# Patient Record
Sex: FEMALE | Race: WHITE | NOT HISPANIC OR LATINO | Employment: FULL TIME | URBAN - METROPOLITAN AREA
[De-identification: names, ages, dates, MRNs, and addresses within clinical notes are randomized per-mention and may not be internally consistent; named-entity substitution may affect disease eponyms.]

---

## 2017-04-20 ENCOUNTER — MYC MEDICAL ADVICE (OUTPATIENT)
Dept: FAMILY MEDICINE | Facility: CLINIC | Age: 42
End: 2017-04-20

## 2017-04-20 DIAGNOSIS — G47.19 DAYTIME HYPERSOMNOLENCE: ICD-10-CM

## 2017-04-20 DIAGNOSIS — R53.83 FATIGUE, UNSPECIFIED TYPE: Primary | ICD-10-CM

## 2017-04-24 DIAGNOSIS — G47.19 DAYTIME HYPERSOMNOLENCE: ICD-10-CM

## 2017-04-24 DIAGNOSIS — R53.83 FATIGUE, UNSPECIFIED TYPE: ICD-10-CM

## 2017-04-24 LAB
ERYTHROCYTE [DISTWIDTH] IN BLOOD BY AUTOMATED COUNT: 12.3 % (ref 10–15)
HCT VFR BLD AUTO: 37.1 % (ref 35–47)
HGB BLD-MCNC: 13.1 G/DL (ref 11.7–15.7)
MCH RBC QN AUTO: 32.8 PG (ref 26.5–33)
MCHC RBC AUTO-ENTMCNC: 35.3 G/DL (ref 31.5–36.5)
MCV RBC AUTO: 93 FL (ref 78–100)
PLATELET # BLD AUTO: 265 10E9/L (ref 150–450)
RBC # BLD AUTO: 3.99 10E12/L (ref 3.8–5.2)
TSH SERPL DL<=0.005 MIU/L-ACNC: 2.13 MU/L (ref 0.4–4)
WBC # BLD AUTO: 8.5 10E9/L (ref 4–11)

## 2017-04-24 PROCEDURE — 36415 COLL VENOUS BLD VENIPUNCTURE: CPT | Performed by: FAMILY MEDICINE

## 2017-04-24 PROCEDURE — 84443 ASSAY THYROID STIM HORMONE: CPT | Performed by: FAMILY MEDICINE

## 2017-04-24 PROCEDURE — 85027 COMPLETE CBC AUTOMATED: CPT | Performed by: FAMILY MEDICINE

## 2017-04-24 PROCEDURE — 82306 VITAMIN D 25 HYDROXY: CPT | Performed by: FAMILY MEDICINE

## 2017-04-25 LAB — DEPRECATED CALCIDIOL+CALCIFEROL SERPL-MC: 21 UG/L (ref 20–75)

## 2017-04-26 ENCOUNTER — MYC MEDICAL ADVICE (OUTPATIENT)
Dept: FAMILY MEDICINE | Facility: CLINIC | Age: 42
End: 2017-04-26

## 2017-04-27 NOTE — TELEPHONE ENCOUNTER
Dr. Maher -  Patient has an appointment on Friday. Please review results below. Thank you.  Adele Cannon RN    Component      Latest Ref Rng & Units 4/24/2017   WBC      4.0 - 11.0 10e9/L 8.5   RBC Count      3.8 - 5.2 10e12/L 3.99   Hemoglobin      11.7 - 15.7 g/dL 13.1   Hematocrit      35.0 - 47.0 % 37.1   MCV      78 - 100 fl 93   MCH      26.5 - 33.0 pg 32.8   MCHC      31.5 - 36.5 g/dL 35.3   RDW      10.0 - 15.0 % 12.3   Platelet Count      150 - 450 10e9/L 265   TSH      0.40 - 4.00 mU/L 2.13   Vitamin D Deficiency screening      20 - 75 ug/L 21

## 2017-04-28 ENCOUNTER — OFFICE VISIT (OUTPATIENT)
Dept: FAMILY MEDICINE | Facility: CLINIC | Age: 42
End: 2017-04-28
Payer: COMMERCIAL

## 2017-04-28 VITALS
DIASTOLIC BLOOD PRESSURE: 75 MMHG | HEIGHT: 66 IN | WEIGHT: 187 LBS | SYSTOLIC BLOOD PRESSURE: 118 MMHG | HEART RATE: 61 BPM | BODY MASS INDEX: 30.05 KG/M2 | TEMPERATURE: 98 F

## 2017-04-28 DIAGNOSIS — R53.82 CHRONIC FATIGUE: Primary | ICD-10-CM

## 2017-04-28 PROCEDURE — 99213 OFFICE O/P EST LOW 20 MIN: CPT | Performed by: FAMILY MEDICINE

## 2017-04-28 ASSESSMENT — ANXIETY QUESTIONNAIRES
5. BEING SO RESTLESS THAT IT IS HARD TO SIT STILL: NOT AT ALL
3. WORRYING TOO MUCH ABOUT DIFFERENT THINGS: NOT AT ALL
7. FEELING AFRAID AS IF SOMETHING AWFUL MIGHT HAPPEN: NOT AT ALL
2. NOT BEING ABLE TO STOP OR CONTROL WORRYING: NOT AT ALL
GAD7 TOTAL SCORE: 0
6. BECOMING EASILY ANNOYED OR IRRITABLE: NOT AT ALL
1. FEELING NERVOUS, ANXIOUS, OR ON EDGE: NOT AT ALL

## 2017-04-28 ASSESSMENT — PATIENT HEALTH QUESTIONNAIRE - PHQ9: 5. POOR APPETITE OR OVEREATING: NOT AT ALL

## 2017-04-28 NOTE — MR AVS SNAPSHOT
After Visit Summary   4/28/2017    Caitlin Oh    MRN: [id number]           Patient Information     Date Of Birth          1975        Visit Information        Provider Department      4/28/2017 7:30 AM Sujatha Maher MD [facility]        Today's Diagnoses     Chronic fatigue    -  1      Care Instructions    Try some flonase or nasonex mist spray for the next 2-4 weeks to see if this helps at all  If not make the sleep  appointment           Follow-ups after your visit        Additional Services     SLEEP EVALUATION & MANAGEMENT REFERRAL - ADULT       Please be aware that coverage of these services is subject to the terms and limitations of your health insurance plan.  Call member services at your health plan with any benefit or coverage questions.      Please bring the following to your appointment:    >>   List of current medications   >>   This referral request   >>   Any documents/labs given to you for this referral    [facility]  Ph [phone] (Age 2 and up)                  Future tests that were ordered for you today     Open Future Orders        Priority Expected Expires Ordered    SLEEP EVALUATION & MANAGEMENT REFERRAL - ADULT Routine  4/28/2018 4/28/2017            Who to contact     Normal or non-critical lab and imaging results will be communicated to you by MyChart, letter or phone within 4 business days after the clinic has received the results. If you do not hear from us within 7 days, please contact the clinic through MyChart or phone. If you have a critical or abnormal lab result, we will notify you by phone as soon as possible.  Submit refill requests through AdStackt or call your pharmacy and they will forward the refill request to us. Please allow 3 business days for your refill to be completed.          If you need to speak with a  for additional information , please call: [phone]             Additional  "Information About Your Visit        MyChart Information     [employer] gives you secure access to your electronic health record. If you see a primary care provider, you can also send messages to your care team and make appointments. If you have questions, please call your primary care clinic.  If you do not have a primary care provider, please call [phone] and they will assist you.        Care EveryWhere ID     This is your Care EveryWhere ID. This could be used by other organizations to access your [facility] medical records  [id number]        Your Vitals Were     Pulse Temperature Height BMI (Body Mass Index)          61 98  F (36.7  C) (Tympanic) 5' 6\" (1.676 m) 30.18 kg/m2         Blood Pressure from Last 3 Encounters:   04/28/17 118/75   09/27/16 128/73   05/13/16 119/76    Weight from Last 3 Encounters:   04/28/17 187 lb (84.8 kg)   09/27/16 191 lb (86.6 kg)   05/13/16 187 lb (84.8 kg)               Primary Care Provider Office Phone # Fax #    Sujatha Maher [phone]       [facility] [address] [facility] [address]        Thank you!     Thank you for choosing [facility]  for your care. Our goal is always to provide you with excellent care. Hearing back from our patients is one way we can continue to improve our services. Please take a few minutes to complete the written survey that you may receive in the mail after your visit with us. Thank you!             Your Updated Medication List - Protect others around you: Learn how to safely use, store and throw away your medicines at www.disposemymeds.org.          This list is accurate as of: 4/28/17  8:12 AM.  Always use your most recent med list.                   Brand Name Dispense Instructions for use    ADVIL PO      Take by mouth every 6 hours as needed       fluticasone 50 MCG/ACT spray    FLONASE    16 g    Spray 1-2 sprays into both nostrils daily       naproxen 500 MG tablet    NAPROSYN    60 tablet    " Take 1 tablet (500 mg) by mouth 2 times daily as needed for moderate pain       rizatriptan 5 MG ODT tab    MAXALT-MLT    18 tablet    Take 2 tablets (10 mg) by mouth at onset of headache for migraine May repeat dose in 2 hours.  Do not exceed 30 mg in 24 hours       tiZANidine 4 MG tablet    ZANAFLEX    90 tablet    Take 0.5 tablets (2 mg) by mouth At Bedtime May increase to 1 tablet as tolerated       topiramate 50 MG tablet    TOPAMAX    180 tablet    Take 1 tablet (50 mg) by mouth 2 times daily       TYLENOL PO      Take 500 mg by mouth every 6 hours as needed

## 2017-04-28 NOTE — NURSING NOTE
"No chief complaint on file.      Initial /75 (BP Location: Right arm, Patient Position: Chair, Cuff Size: Adult Regular)  Pulse 61  Temp 98  F (36.7  C) (Tympanic)  Ht 5' 6\" (1.676 m)  Wt 187 lb (84.8 kg)  BMI 30.18 kg/m2 Estimated body mass index is 30.18 kg/(m^2) as calculated from the following:    Height as of this encounter: 5' 6\" (1.676 m).    Weight as of this encounter: 187 lb (84.8 kg).  Medication Reconciliation: complete   Laurita Matute LPN    "

## 2017-04-28 NOTE — PATIENT INSTRUCTIONS
Try some flonase or nasonex mist spray for the next 2-4 weeks to see if this helps at all  If not make the sleep  appointment

## 2017-04-28 NOTE — PROGRESS NOTES
SUBJECTIVE:                                                    Caitlin Oh is a 42 year old female who presents to clinic today for the following health issues:      Follow up on recent blood work   Feels tired and she feels like she could just close her eyes and sleep   Feels rested in the morning   No headache in the morning   As the day goes on she just feels wiped   Not mentally fatigued just feels like she wants to close her eyes but she is not really sleepy  She is a    Not falling asleep driving. She is very active during the day  She has always had an issue with feeling a bit tired during the day  At night she sleeps with a pillow over her head.   She does wake one time to take the dog out. Remembers her dreams most nights.     No change in weight     Problem list and histories reviewed & adjusted, as indicated.  Additional history: as documented    Patient Active Problem List   Diagnosis     Esophageal reflux     CARDIOVASCULAR SCREENING; LDL GOAL LESS THAN 160     Kidney stones     Tension headache, chronic     Menstrual migraine     Menorrhagia     Cervical high risk HPV (human papillomavirus) test positive     Past Surgical History:   Procedure Laterality Date     BUNIONECTOMY RT/LT Bilateral 11/2009     CYSTOSCOPY, RETROGRADES, INSERT STENT URETER(S), COMBINED  9/19/2013    Procedure: COMBINED CYSTOSCOPY, RETROGRADES, INSERT STENT URETER(S);  Right Ureteral Stent Placement;  Surgeon: JUN Villar MD;  Location: [facility] OR     DILATION AND CURETTAGE, HYSTEROSCOPY, ABLATE ENDOMETRIUM THERMACHOICE, COMBINED N/A 4/15/2015    Procedure: COMBINED DILATION AND CURETTAGE, HYSTEROSCOPY, ABLATE ENDOMETRIUM THERMACHOICE;  Surgeon: Munira Goddard MD;  Location: [facility] OR     EXTRACORPOREAL SHOCK WAVE LITHOTRIPSY (ESWL)  9/18/2013    Procedure: EXTRACORPOREAL SHOCK WAVE LITHOTRIPSY (ESWL);  Right Extracorporeal Shock Wave Lithotripsy;  Surgeon: JUN Villar MD;  Location: [facility] OR     [facility]  SURGERY      for plantar fasciitis     HC TOOTH EXTRACTION W/FORCEP      wisdom teeth     LAPAROSCOPY DIAGNOSTIC (GYN)  2007    infertility     LASER HOLMIUM LITHOTRIPSY URETER(S), INSERT STENT, COMBINED  9/27/2013    Procedure: COMBINED CYSTOSCOPY, URETEROSCOPY, LASER HOLMIUM LITHOTRIPSY URETER(S), INSERT STENT;  Right Ureteroscopic Stone Extraction and Possible Stent Placement;  Surgeon: JUN Villar MD;  Location: [facility] OR     TONSILLECTOMY  1983       Social History   Substance Use Topics     Smoking status: Never Smoker     Smokeless tobacco: Never Used     Alcohol use No     Family History   Problem Relation Age of Onset     HEART DISEASE Maternal Grandmother      Breast Cancer Maternal Grandmother      dx'd age 70     Arthritis Mother      Hypertension Mother      CANCER Mother      skin cancer     Prostate Cancer Father            Reviewed and updated as needed this visit by clinical staff  Tobacco  Allergies  Med Hx  Surg Hx  Fam Hx  Soc Hx      Reviewed and updated as needed this visit by Provider         ROS:  C: NEGATIVE for fever, chills, change in weight  I: NEGATIVE for worrisome rashes, moles or lesions  E: NEGATIVE for vision changes or irritation  E/M: NEGATIVE for ear, mouth and throat problems  R: NEGATIVE for significant cough or SOB  B: NEGATIVE for masses, tenderness or discharge  CV: NEGATIVE for chest pain, palpitations or peripheral edema  GI: NEGATIVE for nausea, abdominal pain, heartburn, or change in bowel habits  : NEGATIVE for frequency, dysuria, or hematuria  M: NEGATIVE for significant arthralgias or myalgia  N: NEGATIVE for weakness, dizziness or paresthesias  E: NEGATIVE for temperature intolerance, skin/hair changes  ENDOCRINE: NEGATIVE for cold intolerance, constipation, hair loss, hot flashes, palpitations, paresthesias, polydipsia, polyphagia, polyuria, weight gain and weight loss  H: NEGATIVE for bleeding problems  P: NEGATIVE for changes in mood or affect    OBJECTIVE:  "                                                   /75 (BP Location: Right arm, Patient Position: Chair, Cuff Size: Adult Regular)  Pulse 61  Temp 98  F (36.7  C) (Tympanic)  Ht 5' 6\" (1.676 m)  Wt 187 lb (84.8 kg)  BMI 30.18 kg/m2  Body mass index is 30.18 kg/(m^2).  GENERAL: healthy, alert and no distress    Diagnostic Test Results:  Results for orders placed or performed in visit on 04/24/17   **TSH with free T4 reflex FUTURE anytime   Result Value Ref Range    TSH 2.13 0.40 - 4.00 mU/L   **Vitamin D Deficiency FUTURE anytime   Result Value Ref Range    Vitamin D Deficiency screening 21 20 - 75 ug/L   **CBC with platelets FUTURE 2mo   Result Value Ref Range    WBC 8.5 4.0 - 11.0 10e9/L    RBC Count 3.99 3.8 - 5.2 10e12/L    Hemoglobin 13.1 11.7 - 15.7 g/dL    Hematocrit 37.1 35.0 - 47.0 %    MCV 93 78 - 100 fl    MCH 32.8 26.5 - 33.0 pg    MCHC 35.3 31.5 - 36.5 g/dL    RDW 12.3 10.0 - 15.0 %    Platelet Count 265 150 - 450 10e9/L        ASSESSMENT/PLAN:                                                        BMI:   Estimated body mass index is 30.18 kg/(m^2) as calculated from the following:    Height as of this encounter: 5' 6\" (1.676 m).    Weight as of this encounter: 187 lb (84.8 kg).   Weight management plan: Discussed healthy diet and exercise guidelines and patient will follow up in 12 months in clinic to re-evaluate.      1. Chronic fatigue  Patient Instructions   Try some flonase or nasonex mist spray for the next 2-4 weeks to see if this helps at all  If not make the sleep  appointment         - SLEEP EVALUATION & MANAGEMENT REFERRAL - ADULT; Future    Work on weight loss  Regular exercise    Sujatha Maher MD  [facility]    "

## 2017-04-29 ASSESSMENT — ANXIETY QUESTIONNAIRES: GAD7 TOTAL SCORE: 0

## 2017-04-29 ASSESSMENT — PATIENT HEALTH QUESTIONNAIRE - PHQ9: SUM OF ALL RESPONSES TO PHQ QUESTIONS 1-9: 0

## 2017-07-12 ENCOUNTER — RECORDS - HEALTHEAST (OUTPATIENT)
Dept: LAB | Facility: HOSPITAL | Age: 42
End: 2017-07-12

## 2017-07-13 LAB — ANA SER QL: 0.2 U

## 2017-07-25 ENCOUNTER — HOSPITAL ENCOUNTER (OUTPATIENT)
Dept: PHYSICAL THERAPY | Facility: CLINIC | Age: 42
Setting detail: THERAPIES SERIES
End: 2017-07-25
Attending: FAMILY MEDICINE
Payer: COMMERCIAL

## 2017-07-25 PROCEDURE — 97140 MANUAL THERAPY 1/> REGIONS: CPT | Mod: GP | Performed by: PHYSICAL THERAPIST

## 2017-07-25 PROCEDURE — 40000718 ZZHC STATISTIC PT DEPARTMENT ORTHO VISIT: Performed by: PHYSICAL THERAPIST

## 2017-07-25 PROCEDURE — 97161 PT EVAL LOW COMPLEX 20 MIN: CPT | Mod: GP | Performed by: PHYSICAL THERAPIST

## 2017-07-26 NOTE — PROGRESS NOTES
07/25/17 1300   General Information   Type of Visit Initial OP Ortho PT Evaluation   Start of Care Date 07/25/17   Referring Physician Nika   Patient/Family Goals Statement decrease pain   Orders Evaluate and Treat   Date of Order 07/11/17   Insurance Type [employer]   Medical Diagnosis thoracic pain   Body Part(s)   Body Part(s) Cervical Spine   Presentation and Etiology   Pertinent history of current problem (include personal factors and/or comorbidities that impact the POC) back pain from a year ago came back.  not sure why.  it was better for the past year.  now constant.  similar to last time.  it helped in PT last time   Impairments A. Pain;J. Burning   Functional Limitations perform activities of daily living;perform desired leisure / sports activities   Symptom Location L thoracic spine   How/Where did it occur From insidious onset   Onset date of current episode/exacerbation 07/28/16   Chronicity Recurrent   Pain rating (0-10 point scale) Best (/10);Worst (/10)   Best (/10) 2   Worst (/10) 5   Pain quality A. Sharp;B. Dull;C. Aching;D. Burning;E. Shooting   Frequency of pain/symptoms A. Constant   Pain/symptoms are: The same all the time   Pain/symptoms eased by G. Heat   Progression of symptoms since onset: Improved   Fall Risk Screen   Per patient - Fall 2 or more times in past year? No   Per patient - Fall with injury in past year? No   Cervical Spine   Posture forward head, rounded   Thoracic Extension ROM stiff CTJ   Thoracic Right Rotation 90   Thoracic Left Rotation 80   Shoulder AROM Screen full   Segmental Mobility-Thoracic FRS LT6 L T10, L 9th rib   Palpation stiff UT, Levator gallo   Cervical Right Rotation ROM 55   Cervical Left Rotation ROM 55   Upper Trapezius Flexibility mod bilat   Levator Scapula Flexibility mod bilat   Cervical Flexibility Comments -   Vertebral Artery Test -   Alar Ligament Test -   Planned Therapy Interventions   Planned Therapy Interventions manual  therapy;neuromuscular re-education;ROM;strengthening;stretching   Clinical Impression   Criteria for Skilled Therapeutic Interventions Met yes, treatment indicated   PT Diagnosis mechanical T pain   Influenced by the following impairments stiffness, tone   Clinical Presentation Stable/Uncomplicated   Clinical Presentation Rationale pain, stiffness, tone   Clinical Decision Making (Complexity) Low complexity   Therapy Frequency 1 time/week   Predicted Duration of Therapy Intervention (days/wks) 6wk   Risk & Benefits of therapy have been explained Yes   Patient, Family & other staff in agreement with plan of care Yes   Education Assessment   Preferred Learning Style Demonstration   Barriers to Learning No barriers   Ortho Goal 1   Goal Identifier 1   Goal Description pt will be able to drive without pain in 4wks for work duties   Target Date 08/26/17   Ortho Goal 2   Goal Identifier 2   Goal Description pt will be able to sleep without waking from pain in 4wk   Target Date 08/26/17   Ortho Goal 3   Goal Identifier 3   Goal Description pt will be able to sleep on L side without pain   Target Date 09/06/17   Total Evaluation Time   Total Evaluation Time 30

## 2017-08-02 ENCOUNTER — HOSPITAL ENCOUNTER (OUTPATIENT)
Dept: PHYSICAL THERAPY | Facility: CLINIC | Age: 42
Setting detail: THERAPIES SERIES
End: 2017-08-02
Attending: FAMILY MEDICINE
Payer: COMMERCIAL

## 2017-08-02 PROCEDURE — 40000718 ZZHC STATISTIC PT DEPARTMENT ORTHO VISIT: Performed by: PHYSICAL THERAPIST

## 2017-08-02 PROCEDURE — 97140 MANUAL THERAPY 1/> REGIONS: CPT | Mod: GP | Performed by: PHYSICAL THERAPIST

## 2017-08-05 ENCOUNTER — E-VISIT (OUTPATIENT)
Dept: FAMILY MEDICINE | Facility: CLINIC | Age: 42
End: 2017-08-05
Payer: COMMERCIAL

## 2017-08-05 DIAGNOSIS — G89.29 CHRONIC MIDLINE LOW BACK PAIN, WITH SCIATICA PRESENCE UNSPECIFIED: Primary | ICD-10-CM

## 2017-08-05 DIAGNOSIS — M54.5 CHRONIC MIDLINE LOW BACK PAIN, WITH SCIATICA PRESENCE UNSPECIFIED: Primary | ICD-10-CM

## 2017-08-05 PROCEDURE — 99207 ZZC NO BILLABLE SERVICE THIS VISIT: CPT | Performed by: FAMILY MEDICINE

## 2017-08-17 ENCOUNTER — HOSPITAL ENCOUNTER (OUTPATIENT)
Dept: PHYSICAL THERAPY | Facility: CLINIC | Age: 42
Setting detail: THERAPIES SERIES
End: 2017-08-17
Attending: FAMILY MEDICINE
Payer: COMMERCIAL

## 2017-08-17 PROCEDURE — 40000718 ZZHC STATISTIC PT DEPARTMENT ORTHO VISIT: Performed by: PHYSICAL THERAPIST

## 2017-08-17 PROCEDURE — 97140 MANUAL THERAPY 1/> REGIONS: CPT | Mod: GP | Performed by: PHYSICAL THERAPIST

## 2017-08-22 ENCOUNTER — OFFICE VISIT (OUTPATIENT)
Dept: ORTHOPEDICS | Facility: CLINIC | Age: 42
End: 2017-08-22
Payer: COMMERCIAL

## 2017-08-22 VITALS
BODY MASS INDEX: 30.05 KG/M2 | HEIGHT: 66 IN | SYSTOLIC BLOOD PRESSURE: 119 MMHG | WEIGHT: 187 LBS | DIASTOLIC BLOOD PRESSURE: 78 MMHG

## 2017-08-22 DIAGNOSIS — M41.9 SCOLIOSIS OF THORACIC SPINE, UNSPECIFIED SCOLIOSIS TYPE: ICD-10-CM

## 2017-08-22 DIAGNOSIS — M54.6 PAIN IN THORACIC SPINE: Primary | ICD-10-CM

## 2017-08-22 PROCEDURE — 99213 OFFICE O/P EST LOW 20 MIN: CPT | Performed by: FAMILY MEDICINE

## 2017-08-22 NOTE — NURSING NOTE
"Chief Complaint   Patient presents with     Back Pain     mid-lower thoracic back pain > 1 month       Initial /78  Ht 5' 6\" (1.676 m)  Wt 187 lb (84.8 kg)  BMI 30.18 kg/m2 Estimated body mass index is 30.18 kg/(m^2) as calculated from the following:    Height as of this encounter: 5' 6\" (1.676 m).    Weight as of this encounter: 187 lb (84.8 kg).  Medication Reconciliation: complete     Enmanuel Horne, ATC  "

## 2017-08-22 NOTE — MR AVS SNAPSHOT
"              After Visit Summary   8/22/2017    Caitlin Oh    MRN: [id number]           Patient Information     Date Of Birth          1975        Visit Information        Provider Department      8/22/2017 8:00 AM Bart Carvalho,  [facility]        Today's Diagnoses     Pain in thoracic spine    -  1    Scoliosis of thoracic spine, unspecified scoliosis type           Follow-ups after your visit        Who to contact     If you have questions or need follow up information about today's clinic visit or your schedule please contact [facility] directly at [phone].  Normal or non-critical lab and imaging results will be communicated to you by Digitilitihart, letter or phone within 4 business days after the clinic has received the results. If you do not hear from us within 7 days, please contact the clinic through Innovaspiret or phone. If you have a critical or abnormal lab result, we will notify you by phone as soon as possible.  Submit refill requests through [employer] or call your pharmacy and they will forward the refill request to us. Please allow 3 business days for your refill to be completed.          Additional Information About Your Visit        MyChart Information     [employer] gives you secure access to your electronic health record. If you see a primary care provider, you can also send messages to your care team and make appointments. If you have questions, please call your primary care clinic.  If you do not have a primary care provider, please call [phone] and they will assist you.        Care EveryWhere ID     This is your Care EveryWhere ID. This could be used by other organizations to access your [facility] medical records  [id number]        Your Vitals Were     Height BMI (Body Mass Index)                5' 6\" (1.676 m) 30.18 kg/m2           Blood Pressure from Last 3 Encounters:   08/24/17 126/75   08/22/17 119/78 "   04/28/17 118/75    Weight from Last 3 Encounters:   08/24/17 190 lb 3.2 oz (86.3 kg)   08/22/17 187 lb (84.8 kg)   04/28/17 187 lb (84.8 kg)              Today, you had the following     No orders found for display         Today's Medication Changes          These changes are accurate as of: 8/22/17 11:59 PM.  If you have any questions, ask your nurse or doctor.               Stop taking these medicines if you haven't already. Please contact your care team if you have questions.     tiZANidine 4 MG tablet   Commonly known as:  ZANAFLEX   Stopped by:  Bart Carvalho,                     Primary Care Provider Office Phone # Fax #    Sujatha Maher [phone] [address]  [place] [address]        Equal Access to Services     [facility] HospitalGISELLE : Hadii ra oquendo Soyu, waaxda luqadaha, qaybta kaalmada charleen, miesha keith . So [facility] [phone].    ATENCIÓN: Si habla español, tiene a galdamez disposición servicios gratuitos de asistencia lingüística. [facility] [phone].    We comply with applicable federal civil rights laws and [place] laws. We do not discriminate on the basis of race, color, national origin, age, disability sex, sexual orientation or gender identity.            Thank you!     Thank you for choosing [facility]  for your care. Our goal is always to provide you with excellent care. Hearing back from our patients is one way we can continue to improve our services. Please take a few minutes to complete the written survey that you may receive in the mail after your visit with us. Thank you!             Your Updated Medication List - Protect others around you: Learn how to safely use, store and throw away your medicines at www.disposemymeds.org.          This list is accurate as of: 8/22/17 11:59 PM.  Always use your most recent med list.                   Brand Name Dispense Instructions for use  Diagnosis    ADVIL PO      Take by mouth every 6 hours as needed        fluticasone 50 MCG/ACT spray    FLONASE    16 g    Spray 1-2 sprays into both nostrils daily    Post-nasal drainage, Chronic rhinitis       rizatriptan 5 MG ODT tab    MAXALT-MLT    18 tablet    Take 2 tablets (10 mg) by mouth at onset of headache for migraine May repeat dose in 2 hours.  Do not exceed 30 mg in 24 hours    Headache(784.0)       TYLENOL PO      Take 500 mg by mouth every 6 hours as needed

## 2017-08-22 NOTE — Clinical Note
Tate Solares,   Saw you got an MR on her and discussed briefly.  I will give her a call as well to review the results.  Bart Carvalho DO, CAQ Primary Care Sports Medicine [facility]

## 2017-08-22 NOTE — PROGRESS NOTES
Caitlin Oh  :  1975  DOS: 2017  MRN: [id number]    Sports Medicine Clinic Visit    PCP: Sujatha Maher    Caitlin Oh is a 42 year old female who is seen in consultation at the request of Joshua Huston DPT presenting with left sided thoracic back pain.    Injury: Gradual onset of left lower thoracic back pain > 4 weeks, mostly with sleeping.  Pain located over left lower thoracic spine, TL junction, nonradiating.  Denies any radiating symptoms at this time.  Additional Features:  Positive: muscle spasm, intermittent burning pain.  Symptoms are better with Rest and lying on right sided.  Symptoms are worse with: lying supine, lying on left, repetitive lifting.  Other evaluation and/or treatments so far consists of: Ibuprofen, Rest and physical therapy (3 visits).  Recent imaging completed: X-rays completed 2016.  Prior History of related problems: H/o similar pain ~ 1 year ago that was treated by Dr Piedra, improved following physical therapy.    Social History: works as [employer] Officer     Review of Systems  Musculoskeletal: as above  Remainder of review of systems is negative including constitutional, CV, pulmonary, GI, Skin and Neurologic except as noted in HPI or medical history.    Past Medical History:   Diagnosis Date     Bilateral bunions      Cervical high risk HPV (human papillomavirus) test positive 2015     Female infertility 7/10/2008    Undergoing ivf .      Personal history of gestational diabetes 2012    diet controlled     Plantar fasciitis     s/p surgery     Past Surgical History:   Procedure Laterality Date     BUNIONECTOMY RT/LT Bilateral 2009     CYSTOSCOPY, RETROGRADES, INSERT STENT URETER(S), COMBINED  2013    Procedure: COMBINED CYSTOSCOPY, RETROGRADES, INSERT STENT URETER(S);  Right Ureteral Stent Placement;  Surgeon: JUN Villar MD;  Location: [facility]     DILATION AND CURETTAGE, HYSTEROSCOPY, ABLATE ENDOMETRIUM THERMACHOICE, COMBINED  "N/A 4/15/2015    Procedure: COMBINED DILATION AND CURETTAGE, HYSTEROSCOPY, ABLATE ENDOMETRIUM THERMACHOICE;  Surgeon: Munira Goddard MD;  Location: [facility]     EXTRACORPOREAL SHOCK WAVE LITHOTRIPSY (ESWL)  9/18/2013    Procedure: EXTRACORPOREAL SHOCK WAVE LITHOTRIPSY (ESWL);  Right Extracorporeal Shock Wave Lithotripsy;  Surgeon: JUN Villar MD;  Location: [facility] OR     FOOT SURGERY      for plantar fasciitis     HC TOOTH EXTRACTION W/FORCEP      wisdom teeth     LAPAROSCOPY DIAGNOSTIC (GYN)  2007    infertility     LASER HOLMIUM LITHOTRIPSY URETER(S), INSERT STENT, COMBINED  9/27/2013    Procedure: COMBINED CYSTOSCOPY, URETEROSCOPY, LASER HOLMIUM LITHOTRIPSY URETER(S), INSERT STENT;  Right Ureteroscopic Stone Extraction and Possible Stent Placement;  Surgeon: JUN Villar MD;  Location: [facility]     TONSILLECTOMY  1983       Objective  /78  Ht 5' 6\" (1.676 m)  Wt 187 lb (84.8 kg)  BMI 30.18 kg/m2    General: healthy, alert and in no distress    HEENT: no scleral icterus or conjunctival erythema   Skin: no suspicious lesions or rash. No jaundice.   CV: regular rhythm by palpation, 2+ distal pulses, no pedal edema    Resp: normal respiratory effort without conversational dyspnea   Psych: normal mood and affect    Gait: nonantalgic, appropriate coordination and balance   Neuro: normal light touch sensory exam of the extremities. Motor strength as noted below     Thoracic and Low back exam:    Inspection:       no visible deformity in the low back       normal skin       normal vascular       normal lymphatic    ROM:       full flexion       full extension       Mildly limited in L>R sidebending and R rotation, minimal discomfort    Tender:       paraspinal muscles on L near costovertebral joint and rib angles of T7-9    Non Tender:       remainder of lumbar spine and thoracic spine       No midline TTP    Strength:        No UE weakness or pain       hip flexion 5/5       knee extension 5/5       ankle " dorsiflexion 5/5       ankle plantarflexion 5/5       dorsiflexion of the great toe 5/5    Reflexes:       patellar (L3, L4) symmetric normal       achilles tendons (S1) symmetric normal    Sensation:      grossly intact throughout lower extremities    Skin:       well perfused       capillary refill brisk    Special tests:       straight leg raise left neg        straight leg raise right neg       neg (-) TATI        slump test neg       Radiology:  XR THORACIC SPINE 2 VW, XR RIBS & CHEST LT G/E 3VW 5/13/2016  10:07 AM      HISTORY: Pleurodynia      IMPRESSION:  1. No evidence of displaced left rib fracture, pleural effusion, or  pneumothorax.  2. The lungs appear clear and the chest within normal limits.  3. The thoracic spine demonstrates mild scoliosis apex to the right at  T6 and to the left at T9. No paravertebral soft tissue swelling. No  apparent compression fracture.    Assessment:  1. Pain in thoracic spine    2. Scoliosis of thoracic spine, unspecified scoliosis type        Plan:  Discussed the assessment with the patient.  Follow up: prn  Prior XR images independently visualized and reviewed with patient today in clinic  Advised continuing PT and being faithful with HEP  Discussed various risk factors for pain exacerbation, including vests/belts/jackets for work as [employer] officer  Discussed role of posture and posture exercises if working at a desk  Discussed no sign of radicular issue, so doubt MR would change mgmt, but given length of time and known scoliosis, MR to define DDD not unreasonable and can be considered any time  No clear role for bracing with current sx and mild scoliosis  We discussed modified progressive pain-free activity as tolerated  Home handouts provided and supportive care reviewed  All questions were answered today  Contact us with additional questions or concerns  Signs and sx of concern reviewed      Bart Carvalho DO, TOBIAS  Primary Care Sports Medicine  [facility]             Disclaimer: This note consists of symbols derived from keyboarding, dictation and/or voice recognition software. As a result, there may be errors in the script that have gone undetected. Please consider this when interpreting information found in this chart.

## 2017-08-24 ENCOUNTER — OFFICE VISIT (OUTPATIENT)
Dept: FAMILY MEDICINE | Facility: CLINIC | Age: 42
End: 2017-08-24
Payer: COMMERCIAL

## 2017-08-24 ENCOUNTER — HOSPITAL ENCOUNTER (OUTPATIENT)
Dept: PHYSICAL THERAPY | Facility: CLINIC | Age: 42
Setting detail: THERAPIES SERIES
End: 2017-08-24
Attending: FAMILY MEDICINE
Payer: COMMERCIAL

## 2017-08-24 VITALS
DIASTOLIC BLOOD PRESSURE: 75 MMHG | WEIGHT: 190.2 LBS | TEMPERATURE: 96.6 F | HEIGHT: 66 IN | SYSTOLIC BLOOD PRESSURE: 126 MMHG | HEART RATE: 63 BPM | BODY MASS INDEX: 30.57 KG/M2

## 2017-08-24 DIAGNOSIS — G43.829 MENSTRUAL MIGRAINE WITHOUT STATUS MIGRAINOSUS, NOT INTRACTABLE: ICD-10-CM

## 2017-08-24 DIAGNOSIS — Z00.00 ROUTINE GENERAL MEDICAL EXAMINATION AT A HEALTH CARE FACILITY: Primary | ICD-10-CM

## 2017-08-24 DIAGNOSIS — M54.14 THORACIC RADICULOPATHY: ICD-10-CM

## 2017-08-24 DIAGNOSIS — Z13.6 CARDIOVASCULAR SCREENING; LDL GOAL LESS THAN 160: ICD-10-CM

## 2017-08-24 LAB
ANION GAP SERPL CALCULATED.3IONS-SCNC: 6 MMOL/L (ref 3–14)
BUN SERPL-MCNC: 14 MG/DL (ref 7–30)
CALCIUM SERPL-MCNC: 8.7 MG/DL (ref 8.5–10.1)
CHLORIDE SERPL-SCNC: 109 MMOL/L (ref 94–109)
CHOLEST SERPL-MCNC: 167 MG/DL
CO2 SERPL-SCNC: 23 MMOL/L (ref 20–32)
CREAT SERPL-MCNC: 0.8 MG/DL (ref 0.52–1.04)
GFR SERPL CREATININE-BSD FRML MDRD: 79 ML/MIN/1.7M2
GLUCOSE SERPL-MCNC: 85 MG/DL (ref 70–99)
HDLC SERPL-MCNC: 53 MG/DL
LDLC SERPL CALC-MCNC: 96 MG/DL
NONHDLC SERPL-MCNC: 114 MG/DL
POTASSIUM SERPL-SCNC: 3.9 MMOL/L (ref 3.4–5.3)
SODIUM SERPL-SCNC: 138 MMOL/L (ref 133–144)
TRIGL SERPL-MCNC: 88 MG/DL

## 2017-08-24 PROCEDURE — 40000718 ZZHC STATISTIC PT DEPARTMENT ORTHO VISIT: Performed by: PHYSICAL THERAPIST

## 2017-08-24 PROCEDURE — 99396 PREV VISIT EST AGE 40-64: CPT | Performed by: FAMILY MEDICINE

## 2017-08-24 PROCEDURE — 80061 LIPID PANEL: CPT | Performed by: FAMILY MEDICINE

## 2017-08-24 PROCEDURE — 99212 OFFICE O/P EST SF 10 MIN: CPT | Mod: 25 | Performed by: FAMILY MEDICINE

## 2017-08-24 PROCEDURE — 97110 THERAPEUTIC EXERCISES: CPT | Mod: GP | Performed by: PHYSICAL THERAPIST

## 2017-08-24 PROCEDURE — 36415 COLL VENOUS BLD VENIPUNCTURE: CPT | Performed by: FAMILY MEDICINE

## 2017-08-24 PROCEDURE — 97140 MANUAL THERAPY 1/> REGIONS: CPT | Mod: GP | Performed by: PHYSICAL THERAPIST

## 2017-08-24 PROCEDURE — 80048 BASIC METABOLIC PNL TOTAL CA: CPT | Performed by: FAMILY MEDICINE

## 2017-08-24 RX ORDER — TOPIRAMATE 50 MG/1
50 TABLET, FILM COATED ORAL 2 TIMES DAILY
Qty: 180 TABLET | Refills: 3 | Status: SHIPPED | OUTPATIENT
Start: 2017-08-24 | End: 2018-08-24

## 2017-08-24 NOTE — MR AVS SNAPSHOT
After Visit Summary   8/24/2017    Caitlin Oh    MRN: [id number]           Patient Information     Date Of Birth          1975        Visit Information        Provider Department      8/24/2017 8:00 AM Sujatha Maher MD [facility]        Today's Diagnoses     Routine general medical examination at a health care facility    -  1    Menstrual migraine without status migrainosus, not intractable        CARDIOVASCULAR SCREENING; LDL GOAL LESS THAN 160        Thoracic radiculopathy          Care Instructions      Preventive Health Recommendations  Female Ages 40 to 49    Yearly exam:     See your health care provider every year in order to  1. Review health changes.   2. Discuss preventive care.    3. Review your medicines if your doctor prescribed any.      Get a Pap test every three years (unless you have an abnormal result and your provider advises testing more often).      If you get Pap tests with HPV test, you only need to test every 5 years, unless you have an abnormal result. You do not need a Pap test if your uterus was removed (hysterectomy) and you have not had cancer.      You should be tested each year for STDs (sexually transmitted diseases), if you're at risk.       Ask your doctor if you should have a mammogram.      Have a colonoscopy (test for colon cancer) if someone in your family has had colon cancer or polyps before age 50.       Have a cholesterol test every 5 years.       Have a diabetes test (fasting glucose) after age 45. If you are at risk for diabetes, you should have this test every 3 years.    Shots: Get a flu shot each year. Get a tetanus shot every 10 years.     Nutrition:     Eat at least 5 servings of fruits and vegetables each day.    Eat whole-grain bread, whole-wheat pasta and brown rice instead of white grains and rice.    Talk to your provider about Calcium and Vitamin D.     Lifestyle    Exercise at least 150 minutes a week (an average of 30  minutes a day, 5 days a week). This will help you control your weight and prevent disease.    Limit alcohol to one drink per day.    No smoking.     Wear sunscreen to prevent skin cancer.    See your dentist every six months for an exam and cleaning.          Follow-ups after your visit        Follow-up notes from your care team     Return if symptoms worsen or fail to improve.      Your next 10 appointments already scheduled     Sep 14, 2017  4:00 PM CDT   New Visit with Dain Vargas DPM   [facility] ([facility])    [address]  [address]  [place] [address]   [phone]              Who to contact     Normal or non-critical lab and imaging results will be communicated to you by [employer], letter or phone within 4 business days after the clinic has received the results. If you do not hear from us within 7 days, please contact the clinic through [employer] or phone. If you have a critical or abnormal lab result, we will notify you by phone as soon as possible.  Submit refill requests through [employer] or call your pharmacy and they will forward the refill request to us. Please allow 3 business days for your refill to be completed.          If you need to speak with a  for additional information , please call: [phone]             Additional Information About Your Visit        [employer] Information     [employer] gives you secure access to your electronic health record. If you see a primary care provider, you can also send messages to your care team and make appointments. If you have questions, please call your primary care clinic.  If you do not have a primary care provider, please call [phone] and they will assist you.        Care EveryWhere ID     This is your Care EveryWhere ID. This could be used by other organizations to access your [facility] medical records  [id number]        Your Vitals Were     Pulse Temperature Height BMI  "(Body Mass Index)          63 96.6  F (35.9  C) (Tympanic) 5' 6\" (1.676 m) 30.7 kg/m2         Blood Pressure from Last 3 Encounters:   08/24/17 126/75   08/22/17 119/78   04/28/17 118/75    Weight from Last 3 Encounters:   08/24/17 190 lb 3.2 oz (86.3 kg)   08/22/17 187 lb (84.8 kg)   04/28/17 187 lb (84.8 kg)              We Performed the Following     Basic metabolic panel     Lipid panel reflex to direct LDL          Where to get your medicines      These medications were sent to [place] [address] [place] [facility] [place] [address] [address] [facility] [address]     Phone:  [phone]     topiramate 50 MG tablet          Primary Care Provider Office Phone # Fax #    Sujatha Maher [phone] [address] [address] [address]        Equal Access to Services     TRACY BALDWIN AH: Hadii aad ku hadasho Soomaali, waaxda luqadaha, qaybta kaalmada adeegyada, waxay idiin haysamn adry keith . So [facility] [phone].    ATENCIÓN: Si habla español, tiene a galdamez disposición servicios gratuitos de asistencia lingüística. Llame al [phone].    We comply with applicable federal civil rights laws and [place] laws. We do not discriminate on the basis of race, color, national origin, age, disability sex, sexual orientation or gender identity.            Thank you!     Thank you for choosing [facility]  for your care. Our goal is always to provide you with excellent care. Hearing back from our patients is one way we can continue to improve our services. Please take a few minutes to complete the written survey that you may receive in the mail after your visit with us. Thank you!             Your Updated Medication List - Protect others around you: Learn how to safely use, store and throw away your medicines at www.disposemymeds.org.          This list is accurate as of: 8/24/17 11:59 PM.  Always use your most recent med list.                   Brand Name Dispense " Instructions for use Diagnosis    ADVIL PO      Take by mouth every 6 hours as needed        fluticasone 50 MCG/ACT spray    FLONASE    16 g    Spray 1-2 sprays into both nostrils daily    Post-nasal drainage, Chronic rhinitis       rizatriptan 5 MG ODT tab    MAXALT-MLT    18 tablet    Take 2 tablets (10 mg) by mouth at onset of headache for migraine May repeat dose in 2 hours.  Do not exceed 30 mg in 24 hours    Headache(784.0)       topiramate 50 MG tablet    TOPAMAX    180 tablet    Take 1 tablet (50 mg) by mouth 2 times daily    Menstrual migraine without status migrainosus, not intractable       TYLENOL PO      Take 500 mg by mouth every 6 hours as needed

## 2017-08-24 NOTE — NURSING NOTE
"Chief Complaint   Patient presents with     Physical       Initial /75  Pulse 63  Temp 96.6  F (35.9  C) (Tympanic)  Ht 5' 6\" (1.676 m)  Wt 190 lb 3.2 oz (86.3 kg)  BMI 30.7 kg/m2 Estimated body mass index is 30.7 kg/(m^2) as calculated from the following:    Height as of this encounter: 5' 6\" (1.676 m).    Weight as of this encounter: 190 lb 3.2 oz (86.3 kg).  Medication Reconciliation: complete   Ria Sarmiento CMA    "

## 2017-08-24 NOTE — PROGRESS NOTES
SUBJECTIVE:   CC: Caitlin Oh is an 42 year old woman who presents for preventive health visit.     Healthy Habits:    Do you get at least three servings of calcium containing foods daily (dairy, green leafy vegetables, etc.)? yes    Amount of exercise or daily activities, outside of work: 2-3 day(s) per week    Problems taking medications regularly not applicable    Medication side effects: No    Have you had an eye exam in the past two years? yes    Do you see a dentist twice per year? yes    Do you have sleep apnea, excessive snoring or daytime drowsiness?no    Discuss Naproxen vs advil     Today's PHQ-2 Score:   PHQ-2 ( 1999 [employer]) 8/24/2017 4/28/2017   Q1: Little interest or pleasure in doing things 0 0   Q2: Feeling down, depressed or hopeless 0 0   PHQ-2 Score 0 0   Q1: Little interest or pleasure in doing things - -   Q2: Feeling down, depressed or hopeless - -   PHQ-2 Score - -         Abuse: Current or Past(Physical, Sexual or Emotional)- No  Do you feel safe in your environment - Yes  Social History   Substance Use Topics     Smoking status: Never Smoker     Smokeless tobacco: Never Used     Alcohol use No     The patient does not drink >3 drinks per day nor >7 drinks per week.    Reviewed orders with patient.  Reviewed health maintenance and updated orders accordingly - Yes  BP Readings from Last 3 Encounters:   08/24/17 126/75   08/22/17 119/78   04/28/17 118/75    Wt Readings from Last 3 Encounters:   08/24/17 190 lb 3.2 oz (86.3 kg)   08/22/17 187 lb (84.8 kg)   04/28/17 187 lb (84.8 kg)                      Patient under age 50, mutual decision reflected in health maintenance.        Pertinent mammograms are reviewed under the imaging tab.Lab Results   Component Value Date    PAP NIL 09/27/2016    PAP NIL 10/28/2015    PAP NIL 07/11/2012     History of abnormal Pap smear: NO - age 30-65 PAP every 5 years with negative HPV co-testing recommended    Reviewed and updated as needed this visit by  "clinical staff         Reviewed and updated as needed this visit by Provider          She hascontinued to have the back pain she is conceerned that there is something more going on as she cont to have the pain in the upper abdomen and the wrapping feeling   Denies extremity weakness no numbness     ROS:  C: NEGATIVE for fever, chills, change in weight  I: NEGATIVE for worrisome rashes, moles or lesions  E: NEGATIVE for vision changes or irritation  ENT: NEGATIVE for ear, mouth and throat problems  R: NEGATIVE for significant cough or SOB  B: NEGATIVE for masses, tenderness or discharge  CV: NEGATIVE for chest pain, palpitations or peripheral edema  GI: NEGATIVE for nausea, abdominal pain, heartburn, or change in bowel habits  : NEGATIVE for unusual urinary or vaginal symptoms. Periods are regular.  MUSCULOSKELETAL:POSITIVE  for back pain , radicular pain thoracic and migraines   N: NEGATIVE for weakness, dizziness or paresthesias  P: NEGATIVE for changes in mood or affect    OBJECTIVE:   /75  Pulse 63  Temp 96.6  F (35.9  C) (Tympanic)  Ht 5' 6\" (1.676 m)  Wt 190 lb 3.2 oz (86.3 kg)  BMI 30.7 kg/m2  EXAM:  GENERAL: healthy, alert and no distress  HENT: ear canals and TM's normal, nose and mouth without ulcers or lesions  NECK: no adenopathy, no asymmetry, masses, or scars and thyroid normal to palpation  RESP: lungs clear to auscultation - no rales, rhonchi or wheezes  BREAST: normal without masses, tenderness or nipple discharge and no palpable axillary masses or adenopathy  CV: regular rate and rhythm, normal S1 S2, no S3 or S4, no murmur, click or rub, no peripheral edema and peripheral pulses strong  ABDOMEN: soft, nontender, no hepatosplenomegaly, no masses and bowel sounds normal  MS: no gross musculoskeletal defects noted, no edema  SKIN: no suspicious lesions or rashes  NEURO: Normal strength and tone, mentation intact and speech normal  Comprehensive back pain exam:  Tenderness of parathoracic " "muscles and facet joints lower mid thoracic , Range of motion not limited by pain and Lower extremity strength functional and equal on both sides  PSYCH: mentation appears normal, affect normal/bright    ASSESSMENT/PLAN:       ICD-10-CM    1. Routine general medical examination at a health care facility Z00.00    2. Menstrual migraine without status migrainosus, not intractable G43.829 topiramate (TOPAMAX) 50 MG tablet     Basic metabolic panel   3. CARDIOVASCULAR SCREENING; LDL GOAL LESS THAN 160 Z13.6 Lipid panel reflex to direct LDL   4. Thoracic radiculopathy M54.14 MR Thoracic Spine w/o Contrast   will get MRI she does have radiculopathy sx   Consider alternate physical therapy     COUNSELING:   Reviewed preventive health counseling, as reflected in patient instructions         reports that she has never smoked. She has never used smokeless tobacco.    Estimated body mass index is 30.18 kg/(m^2) as calculated from the following:    Height as of 8/22/17: 5' 6\" (1.676 m).    Weight as of 8/22/17: 187 lb (84.8 kg).   Weight management plan: Discussed healthy diet and exercise guidelines and patient will follow up in 6 months in clinic to re-evaluate.    Counseling Resources:  ATP IV Guidelines  Pooled Cohorts Equation Calculator  Breast Cancer Risk Calculator  FRAX Risk Assessment  ICSI Preventive Guidelines  Dietary Guidelines for Americans, 2010  USDA's MyPlate  ASA Prophylaxis  Lung CA Screening    Sujatha Maher MD  [facility]  "

## 2017-08-25 ENCOUNTER — HOSPITAL ENCOUNTER (OUTPATIENT)
Dept: MRI IMAGING | Facility: CLINIC | Age: 42
Discharge: HOME | End: 2017-08-25
Attending: FAMILY MEDICINE | Admitting: FAMILY MEDICINE
Payer: COMMERCIAL

## 2017-08-25 DIAGNOSIS — M54.14 THORACIC RADICULOPATHY: ICD-10-CM

## 2017-08-25 PROCEDURE — 72146 MRI CHEST SPINE W/O DYE: CPT

## 2017-08-28 NOTE — PROGRESS NOTES
Caitlin,  Your lab results were normal/stable. Please feel free to my chart or call the office with questions. Sujatha Maher M.D.

## 2017-08-29 ENCOUNTER — MYC MEDICAL ADVICE (OUTPATIENT)
Dept: FAMILY MEDICINE | Facility: CLINIC | Age: 42
End: 2017-08-29

## 2017-09-06 ENCOUNTER — TRANSFERRED RECORDS (OUTPATIENT)
Dept: HEALTH INFORMATION MANAGEMENT | Facility: CLINIC | Age: 42
End: 2017-09-06

## 2017-09-06 ENCOUNTER — MEDICAL CORRESPONDENCE (OUTPATIENT)
Dept: HEALTH INFORMATION MANAGEMENT | Facility: CLINIC | Age: 42
End: 2017-09-06

## 2017-09-06 ENCOUNTER — TELEPHONE (OUTPATIENT)
Dept: FAMILY MEDICINE | Facility: CLINIC | Age: 42
End: 2017-09-06

## 2017-09-06 NOTE — TELEPHONE ENCOUNTER
Reason for Call: Request for an order or referral:    Order or referral being requested: Referral for JEFF Childs Fax #[phone]    Date needed: as soon as possible -  She has appointment this afternoon (9/6/17).  She needs referral for back and neck pain.      Has the patient been seen by the PCP for this problem? YES    Additional comments: none    Phone number Patient can be reached at:  Other phone number:  Page @ [phone]    Best Time:  Any time    Can we leave a detailed message on this number?  YES    Call taken on 9/6/2017 at 1:14 PM by Reny Miranda

## 2017-09-07 ENCOUNTER — TRANSFERRED RECORDS (OUTPATIENT)
Dept: HEALTH INFORMATION MANAGEMENT | Facility: CLINIC | Age: 42
End: 2017-09-07

## 2017-09-18 ENCOUNTER — MYC MEDICAL ADVICE (OUTPATIENT)
Dept: FAMILY MEDICINE | Facility: CLINIC | Age: 42
End: 2017-09-18

## 2017-09-19 ENCOUNTER — E-VISIT (OUTPATIENT)
Dept: FAMILY MEDICINE | Facility: CLINIC | Age: 42
End: 2017-09-19
Payer: COMMERCIAL

## 2017-09-19 ENCOUNTER — MYC MEDICAL ADVICE (OUTPATIENT)
Dept: FAMILY MEDICINE | Facility: CLINIC | Age: 42
End: 2017-09-19

## 2017-09-19 DIAGNOSIS — M79.10 MYALGIA: ICD-10-CM

## 2017-09-19 DIAGNOSIS — M25.50 PAIN IN JOINT, MULTIPLE SITES: Primary | ICD-10-CM

## 2017-09-19 DIAGNOSIS — Z82.61 FAMILY HISTORY OF RHEUMATOID ARTHRITIS: ICD-10-CM

## 2017-09-19 PROCEDURE — 99444 ZZC PHYSICIAN ONLINE EVALUATION & MANAGEMENT SERVICE: CPT | Performed by: FAMILY MEDICINE

## 2017-09-21 DIAGNOSIS — M25.50 PAIN IN JOINT, MULTIPLE SITES: ICD-10-CM

## 2017-09-21 DIAGNOSIS — Z82.61 FAMILY HISTORY OF RHEUMATOID ARTHRITIS: ICD-10-CM

## 2017-09-21 DIAGNOSIS — M79.10 MYALGIA: ICD-10-CM

## 2017-09-21 LAB — CRP SERPL-MCNC: <2.9 MG/L (ref 0–8)

## 2017-09-21 PROCEDURE — 86038 ANTINUCLEAR ANTIBODIES: CPT | Performed by: FAMILY MEDICINE

## 2017-09-21 PROCEDURE — 86618 LYME DISEASE ANTIBODY: CPT | Performed by: FAMILY MEDICINE

## 2017-09-21 PROCEDURE — 86039 ANTINUCLEAR ANTIBODIES (ANA): CPT | Performed by: FAMILY MEDICINE

## 2017-09-21 PROCEDURE — 86200 CCP ANTIBODY: CPT | Performed by: FAMILY MEDICINE

## 2017-09-21 PROCEDURE — 86431 RHEUMATOID FACTOR QUANT: CPT | Performed by: FAMILY MEDICINE

## 2017-09-21 PROCEDURE — 86140 C-REACTIVE PROTEIN: CPT | Performed by: FAMILY MEDICINE

## 2017-09-21 PROCEDURE — 36415 COLL VENOUS BLD VENIPUNCTURE: CPT | Performed by: FAMILY MEDICINE

## 2017-09-22 LAB
B BURGDOR IGG+IGM SER QL: 0.08 (ref 0–0.89)
CCP AB SER IA-ACNC: 1 U/ML
RHEUMATOID FACT SER NEPH-ACNC: <20 IU/ML (ref 0–20)

## 2017-09-25 LAB
ANA PAT SER IF-IMP: ABNORMAL
ANA PAT SER IF-IMP: ABNORMAL
ANA SER QL IF: POSITIVE
ANA TITR SER IF: ABNORMAL {TITER}
ANA TITR SER IF: ABNORMAL {TITER}

## 2017-09-26 ENCOUNTER — MYC MEDICAL ADVICE (OUTPATIENT)
Dept: FAMILY MEDICINE | Facility: CLINIC | Age: 42
End: 2017-09-26

## 2017-09-28 ENCOUNTER — OFFICE VISIT (OUTPATIENT)
Dept: PODIATRY | Facility: CLINIC | Age: 42
End: 2017-09-28
Payer: COMMERCIAL

## 2017-09-28 VITALS — HEIGHT: 66 IN | WEIGHT: 190 LBS | BODY MASS INDEX: 30.53 KG/M2

## 2017-09-28 DIAGNOSIS — M72.2 PLANTAR FASCIITIS, LEFT: Primary | ICD-10-CM

## 2017-09-28 PROCEDURE — 99203 OFFICE O/P NEW LOW 30 MIN: CPT | Performed by: PODIATRIST

## 2017-09-28 NOTE — MR AVS SNAPSHOT
After Visit Summary   9/28/2017    Caitlin Oh    MRN: [id number]           Patient Information     Date Of Birth          1975        Visit Information        Provider Department      9/28/2017 4:20 PM Dain Vargas DPM [facility]        Today's Diagnoses     Plantar fasciitis, left    -  1      Care Instructions    Initial musculoskeletal treatment recommendation:    1.  Stretch the calf muscles as instructed once an hour.  2.  Ice the injured area in the evening; 20 min on/off.  3.  Take antiinflammatory medication as directed.  4.  Massage the soft tissues around the injured area in the morning to loosen the tissue  5.  Wear supportive foot wear and/or arch supports (rigid not cushion).      If no improvement in symptoms within four to six weeks, return to clinic for reevaluation.            Follow-ups after your visit        Additional Services     ORTHOTICS REFERRAL       Please be aware that coverage of these services is subject to the terms and limitations of your health insurance plan.  Call member services at your health plan with any benefit or coverage questions.      Please bring the following to your appointment:    >>   Any x-rays, CTs or MRIs which have been performed.  Contact the facility where they were done to arrange for  prior to your scheduled appointment.  Any new CT, MRI or other procedures ordered by your specialist must be performed at a [facility] facility or coordinated by your clinic's referral office.    >>   List of current medications   >>   This referral request   >>   Any documents/labs given to you for this referral    ==This Referral PRINTS in the [facility] (ORTHOTICS & PROSTHETICS) Central scheduling office ==     The [facility] Central Scheduling staff will contact patient to arrange appointments. Central Scheduling Phone #:  [place]  [phone]     Orthotics: Foot Orthotics          "         Follow-up notes from your care team     Return in about 4 weeks (around 10/26/2017).      Your next 10 appointments already scheduled     Oct 31, 2017  1:00 PM CDT   New Visit with Clint Nye MD   [facility] ([facility])    [address] [place] [address] [id number] [phone]              Who to contact     If you have questions or need follow up information about today's clinic visit or your schedule please contact [facility] directly at [phone].  Normal or non-critical lab and imaging results will be communicated to you by Mieplehart, letter or phone within 4 business days after the clinic has received the results. If you do not hear from us within 7 days, please contact the clinic through [employer] or phone. If you have a critical or abnormal lab result, we will notify you by phone as soon as possible.  Submit refill requests through muzu tv or call your pharmacy and they will forward the refill request to us. Please allow 3 business days for your refill to be completed.          Additional Information About Your Visit        MyChart Information     muzu tv gives you secure access to your electronic health record. If you see a primary care provider, you can also send messages to your care team and make appointments. If you have questions, please call your primary care clinic.  If you do not have a primary care provider, please call [phone] and they will assist you.        Care EveryWhere ID     This is your Care EveryWhere ID. This could be used by other organizations to access your [facility] medical records  [id number]        Your Vitals Were     Height BMI (Body Mass Index)                1.676 m (5' 6\") 30.67 kg/m2           Blood Pressure from Last 3 Encounters:   08/24/17 126/75   08/22/17 119/78   04/28/17 118/75    Weight from Last 3 Encounters:   09/28/17 86.2 kg (190 lb)   08/24/17 86.3 kg (190 lb " 3.2 oz)   08/22/17 84.8 kg (187 lb)              We Performed the Following     ORTHOTICS REFERRAL        Primary Care Provider Office Phone # Fax #    Sujatha Maher [phone] [address] [place] [address]        Equal Access to Services     TRACY WILKESGISELLE : Hadii aad ku hadasho Soomaali, waaxda luqadaha, qaybta kaalmada adeegyada, waxay idiin hayaan aderocael myers lanoamn ah. So [facility] [phone].    ATENCIÓN: Si habla español, tiene a galdamez disposición servicios gratuitos de asistencia lingüística. Ll[facility] [phone].    We comply with applicable federal civil rights laws and [place] laws. We do not discriminate on the basis of race, color, national origin, age, disability sex, sexual orientation or gender identity.            Thank you!     Thank you for choosing [facility]  for your care. Our goal is always to provide you with excellent care. Hearing back from our patients is one way we can continue to improve our services. Please take a few minutes to complete the written survey that you may receive in the mail after your visit with us. Thank you!             Your Updated Medication List - Protect others around you: Learn how to safely use, store and throw away your medicines at www.disposemymeds.org.          This list is accurate as of: 9/28/17  4:38 PM.  Always use your most recent med list.                   Brand Name Dispense Instructions for use Diagnosis    ADVIL PO      Take by mouth every 6 hours as needed        fluticasone 50 MCG/ACT spray    FLONASE    16 g    Spray 1-2 sprays into both nostrils daily    Post-nasal drainage, Chronic rhinitis       rizatriptan 5 MG ODT tab    MAXALT-MLT    18 tablet    Take 2 tablets (10 mg) by mouth at onset of headache for migraine May repeat dose in 2 hours.  Do not exceed 30 mg in 24 hours    Headache(784.0)       topiramate 50 MG tablet    TOPAMAX    180 tablet    Take 1 tablet (50 mg) by mouth 2  times daily    Menstrual migraine without status migrainosus, not intractable       TYLENOL PO      Take 500 mg by mouth every 6 hours as needed

## 2017-09-28 NOTE — LETTER
9/28/2017         RE: Caitlin Oh  [address] [place] [address]        Dear Colleague,    Thank you for referring your patient, Caitlin Oh, to the [facility]. Please see a copy of my visit note below.    PATIENT HISTORY:  Caitlin Oh is a 42 year old female who presents to clinic for a painful left foot .  The patient describes the pain as sharp stabbing.  The patient relates the pain level is moderate.  The patient relates pain is located on the bottom of her left heel.  The patient relates the pain has been present for the past several weeks.  The patient relates pain with ambulation.  The patient has tried different shoes with little relief.      REVIEW OF SYSTEMS:  Constitutional, HEENT, cardiovascular, pulmonary, GI, , musculoskeletal, neuro, skin, endocrine and psych systems are negative, except as otherwise noted.     PAST MEDICAL HISTORY:   Past Medical History:   Diagnosis Date     Bilateral bunions      Cervical high risk HPV (human papillomavirus) test positive 11/4/2015     Female infertility 7/10/2008    Undergoing ivf 2008.      Personal history of gestational diabetes 7/11/2012    diet controlled     Plantar fasciitis     s/p surgery        PAST SURGICAL HISTORY:   Past Surgical History:   Procedure Laterality Date     BUNIONECTOMY RT/LT Bilateral 11/2009     CYSTOSCOPY, RETROGRADES, INSERT STENT URETER(S), COMBINED  9/19/2013    Procedure: COMBINED CYSTOSCOPY, RETROGRADES, INSERT STENT URETER(S);  Right Ureteral Stent Placement;  Surgeon: JUN Villar MD;  Location: [facility]     DILATION AND CURETTAGE, HYSTEROSCOPY, ABLATE ENDOMETRIUM THERMACHOICE, COMBINED N/A 4/15/2015    Procedure: COMBINED DILATION AND CURETTAGE, HYSTEROSCOPY, ABLATE ENDOMETRIUM THERMACHOICE;  Surgeon: Munira Goddard MD;  Location: [facility] OR     EXTRACORPOREAL SHOCK WAVE LITHOTRIPSY (ESWL)  9/18/2013    Procedure: EXTRACORPOREAL SHOCK WAVE LITHOTRIPSY (ESWL);  Right  Extracorporeal Shock Wave Lithotripsy;  Surgeon: JUN Villar MD;  Location: [facility] OR     FOOT SURGERY      for plantar fasciitis     HC TOOTH EXTRACTION W/FORCEP      wisdom teeth     LAPAROSCOPY DIAGNOSTIC (GYN)  2007    infertility     LASER HOLMIUM LITHOTRIPSY URETER(S), INSERT STENT, COMBINED  9/27/2013    Procedure: COMBINED CYSTOSCOPY, URETEROSCOPY, LASER HOLMIUM LITHOTRIPSY URETER(S), INSERT STENT;  Right Ureteroscopic Stone Extraction and Possible Stent Placement;  Surgeon: JUN Villar MD;  Location: [facility] OR     TONSILLECTOMY  1983        MEDICATIONS:   Current Outpatient Prescriptions:      topiramate (TOPAMAX) 50 MG tablet, Take 1 tablet (50 mg) by mouth 2 times daily, Disp: 180 tablet, Rfl: 3     fluticasone (FLONASE) 50 MCG/ACT nasal spray, Spray 1-2 sprays into both nostrils daily, Disp: 16 g, Rfl: 11     rizatriptan (MAXALT-MLT) 5 MG disintegrating tablet, Take 2 tablets (10 mg) by mouth at onset of headache for migraine May repeat dose in 2 hours.  Do not exceed 30 mg in 24 hours, Disp: 18 tablet, Rfl: 1     Ibuprofen (ADVIL PO), Take by mouth every 6 hours as needed, Disp: , Rfl:      Acetaminophen (TYLENOL PO), Take 500 mg by mouth every 6 hours as needed, Disp: , Rfl:      ALLERGIES:    Allergies   Allergen Reactions     Penicillins Rash        SOCIAL HISTORY:   Social History     Social History     Marital status:      Spouse name: Robi Oh     Number of children: 1     Years of education: 18     Occupational History      [employer]     Social History Main Topics     Smoking status: Never Smoker     Smokeless tobacco: Never Used     Alcohol use No     Drug use: No     Sexual activity: Yes     Partners: Male     Birth control/ protection: Male Surgical      Comment: infertility, vasectomy     Other Topics Concern     Parent/Sibling W/ Cabg, Mi Or Angioplasty Before 65f 55m? No     Social History Narrative    Caffeine intake/servings daily - 1    Calcium  "intake/servings daily - 3    Exercise 6 times weekly - describe cardio and weightlifting    Sunscreen used - Yes    Seatbelts used - Yes    Guns stored in the home - Yes    Self Breast Exam - Yes    Pap test up to date -  Yes 1/04    Eye exam up to date -  No    Dental exam up to date -  Yes    DEXA scan up to date -  Not Applicable    Flex Sig/Colonoscopy up to date -  Not Applicable    Mammography up to date -  Not Applicable    Immunizations reviewed and up to date - Yes    Abuse: Current or Past (Physical, Sexual or Emotional) - No    Do you feel safe in your environment - Yes    Do you cope well with stress - Yes    Do you suffer from insomnia - No    Last updated by: Joselyn Massey  1/28/2005                                FAMILY HISTORY:   Family History   Problem Relation Age of Onset     HEART DISEASE Maternal Grandmother      Breast Cancer Maternal Grandmother      dx'd age 70     Arthritis Mother      Hypertension Mother      CANCER Mother      skin cancer     Prostate Cancer Father         EXAM:Vitals: Ht 1.676 m (5' 6\")  Wt 86.2 kg (190 lb)  BMI 30.67 kg/m2  BMI= Body mass index is 30.67 kg/(m^2).    Weight management plan: Patient was referred to their PCP to discuss a diet and exercise plan.    General appearance: Patient is alert and fully cooperative with history & exam.  No sign of distress is noted during the visit.     Psychiatric: Affect is pleasant & appropriate.  Patient appears motivated to improve health.     Respiratory: Breathing is regular & unlabored while sitting.     HEENT: Hearing is intact to spoken word.  Speech is clear.  No gross evidence of visual impairment that would impact ambulation.     Dermatologic: Skin is intact to both lower extremities without significant lesions, rash or abrasion.  No paronychia or evidence of soft tissue infection is noted.     Vascular: DP & PT pulses are intact & regular bilaterally.  No significant edema or varicosities noted.  CFT and skin " temperature is normal to both lower extremities.     Neurologic: Lower extremity sensation is intact to light touch.  No evidence of weakness or contracture in the lower extremities.  No evidence of neuropathy.     Musculoskeletal: Patient is ambulatory without assistive device or brace.  No gross ankle deformity noted.  No foot or ankle joint effusion is noted.  No pain on palpation of the plantar aspect of the left heel at the insertion point of the plantar fascia. No surrounding erythema noted. Once a tight gastroc complex on the left lower extremity.         ASSESSMENT / PLAN:     ICD-10-CM    1. Plantar fasciitis, left M72.2 ORTHOTICS REFERRAL       I have explained to Caitlin  about the conditions.  We discussed the nature of the condition as well as the treatment plan and expected length of recovery.  At this time, the patient was instructed on icing, stretching, tissue massage and support.  The patient was referred to [facility] for custom orthotics that will aid in offloading the tension forces to the soft tissues and prevent further inflammation.   The patient will return in four weeks for reevaluation if the symptoms do not resolve.        Disclaimer: This note consists of symbols derived from keyboarding, dictation and/or voice recognition software. As a result, there may be errors in the script that have gone undetected. Please consider this when interpreting information found in this chart.       TREMAINE [email or web address]., F.A.C.F.A.S.        Again, thank you for allowing me to participate in the care of your patient.        Sincerely,        Dain Vargas DPM

## 2017-09-28 NOTE — PROGRESS NOTES
PATIENT HISTORY:  Caitlin Oh is a 42 year old female who presents to clinic for a painful left foot .  The patient describes the pain as sharp stabbing.  The patient relates the pain level is moderate.  The patient relates pain is located on the bottom of her left heel.  The patient relates the pain has been present for the past several weeks.  The patient relates pain with ambulation.  The patient has tried different shoes with little relief.      REVIEW OF SYSTEMS:  Constitutional, HEENT, cardiovascular, pulmonary, GI, , musculoskeletal, neuro, skin, endocrine and psych systems are negative, except as otherwise noted.     PAST MEDICAL HISTORY:   Past Medical History:   Diagnosis Date     Bilateral bunions      Cervical high risk HPV (human papillomavirus) test positive 11/4/2015     Female infertility 7/10/2008    Undergoing ivf 2008.      Personal history of gestational diabetes 7/11/2012    diet controlled     Plantar fasciitis     s/p surgery        PAST SURGICAL HISTORY:   Past Surgical History:   Procedure Laterality Date     BUNIONECTOMY RT/LT Bilateral 11/2009     CYSTOSCOPY, RETROGRADES, INSERT STENT URETER(S), COMBINED  9/19/2013    Procedure: COMBINED CYSTOSCOPY, RETROGRADES, INSERT STENT URETER(S);  Right Ureteral Stent Placement;  Surgeon: JUN Villar MD;  Location: [facility]     DILATION AND CURETTAGE, HYSTEROSCOPY, ABLATE ENDOMETRIUM THERMACHOICE, COMBINED N/A 4/15/2015    Procedure: COMBINED DILATION AND CURETTAGE, HYSTEROSCOPY, ABLATE ENDOMETRIUM THERMACHOICE;  Surgeon: Munira Goddard MD;  Location: [facility]     EXTRACORPOREAL SHOCK WAVE LITHOTRIPSY (ESWL)  9/18/2013    Procedure: EXTRACORPOREAL SHOCK WAVE LITHOTRIPSY (ESWL);  Right Extracorporeal Shock Wave Lithotripsy;  Surgeon: JUN Villar MD;  Location: [facility] OR     FOOT SURGERY      for plantar fasciitis     HC TOOTH EXTRACTION W/FORCEP      wisdom teeth     LAPAROSCOPY DIAGNOSTIC (GYN)  2007    infertility     LASER HOLMIUM  LITHOTRIPSY URETER(S), INSERT STENT, COMBINED  9/27/2013    Procedure: COMBINED CYSTOSCOPY, URETEROSCOPY, LASER HOLMIUM LITHOTRIPSY URETER(S), INSERT STENT;  Right Ureteroscopic Stone Extraction and Possible Stent Placement;  Surgeon: JUN Villar MD;  Location: [facility]     TONSILLECTOMY  1983        MEDICATIONS:   Current Outpatient Prescriptions:      topiramate (TOPAMAX) 50 MG tablet, Take 1 tablet (50 mg) by mouth 2 times daily, Disp: 180 tablet, Rfl: 3     fluticasone (FLONASE) 50 MCG/ACT nasal spray, Spray 1-2 sprays into both nostrils daily, Disp: 16 g, Rfl: 11     rizatriptan (MAXALT-MLT) 5 MG disintegrating tablet, Take 2 tablets (10 mg) by mouth at onset of headache for migraine May repeat dose in 2 hours.  Do not exceed 30 mg in 24 hours, Disp: 18 tablet, Rfl: 1     Ibuprofen (ADVIL PO), Take by mouth every 6 hours as needed, Disp: , Rfl:      Acetaminophen (TYLENOL PO), Take 500 mg by mouth every 6 hours as needed, Disp: , Rfl:      ALLERGIES:    Allergies   Allergen Reactions     Penicillins Rash        SOCIAL HISTORY:   Social History     Social History     Marital status:      Spouse name: Robi Oh     Number of children: 1     Years of education: 18     Occupational History      [employer]     Social History Main Topics     Smoking status: Never Smoker     Smokeless tobacco: Never Used     Alcohol use No     Drug use: No     Sexual activity: Yes     Partners: Male     Birth control/ protection: Male Surgical      Comment: infertility, vasectomy     Other Topics Concern     Parent/Sibling W/ Cabg, Mi Or Angioplasty Before 65f 55m? No     Social History Narrative    Caffeine intake/servings daily - 1    Calcium intake/servings daily - 3    Exercise 6 times weekly - describe cardio and weightlifting    Sunscreen used - Yes    Seatbelts used - Yes    Guns stored in the home - Yes    Self Breast Exam - Yes    Pap test up to date -  Yes 1/04    Eye exam up to date -   "No    Dental exam up to date -  Yes    DEXA scan up to date -  Not Applicable    Flex Sig/Colonoscopy up to date -  Not Applicable    Mammography up to date -  Not Applicable    Immunizations reviewed and up to date - Yes    Abuse: Current or Past (Physical, Sexual or Emotional) - No    Do you feel safe in your environment - Yes    Do you cope well with stress - Yes    Do you suffer from insomnia - No    Last updated by: Joselyn Massey  1/28/2005                                FAMILY HISTORY:   Family History   Problem Relation Age of Onset     HEART DISEASE Maternal Grandmother      Breast Cancer Maternal Grandmother      dx'd age 70     Arthritis Mother      Hypertension Mother      CANCER Mother      skin cancer     Prostate Cancer Father         EXAM:Vitals: Ht 1.676 m (5' 6\")  Wt 86.2 kg (190 lb)  BMI 30.67 kg/m2  BMI= Body mass index is 30.67 kg/(m^2).    Weight management plan: Patient was referred to their PCP to discuss a diet and exercise plan.    General appearance: Patient is alert and fully cooperative with history & exam.  No sign of distress is noted during the visit.     Psychiatric: Affect is pleasant & appropriate.  Patient appears motivated to improve health.     Respiratory: Breathing is regular & unlabored while sitting.     HEENT: Hearing is intact to spoken word.  Speech is clear.  No gross evidence of visual impairment that would impact ambulation.     Dermatologic: Skin is intact to both lower extremities without significant lesions, rash or abrasion.  No paronychia or evidence of soft tissue infection is noted.     Vascular: DP & PT pulses are intact & regular bilaterally.  No significant edema or varicosities noted.  CFT and skin temperature is normal to both lower extremities.     Neurologic: Lower extremity sensation is intact to light touch.  No evidence of weakness or contracture in the lower extremities.  No evidence of neuropathy.     Musculoskeletal: Patient is ambulatory without " assistive device or brace.  No gross ankle deformity noted.  No foot or ankle joint effusion is noted.  No pain on palpation of the plantar aspect of the left heel at the insertion point of the plantar fascia. No surrounding erythema noted. Once a tight gastroc complex on the left lower extremity.         ASSESSMENT / PLAN:     ICD-10-CM    1. Plantar fasciitis, left M72.2 ORTHOTICS REFERRAL       I have explained to Caitlin  about the conditions.  We discussed the nature of the condition as well as the treatment plan and expected length of recovery.  At this time, the patient was instructed on icing, stretching, tissue massage and support.  The patient was referred to [facility] for custom orthotics that will aid in offloading the tension forces to the soft tissues and prevent further inflammation.   The patient will return in four weeks for reevaluation if the symptoms do not resolve.        Disclaimer: This note consists of symbols derived from keyboarding, dictation and/or voice recognition software. As a result, there may be errors in the script that have gone undetected. Please consider this when interpreting information found in this chart.       FELTON Vargas D.P.M., FMELIDA.F.A.S.

## 2017-10-03 ENCOUNTER — OFFICE VISIT (OUTPATIENT)
Dept: FAMILY MEDICINE | Facility: CLINIC | Age: 42
End: 2017-10-03
Payer: COMMERCIAL

## 2017-10-03 ENCOUNTER — RADIANT APPOINTMENT (OUTPATIENT)
Dept: GENERAL RADIOLOGY | Facility: CLINIC | Age: 42
End: 2017-10-03
Attending: FAMILY MEDICINE
Payer: COMMERCIAL

## 2017-10-03 VITALS
SYSTOLIC BLOOD PRESSURE: 127 MMHG | TEMPERATURE: 98.6 F | DIASTOLIC BLOOD PRESSURE: 80 MMHG | WEIGHT: 186 LBS | HEART RATE: 61 BPM | HEIGHT: 66 IN | BODY MASS INDEX: 29.89 KG/M2

## 2017-10-03 DIAGNOSIS — M25.50 ARTHRALGIA OF MULTIPLE SITES, BILATERAL: Primary | ICD-10-CM

## 2017-10-03 DIAGNOSIS — M25.50 ARTHRALGIA OF MULTIPLE SITES, BILATERAL: ICD-10-CM

## 2017-10-03 DIAGNOSIS — H15.003 SCLERITIS OF BOTH EYES: ICD-10-CM

## 2017-10-03 DIAGNOSIS — Z23 NEED FOR PROPHYLACTIC VACCINATION AND INOCULATION AGAINST INFLUENZA: ICD-10-CM

## 2017-10-03 PROCEDURE — 36415 COLL VENOUS BLD VENIPUNCTURE: CPT | Performed by: FAMILY MEDICINE

## 2017-10-03 PROCEDURE — 90471 IMMUNIZATION ADMIN: CPT | Performed by: FAMILY MEDICINE

## 2017-10-03 PROCEDURE — 84550 ASSAY OF BLOOD/URIC ACID: CPT | Performed by: FAMILY MEDICINE

## 2017-10-03 PROCEDURE — 90686 IIV4 VACC NO PRSV 0.5 ML IM: CPT | Performed by: FAMILY MEDICINE

## 2017-10-03 PROCEDURE — 86480 TB TEST CELL IMMUN MEASURE: CPT | Performed by: FAMILY MEDICINE

## 2017-10-03 PROCEDURE — 99213 OFFICE O/P EST LOW 20 MIN: CPT | Mod: 25 | Performed by: FAMILY MEDICINE

## 2017-10-03 PROCEDURE — 99000 SPECIMEN HANDLING OFFICE-LAB: CPT | Performed by: FAMILY MEDICINE

## 2017-10-03 PROCEDURE — 85549 MURAMIDASE: CPT | Mod: 90 | Performed by: FAMILY MEDICINE

## 2017-10-03 PROCEDURE — 86255 FLUORESCENT ANTIBODY SCREEN: CPT | Mod: 90 | Performed by: FAMILY MEDICINE

## 2017-10-03 PROCEDURE — 71020 XR CHEST 2 VW: CPT

## 2017-10-03 PROCEDURE — 82164 ANGIOTENSIN I ENZYME TEST: CPT | Mod: 90 | Performed by: FAMILY MEDICINE

## 2017-10-03 NOTE — PROGRESS NOTES
SUBJECTIVE:                                                    Caitlin Oh is a 42 year old female who presents to clinic today for the following health issues:    Patient appointment note:  Talk more about the pain in my back and rib cage.   Physical therapy is not working. It is uncomfortable   to take deep breaths and lay on my stomach and back.      Problem list and histories reviewed & adjusted, as indicated.  Additional history: the jaya came back positive. 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 I had not been aware that she had this and she did not realize before this that there was an association with auto immune disease.   Her mother has had RA for many years  She has been to physical therapy and she isnot improving  At this point with her wide spread pain she may have an auto immune disease       Patient Active Problem List   Diagnosis     Esophageal reflux     CARDIOVASCULAR SCREENING; LDL GOAL LESS THAN 160     Kidney stones     Tension headache, chronic     Menstrual migraine     Menorrhagia     Cervical high risk HPV (human papillomavirus) test positive     Past Surgical History:   Procedure Laterality Date     BUNIONECTOMY RT/LT Bilateral 11/2009     CYSTOSCOPY, RETROGRADES, INSERT STENT URETER(S), COMBINED  9/19/2013    Procedure: COMBINED CYSTOSCOPY, RETROGRADES, INSERT STENT URETER(S);  Right Ureteral Stent Placement;  Surgeon: JUN Villar MD;  Location: [facility]     DILATION AND CURETTAGE, HYSTEROSCOPY, ABLATE ENDOMETRIUM THERMACHOICE, COMBINED N/A 4/15/2015    Procedure: COMBINED DILATION AND CURETTAGE, HYSTEROSCOPY, ABLATE ENDOMETRIUM THERMACHOICE;  Surgeon: Munira Goddard MD;  Location: [facility]     EXTRACORPOREAL SHOCK WAVE LITHOTRIPSY (ESWL)  9/18/2013    Procedure: EXTRACORPOREAL SHOCK WAVE LITHOTRIPSY (ESWL);  Right Extracorporeal Shock Wave Lithotripsy;  Surgeon:  "JUN Villar MD;  Location: [facility]     FOOT SURGERY      for plantar fasciitis     HC TOOTH EXTRACTION W/FORCEP      wisdom teeth     LAPAROSCOPY DIAGNOSTIC (GYN)  2007    infertility     LASER HOLMIUM LITHOTRIPSY URETER(S), INSERT STENT, COMBINED  9/27/2013    Procedure: COMBINED CYSTOSCOPY, URETEROSCOPY, LASER HOLMIUM LITHOTRIPSY URETER(S), INSERT STENT;  Right Ureteroscopic Stone Extraction and Possible Stent Placement;  Surgeon: JUN Villar MD;  Location: [facility]     TONSILLECTOMY  1983       Social History   Substance Use Topics     Smoking status: Never Smoker     Smokeless tobacco: Never Used     Alcohol use No     Family History   Problem Relation Age of Onset     HEART DISEASE Maternal Grandmother      Breast Cancer Maternal Grandmother      dx'd age 70     Arthritis Mother      Hypertension Mother      CANCER Mother      skin cancer     Other Cancer Mother      Lymphoma     Prostate Cancer Father              ROS:  Constitutional, HEENT, cardiovascular, pulmonary, gi and gu systems are negative, except as otherwise noted.      OBJECTIVE:                                                    /80 (BP Location: Right arm, Patient Position: Chair, Cuff Size: Adult Regular)  Pulse 61  Temp 98.6  F (37  C) (Tympanic)  Ht 5' 6\" (1.676 m)  Wt 186 lb (84.4 kg)  BMI 30.02 kg/m2 Body mass index is 30.02 kg/(m^2).   GENERAL APPEARANCE: healthy, alert and no distress       ASSESSMENT/PLAN:                                                    1. Arthralgia of multiple sites, bilateral    - Uric acid  - Antineutrophil cytoplasmic Nubia IgG  - Angiotensin converting enzyme  - LYSOZYME, SERUM  - M Tuberculosis by Quantiferon  - XR Chest 2 Views; Future    2. Scleritis of both eyes    - Uric acid  - Antineutrophil cytoplasmic Nubia IgG  - Angiotensin converting enzyme  - LYSOZYME, SERUM  - M Tuberculosis by Quantiferon  - XR Chest 2 Views; Future    3. Need for prophylactic vaccination and inoculation against " influenza    - FLU VAC, SPLIT VIRUS IM > 3 YO (QUADRIVALENT) [36802]  - Vaccine Administration, Initial [74114]  Referral to rheumatology has already been make she will also get the additional labs     reports that she has never smoked. She has never used smokeless tobacco.        Weight management plan: Discussed healthy diet and exercise guidelines and patient will follow up in 12 months in clinic to re-evaluate.  Sujatha Maher M.D.  [facility]

## 2017-10-03 NOTE — PROGRESS NOTES
Injectable Influenza Immunization Documentation    1.  Is the person to be vaccinated sick today?   No    2. Does the person to be vaccinated have an allergy to a component   of the vaccine?   No    3. Has the person to be vaccinated ever had a serious reaction   to influenza vaccine in the past?   No    4. Has the person to be vaccinated ever had Guillain-Barré syndrome?   No    Form completed by Charito Garner CMA

## 2017-10-03 NOTE — MR AVS SNAPSHOT
After Visit Summary   10/3/2017    Caitlin Oh    MRN: [id number]           Patient Information     Date Of Birth          1975        Visit Information        Provider Department      10/3/2017 2:00 PM Sujatha Maher MD [facility]        Today's Diagnoses     Arthralgia of multiple sites, bilateral    -  1    Scleritis of both eyes        Need for prophylactic vaccination and inoculation against influenza           Follow-ups after your visit        Follow-up notes from your care team     Return with the speciaoist.      Your next 10 appointments already scheduled     Oct 25, 2017  8:00 AM CDT   New Visit with Clint Nye MD   [facility] ([facility])    [address] [facility] [address] [phone]              Who to contact     Normal or non-critical lab and imaging results will be communicated to you by MyChart, letter or phone within 4 business days after the clinic has received the results. If you do not hear from us within 7 days, please contact the clinic through MyChart or phone. If you have a critical or abnormal lab result, we will notify you by phone as soon as possible.  Submit refill requests through wali or call your pharmacy and they will forward the refill request to us. Please allow 3 business days for your refill to be completed.          If you need to speak with a  for additional information , please call: [phone]             Additional Information About Your Visit        [employer] Information     wali gives you secure access to your electronic health record. If you see a primary care provider, you can also send messages to your care team and make appointments. If you have questions, please call your primary care clinic.  If you do not have a primary care provider, please call [phone] and they will assist you.        Care EveryWhere ID     This is your Care EveryWhere ID. This could be  "used by other organizations to access your [facility] medical records  [id number]        Your Vitals Were     Pulse Temperature Height BMI (Body Mass Index)          61 98.6  F (37  C) (Tympanic) 5' 6\" (1.676 m) 30.02 kg/m2         Blood Pressure from Last 3 Encounters:   10/03/17 127/80   08/24/17 126/75   08/22/17 119/78    Weight from Last 3 Encounters:   10/03/17 186 lb (84.4 kg)   09/28/17 190 lb (86.2 kg)   08/24/17 190 lb 3.2 oz (86.3 kg)              We Performed the Following     Angiotensin converting enzyme     Antineutrophil cytoplasmic Nubia IgG     FLU VAC, SPLIT VIRUS IM > 3 YO (QUADRIVALENT) [04501]     LYSOZYME, SERUM     M Tuberculosis by Quantiferon     Uric acid     Vaccine Administration, Initial [68294]        Primary Care Provider Office Phone # Fax #    Sujatha Maher [phone] [address] [facility] [address]        Equal Access to Services     TRACY BALDWIN : Hadii aad ku hadasho Soomaali, waaxda luqadaha, qaybta kaalmada adeegyada, waxay idiin haysamn adry keith . So [facility] [phone].    ATENCIÓN: Si habla español, tiene a galdamez disposición servicios gratuitos de asistencia lingüística. Llame al [phone].    We comply with applicable federal civil rights laws and [place] laws. We do not discriminate on the basis of race, color, national origin, age, disability, sex, sexual orientation, or gender identity.            Thank you!     Thank you for choosing [facility]  for your care. Our goal is always to provide you with excellent care. Hearing back from our patients is one way we can continue to improve our services. Please take a few minutes to complete the written survey that you may receive in the mail after your visit with us. Thank you!             Your Updated Medication List - Protect others around you: Learn how to safely use, store and throw away your medicines at www.disposemymeds.org.          This list is accurate as of: " 10/3/17 11:59 PM.  Always use your most recent med list.                   Brand Name Dispense Instructions for use Diagnosis    ADVIL PO      Take by mouth every 6 hours as needed        fluticasone 50 MCG/ACT spray    FLONASE    16 g    Spray 1-2 sprays into both nostrils daily    Post-nasal drainage, Chronic rhinitis       rizatriptan 5 MG ODT tab    MAXALT-MLT    18 tablet    Take 2 tablets (10 mg) by mouth at onset of headache for migraine May repeat dose in 2 hours.  Do not exceed 30 mg in 24 hours    Headache(784.0)       topiramate 50 MG tablet    TOPAMAX    180 tablet    Take 1 tablet (50 mg) by mouth 2 times daily    Menstrual migraine without status migrainosus, not intractable       TYLENOL PO      Take 500 mg by mouth every 6 hours as needed

## 2017-10-04 LAB
ACE SERPL-CCNC: 23 U/L (ref 9–67)
URATE SERPL-MCNC: 3.4 MG/DL (ref 2.6–6)

## 2017-10-05 LAB
ANCA IGG TITR SER IF: NORMAL {TITER}
M TB TUBERC IFN-G BLD QL: NEGATIVE
M TB TUBERC IFN-G/MITOGEN IGNF BLD: 0 IU/ML

## 2017-10-06 LAB — LYSOZYME SERPL-MCNC: 1.28 UG/ML (ref 0–2.75)

## 2017-10-25 ENCOUNTER — OFFICE VISIT (OUTPATIENT)
Dept: RHEUMATOLOGY | Facility: CLINIC | Age: 42
End: 2017-10-25
Payer: COMMERCIAL

## 2017-10-25 ENCOUNTER — RESULTS ONLY (OUTPATIENT)
Dept: OTHER | Facility: CLINIC | Age: 42
End: 2017-10-25

## 2017-10-25 ENCOUNTER — RADIANT APPOINTMENT (OUTPATIENT)
Dept: GENERAL RADIOLOGY | Facility: CLINIC | Age: 42
End: 2017-10-25
Attending: INTERNAL MEDICINE
Payer: COMMERCIAL

## 2017-10-25 VITALS
HEIGHT: 66 IN | TEMPERATURE: 98.5 F | SYSTOLIC BLOOD PRESSURE: 143 MMHG | RESPIRATION RATE: 16 BRPM | WEIGHT: 191 LBS | BODY MASS INDEX: 30.7 KG/M2 | HEART RATE: 78 BPM | DIASTOLIC BLOOD PRESSURE: 86 MMHG

## 2017-10-25 DIAGNOSIS — M79.641 PAIN IN BOTH HANDS: ICD-10-CM

## 2017-10-25 DIAGNOSIS — M79.642 PAIN IN BOTH HANDS: ICD-10-CM

## 2017-10-25 DIAGNOSIS — R76.8 ANA POSITIVE: Primary | ICD-10-CM

## 2017-10-25 DIAGNOSIS — H15.002 SCLERITIS OF LEFT EYE: ICD-10-CM

## 2017-10-25 DIAGNOSIS — M54.50 CHRONIC MIDLINE LOW BACK PAIN WITHOUT SCIATICA: ICD-10-CM

## 2017-10-25 DIAGNOSIS — G89.29 CHRONIC MIDLINE LOW BACK PAIN WITHOUT SCIATICA: ICD-10-CM

## 2017-10-25 LAB
ALBUMIN SERPL-MCNC: 3.8 G/DL (ref 3.4–5)
ALBUMIN UR-MCNC: NEGATIVE MG/DL
ALP SERPL-CCNC: 78 U/L (ref 40–150)
ALT SERPL W P-5'-P-CCNC: 28 U/L (ref 0–50)
AMORPH CRY #/AREA URNS HPF: ABNORMAL /HPF
ANION GAP SERPL CALCULATED.3IONS-SCNC: 8 MMOL/L (ref 3–14)
APPEARANCE UR: ABNORMAL
AST SERPL W P-5'-P-CCNC: 13 U/L (ref 0–45)
BASOPHILS # BLD AUTO: 0 10E9/L (ref 0–0.2)
BASOPHILS NFR BLD AUTO: 0.2 %
BILIRUB SERPL-MCNC: 0.2 MG/DL (ref 0.2–1.3)
BILIRUB UR QL STRIP: NEGATIVE
BUN SERPL-MCNC: 12 MG/DL (ref 7–30)
CALCIUM SERPL-MCNC: 8.8 MG/DL (ref 8.5–10.1)
CHLORIDE SERPL-SCNC: 112 MMOL/L (ref 94–109)
CK SERPL-CCNC: 100 U/L (ref 30–225)
CO2 SERPL-SCNC: 21 MMOL/L (ref 20–32)
COLOR UR AUTO: YELLOW
CREAT SERPL-MCNC: 0.79 MG/DL (ref 0.52–1.04)
CREAT UR-MCNC: 47 MG/DL
CRP SERPL-MCNC: 5.7 MG/L (ref 0–8)
DIFFERENTIAL METHOD BLD: NORMAL
EOSINOPHIL # BLD AUTO: 0.1 10E9/L (ref 0–0.7)
EOSINOPHIL NFR BLD AUTO: 0.9 %
ERYTHROCYTE [DISTWIDTH] IN BLOOD BY AUTOMATED COUNT: 13 % (ref 10–15)
ERYTHROCYTE [SEDIMENTATION RATE] IN BLOOD BY WESTERGREN METHOD: 10 MM/H (ref 0–20)
GFR SERPL CREATININE-BSD FRML MDRD: 80 ML/MIN/1.7M2
GLUCOSE SERPL-MCNC: 92 MG/DL (ref 70–99)
GLUCOSE UR STRIP-MCNC: NEGATIVE MG/DL
HCT VFR BLD AUTO: 38.2 % (ref 35–47)
HGB BLD-MCNC: 13 G/DL (ref 11.7–15.7)
HGB UR QL STRIP: NEGATIVE
KETONES UR STRIP-MCNC: NEGATIVE MG/DL
LEUKOCYTE ESTERASE UR QL STRIP: NEGATIVE
LYMPHOCYTES # BLD AUTO: 1.8 10E9/L (ref 0.8–5.3)
LYMPHOCYTES NFR BLD AUTO: 21.8 %
MCH RBC QN AUTO: 31.8 PG (ref 26.5–33)
MCHC RBC AUTO-ENTMCNC: 34 G/DL (ref 31.5–36.5)
MCV RBC AUTO: 93 FL (ref 78–100)
MONOCYTES # BLD AUTO: 0.7 10E9/L (ref 0–1.3)
MONOCYTES NFR BLD AUTO: 8.5 %
NEUTROPHILS # BLD AUTO: 5.8 10E9/L (ref 1.6–8.3)
NEUTROPHILS NFR BLD AUTO: 68.6 %
NITRATE UR QL: NEGATIVE
NON-SQ EPI CELLS #/AREA URNS LPF: ABNORMAL /LPF
PH UR STRIP: 7 PH (ref 5–7)
PLATELET # BLD AUTO: 270 10E9/L (ref 150–450)
POTASSIUM SERPL-SCNC: 3.8 MMOL/L (ref 3.4–5.3)
PROT SERPL-MCNC: 7 G/DL (ref 6.8–8.8)
PROT UR-MCNC: 0.07 G/L
PROT/CREAT 24H UR: 0.16 G/G CR (ref 0–0.2)
RBC # BLD AUTO: 4.09 10E12/L (ref 3.8–5.2)
RBC #/AREA URNS AUTO: ABNORMAL /HPF
SODIUM SERPL-SCNC: 141 MMOL/L (ref 133–144)
SOURCE: ABNORMAL
SP GR UR STRIP: <=1.005 (ref 1–1.03)
UROBILINOGEN UR STRIP-ACNC: 0.2 EU/DL (ref 0.2–1)
WBC # BLD AUTO: 8.4 10E9/L (ref 4–11)
WBC #/AREA URNS AUTO: ABNORMAL /HPF

## 2017-10-25 PROCEDURE — 99204 OFFICE O/P NEW MOD 45 MIN: CPT | Performed by: INTERNAL MEDICINE

## 2017-10-25 PROCEDURE — 81001 URINALYSIS AUTO W/SCOPE: CPT | Performed by: INTERNAL MEDICINE

## 2017-10-25 PROCEDURE — 86160 COMPLEMENT ANTIGEN: CPT | Performed by: INTERNAL MEDICINE

## 2017-10-25 PROCEDURE — 73130 X-RAY EXAM OF HAND: CPT | Mod: LT

## 2017-10-25 PROCEDURE — 00000167 ZZHCL STATISTIC INR NC: Performed by: INTERNAL MEDICINE

## 2017-10-25 PROCEDURE — 00000401 ZZHCL STATISTIC THROMBIN TIME NC: Performed by: INTERNAL MEDICINE

## 2017-10-25 PROCEDURE — 86225 DNA ANTIBODY NATIVE: CPT | Performed by: INTERNAL MEDICINE

## 2017-10-25 PROCEDURE — 36415 COLL VENOUS BLD VENIPUNCTURE: CPT | Performed by: INTERNAL MEDICINE

## 2017-10-25 PROCEDURE — 80053 COMPREHEN METABOLIC PANEL: CPT | Performed by: INTERNAL MEDICINE

## 2017-10-25 PROCEDURE — 86147 CARDIOLIPIN ANTIBODY EA IG: CPT | Performed by: INTERNAL MEDICINE

## 2017-10-25 PROCEDURE — 86780 TREPONEMA PALLIDUM: CPT | Performed by: INTERNAL MEDICINE

## 2017-10-25 PROCEDURE — 86235 NUCLEAR ANTIGEN ANTIBODY: CPT | Performed by: INTERNAL MEDICINE

## 2017-10-25 PROCEDURE — 85025 COMPLETE CBC W/AUTO DIFF WBC: CPT | Performed by: INTERNAL MEDICINE

## 2017-10-25 PROCEDURE — 86140 C-REACTIVE PROTEIN: CPT | Performed by: INTERNAL MEDICINE

## 2017-10-25 PROCEDURE — 85730 THROMBOPLASTIN TIME PARTIAL: CPT | Performed by: INTERNAL MEDICINE

## 2017-10-25 PROCEDURE — 86146 BETA-2 GLYCOPROTEIN ANTIBODY: CPT | Performed by: INTERNAL MEDICINE

## 2017-10-25 PROCEDURE — 81374 HLA I TYPING 1 ANTIGEN LR: CPT | Performed by: INTERNAL MEDICINE

## 2017-10-25 PROCEDURE — 85652 RBC SED RATE AUTOMATED: CPT | Performed by: INTERNAL MEDICINE

## 2017-10-25 PROCEDURE — 85613 RUSSELL VIPER VENOM DILUTED: CPT | Performed by: INTERNAL MEDICINE

## 2017-10-25 PROCEDURE — 82550 ASSAY OF CK (CPK): CPT | Performed by: INTERNAL MEDICINE

## 2017-10-25 PROCEDURE — 84156 ASSAY OF PROTEIN URINE: CPT | Performed by: INTERNAL MEDICINE

## 2017-10-25 NOTE — PROGRESS NOTES
Rheumatology Clinic Visit      Caitlin Oh MRN# [id number]   YOB: 1975 Age: 42 year old      Date of visit: 10/25/17   PCP: Dr. Sujatha Maher  Ophthalmology: Braden Christy at [facility]    Chief Complaint   Patient presents with:  Consult: Labs      Assessment and Plan     1. Left eye scleritis: Response to topical drops given by her eye doctor. Workup has included a negative CCP, negative rheumatoid factor, positive antinuclear antibody at 1:160 nucleolar, negative ANCA, normal CBC, normal BMP, negative Lyme antibody screen, and negative tuberculosis testing. Given the positive antinuclear antibody, additional labs will be ordered as noted below.  No other symptoms to suggest an ZACHARY-associated rheumatologic disease at this time. Another consideration is for possible spondyloarthropathy given the lower back pain; see #2. Check x-rays of bilateral hands given the bilateral thumb IP joint pain. Continue to follow with ophthalmology  - Labs: CBC, CMP, ESR, CRP, ZACHARY by immunofluorescence, BONIFACIO, dsDNA, C3, C4, APS labs, CK, UA, Uprotein:creatinine, HLA-B27, RPR  - X-rays: Bilateral hands    2. Lower back pain: chronic issue.  Also has hx of bilateral plantar fasciitis that required surgery of the right foot. In the setting of an inflammatory eye disease, there is concern for spondyloarthropathy.  She has had a previous abdominal CT without evidence of sacroiliitis seen on review today. Therefore, check MRI and HLA-B27. Has been to PT in the past without benefit.  Depending on the imaging results, she may benefit from a spine specialist evaluation.   - MRI without contrast of the L-spine and SI joints  - Lab: HLA-B27    3. Shooting pains in the bilateral arms unclear etiology. Possibly related to other issues that are being worked up. Concern for other neuropathy such as ulnar neuropathy or carpal tunnel syndrome. Depending on the findings from the above issues, may consider a nerve  conduction study / neurology referral.    4. Left lower thoracic back pain: She reports having an MRI for this in the past that was nonrevealing. Unclear etiology. Advised her to follow up with her PCP for this issue.    Ms. Oh verbalized agreement with and understanding of the rational for the diagnosis and treatment plan.  All questions were answered to best of my ability and the patient's satisfaction. Ms. Oh was advised to contact the clinic with any questions that may arise after the clinic visit.      Thank you for involving me in the care of the patient    Return to clinic: 1-2 weeks      HPI   Caitlin Oh is a 42 year old female with a past medical history significant for GERD, nephrolithiasis, tension headaches, and a pain who presents for evaluation of a positive ZACHARY    Today, Ms. Oh reports that she was diagnosed with left eye scleritis 2 years ago and has had 4 recurrences since then. Each episode resolves with steroid eyedrops. [facility] is seen at [facility]. She has a history of kidney stones twice but none recently. History of headaches since high school but have not changed in quality or severity. History of GERD that has not recurred. History of bilateral plantar fasciitis that required surgery on the right foot but she is still symptomatically. Bilateral bunionectomies in the past. Left lower thoracic back pain without clear etiology and she says that an MRI did not give more information for this. Bilateral thumb IP joint pain. Bilateral elbow pain that occurs randomly with shooting pains down her forearms. The main reason for coming in today is to figure out if the left eye scleritis is related to an autoimmune disease, as she was told that it may be sensitive as been reoccurring so many times. No morning stiffness. Her mother has rheumatoid arthritis.      Denies fevers, chills, nausea, vomiting, constipation, diarrhea. No abdominal pain. No chest pain/pressure,  palpitations, or shortness of breath. No LE swelling. No neck pain. No oral or nasal sores.  No rash. No sicca symptoms.  No history of DVT, pulmonary embolism, or miscarriage.   No history of serositis.  No history of Raynaud's Phenomenon.  No seizure history.  No known renal disorder.      Tobacco: None  EtOH: None  Drugs: None  Occupation: ; works in [employer]   GEN: No fevers, chills, night sweats, or weight change  SKIN: No itching, rashes, sores  HEENT: No epistaxis. No oral or nasal ulcers. See history of present illness  CV: No chest pain, pressure, palpitations, or dyspnea on exertion.  PULM: No SOB, wheeze, cough.  GI: No nausea, vomiting, constipation, diarrhea. No blood in stool. No abdominal pain.  : No blood in urine.  MSK: See HPI.  NEURO: No numbness, tingling, or weakness.  EXT: No LE swelling  PSYCH: Negative    Active Problem List     Patient Active Problem List   Diagnosis     Esophageal reflux     CARDIOVASCULAR SCREENING; LDL GOAL LESS THAN 160     Kidney stones     Tension headache, chronic     Menstrual migraine     Menorrhagia     Cervical high risk HPV (human papillomavirus) test positive     Past Medical History     Past Medical History:   Diagnosis Date     Bilateral bunions      Cervical high risk HPV (human papillomavirus) test positive 11/4/2015     Female infertility 7/10/2008    Undergoing ivf 2008.      Personal history of gestational diabetes 7/11/2012    diet controlled     Plantar fasciitis     s/p surgery     Past Surgical History     Past Surgical History:   Procedure Laterality Date     BUNIONECTOMY RT/LT Bilateral 11/2009     CYSTOSCOPY, RETROGRADES, INSERT STENT URETER(S), COMBINED  9/19/2013    Procedure: COMBINED CYSTOSCOPY, RETROGRADES, INSERT STENT URETER(S);  Right Ureteral Stent Placement;  Surgeon: JUN Villar MD;  Location: [facility]     DILATION AND CURETTAGE, HYSTEROSCOPY, ABLATE ENDOMETRIUM THERMACHOICE, COMBINED N/A 4/15/2015    Procedure:  COMBINED DILATION AND CURETTAGE, HYSTEROSCOPY, ABLATE ENDOMETRIUM THERMACHOICE;  Surgeon: Munira Goddard MD;  Location: [facility]     EXTRACORPOREAL SHOCK WAVE LITHOTRIPSY (ESWL)  9/18/2013    Procedure: EXTRACORPOREAL SHOCK WAVE LITHOTRIPSY (ESWL);  Right Extracorporeal Shock Wave Lithotripsy;  Surgeon: JUN Villar MD;  Location: [facility]     FOOT SURGERY      for plantar fasciitis     HC TOOTH EXTRACTION W/FORCEP      wisdom teeth     LAPAROSCOPY DIAGNOSTIC (GYN)  2007    infertility     LASER HOLMIUM LITHOTRIPSY URETER(S), INSERT STENT, COMBINED  9/27/2013    Procedure: COMBINED CYSTOSCOPY, URETEROSCOPY, LASER HOLMIUM LITHOTRIPSY URETER(S), INSERT STENT;  Right Ureteroscopic Stone Extraction and Possible Stent Placement;  Surgeon: JUN Villar MD;  Location: [facility]     TONSILLECTOMY  1983     Allergy     Allergies   Allergen Reactions     Penicillins Rash     Current Medication List     Current Outpatient Prescriptions   Medication Sig     topiramate (TOPAMAX) 50 MG tablet Take 1 tablet (50 mg) by mouth 2 times daily     fluticasone (FLONASE) 50 MCG/ACT nasal spray Spray 1-2 sprays into both nostrils daily (Patient not taking: Reported on 10/25/2017)     rizatriptan (MAXALT-MLT) 5 MG disintegrating tablet Take 2 tablets (10 mg) by mouth at onset of headache for migraine May repeat dose in 2 hours.  Do not exceed 30 mg in 24 hours     Ibuprofen (ADVIL PO) Take by mouth every 6 hours as needed     Acetaminophen (TYLENOL PO) Take 500 mg by mouth every 6 hours as needed     No current facility-administered medications for this visit.        Social History   See HPI    Family History     Family History   Problem Relation Age of Onset     HEART DISEASE Maternal Grandmother      Breast Cancer Maternal Grandmother      dx'd age 70     Arthritis Mother      Hypertension Mother      CANCER Mother      skin cancer     Other Cancer Mother      Lymphoma     Prostate Cancer Father      Mother: Rheumatoid  "arthritis    Physical Exam     Temp Readings from Last 3 Encounters:   10/25/17 98.5  F (36.9  C)   10/03/17 98.6  F (37  C) (Tympanic)   08/24/17 96.6  F (35.9  C) (Tympanic)     BP Readings from Last 5 Encounters:   10/25/17 143/86   10/03/17 127/80   08/24/17 126/75   08/22/17 119/78   04/28/17 118/75     Pulse Readings from Last 1 Encounters:   10/25/17 78     Resp Readings from Last 1 Encounters:   10/25/17 16     Estimated body mass index is 30.83 kg/(m^2) as calculated from the following:    Height as of this encounter: 1.676 m (5' 6\").    Weight as of this encounter: 86.6 kg (191 lb).    GEN: NAD  HEENT: MMM. No oral lesions. EOMI. Anicteric, noninjected sclera  CV: S1, S2. RRR. No m/r/g.  PULM: CTA bilaterally. No w/c.  ABD: +BS.  MSK: Bilateral first IP joints with bony hypertrophy. MCPs, PIPs, DIPs, wrists, elbows, shoulders, knees, ankles, and MTPs without swelling or tenderness to palpation. Hips nontender to direct palpation. Spine nontender to direct palpation.      NEURO: UE and LE strengths 5/5 and equal bilaterally.   SKIN: No rash. No nail pitting.  EXT: No LE edema  PSYCH: Alert. Appropriate.    Labs / Imaging (select studies)     RF/CCP  Recent Labs   Lab Test  09/21/17   1628   CCPIGG  1   RHF  <20     ZACHARY  Recent Labs   Lab Test  09/21/17   1628   ROOPA  Positive*   ANAP1  NUCLEOLAR   ANAT1  1:160     ANCA  Recent Labs   Lab Test  10/03/17   1516   ANCA  <1:20     CBC  Recent Labs   Lab Test  04/24/17   1637  10/28/15   0859  04/08/15   1227   WBC  8.5  7.5   --    RBC  3.99  4.07   --    HGB  13.1  12.8  13.2   HCT  37.1  37.5   --    MCV  93  92   --    RDW  12.3  12.3   --    PLT  265  249   --    MCH  32.8  31.4   --    MCHC  35.3  34.1   --      CMP  Recent Labs   Lab Test  08/24/17   0901  09/27/16   1010  10/28/15   0859  08/26/14   0956  12/27/13   1010   03/16/13   0919  12/14/12   1115   NA  138  139  140  141  139   --   140  141   POTASSIUM  3.9  3.8  4.3  4.5  4.2   --   4.1  4.4 "   CHLORIDE  109  109  111*  112*  105   --   108  104   CO2  23  25  24  24  25   --   23  26   ANIONGAP  6  5  5  5*  9   --   10  11   GLC  85  89  95  93  104*   --   96  87   BUN  14  12  13  12  14   --   14  12   CR  0.80  0.78  0.76  0.79  0.93   --   0.91  0.75   GFRESTIMATED  79  81  84  81  68   --   70  87   GFRESTBLACK  >90  >90  African American GFR Calc    >90   GFR Calc    >90   GFR Calc    82   --   84  >90   FEDE  8.7  8.5  8.5  8.5  8.9   < >  8.4*  9.7   BILITOTAL   --    --   0.4   --    --    --    --    --    ALBUMIN   --    --   3.8   --   4.0   --   4.0  4.3   PROTTOTAL   --    --   6.8   --    --    --    --    --    ALKPHOS   --    --   84   --    --    --    --    --    AST   --    --   13   --    --    --    --    --    ALT   --    --   24   --    --    --    --    --     < > = values in this interval not displayed.     GGT  No results for input(s): GGT in the last 12319 hours.  HgA1c  Recent Labs   Lab Test  07/11/12   1008   A1C  5.5     Uric Acid  Recent Labs   Lab Test  10/03/17   1516  12/27/13   1010   URIC  3.4  4.3     Calcium/VitaminD  Recent Labs   Lab Test  08/24/17   0901  04/24/17   1637  09/27/16   1010  10/28/15   0859   12/28/10   0915   FEDE  8.7   --   8.5  8.5   < >   --    D3VIT   --    --    --    --    --   36   VITDT   --   21   --    --    --    --     < > = values in this interval not displayed.     ESR/CRP  Recent Labs   Lab Test  09/21/17   1628   CRP  <2.9     TSH/T4  Recent Labs   Lab Test  04/24/17   1637  03/31/15   0822   TSH  2.13  1.20     Lyme ab screening  Recent Labs   Lab Test  09/21/17   1628   LYMEGM  0.08     Tuberculosis Screening  Recent Labs   Lab Test  10/03/17   1516   TBRSLT  Negative   TBAGN  0.00     UA  Recent Labs   Lab Test  08/21/13   1205   COLOR  Yellow   APPEARANCE  Clear   URINEGLC  Negative   URINEBILI  Negative   SG  1.020   URINEPH  7.0   PROTEIN  Negative   UROBILINOGEN  0.2   NITRITE  Negative    UBLD  Negative   LEUKEST  Negative     Immunization History     Immunization History   Administered Date(s) Administered     HepB 01/23/2003     Influenza (IIV3) 10/28/2003, 10/14/2008, 09/28/2009, 10/17/2012     Influenza Intranasal Vaccine 4 valent 10/18/2013     Influenza Vaccine IM 3yrs+ 4 Valent IIV4 10/01/2015, 10/03/2017     TD (ADULT, 7+) 01/01/2009          Chart documentation done in part with Dragon Voice recognition Software. Although reviewed after completion, some word and grammatical error may remain.    Clint Nye MD

## 2017-10-25 NOTE — MR AVS SNAPSHOT
After Visit Summary   10/25/2017    Caitlin Oh    MRN: [id number]           Patient Information     Date Of Birth          1975        Visit Information        Provider Department      10/25/2017 8:00 AM Clint Nye MD [facility]        Today's Diagnoses     ZACHARY positive    -  1    Scleritis of left eye        Chronic midline low back pain without sciatica        Pain in both hands           Follow-ups after your visit        Follow-up notes from your care team     Return in about 2 weeks (around 11/8/2017).      Your next 10 appointments already scheduled     Nov 06, 2017  4:30 PM CST   MR LUMBAR SPINE W/O CONTRAST with [address] MRI ([facility])    [address] [facility] [id number] [phone]           Take your medicines as usual, unless your doctor tells you not to. Bring a list of your current medicines to your exam (including vitamins, minerals and over-the-counter drugs). Also bring the results of similar scans you may have had.  Please remove any body piercings and hair extensions before you arrive.  Follow your doctor s orders. If you do not, we may have to postpone your exam.  You will not have contrast for this exam. You do not need to do anything special to prepare.  The MRI machine uses a strong magnet. Please wear clothes without metal (snaps, zippers). A sweatsuit works well, or we may give you a hospital gown.   **IMPORTANT** THE INSTRUCTIONS BELOW ARE ONLY FOR THOSE PATIENTS WHO HAVE BEEN TOLD THEY WILL RECEIVE SEDATION OR GENERAL ANESTHESIA DURING THEIR MRI PROCEDURE:  IF YOU WILL RECEIVE SEDATION (take medicine to help you relax during your exam):   You must get the medicine from your doctor before you arrive. Bring the medicine to the exam. Do not take it at home.   Arrive one hour early. Bring someone who can take you home after the test. Your medicine will make you sleepy. After the exam, you may not drive,  take a bus or take a taxi by yourself.   No eating 8 hours before your exam. You may have clear liquids up until 4 hours before your exam. (Clear liquids include water, clear tea, black coffee and fruit juice without pulp.)  IF YOU WILL RECEIVE ANESTHESIA (be asleep for your exam):   Arrive 1 1/2 hours early. Bring someone who can take you home after the test. You may not drive, take a bus or take a taxi by yourself.   No eating 8 hours before your exam. You may have clear liquids up until 4 hours before your exam. (Clear liquids include water, clear tea, black coffee and fruit juice without pulp.)   You will spend four to five hours in the recovery room.  Please call the Imaging Department at your exam site with any questions.            Nov 06, 2017  5:00 PM CST   MR PELVIS W/O CONTRAST with [facility]   [facility] ([facility])    [address] [facility] [address]   [phone]           Take your medicines as usual, unless your doctor tells you not to. Bring a list of your current medicines to your exam (including vitamins, minerals and over-the-counter drugs). Also bring the results of similar scans you may have had.  Please remove any body piercings and hair extensions before you arrive.  Follow your doctor s orders. If you do not, we may have to postpone your exam.  You will not have contrast for this exam. You do not need to do anything special to prepare.  The MRI machine uses a strong magnet. Please wear clothes without metal (snaps, zippers). A sweatsuit works well, or we may give you a hospital gown.   **IMPORTANT** THE INSTRUCTIONS BELOW ARE ONLY FOR THOSE PATIENTS WHO HAVE BEEN TOLD THEY WILL RECEIVE SEDATION OR GENERAL ANESTHESIA DURING THEIR MRI PROCEDURE:  IF YOU WILL RECEIVE SEDATION (take medicine to help you relax during your exam):   You must get the medicine from your doctor before you arrive. Bring the medicine to the exam. Do not take it at home.   Arrive one  hour early. Bring someone who can take you home after the test. Your medicine will make you sleepy. After the exam, you may not drive, take a bus or take a taxi by yourself.   No eating 8 hours before your exam. You may have clear liquids up until 4 hours before your exam. (Clear liquids include water, clear tea, black coffee and fruit juice without pulp.)  IF YOU WILL RECEIVE ANESTHESIA (be asleep for your exam):   Arrive 1 1/2 hours early. Bring someone who can take you home after the test. You may not drive, take a bus or take a taxi by yourself.   No eating 8 hours before your exam. You may have clear liquids up until 4 hours before your exam. (Clear liquids include water, clear tea, black coffee and fruit juice without pulp.)   You will spend four to five hours in the recovery room.  Please call the Imaging Department at your exam site with any questions.            Nov 13, 2017  3:40 PM CST   Return Visit with Clint Nye MD   [facility]    [address] [facility] [address] [phone]              Future tests that were ordered for you today     Open Future Orders        Priority Expected Expires Ordered    MR Lumbar Spine w/o Contrast Routine  10/26/2018 10/25/2017    MR Pelvis w/o Contrast Routine  10/25/2018 10/25/2017            Who to contact     If you have questions or need follow up information about today's clinic visit or your schedule please contact [facility] directly at [phone].  Normal or non-critical lab and imaging results will be communicated to you by MyChart, letter or phone within 4 business days after the clinic has received the results. If you do not hear from us within 7 days, please contact the clinic through [employer] or phone. If you have a critical or abnormal lab result, we will notify you by phone as soon as possible.  Submit refill requests through [employer] or call your pharmacy and they will forward the  "refill request to us. Please allow 3 business days for your refill to be completed.          Additional Information About Your Visit        Applimation[employer] Information     [employer] gives you secure access to your electronic health record. If you see a primary care provider, you can also send messages to your care team and make appointments. If you have questions, please call your primary care clinic.  If you do not have a primary care provider, please call [phone] and they will assist you.        Care EveryWhere ID     This is your Care EveryWhere ID. This could be used by other organizations to access your [place] medical records  [id number]        Your Vitals Were     Pulse Temperature Respirations Height BMI (Body Mass Index)       78 98.5  F (36.9  C) 16 1.676 m (5' 6\") 30.83 kg/m2        Blood Pressure from Last 3 Encounters:   10/25/17 143/86   10/03/17 127/80   08/24/17 126/75    Weight from Last 3 Encounters:   10/25/17 86.6 kg (191 lb)   10/03/17 84.4 kg (186 lb)   09/28/17 86.2 kg (190 lb)              We Performed the Following     Anti Treponema     Beta 2 Glycoprotein 1 Antibody IgG     Beta 2 Glycoprotein 1 Antibody IgM     Cardiolipin Nubia IgG and IgM     CBC with platelets differential     CK total     Complement C3     Complement C4     Comprehensive metabolic panel     Creatinine urine calculation only     CRP inflammation     DNA double stranded antibodies     BONIFACIO antibody panel     Erythrocyte sedimentation rate auto     HLA-B27 Typing     Lupus Anticoagulant Panel     Protein  random urine with Creat Ratio     UA with Microscopic reflex to Culture        Primary Care Provider Office Phone # Fax #    Sujatha Maher [phone] [address] [place] [address]        Equal Access to Services     [facility]: Hadii ra oquendo Soyu, waaxda luqadaha, qaybta kamiesha quezada. So [facility] [phone].    ATENCIÓN: Si habla " español, tiene a galdamez disposición servicios gratuitos de asistencia lingüística. Marina max [phone].    We comply with applicable federal civil rights laws and [place] laws. We do not discriminate on the basis of race, color, national origin, age, disability, sex, sexual orientation, or gender identity.            Thank you!     Thank you for choosing [facility]  for your care. Our goal is always to provide you with excellent care. Hearing back from our patients is one way we can continue to improve our services. Please take a few minutes to complete the written survey that you may receive in the mail after your visit with us. Thank you!             Your Updated Medication List - Protect others around you: Learn how to safely use, store and throw away your medicines at www.disposemymeds.org.          This list is accurate as of: 10/25/17  4:57 PM.  Always use your most recent med list.                   Brand Name Dispense Instructions for use Diagnosis    ADVIL PO      Take by mouth every 6 hours as needed        fluticasone 50 MCG/ACT spray    FLONASE    16 g    Spray 1-2 sprays into both nostrils daily    Post-nasal drainage, Chronic rhinitis       rizatriptan 5 MG ODT tab    MAXALT-MLT    18 tablet    Take 2 tablets (10 mg) by mouth at onset of headache for migraine May repeat dose in 2 hours.  Do not exceed 30 mg in 24 hours    Headache(784.0)       topiramate 50 MG tablet    TOPAMAX    180 tablet    Take 1 tablet (50 mg) by mouth 2 times daily    Menstrual migraine without status migrainosus, not intractable       TYLENOL PO      Take 500 mg by mouth every 6 hours as needed

## 2017-10-25 NOTE — NURSING NOTE
"Chief Complaint   Patient presents with     Consult     Labs       Initial /86  Pulse 78  Temp 98.5  F (36.9  C)  Resp 16  Ht 5' 6\" (1.676 m)  Wt 191 lb (86.6 kg)  BMI 30.83 kg/m2 Estimated body mass index is 30.83 kg/(m^2) as calculated from the following:    Height as of this encounter: 5' 6\" (1.676 m).    Weight as of this encounter: 191 lb (86.6 kg).  BP completed using cuff size: regular         RAPID3 (0-30) Cumulative Score  6.3          RAPID3 Weighted Score (divide #4 by 3 and that is the weighted score)  2.1       "

## 2017-10-26 LAB
B2 GLYCOPROT1 IGG SERPL IA-ACNC: <0.6 U/ML
B2 GLYCOPROT1 IGM SERPL IA-ACNC: 3.4 U/ML
CARDIOLIPIN ANTIBODY IGG: <1.6 GPL-U/ML (ref 0–19.9)
CARDIOLIPIN ANTIBODY IGM: 3.9 MPL-U/ML (ref 0–19.9)
DSDNA AB SER-ACNC: <1 IU/ML
ENA RNP IGG SER IA-ACNC: <0.2 AI (ref 0–0.9)
ENA SCL70 IGG SER IA-ACNC: <0.2 AI (ref 0–0.9)
ENA SM IGG SER-ACNC: <0.2 AI (ref 0–0.9)
ENA SS-A IGG SER IA-ACNC: <0.2 AI (ref 0–0.9)
ENA SS-B IGG SER IA-ACNC: <0.2 AI (ref 0–0.9)
HLA-B27 QL NAA+PROBE: NORMAL
LA PPP-IMP: NEGATIVE
T PALLIDUM IGG+IGM SER QL: NEGATIVE

## 2017-10-26 NOTE — PROGRESS NOTES
""[employer] message sent:  \"Ms. Oh,    Hand x-rays showed very mild degenerative changes in the left thumb.  Lab results are pending.    Sincerely,  Clint Nye MD  10/26/2017 5:24 AM\""

## 2017-10-27 LAB
C3 SERPL-MCNC: 109 MG/DL (ref 76–169)
C4 SERPL-MCNC: 21 MG/DL (ref 15–50)

## 2017-10-30 LAB
B LOCUS: NORMAL
B27TEST METHOD: NORMAL

## 2017-11-01 NOTE — PROGRESS NOTES
"[employer] message sent:  \"Ms. Oh,    Labs did not reveal an etiology for the scleritis.  I will review them with you at your follow up appointment.    Sincerely,  Clint Nye MD  11/1/2017 5:33 AM\""

## 2017-11-06 ENCOUNTER — HOSPITAL ENCOUNTER (OUTPATIENT)
Dept: MRI IMAGING | Facility: CLINIC | Age: 42
Discharge: HOME | End: 2017-11-06
Attending: INTERNAL MEDICINE | Admitting: INTERNAL MEDICINE
Payer: COMMERCIAL

## 2017-11-06 ENCOUNTER — HOSPITAL ENCOUNTER (OUTPATIENT)
Dept: MRI IMAGING | Facility: CLINIC | Age: 42
End: 2017-11-06
Attending: INTERNAL MEDICINE
Payer: COMMERCIAL

## 2017-11-06 DIAGNOSIS — H15.002 SCLERITIS OF LEFT EYE: ICD-10-CM

## 2017-11-06 DIAGNOSIS — M54.50 CHRONIC MIDLINE LOW BACK PAIN WITHOUT SCIATICA: ICD-10-CM

## 2017-11-06 DIAGNOSIS — G89.29 CHRONIC MIDLINE LOW BACK PAIN WITHOUT SCIATICA: ICD-10-CM

## 2017-11-06 PROCEDURE — 72195 MRI PELVIS W/O DYE: CPT

## 2017-11-06 PROCEDURE — 72148 MRI LUMBAR SPINE W/O DYE: CPT

## 2017-11-08 NOTE — PROGRESS NOTES
"[employer] message sent:  \"Ms. Oh,    MRIs showed degenerative changes without evidence for inflammatory arthritis. We will also review this at your follow up appointment.    Sincerely,  Clint Nye MD  11/8/2017 5:38 AM\""

## 2017-11-13 ENCOUNTER — TELEPHONE (OUTPATIENT)
Dept: PALLIATIVE MEDICINE | Facility: CLINIC | Age: 42
End: 2017-11-13

## 2017-11-13 ENCOUNTER — OFFICE VISIT (OUTPATIENT)
Dept: RHEUMATOLOGY | Facility: CLINIC | Age: 42
End: 2017-11-13
Payer: COMMERCIAL

## 2017-11-13 VITALS
BODY MASS INDEX: 30.5 KG/M2 | HEIGHT: 66 IN | DIASTOLIC BLOOD PRESSURE: 83 MMHG | HEART RATE: 76 BPM | SYSTOLIC BLOOD PRESSURE: 132 MMHG | OXYGEN SATURATION: 98 % | WEIGHT: 189.8 LBS

## 2017-11-13 DIAGNOSIS — R52 PAIN: Primary | ICD-10-CM

## 2017-11-13 DIAGNOSIS — R76.8 ANA POSITIVE: ICD-10-CM

## 2017-11-13 PROCEDURE — 99213 OFFICE O/P EST LOW 20 MIN: CPT | Performed by: INTERNAL MEDICINE

## 2017-11-13 NOTE — NURSING NOTE
"Chief Complaint   Patient presents with     RECHECK     follow up labs       Initial /83 (BP Location: Left arm, Patient Position: Chair, Cuff Size: Adult Large)  Pulse 76  Ht 1.676 m (5' 6\")  Wt 86.1 kg (189 lb 12.8 oz)  SpO2 98%  BMI 30.63 kg/m2 Estimated body mass index is 30.63 kg/(m^2) as calculated from the following:    Height as of this encounter: 1.676 m (5' 6\").    Weight as of this encounter: 86.1 kg (189 lb 12.8 oz).  BP completed using cuff size: large         RAPID3 (0-30) Cumulative Score            RAPID3 Weighted Score (divide #4 by 3 and that is the weighted score)           "

## 2017-11-13 NOTE — MR AVS SNAPSHOT
After Visit Summary   11/13/2017    Caitlin Oh    MRN: [id number]           Patient Information     Date Of Birth          1975        Visit Information        Provider Department      11/13/2017 3:40 PM Clint Nye MD [facility]        Today's Diagnoses     Pain    -  1       Follow-ups after your visit        Additional Services     PAIN MANAGEMENT REFERRAL       Your provider has referred you to: [facility]: [facility] -    Reason for Referral: Comprehensive Evaluation and Management    Please complete the following questions:    What is your diagnosis for the patient's pain? Lower and thoracic back pain; also pain under the anterior lower left ribs    Do you have any specific questions for the pain specialist? Yes: assistance with diagnosis and treatment for the cause of her pain.    Are there any red flags that may impact the assessment or management of the patient? None    For any questions, contact the [facility] at [phone].     **ANY DIAGNOSTIC TESTS THAT ARE NOT IN EPIC SHOULD BE SENT TO THE PAIN CENTER**    REGARDING OPIOID MEDICATIONS:  We will always address appropriateness of opioid pain medications, but we generally will not automatically take on a prescribing role. When we do take on prescribing of opioids for chronic pain, it is in collaboration with the referring physician for an intermediate period of time (months), with an expectation that the primary physician or provider will assume the prescribing role if medications are effective at stable doses with demonstrated compliance.  Therefore, please do not assume that your prescribing responsibilities end on the day of pain clinic consultation.  Is this agreeable to you? NO: If chronic opioids are going to be started, please coordinate this with the patient's primary care provider.       Please be aware that coverage of these services is subject to the terms and  "limitations of your health insurance plan.  Call member services at your health plan with any benefit or coverage questions.      Please bring the following with you to your appointment:    (1) Any X-Rays, CTs or MRIs which have been performed.  Contact the facility where they were done to arrange for  prior to your scheduled appointment.    (2) List of current medications   (3) This referral request   (4) Any documents/labs given to you for this referral                  Who to contact     If you have questions or need follow up information about today's clinic visit or your schedule please contact [facility] directly at [phone].  Normal or non-critical lab and imaging results will be communicated to you by [employer], letter or phone within 4 business days after the clinic has received the results. If you do not hear from us within 7 days, please contact the clinic through [employer] or phone. If you have a critical or abnormal lab result, we will notify you by phone as soon as possible.  Submit refill requests through [employer] or call your pharmacy and they will forward the refill request to us. Please allow 3 business days for your refill to be completed.          Additional Information About Your Visit        [employer] Information     [employer] gives you secure access to your electronic health record. If you see a primary care provider, you can also send messages to your care team and make appointments. If you have questions, please call your primary care clinic.  If you do not have a primary care provider, please call [phone] and they will assist you.        Care EveryWhere ID     This is your Care EveryWhere ID. This could be used by other organizations to access your [place] medical records  [id number]        Your Vitals Were     Pulse Height Pulse Oximetry BMI (Body Mass Index)          76 1.676 m (5' 6\") 98% 30.63 kg/m2         Blood Pressure from Last 3 Encounters:   11/13/17 132/83 "   10/25/17 143/86   10/03/17 127/80    Weight from Last 3 Encounters:   11/13/17 86.1 kg (189 lb 12.8 oz)   10/25/17 86.6 kg (191 lb)   10/03/17 84.4 kg (186 lb)              We Performed the Following     PAIN MANAGEMENT REFERRAL        Primary Care Provider Office Phone # Fax #    Sujatha Maher [phone] [address] [facility] [address]        Equal Access to Services     [facility] : Hadii aad ku hadasho Soomaali, waaxda luqadaha, qaybta kaalmada adeegyada, waxay idiin hayaan adeeg kharash laannetta . So [facility] [phone].    ATENCIÓN: Si habla español, tiene a galdamez disposición servicios gratuitos de asistencia lingüística. [facility] [phone].    We comply with applicable federal civil rights laws and [place] laws. We do not discriminate on the basis of race, color, national origin, age, disability, sex, sexual orientation, or gender identity.            Thank you!     Thank you for choosing [facility]  for your care. Our goal is always to provide you with excellent care. Hearing back from our patients is one way we can continue to improve our services. Please take a few minutes to complete the written survey that you may receive in the mail after your visit with us. Thank you!             Your Updated Medication List - Protect others around you: Learn how to safely use, store and throw away your medicines at www.disposemymeds.org.          This list is accurate as of: 11/13/17  4:28 PM.  Always use your most recent med list.                   Brand Name Dispense Instructions for use Diagnosis    ADVIL PO      Take by mouth every 6 hours as needed        fluticasone 50 MCG/ACT spray    FLONASE    16 g    Spray 1-2 sprays into both nostrils daily    Post-nasal drainage, Chronic rhinitis       rizatriptan 5 MG ODT tab    MAXALT-MLT    18 tablet    Take 2 tablets (10 mg) by mouth at onset of headache for migraine May repeat dose in 2 hours.  Do not exceed 30 mg in 24  hours    Headache(784.0)       topiramate 50 MG tablet    TOPAMAX    180 tablet    Take 1 tablet (50 mg) by mouth 2 times daily    Menstrual migraine without status migrainosus, not intractable       TYLENOL PO      Take 500 mg by mouth every 6 hours as needed

## 2017-11-13 NOTE — LETTER
November 13, 2017    Caitlin Oh  [address] [place] [address]    Dear Caitlin,    Welcome to the [facility].  We are located on the 2nd floor ([address]) of the [facility], located at [address] [place] [address] [place] [address].    Your appointment at the [facility] has been scheduled on Thursday, December 14TH at 9:00AM with Yahir Scott MD.    At your first visit, you will meet your team of caregivers who will help you to develop pain management strategies that will last a lifetime. You will meet with our support staff to review your insurance information, and collect your co-payment if required by your insurance company. You will also meet with a medical pain specialist and care coordinator who will assess your pain and develop a plan of care for your successful pain rehabilitation. You should expect to spend 1-2 hours at your first visit with us. Usually, patients work with us for a period of 6-12 months, and eventually return to their primary doctor once their pain management has stabilized.      To help us make your visit go as smoothly as possible, please bring the following items with you on your visit:     Completed Pain Questionnaire enclosed in this packet.  If you do not bring the completed questionnaire, we may have to reschedule your appointment.  List of any medicines that you are currently taking or have been prescribed  Important NON-[place] medical information such as medical records or tests results (X-rays, or laboratory tests)  Your health insurance card  Financial resources to cover your co-payment or balance due at the time of service (cash, personal check, Visa, and MasterCard are acceptable methods of payment)     Due to the demand for new patient evaluations, you must notify the scheduling department 48 hours in advance if you are not able to keep this appointment. Failure to do so could affect your ability to reschedule with our  clinic. Please be aware that we will not prescribe any medications at your first visit.     Please call [phone] with any questions regarding your appointment. We look forward to meeting you and working to address your health care needs.     Sincerely,        [facility]

## 2017-11-13 NOTE — PROGRESS NOTES
Rheumatology Clinic Visit      Caitlin Oh MRN# [id number]   YOB: 1975 Age: 42 year old      Date of visit: 11/13/17   PCP: Dr. Sujatha Maher  Ophthalmology: Braden Christy at [facility]    Chief Complaint   Patient presents with:  RECHECK: follow up labs      Assessment and Plan     1. Left eye scleritis / Positive ZACHARY: Good response to topical drops given by her eye doctor. Workup for an associated rheumatologic etiology was negative except for the ZACHARY at 1:160 nucleolar.  Additional ZACHARY-specific labs were negative/normal and she does not have other symptoms suggestive of an ZACHARY-associated rheumatologic disorder.  She may return in the future if systemic immunosuppression management is needed (typically her ophthalmologist will request this).    2. Back pain: chronic issue.  Also has hx of bilateral plantar fasciitis that required surgery of the right foot. In the setting of an inflammatory eye disease, there was concern for spondyloarthropath but additional workup with MRIs did not reveal an axial inflammatory arthritis and HLA-B27 was negative.  Has been to PT and sports medicine without improvement of her back pain.  At this point I think the next best step would be an evaluation with one of the pain specialists to help with diagnosis and possible treatment options.   - Pain management referral    3. Shooting pains in the bilateral arms Unclear etiology. Not always present.  There is concern for neuropathy but given her overall pain that is occurring I think she would be be evaluated by a pain specialist before considering a NCS.      Ms. Oh verbalized agreement with and understanding of the rational for the diagnosis and treatment plan.  All questions were answered to best of my ability and the patient's satisfaction. Ms. Oh was advised to contact the clinic with any questions that may arise after the clinic visit.      Thank you for involving me in the care of the  patient    Return to clinic: 1-2 weeks      HPI   Caitlin Oh is a 42 year old female with a past medical history significant for GERD, nephrolithiasis, tension headaches, and a pain who presents for f/u of positive ZACHARY.    On 10/25/2017, Ms. 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 Each episode resolves with steroid eyedrops. She is seen at [facility]. She has a history of kidney stones twice but none recently. History of headaches since high school but have not changed in quality or severity. History of GERD that has not recurred. History of bilateral plantar fasciitis that required surgery on the right foot but she is still symptomatic. Bilateral bunionectomies in the past. Left lower thoracic back pain without clear etiology and she says that an MRI did not give more information for this. Bilateral thumb IP joint pain. Bilateral elbow pain that occurs randomly with shooting pains down her forearms. The main reason for coming in today is to figure out if the left eye scleritis is related to an autoimmune disease, as she was told that it may be sensitive as been reoccurring so many times. No morning stiffness. Her mother has rheumatoid arthritis.      Today, she reports no change in her symptoms.  Scleritis not an issue today. Left thoracic back pain is the most bothersome for her, but only occurs at night when laying down.  Bilateral elbow pain occurs randomly with shooting pains down her forearms; not always present.  No morning stiffness.     Denies fevers, chills, nausea, vomiting, constipation, diarrhea. No abdominal pain. No chest pain/pressure, palpitations, or shortness of breath. No LE swelling. No neck pain. No oral or nasal sores.  No rash. No sicca symptoms.  No history of DVT, pulmonary embolism, or miscarriage.   No history of serositis.  No history of Raynaud's Phenomenon.  No seizure history.  No known renal  disorder.      Tobacco: None  EtOH: None  Drugs: None  Occupation: ; [place] [facility] [employer]   GEN: No fevers, chills, night sweats, or weight change  SKIN: No itching, rashes, sores  HEENT: No epistaxis. No oral or nasal ulcers. See history of present illness  CV: No chest pain, pressure, palpitations, or dyspnea on exertion.  PULM: No SOB, wheeze, cough.  GI: No nausea, vomiting, constipation, diarrhea. No blood in stool. No abdominal pain.  : No blood in urine.  MSK: See HPI.  NEURO: No numbness, tingling, or weakness.  EXT: No LE swelling  PSYCH: Negative    Active Problem List     Patient Active Problem List   Diagnosis     Esophageal reflux     CARDIOVASCULAR SCREENING; LDL GOAL LESS THAN 160     Kidney stones     Tension headache, chronic     Menstrual migraine     Menorrhagia     Cervical high risk HPV (human papillomavirus) test positive     Past Medical History     Past Medical History:   Diagnosis Date     Bilateral bunions      Cervical high risk HPV (human papillomavirus) test positive 11/4/2015     Female infertility 7/10/2008    Undergoing ivf 2008.      Personal history of gestational diabetes 7/11/2012    diet controlled     Plantar fasciitis     s/p surgery     Past Surgical History     Past Surgical History:   Procedure Laterality Date     BUNIONECTOMY RT/LT Bilateral 11/2009     CYSTOSCOPY, RETROGRADES, INSERT STENT URETER(S), COMBINED  9/19/2013    Procedure: COMBINED CYSTOSCOPY, RETROGRADES, INSERT STENT URETER(S);  Right Ureteral Stent Placement;  Surgeon: JUN Villar MD;  Location: [facility] OR     DILATION AND CURETTAGE, HYSTEROSCOPY, ABLATE ENDOMETRIUM THERMACHOICE, COMBINED N/A 4/15/2015    Procedure: COMBINED DILATION AND CURETTAGE, HYSTEROSCOPY, ABLATE ENDOMETRIUM THERMACHOICE;  Surgeon: Munira Goddard MD;  Location: [facility]     EXTRACORPOREAL SHOCK WAVE LITHOTRIPSY (ESWL)  9/18/2013    Procedure: EXTRACORPOREAL SHOCK WAVE LITHOTRIPSY (ESWL);  Right Extracorporeal Shock  Wave Lithotripsy;  Surgeon: JUN Villar MD;  Location: [facility] OR     FOOT SURGERY      for plantar fasciitis     HC TOOTH EXTRACTION W/FORCEP      wisdom teeth     LAPAROSCOPY DIAGNOSTIC (GYN)  2007    infertility     LASER HOLMIUM LITHOTRIPSY URETER(S), INSERT STENT, COMBINED  9/27/2013    Procedure: COMBINED CYSTOSCOPY, URETEROSCOPY, LASER HOLMIUM LITHOTRIPSY URETER(S), INSERT STENT;  Right Ureteroscopic Stone Extraction and Possible Stent Placement;  Surgeon: JUN Villar MD;  Location: [facility] OR     TONSILLECTOMY  1983     Allergy     Allergies   Allergen Reactions     Penicillins Rash     Current Medication List     Current Outpatient Prescriptions   Medication Sig     topiramate (TOPAMAX) 50 MG tablet Take 1 tablet (50 mg) by mouth 2 times daily     rizatriptan (MAXALT-MLT) 5 MG disintegrating tablet Take 2 tablets (10 mg) by mouth at onset of headache for migraine May repeat dose in 2 hours.  Do not exceed 30 mg in 24 hours     Ibuprofen (ADVIL PO) Take by mouth every 6 hours as needed     Acetaminophen (TYLENOL PO) Take 500 mg by mouth every 6 hours as needed     fluticasone (FLONASE) 50 MCG/ACT nasal spray Spray 1-2 sprays into both nostrils daily (Patient not taking: Reported on 10/25/2017)     No current facility-administered medications for this visit.        Social History   See HPI    Family History     Family History   Problem Relation Age of Onset     HEART DISEASE Maternal Grandmother      Breast Cancer Maternal Grandmother      dx'd age 70     Arthritis Mother      Hypertension Mother      CANCER Mother      skin cancer     Other Cancer Mother      Lymphoma     Prostate Cancer Father      Mother: Rheumatoid arthritis    Physical Exam     Temp Readings from Last 3 Encounters:   10/25/17 98.5  F (36.9  C)   10/03/17 98.6  F (37  C) (Tympanic)   08/24/17 96.6  F (35.9  C) (Tympanic)     BP Readings from Last 5 Encounters:   11/13/17 132/83   10/25/17 143/86   10/03/17 127/80   08/24/17 126/75  "  08/22/17 119/78     Pulse Readings from Last 1 Encounters:   11/13/17 76     Resp Readings from Last 1 Encounters:   10/25/17 16     Estimated body mass index is 30.63 kg/(m^2) as calculated from the following:    Height as of this encounter: 1.676 m (5' 6\").    Weight as of this encounter: 86.1 kg (189 lb 12.8 oz).    GEN: NAD  HEENT: MMM. No oral lesions. EOMI. Anicteric, noninjected sclera  CV: S1, S2. RRR. No m/r/g.  PULM: CTA bilaterally. No w/c.  ABD: +BS.  MSK: Bilateral first IP joints with bony hypertrophy. MCPs, PIPs, DIPs, wrists, elbows, shoulders, knees, ankles, and MTPs without swelling or tenderness to palpation. Hips nontender to direct palpation. Spine nontender to direct palpation.   Point tenderness left of her thoracic spine but only if she is lying down, not if she is sitting up.    NEURO: UE and LE strengths 5/5 and equal bilaterally.   SKIN: No rash. No nail pitting.  EXT: No LE edema  PSYCH: Alert. Appropriate.    Labs / Imaging (select studies)   RF/CCP  Recent Labs   Lab Test  09/21/17   1628   CCPIGG  1   RHF  <20     ZACHARY  Recent Labs   Lab Test  09/21/17   1628   ROOPA  Positive*   ANAP1  NUCLEOLAR   ANAT1  1:160     RNP/Sm/SSA/SSB  Recent Labs   Lab Test  10/25/17   0857   RNPIGG  <0.2   SMIGG  <0.2   SSAIGG  <0.2   SSBIGG  <0.2   SCLIGG  <0.2   TREPAB  Negative     dsDNA  Recent Labs   Lab Test  10/25/17   0857   DNA  <1     C3/C4  Recent Labs   Lab Test  10/25/17   0857   G7DURQN  109   N6TMIWG  21     Antiphospholipid Antibodies  Recent Labs   Lab Test  10/25/17   0857   B2GPG  <0.6   B2GPM  3.4   CARDG  <1.6   CARDM  3.9   LUPINT  Negative     ANCA  Recent Labs   Lab Test  10/03/17   1516   ANCA  <1:20     HLA-B27  Recent Labs   Lab Test  10/25/17   0857   [id number]  SSOP   B1  B27 Neg     CBC  Recent Labs   Lab Test  10/25/17   0857  04/24/17   1637  10/28/15   0859   WBC  8.4  8.5  7.5   RBC  4.09  3.99  4.07   HGB  13.0  13.1  12.8   HCT  38.2  37.1  37.5   MCV  93  93  92   RDW "  13.0  12.3  12.3   PLT  270  265  249   MCH  31.8  32.8  31.4   MCHC  34.0  35.3  34.1   NEUTROPHIL  68.6   --    --    LYMPH  21.8   --    --    MONOCYTE  8.5   --    --    EOSINOPHIL  0.9   --    --    BASOPHIL  0.2   --    --    ANEU  5.8   --    --    ALYM  1.8   --    --    MIRNA  0.7   --    --    AEOS  0.1   --    --    ABAS  0.0   --    --      CMP  Recent Labs   Lab Test  10/25/17   0857  08/24/17   0901  09/27/16   1010  10/28/15   0859  08/26/14   0956  12/27/13   1010   03/16/13   0919   NA  141  138  139  140  141  139   --   140   POTASSIUM  3.8  3.9  3.8  4.3  4.5  4.2   --   4.1   CHLORIDE  112*  109  109  111*  112*  105   --   108   CO2  21  23  25  24  24  25   --   23   ANIONGAP  8  6  5  5  5*  9   --   10   GLC  92  85  89  95  93  104*   --   96   BUN  12  14  12  13  12  14   --   14   CR  0.79  0.80  0.78  0.76  0.79  0.93   --   0.91   GFRESTIMATED  80  79  81  84  81  68   --   70   GFRESTBLACK  >90  >90  >90  African American GFR Calc    >90   GFR Calc    >90   GFR Calc    82   --   84   FEDE  8.8  8.7  8.5  8.5  8.5  8.9   < >  8.4*   BILITOTAL  0.2   --    --   0.4   --    --    --    --    ALBUMIN  3.8   --    --   3.8   --   4.0   --   4.0   PROTTOTAL  7.0   --    --   6.8   --    --    --    --    ALKPHOS  78   --    --   84   --    --    --    --    AST  13   --    --   13   --    --    --    --    ALT  28   --    --   24   --    --    --    --     < > = values in this interval not displayed.     HgA1c  Recent Labs   Lab Test  07/11/12   1008   A1C  5.5     Uric Acid  Recent Labs   Lab Test  10/03/17   1516  12/27/13   1010   URIC  3.4  4.3     Calcium/VitaminD  Recent Labs   Lab Test  10/25/17   0857  08/24/17   0901  04/24/17   1637  09/27/16   1010   12/28/10   0915   FEDE  8.8  8.7   --   8.5   < >   --    D3VIT   --    --    --    --    --   36   VITDT   --    --   21   --    --    --     < > = values in this interval not displayed.      ESR/CRP  Recent Labs   Lab Test  10/25/17   0857  09/21/17   1628   SED  10   --    CRP  5.7  <2.9     CK/Aldolase  Recent Labs   Lab Test  10/25/17   0857   CKT  100     TSH/T4  Recent Labs   Lab Test  04/24/17   1637  03/31/15   0822   TSH  2.13  1.20     Lyme ab screening  Recent Labs   Lab Test  09/21/17   1628   LYMEGM  0.08     Tuberculosis Screening  Recent Labs   Lab Test  10/03/17   1516   TBRSLT  Negative   TBAGN  0.00     UA  Recent Labs   Lab Test  10/25/17   0855  08/21/13   1205   COLOR  Yellow  Yellow   APPEARANCE  Slightly Cloudy  Clear   URINEGLC  Negative  Negative   URINEBILI  Negative  Negative   SG  <=1.005  1.020   URINEPH  7.0  7.0   PROTEIN  Negative  Negative   UROBILINOGEN  0.2  0.2   NITRITE  Negative  Negative   UBLD  Negative  Negative   LEUKEST  Negative  Negative   WBCU  O - 2   --    RBCU  O - 2   --    SQUAMOUSEPI  Few   --      Urine Microscopic  Recent Labs   Lab Test  10/25/17   0855   WBCU  O - 2   RBCU  O - 2   SQUAMOUSEPI  Few     Urine Protein  Recent Labs   Lab Test  10/25/17   0855   UTP  0.07   UTPG  0.16   UCRR  47     Recent Results (from the past 744 hour(s))   XR Hand Bilateral G/E 3 Views    Narrative    XR HAND BILATERAL G/E 3 VW 10/25/2017 9:30 AM    COMPARISON: None.    HISTORY: Bilateral hand pain.    FINDINGS:  Right hand: No fractures are seen. Joint spaces are preserved and in  normal alignment. No erosive changes identified.    Left hand: Very mild first interphalangeal degenerative change. No  fractures are seen. No erosive changes identified.      Impression    IMPRESSION: Very mild left first interphalangeal osteoarthritis. No  acute bone abnormality or erosive changes identified in either hand.    MARTY RODRIGUEZ MD   MR Pelvis w/o Contrast    Narrative    MR PELVIS WITHOUT CONTRAST November 6, 2017 5:09 PM    HISTORY: Lower back pain; concern for sacroiliitis.    TECHNIQUE: Coronal T1 and inversion recovery, sagittal T1, and  transverse proton density  and fat suppressed T2 weighted images.    FINDINGS:   Osseous and Cartilaginous Structures: Marrow signal within the pelvis  and proximal femurs appears within normal limits. No MR evidence of  sacroiliitis or periarticular erosion. No fracture or destructive bone  lesion. No femoral head osteonecrosis. No significant hip  osteoarthritis or apparent chondromalacia.       Acetabular Labrum: No juxta-acetabular cyst. No obvious labral tear is  appreciated, allowing for the large FOV technique. If indicated  clinically, MR arthrography would be considered the study of choice in  this regard.    Common Hamstring Tendon: Intact.    Gluteal Tendon Greater Trochanteric Attachments: The gluteus medius  and minimus tendons appear within normal limits and symmetrical.    Trochanteric and Iliopsoas Bursae: No fluid collection in the  trochanteric and iliopsoas bursae.    Joint space: No joint effusion.       Impression    IMPRESSION: No sacroiliac joint periarticular erosion or evidence of  sacroiliitis. Osseous and soft tissue signal about the hips appears  within normal limits.    JES JAMES MD   MR Lumbar Spine w/o Contrast    Narrative    MR LUMBAR SPINE WITHOUT CONTRAST  11/6/2017 5:09 PM    HISTORY:  Lower back pain. History of scleritis; concern for  inflammatory arthritis versus other.    TECHNIQUE: Sagittal T1 and T2, sagittal IR, and transverse proton  density and T2-weighted pulse sequences.    FINDINGS: Five lumbar vertebrae are assumed. Mild loss of disc signal  is noted, greatest at L4-L5 where there is minimal loss of disc  height. Apophyseal joints appear essentially within normal limits.  Marrow signal is within normal limits. Vertebral body heights and  sagittal alignment are normal. The conus medullaris is unremarkable in  appearance on the sagittal images.     L1-L2, L2-L3, L3-L4: No disc bulge or herniation. No central or  foraminal stenosis.    L4-L5: Small central disc protrusion contacting but not  indenting the  thecal sac. No central stenosis or evidence of direct neural  impingement. The L4 neural foramina are widely patent.    L5-S1: Minimal if any annular bulge. No central or foraminal stenosis.      Impression    IMPRESSION:  1. Mild or early degenerative disc disease, greatest at L4-L5.  2. L4-L5 small central disc protrusion without stenosis or apparent  neural impingement.  3. Apophyseal joints appear essentially within normal limits. No  periarticular reactive marrow edema.    JES JAMES MD     Immunization History     Immunization History   Administered Date(s) Administered     HepB 01/23/2003     Influenza (IIV3) 10/28/2003, 10/14/2008, 09/28/2009, 10/17/2012     Influenza Intranasal Vaccine 4 valent 10/18/2013     Influenza Vaccine IM 3yrs+ 4 Valent IIV4 10/01/2015, 10/03/2017     TD (ADULT, 7+) 01/01/2009          Chart documentation done in part with Dragon Voice recognition Software. Although reviewed after completion, some word and grammatical error may remain.    Clint Nye MD

## 2017-11-14 DIAGNOSIS — G89.29 CHRONIC LEFT-SIDED THORACIC BACK PAIN: ICD-10-CM

## 2017-11-14 DIAGNOSIS — M54.6 CHRONIC LEFT-SIDED THORACIC BACK PAIN: ICD-10-CM

## 2017-11-15 ENCOUNTER — E-VISIT (OUTPATIENT)
Dept: FAMILY MEDICINE | Facility: CLINIC | Age: 42
End: 2017-11-15
Payer: COMMERCIAL

## 2017-11-15 DIAGNOSIS — G89.29 CHRONIC BILATERAL THORACIC BACK PAIN: Primary | ICD-10-CM

## 2017-11-15 DIAGNOSIS — M54.6 CHRONIC BILATERAL THORACIC BACK PAIN: Primary | ICD-10-CM

## 2017-11-15 PROCEDURE — 99444 ZZC PHYSICIAN ONLINE EVALUATION & MANAGEMENT SERVICE: CPT | Performed by: FAMILY MEDICINE

## 2017-11-15 NOTE — TELEPHONE ENCOUNTER
TIZANIDINE HCL 4 MG TABLET        Last Written Prescription Date:  Not stated  Last Fill Quantity: na,   # refills: na  Future Office visit:       Routing refill request to provider for review/approval because:  Drug not on the [facility], [facility] or [facility] refill protocol or controlled substance

## 2017-12-13 ENCOUNTER — MYC MEDICAL ADVICE (OUTPATIENT)
Dept: PODIATRY | Facility: CLINIC | Age: 42
End: 2017-12-13

## 2017-12-14 ENCOUNTER — TELEPHONE (OUTPATIENT)
Dept: PALLIATIVE MEDICINE | Facility: CLINIC | Age: 42
End: 2017-12-14

## 2017-12-14 ENCOUNTER — OFFICE VISIT (OUTPATIENT)
Dept: PALLIATIVE MEDICINE | Facility: CLINIC | Age: 42
End: 2017-12-14
Payer: COMMERCIAL

## 2017-12-14 VITALS
DIASTOLIC BLOOD PRESSURE: 84 MMHG | HEART RATE: 66 BPM | SYSTOLIC BLOOD PRESSURE: 134 MMHG | WEIGHT: 189 LBS | BODY MASS INDEX: 30.51 KG/M2

## 2017-12-14 DIAGNOSIS — M79.18 MYOFASCIAL MUSCLE PAIN: Primary | ICD-10-CM

## 2017-12-14 PROCEDURE — 99205 OFFICE O/P NEW HI 60 MIN: CPT | Performed by: PAIN MEDICINE

## 2017-12-14 RX ORDER — LIDOCAINE/PRILOCAINE 2.5 %-2.5%
CREAM (GRAM) TOPICAL PRN
Qty: 30 G | Refills: 1 | Status: SHIPPED | OUTPATIENT
Start: 2017-12-14 | End: 2018-12-04

## 2017-12-14 RX ORDER — DICLOFENAC SODIUM 75 MG/1
75 TABLET, DELAYED RELEASE ORAL 2 TIMES DAILY PRN
Qty: 60 TABLET | Refills: 1 | Status: SHIPPED | OUTPATIENT
Start: 2017-12-14 | End: 2018-08-16

## 2017-12-14 ASSESSMENT — PATIENT HEALTH QUESTIONNAIRE - PHQ9: SUM OF ALL RESPONSES TO PHQ QUESTIONS 1-9: 0

## 2017-12-14 ASSESSMENT — PAIN SCALES - GENERAL: PAINLEVEL: NO PAIN (0)

## 2017-12-14 NOTE — TELEPHONE ENCOUNTER
Order came from Dr. Scott for:    Interventional Evaluation:    Trigger Point: need US for procedure      Does pt need an evaluation or can she go ahead and schedule TPI?  Please advise and route back to schedulers.       Maria Antonia RUDOLPH    [facility]

## 2017-12-14 NOTE — PATIENT INSTRUCTIONS
- Further procedures recommended:    - Trigger point injection-will need Ultrasound for the procedure    - May consider intercostal nerve blocks in the future  - Medication Management:    - Start Diclofenac 75mg twice a day as needed. Do not take any other NSAIDS will taking Diclofenac   - Start EMLA cream- if not covered will order 4% Lidocaine   - Diagnostic Studies: none  - Urine toxicology screen today: none   - Follow up: For trigger point injection or as needed      ----------------------------------------------------------------  Nurse Triage line:  [phone]   Call this number with any questions or concerns. You may leave a detailed message anytime. Calls are typically returned Monday through Friday between 8 AM and 4:30 PM. We usually get back to you within 2 business days depending on the issue/request.       Medication refills:    For non-narcotic medications, call your pharmacy directly to request a refill. The pharmacy will contact the [facility] for authorization. Please allow 3-4 days for these refills to be processed.     For narcotic refills, call the nurse triage line or send a [employer] message. Please contact us 7-10 days before your refill is due. The message MUST include the name of the specific medication(s) requested and how you would like to receive the prescription(s). The options are as follows:    Pain Clinic staff can mail the prescription to your pharmacy. Please tell us the name of the pharmacy.    You may pick the prescription up at the Pain Clinic (tell us the location) or during a clinic visit with your pain provider    Pain Clinic staff can deliver the prescription to the [facility] pharmacy in the clinic building. Please tell us the location.      Scheduling number: [phone].  Call this number to schedule or change appointments.    We believe regular attendance is key to your success in our program.    Any time you are unable to keep your appointment we ask that you  call us at least 24 hours in advance to let us know. This will allow us to offer the appointment time to another patient.

## 2017-12-14 NOTE — TELEPHONE ENCOUNTER
Please schedule the TPI with me when ultrasound is available as soon as possible.  No further evaluation is needed.   Thanks

## 2017-12-14 NOTE — PROGRESS NOTES
[facility] Consultation    Date of visit: 12/14/2017    Reason for consultation:    Primary Care Provider is Sujatha Maher  Pain medications are being prescribed by n/a    Please see the [facility] health questionnaire which the patient completed and reviewed with me in detail.    Chief Complaint:    Chief Complaint   Patient presents with     Pain       Pain history:  Caitlin Oh is a 42 year old female who first started having problems with pain approx 1.5 yrs ago. Of note pt  at that time had a kidney stone. patient had negative workup for kidney stone with her current pain. Patient reports her current pain is different from her prior pain,  more cephalad approximately T6-T10 area.  Her current pain started approx 6month ago. No inciting event. The pain is intermittent. Only occurs when sleeping. The pain starts in the upper back and radiates to front. The pain is a deep ach, but occasional sharp shooting.  She denies any radiation into her abdomen or groin.  Occasionally the pt feels like her rib flips out. She takes advil prn for HAs and pain. She takes zanaflex with some relief at night. Pain is worse with sleeping.The pain is worse with deep breaths and deep palpation.   The pain is better when she is not putting pressure over that area. she denies any respiratory/pneumonia like symptoms. Neg chest xray. Denies burning. She denies any allodynia. She denies any pain when putting on cloths. She denies any rash or hx of shingles(Hx of chicken pox as a kid)      Overall the pain limits her ability to sleep, which is her main concern.      Hx of HAs as well    She has tried physical therapy without relief.  She has tried massages without relief.      Any bowel or bladder incontinence: no    Current treatments include:  zanaflex   As needed Motrin    Previous medication treatments included:  none    Other treatments have included:  \No benefit\  PT: No  benefit  Chiropractic: no benefit     Injections: none    Past Medical History:  Past Medical History:   Diagnosis Date     Bilateral bunions      Cervical high risk HPV (human papillomavirus) test positive 11/4/2015     Female infertility 7/10/2008    Undergoing ivf 2008.      Personal history of gestational diabetes 7/11/2012    diet controlled     Plantar fasciitis     s/p surgery     Past Surgical History:  Past Surgical History:   Procedure Laterality Date     BUNIONECTOMY RT/LT Bilateral 11/2009     CYSTOSCOPY, RETROGRADES, INSERT STENT URETER(S), COMBINED  9/19/2013    Procedure: COMBINED CYSTOSCOPY, RETROGRADES, INSERT STENT URETER(S);  Right Ureteral Stent Placement;  Surgeon: JUN Villar MD;  Location: [facility]     DILATION AND CURETTAGE, HYSTEROSCOPY, ABLATE ENDOMETRIUM THERMACHOICE, COMBINED N/A 4/15/2015    Procedure: COMBINED DILATION AND CURETTAGE, HYSTEROSCOPY, ABLATE ENDOMETRIUM THERMACHOICE;  Surgeon: Munira Goddard MD;  Location: [facility]     EXTRACORPOREAL SHOCK WAVE LITHOTRIPSY (ESWL)  9/18/2013    Procedure: EXTRACORPOREAL SHOCK WAVE LITHOTRIPSY (ESWL);  Right Extracorporeal Shock Wave Lithotripsy;  Surgeon: JUN Villar MD;  Location: [facility] OR     FOOT SURGERY      for plantar fasciitis     HC TOOTH EXTRACTION W/FORCEP      wisdom teeth     LAPAROSCOPY DIAGNOSTIC (GYN)  2007    infertility     LASER HOLMIUM LITHOTRIPSY URETER(S), INSERT STENT, COMBINED  9/27/2013    Procedure: COMBINED CYSTOSCOPY, URETEROSCOPY, LASER HOLMIUM LITHOTRIPSY URETER(S), INSERT STENT;  Right Ureteroscopic Stone Extraction and Possible Stent Placement;  Surgeon: JUN Villar MD;  Location: [facility] OR     TONSILLECTOMY  1983     Medications:  Current Outpatient Prescriptions   Medication Sig Dispense Refill     lidocaine-prilocaine (EMLA) cream Apply topically as needed for moderate pain 30 g 1     diclofenac (VOLTAREN) 75 MG EC tablet Take 1 tablet (75 mg) by mouth 2 times daily as needed for moderate pain 60 tablet 1      tiZANidine (ZANAFLEX) 4 MG tablet Take 1.5 tablets (6 mg) by mouth 3 times daily as needed for muscle spasms 135 tablet 11     topiramate (TOPAMAX) 50 MG tablet Take 1 tablet (50 mg) by mouth 2 times daily 180 tablet 3     fluticasone (FLONASE) 50 MCG/ACT nasal spray Spray 1-2 sprays into both nostrils daily 16 g 11     rizatriptan (MAXALT-MLT) 5 MG disintegrating tablet Take 2 tablets (10 mg) by mouth at onset of headache for migraine May repeat dose in 2 hours.  Do not exceed 30 mg in 24 hours 18 tablet 1     Ibuprofen (ADVIL PO) Take by mouth every 6 hours as needed       Acetaminophen (TYLENOL PO) Take 500 mg by mouth every 6 hours as needed       Allergies:     Allergies   Allergen Reactions     Penicillins Rash     Social History:  Home situation: family  Occupation/Schooling:   Tobacco use: no  Alcohol use: no  Drug use: no  History of chemical dependency treatment: no    Family history:  Family History   Problem Relation Age of Onset     HEART DISEASE Maternal Grandmother      Breast Cancer Maternal Grandmother      dx'd age 70     Arthritis Mother      Hypertension Mother      CANCER Mother      skin cancer     Other Cancer Mother      Lymphoma     Prostate Cancer Father      Family history of headaches: yes    Review of Systems:  Skin: negative  Eyes: negative  Ears/Nose/Throat: negative  Respiratory: No shortness of breath, dyspnea on exertion, cough, or hemoptysis  Cardiovascular: negative  Gastrointestinal: negative  Genitourinary: negative  Musculoskeletal: negative  Neurologic: negative  Psychiatric: negative  Hematologic/Lymphatic/Immunologic: negative  Endocrine: negative    Physical Exam:  Vitals:    12/14/17 0856   BP: 134/84   Pulse: 66   Weight: 85.7 kg (189 lb)     Exam:  Constitutional: healthy, alert and no distress  Head: normocephalic. Atraumatic.   Eyes: no redness or jaundice noted   ENT: oropharnx normal.  MMM.  Neck supple.    Cardiovascular: RRR no m/g/r    Respiratory: clear   Gastrointestinal: soft, non-tender, normoactive bowel sounds   Skin: no suspicious lesions or rashes  Psychiatric: mentation appears normal and affect normal/bright    Musculoskeletal exam:  Gait/Station/Posture: wnl  Cervical spine: ROM       Thoracic spine:    ++++ Tenderness to deep palpation over the left T6-T10 area which reproduces some of her pain.  The pain is mostly in the intercostal space limited tenderness over the ribs themselves    Lumbar spine:     ROM: wnl   Myofascial tenderness:  neg     Neurologic exam:  CN:  Cranial nerves 2-12 are normal  Motor:  5/5 UE and LE strength  Reflexes:     Biceps:     +2   Brachioradialis   +2            Sensory:  (upper and lower extremities):   Light touch: normal    Allodynia: absent    Dysethesia: absent    Hyperalgesia: absent     Diagnostic tests:  MRI       IMPRESSION:  1. Mild or early degenerative disc disease, greatest at L4-L5.  2. L4-L5 small central disc protrusion without stenosis or apparent  neural impingement.  3. Apophyseal joints appear essentially within normal limits. No  periarticular reactive marrow edema.         Assessment/Plan:  Caitlin Oh is a 42 year old female who presents with the complaints of Left rib pain   Caitlin was seen today for pain.    Diagnoses and all orders for this visit:    Myofascial muscle pain  -     lidocaine-prilocaine (EMLA) cream; Apply topically as needed for moderate pain  -     diclofenac (VOLTAREN) 75 MG EC tablet; Take 1 tablet (75 mg) by mouth 2 times daily as needed for moderate pain  - Further procedures recommended:    - Trigger point injection-will need Ultrasound   - May consider intercostal nerve blocks in the future  - Medication Management:    - Start Diclofenac 75mg twice a day as needed. Do not take any other NSAIDS will taking Diclofenac   -Continue Zanaflex as prescribed.  May consider taking 2 mg during the day if pain worsens.  - Diagnostic Studies: none  - Urine toxicology  screen today: none   - Follow up: For trigger point or as needed            Total time spent was 60 minutes, and more than 50% of face to face time was spent counseling and/or coordination of care regarding principles of multidisciplinary care and medication management  Left rib pain     Mark Scott MD  [facility] [place]

## 2017-12-14 NOTE — NURSING NOTE
"Chief Complaint   Patient presents with     Pain       Initial /84  Pulse 66  Wt 85.7 kg (189 lb)  BMI 30.51 kg/m2 Estimated body mass index is 30.51 kg/(m^2) as calculated from the following:    Height as of 11/13/17: 1.676 m (5' 6\").    Weight as of this encounter: 85.7 kg (189 lb).  Medication Reconciliation: complete     Antonina Alberts MA      "

## 2017-12-14 NOTE — MR AVS SNAPSHOT
After Visit Summary   12/14/2017    Caitlin Oh    MRN: [id number]           Patient Information     Date Of Birth          1975        Visit Information        Provider Department      12/14/2017 9:00 AM Mark Scott MD [facility]        Today's Diagnoses     Myofascial muscle pain    -  1      Care Instructions    - Further procedures recommended:    - Trigger point injection-will need Ultrasound for the procedure    - May consider intercostal nerve blocks in the future  - Medication Management:    - Start Diclofenac 75mg twice a day as needed. Do not take any other NSAIDS will taking Diclofenac   - Start EMLA cream- if not covered will order 4% Lidocaine   - Diagnostic Studies: none  - Urine toxicology screen today: none   - Follow up: For trigger point injection or as needed      ----------------------------------------------------------------  Nurse Triage line:  [phone]   Call this number with any questions or concerns. You may leave a detailed message anytime. Calls are typically returned Monday through Friday between 8 AM and 4:30 PM. We usually get back to you within 2 business days depending on the issue/request.       Medication refills:    For non-narcotic medications, call your pharmacy directly to request a refill. The pharmacy will contact the [facility] Management Center for authorization. Please allow 3-4 days for these refills to be processed.     For narcotic refills, call the nurse triage line or send a [employer] message. Please contact us 7-10 days before your refill is due. The message MUST include the name of the specific medication(s) requested and how you would like to receive the prescription(s). The options are as follows:    Pain Clinic staff can mail the prescription to your pharmacy. Please tell us the name of the pharmacy.    You may pick the prescription up at the Pain Clinic (tell us the location) or during a clinic visit with your pain  provider    Pain Clinic staff can deliver the prescription to the [facility] pharmacy in the clinic building. Please tell us the location.      Scheduling number: [phone].  Call this number to schedule or change appointments.    We believe regular attendance is key to your success in our program.    Any time you are unable to keep your appointment we ask that you call us at least 24 hours in advance to let us know. This will allow us to offer the appointment time to another patient.               Follow-ups after your visit        Additional Services     PAIN INJECTION EVAL/TREAT/FOLLOW UP                 Who to contact     If you have questions or need follow up information about today's clinic visit or your schedule please contact [facility] directly at [phone].  Normal or non-critical lab and imaging results will be communicated to you by MyChart, letter or phone within 4 business days after the clinic has received the results. If you do not hear from us within 7 days, please contact the clinic through [employer] or phone. If you have a critical or abnormal lab result, we will notify you by phone as soon as possible.  Submit refill requests through [employer] or call your pharmacy and they will forward the refill request to us. Please allow 3 business days for your refill to be completed.          Additional Information About Your Visit        [employer] Information     [employer] gives you secure access to your electronic health record. If you see a primary care provider, you can also send messages to your care team and make appointments. If you have questions, please call your primary care clinic.  If you do not have a primary care provider, please call [phone] and they will assist you.        Care EveryWhere ID     This is your Care EveryWhere ID. This could be used by other organizations to access your [facility] medical records  [id number]        Your Vitals Were     Pulse BMI (Body Mass Index)                 66 30.51 kg/m2           Blood Pressure from Last 3 Encounters:   12/14/17 134/84   11/13/17 132/83   10/25/17 143/86    Weight from Last 3 Encounters:   12/14/17 85.7 kg (189 lb)   11/13/17 86.1 kg (189 lb 12.8 oz)   10/25/17 86.6 kg (191 lb)              We Performed the Following     PAIN INJECTION EVAL/TREAT/FOLLOW UP          Today's Medication Changes          These changes are accurate as of: 12/14/17  9:44 AM.  If you have any questions, ask your nurse or doctor.               Start taking these medicines.        Dose/Directions    diclofenac 75 MG EC tablet   Commonly known as:  VOLTAREN   Used for:  Myofascial muscle pain        Dose:  75 mg   Take 1 tablet (75 mg) by mouth 2 times daily as needed for moderate pain   Quantity:  60 tablet   Refills:  1       lidocaine-prilocaine cream   Commonly known as:  EMLA   Used for:  Myofascial muscle pain        Apply topically as needed for moderate pain   Quantity:  30 g   Refills:  1            Where to get your medicines      These medications were sent to [place] [address] [place] [id number]     Phone:  [phone]     diclofenac 75 MG EC tablet    lidocaine-prilocaine cream                Primary Care Provider Office Phone # Fax #    Sujatha Maher [phone] [address] [facility] [address]        Equal Access to Services     [facility] AH: Hadii ra caldera hadkavyao Soyu, waaxda luqadaha, qaybta kaalmada aderocaelyada, miesha garcia. So [facility] [phone].    ATENCIÓN: Si habla español, tiene a galdamez disposición servicios gratuitos de asistencia lingüística. Llame al [phone].    We comply with applicable federal civil rights laws and [place] laws. We do not discriminate on the basis of race, color, national origin, age, disability, sex, sexual orientation, or gender identity.            Thank you!     Thank you for choosing  [facility]  for your care. Our goal is always to provide you with excellent care. Hearing back from our patients is one way we can continue to improve our services. Please take a few minutes to complete the written survey that you may receive in the mail after your visit with us. Thank you!             Your Updated Medication List - Protect others around you: Learn how to safely use, store and throw away your medicines at www.disposemymeds.org.          This list is accurate as of: 12/14/17  9:44 AM.  Always use your most recent med list.                   Brand Name Dispense Instructions for use Diagnosis    ADVIL PO      Take by mouth every 6 hours as needed        diclofenac 75 MG EC tablet    VOLTAREN    60 tablet    Take 1 tablet (75 mg) by mouth 2 times daily as needed for moderate pain    Myofascial muscle pain       fluticasone 50 MCG/ACT spray    FLONASE    16 g    Spray 1-2 sprays into both nostrils daily    Post-nasal drainage, Chronic rhinitis       lidocaine-prilocaine cream    EMLA    30 g    Apply topically as needed for moderate pain    Myofascial muscle pain       rizatriptan 5 MG ODT tab    MAXALT-MLT    18 tablet    Take 2 tablets (10 mg) by mouth at onset of headache for migraine May repeat dose in 2 hours.  Do not exceed 30 mg in 24 hours    Headache(784.0)       tiZANidine 4 MG tablet    ZANAFLEX    135 tablet    Take 1.5 tablets (6 mg) by mouth 3 times daily as needed for muscle spasms    Muscle spasm of back       topiramate 50 MG tablet    TOPAMAX    180 tablet    Take 1 tablet (50 mg) by mouth 2 times daily    Menstrual migraine without status migrainosus, not intractable       TYLENOL PO      Take 500 mg by mouth every 6 hours as needed

## 2017-12-19 NOTE — TELEPHONE ENCOUNTER
There is a grid for when the [place] is free for the [place] location.    Okay to schedule this procedure with Dr. Scott on the next available Wednesday.  Please put in notes that this is with [place].    Sara Do RN-BSN  [facility]

## 2017-12-27 ENCOUNTER — OFFICE VISIT (OUTPATIENT)
Dept: PALLIATIVE MEDICINE | Facility: CLINIC | Age: 42
End: 2017-12-27
Payer: COMMERCIAL

## 2017-12-27 VITALS — HEART RATE: 79 BPM | DIASTOLIC BLOOD PRESSURE: 96 MMHG | SYSTOLIC BLOOD PRESSURE: 152 MMHG

## 2017-12-27 DIAGNOSIS — M79.18 MYOFASCIAL MUSCLE PAIN: Primary | ICD-10-CM

## 2017-12-27 PROCEDURE — 20552 NJX 1/MLT TRIGGER POINT 1/2: CPT | Performed by: PAIN MEDICINE

## 2017-12-27 ASSESSMENT — PAIN SCALES - GENERAL: PAINLEVEL: NO PAIN (0)

## 2017-12-27 NOTE — PATIENT INSTRUCTIONS
[facility]   Post Procedure Instructions    Today you had:  trigger point injections        Medications used:  lidocaine   ropivicaine  depomedrol          Go to the emergency room if you develop any shortness of breath    Monitor the injection sites for signs and symptoms of infection-fever, chills, redness, swelling, warmth, or drainage to areas.    You may have soreness at injection sites for up to 24 hours.    You may apply ice to the painful areas to help minimize the discomfort of the needle pokes.    Do not apply heat to sites for at least 12 hours.    You may use anti-inflammatory medications or Tylenol for pain control if necessary  Nurse line: [phone]  After hours provider line: [phone]  Appointment line: [phone]

## 2017-12-27 NOTE — LETTER
[facility]  [address]  [place] [address]  Phone: [phone]  Fax: [phone]    December 27, 2017        Caitlin hO  [address]  [place] [address]    To whom it may concern:    RE: Caitlin Oh    Patient was seen and treated today at our clinic today for myofascial pain.  She had a trigger point injection today under ultrasound. Patient should perform any crunches until further evaluation.      Please contact me for questions or concerns.      Sincerely,        Mark Scott MD

## 2017-12-27 NOTE — MR AVS SNAPSHOT
After Visit Summary   12/27/2017    Caitlin Oh    MRN: [id number]           Patient Information     Date Of Birth          1975        Visit Information        Provider Department      12/27/2017 9:30 AM Mark Scott MD [facility]        Today's Diagnoses     Myofascial muscle pain    -  1      Care Instructions    [facility]   Post Procedure Instructions    Today you had:  trigger point injections        Medications used:  lidocaine   ropivicaine  depomedrol          Go to the emergency room if you develop any shortness of breath    Monitor the injection sites for signs and symptoms of infection-fever, chills, redness, swelling, warmth, or drainage to areas.    You may have soreness at injection sites for up to 24 hours.    You may apply ice to the painful areas to help minimize the discomfort of the needle pokes.    Do not apply heat to sites for at least 12 hours.    You may use anti-inflammatory medications or Tylenol for pain control if necessary  Nurse line: [phone]  After hours provider line: [phone]  Appointment line: [phone]              Follow-ups after your visit        Your next 10 appointments already scheduled     Dec 28, 2017  4:40 PM CST   Return Visit with Dain Vargas DPM   [facility]    [address] [address] [phone]              Who to contact     If you have questions or need follow up information about today's clinic visit or your schedule please contact [facility] directly at [phone].  Normal or non-critical lab and imaging results will be communicated to you by MyChart, letter or phone within 4 business days after the clinic has received the results. If you do not hear from us within 7 days, please contact the clinic through MyChart or phone. If you have a critical or abnormal lab result,  we will notify you by phone as soon as possible.  Submit refill requests through [employer] or call your pharmacy and they will forward the refill request to us. Please allow 3 business days for your refill to be completed.          Additional Information About Your Visit        [employer] Information     [employer] gives you secure access to your electronic health record. If you see a primary care provider, you can also send messages to your care team and make appointments. If you have questions, please call your primary care clinic.  If you do not have a primary care provider, please call [phone] and they will assist you.        Care EveryWhere ID     This is your Care EveryWhere ID. This could be used by other organizations to access your [facility] medical records  [id number]        Your Vitals Were     Pulse                   79            Blood Pressure from Last 3 Encounters:   12/27/17 (!) 152/96   12/14/17 134/84   11/13/17 132/83    Weight from Last 3 Encounters:   12/14/17 85.7 kg (189 lb)   11/13/17 86.1 kg (189 lb 12.8 oz)   10/25/17 86.6 kg (191 lb)              Today, you had the following     No orders found for display       Primary Care Provider Office Phone # Fax #    Sujatha Maher [phone] [address] [facility] [address]        Equal Access to Services     [facility] AH: Hadii aad ku hadasho Soomaali, waaxda luqadaha, qaybta kaalmada adeegyada, waxay idiin hayaan adry keith . So [facility] [phone].    ATENCIÓN: Si habla español, tiene a galdamez disposición servicios gratuitos de asistencia lingüística. Llame al [phone].    We comply with applicable federal civil rights laws and [place] laws. We do not discriminate on the basis of race, color, national origin, age, disability, sex, sexual orientation, or gender identity.            Thank you!     Thank you for choosing [facility]  for your care. Our goal is always to provide you with  excellent care. Hearing back from our patients is one way we can continue to improve our services. Please take a few minutes to complete the written survey that you may receive in the mail after your visit with us. Thank you!             Your Updated Medication List - Protect others around you: Learn how to safely use, store and throw away your medicines at www.disposemymeds.org.          This list is accurate as of: 12/27/17 10:39 AM.  Always use your most recent med list.                   Brand Name Dispense Instructions for use Diagnosis    ADVIL PO      Take by mouth every 6 hours as needed        diclofenac 75 MG EC tablet    VOLTAREN    60 tablet    Take 1 tablet (75 mg) by mouth 2 times daily as needed for moderate pain    Myofascial muscle pain       fluticasone 50 MCG/ACT spray    FLONASE    16 g    Spray 1-2 sprays into both nostrils daily    Post-nasal drainage, Chronic rhinitis       lidocaine-prilocaine cream    EMLA    30 g    Apply topically as needed for moderate pain    Myofascial muscle pain       rizatriptan 5 MG ODT tab    MAXALT-MLT    18 tablet    Take 2 tablets (10 mg) by mouth at onset of headache for migraine May repeat dose in 2 hours.  Do not exceed 30 mg in 24 hours    Headache(784.0)       tiZANidine 4 MG tablet    ZANAFLEX    135 tablet    Take 1.5 tablets (6 mg) by mouth 3 times daily as needed for muscle spasms    Muscle spasm of back       topiramate 50 MG tablet    TOPAMAX    180 tablet    Take 1 tablet (50 mg) by mouth 2 times daily    Menstrual migraine without status migrainosus, not intractable       TYLENOL PO      Take 500 mg by mouth every 6 hours as needed

## 2017-12-27 NOTE — PROGRESS NOTES
Caitlin was seen today for pain.    Diagnoses and all orders for this visit:    Myofascial muscle pain      Trigger points were identified by patient, and marked when appropriate.  The area was prepped with Chloroprep.    Using clean technique, injections were completed using a 25G, 3.5 inch needle.  After negative aspiration, injection was completed.  A total of 3 locations were injected.  When possible, tissue was retracted from the chest wall to avoid lung injury.    Muscle groups injected:  Intercostal external/internal. Latissimus dorsi    Injection solution contained:  5ml of 1% lidocaine, 5ml of ropivacaine 0.2%, and 40mg of Depomedrol.    Breath sounds were normal. wnl acutely and 15min after procedure.         Mark Scott MD  [facility]

## 2017-12-27 NOTE — LETTER
[facility]  [address]  [place] [address]  Phone: [phone]  Fax: [phone]    December 27, 2017        RE:  Caitlin Oh         [address]         [place] [address]      To whom it may concern:    RE: Caitlin Oh    Patient was seen and treated today at our clinic for myofascial pain.  She had trigger point injections under ultrasound.  Patient should not perform any crunches until further evaluation.    Please contact me for questions or concerns.      Sincerely,        Mark Scott MD

## 2017-12-28 ENCOUNTER — MYC MEDICAL ADVICE (OUTPATIENT)
Dept: PALLIATIVE MEDICINE | Facility: CLINIC | Age: 42
End: 2017-12-28

## 2017-12-28 ENCOUNTER — OFFICE VISIT (OUTPATIENT)
Dept: PODIATRY | Facility: CLINIC | Age: 42
End: 2017-12-28
Payer: COMMERCIAL

## 2017-12-28 VITALS — WEIGHT: 189 LBS | BODY MASS INDEX: 30.37 KG/M2 | HEART RATE: 80 BPM | HEIGHT: 66 IN

## 2017-12-28 DIAGNOSIS — M72.2 PLANTAR FASCIITIS, LEFT: Primary | ICD-10-CM

## 2017-12-28 PROCEDURE — 99212 OFFICE O/P EST SF 10 MIN: CPT | Performed by: PODIATRIST

## 2017-12-28 RX ORDER — METHYLPREDNISOLONE 4 MG
4 TABLET, DOSE PACK ORAL SEE ADMIN INSTRUCTIONS
Qty: 21 TABLET | Refills: 0 | Status: SHIPPED | OUTPATIENT
Start: 2017-12-28 | End: 2018-02-14

## 2017-12-28 NOTE — MR AVS SNAPSHOT
After Visit Summary   12/28/2017    Caitlin Oh    MRN: [id number]           Patient Information     Date Of Birth          1975        Visit Information        Provider Department      12/28/2017 4:40 PM Dain Vargas DPM [facility]        Today's Diagnoses     Plantar fasciitis, left    -  1      Care Instructions    After the Injection     After the injection, strenuous and repetitive activity should be minimized for approximately 48 hours.   Ice should be applied to the injected area at least for the next 48 hours.   Apply ice to the injected area at least 3 - 4 times a day for 20 minutes each time for the next 48 hours. This can reduce the painful  flare  reaction that can follow an injection the next day. This reaction can cause the area that was injected to hurt more the next day just from the injection. This will resolve within a day if it does occur.     Use over-the-counter pain medications such as Tylenol to help with the pain if necessary.     After 48 hours, icing the area may be continued if you find it beneficial.     The lidocaine or marcaine (commonly called Novocain) is an anesthetic agent that is injected with the steroid will typically relieve your pain for a few hours following the injection. If the  Novocain  and steroid are injected near a nerve, you may experience local numbness or weakness from the nerve block until it wears off. After this wears off your pain may return until the steroid takes effect.   The steroid may be effective immediately after the injection. Do not be concerned if the injection is not effective in relieving your symptoms immediately. In some cases, it may take up to two weeks for the steroid to work.   If you are diabetic, the corticosteroid may cause your blood sugar to become elevated for several days following the injection. This response usually lasts about 2-4 days before it returns to your normal  "level.   You should report any adverse reaction to you doctor. Call if there are any questions.             Follow-ups after your visit        Follow-up notes from your care team     Return in about 4 weeks (around 1/25/2018), or if symptoms worsen or fail to improve.      Who to contact     If you have questions or need follow up information about today's clinic visit or your schedule please contact [facility] directly at [phone].  Normal or non-critical lab and imaging results will be communicated to you by Reflexhart, letter or phone within 4 business days after the clinic has received the results. If you do not hear from us within 7 days, please contact the clinic through Healintt or phone. If you have a critical or abnormal lab result, we will notify you by phone as soon as possible.  Submit refill requests through Soicos or call your pharmacy and they will forward the refill request to us. Please allow 3 business days for your refill to be completed.          Additional Information About Your Visit        Soicos Information     Soicos gives you secure access to your electronic health record. If you see a primary care provider, you can also send messages to your care team and make appointments. If you have questions, please call your primary care clinic.  If you do not have a primary care provider, please call [phone] and they will assist you.        Care EveryWhere ID     This is your Care EveryWhere ID. This could be used by other organizations to access your [place] medical records  [id number]        Your Vitals Were     Pulse Height BMI (Body Mass Index)             80 5' 6\" (1.676 m) 30.51 kg/m2          Blood Pressure from Last 3 Encounters:   12/27/17 (!) 152/96   12/14/17 134/84   11/13/17 132/83    Weight from Last 3 Encounters:   12/28/17 189 lb (85.7 kg)   12/14/17 189 lb (85.7 kg)   11/13/17 189 lb 12.8 oz (86.1 kg)              Today, you had the " following     No orders found for display         Today's Medication Changes          These changes are accurate as of: 12/28/17 11:59 PM.  If you have any questions, ask your nurse or doctor.               Start taking these medicines.        Dose/Directions    methylPREDNISolone 4 MG tablet   Commonly known as:  MEDROL DOSEPAK   Used for:  Plantar fasciitis, left   Started by:  Dain Vargas DPM        Dose:  4 mg   Take 1 tablet (4 mg) by mouth See Admin Instructions   Quantity:  21 tablet   Refills:  0            Where to get your medicines      These medications were sent to [place] [address] [facility] [place] [address] [address] [address]     Phone:  [phone]     methylPREDNISolone 4 MG tablet                Primary Care Provider Office Phone # Fax #    Sujatha Maher [phone] [address] [facility] [address]        Equal Access to Services     TRACY BALDWIN AH: Hadii ra caldera hadasho Soomaali, waaxda luqadaha, qaybta kaalmada adeegyada, miesha keith . So [facility] [phone].    ATENCIÓN: Si habla español, tiene a galdamez disposición servicios gratuitos de asistencia lingüística. Marina al [phone].    We comply with applicable federal civil rights laws and [place] laws. We do not discriminate on the basis of race, color, national origin, age, disability, sex, sexual orientation, or gender identity.            Thank you!     Thank you for choosing [facility]  for your care. Our goal is always to provide you with excellent care. Hearing back from our patients is one way we can continue to improve our services. Please take a few minutes to complete the written survey that you may receive in the mail after your visit with us. Thank you!             Your Updated Medication List - Protect others around you: Learn how to safely use, store and throw away your medicines at  www.disposemymeds.org.          This list is accurate as of: 12/28/17 11:59 PM.  Always use your most recent med list.                   Brand Name Dispense Instructions for use Diagnosis    ADVIL PO      Take by mouth every 6 hours as needed        diclofenac 75 MG EC tablet    VOLTAREN    60 tablet    Take 1 tablet (75 mg) by mouth 2 times daily as needed for moderate pain    Myofascial muscle pain       fluticasone 50 MCG/ACT spray    FLONASE    16 g    Spray 1-2 sprays into both nostrils daily    Post-nasal drainage, Chronic rhinitis       lidocaine-prilocaine cream    EMLA    30 g    Apply topically as needed for moderate pain    Myofascial muscle pain       methylPREDNISolone 4 MG tablet    MEDROL DOSEPAK    21 tablet    Take 1 tablet (4 mg) by mouth See Admin Instructions    Plantar fasciitis, left       rizatriptan 5 MG ODT tab    MAXALT-MLT    18 tablet    Take 2 tablets (10 mg) by mouth at onset of headache for migraine May repeat dose in 2 hours.  Do not exceed 30 mg in 24 hours    Headache(784.0)       tiZANidine 4 MG tablet    ZANAFLEX    135 tablet    Take 1.5 tablets (6 mg) by mouth 3 times daily as needed for muscle spasms    Muscle spasm of back       topiramate 50 MG tablet    TOPAMAX    180 tablet    Take 1 tablet (50 mg) by mouth 2 times daily    Menstrual migraine without status migrainosus, not intractable       TYLENOL PO      Take 500 mg by mouth every 6 hours as needed

## 2017-12-28 NOTE — LETTER
12/28/2017         RE: Caitlin Oh  [address] [place] [address]        Dear Colleague,    Thank you for referring your patient, Caitlin Oh, to the [facility]. Please see a copy of my visit note below.    Caitlin returns to the office for reevaluation of the left foot.  The patient relates following the instructions given at the last visit with noted more pain.  The patient relates overall less  improvement in pain and function of the left foot.  The patient relates no other problems.    PAST MEDICAL HISTORY:   Past Medical History:   Diagnosis Date     Bilateral bunions      Cervical high risk HPV (human papillomavirus) test positive 11/4/2015     Female infertility 7/10/2008    Undergoing ivf 2008.      Personal history of gestational diabetes 7/11/2012    diet controlled     Plantar fasciitis     s/p surgery       BMI= Body mass index is 30.51 kg/(m^2).    Weight management plan: Patient was referred to their PCP to discuss a diet and exercise plan.    Physical Exam:    General: The patient appears to have a pleasant mental affect.    Lower extremity physical exam:  Neurovascular status is intact with palpable pedal pulses and intact epicritic sensations.  Muscular exam is within normal limits to major muscle groups.  Integument is intact.      One notes decreased edema.  One notes pain on palpation under the left heel at the insertion point of the plantar fascia.  No surrounding erythema noted.       Assessment:      ICD-10-CM    1. Plantar fasciitis, left M72.2 methylPREDNISolone (MEDROL DOSEPAK) 4 MG tablet       Plan:  I have explained to Caitlin about the conditions.  At this time, the patient was prescribed a Medrol Dosepak to help reduce the amount of inflammation.      Disclaimer: This note consists of symbols derived from keyboarding, dictation and/or voice recognition software. As a result, there may be errors in the script that have gone undetected. Please  consider this when interpreting information found in this chart.       FELTON Vargas D.P.M., F.GRISEL.C.F.A.S.      Again, thank you for allowing me to participate in the care of your patient.        Sincerely,        Dain Vargas DPM

## 2017-12-28 NOTE — PROGRESS NOTES
Caitlin returns to the office for reevaluation of the left foot.  The patient relates following the instructions given at the last visit with noted more pain.  The patient relates overall less  improvement in pain and function of the left foot.  The patient relates no other problems.    PAST MEDICAL HISTORY:   Past Medical History:   Diagnosis Date     Bilateral bunions      Cervical high risk HPV (human papillomavirus) test positive 11/4/2015     Female infertility 7/10/2008    Undergoing ivf 2008.      Personal history of gestational diabetes 7/11/2012    diet controlled     Plantar fasciitis     s/p surgery       BMI= Body mass index is 30.51 kg/(m^2).    Weight management plan: Patient was referred to their PCP to discuss a diet and exercise plan.    Physical Exam:    General: The patient appears to have a pleasant mental affect.    Lower extremity physical exam:  Neurovascular status is intact with palpable pedal pulses and intact epicritic sensations.  Muscular exam is within normal limits to major muscle groups.  Integument is intact.      One notes decreased edema.  One notes pain on palpation under the left heel at the insertion point of the plantar fascia.  No surrounding erythema noted.       Assessment:      ICD-10-CM    1. Plantar fasciitis, left M72.2 methylPREDNISolone (MEDROL DOSEPAK) 4 MG tablet       Plan:  I have explained to Caitlin about the conditions.  At this time, the patient was prescribed a Medrol Dosepak to help reduce the amount of inflammation.      Disclaimer: This note consists of symbols derived from keyboarding, dictation and/or voice recognition software. As a result, there may be errors in the script that have gone undetected. Please consider this when interpreting information found in this chart.       FELTON Vargas D.P.M., FMELIDA.F.ASandraS.

## 2017-12-28 NOTE — NURSING NOTE
"Chief Complaint   Patient presents with     RECHECK     Planter fasciitis left foot       Initial Pulse 80  Ht 5' 6\" (1.676 m)  Wt 189 lb (85.7 kg)  BMI 30.51 kg/m2 Estimated body mass index is 30.51 kg/(m^2) as calculated from the following:    Height as of this encounter: 5' 6\" (1.676 m).    Weight as of this encounter: 189 lb (85.7 kg).  Medication Reconciliation: complete     Kianna Linn MA        "

## 2017-12-28 NOTE — TELEPHONE ENCOUNTER
Per patient Ramyhart message:  From: Caitlin Oh      Created: 12/28/2017 10:22 AM      Good morning. Sorry I missed you call. I was curious if it is normal to have pain, swelling and tenderness in the area that you worked yesterday. Let me know if that is normal. I have been icing it, but it is pretty sore.     Thanks, graeme        Routed to the nursing pool to triage.    Sara Do, RN-BSN  [facility]

## 2017-12-29 NOTE — TELEPHONE ENCOUNTER
My chart below sent to patient. Will leave encounter open for patient response/chart review by nursing.       Ambrose Tucker,     I reviewed your chart.  I looks like you had trigger point injections done.  How are you feeling now? Some patients do experience soreness for about 24 hours. I copied the discharge information below. Are you having any of the signs of infection per below?       Go to the emergency room if you develop any shortness of breath    Monitor the injection sites for signs and symptoms of infection-fever, chills, redness, swelling, warmth, or drainage to areas.    You may have soreness at injection sites for up to 24 hours.    You may apply ice to the painful areas to help minimize the discomfort of the needle pokes.    Do not apply heat to sites for at least 12 hours.    You may use anti-inflammatory medications or Tylenol for pain control if necessary  Nurse line: [phone]    Marina Santacruz  BSN-RN Care Coordinator  [facility]

## 2018-01-03 NOTE — TELEPHONE ENCOUNTER
Per patient myChart message:  From: Caitlin Oh      Created: 12/29/2017 2:30 PM      Hello, it is better today. A little sore still but much better. Hopefully I will be able to tell if the injections worked soon.         Routed to Dr. Scott to review.    Sara Do, RN-BSN  [facility]

## 2018-01-04 NOTE — PATIENT INSTRUCTIONS
After the Injection     After the injection, strenuous and repetitive activity should be minimized for approximately 48 hours.   Ice should be applied to the injected area at least for the next 48 hours.   Apply ice to the injected area at least 3 - 4 times a day for 20 minutes each time for the next 48 hours. This can reduce the painful  flare  reaction that can follow an injection the next day. This reaction can cause the area that was injected to hurt more the next day just from the injection. This will resolve within a day if it does occur.     Use over-the-counter pain medications such as Tylenol to help with the pain if necessary.     After 48 hours, icing the area may be continued if you find it beneficial.     The lidocaine or marcaine (commonly called Novocain) is an anesthetic agent that is injected with the steroid will typically relieve your pain for a few hours following the injection. If the  Novocain  and steroid are injected near a nerve, you may experience local numbness or weakness from the nerve block until it wears off. After this wears off your pain may return until the steroid takes effect.   The steroid may be effective immediately after the injection. Do not be concerned if the injection is not effective in relieving your symptoms immediately. In some cases, it may take up to two weeks for the steroid to work.   If you are diabetic, the corticosteroid may cause your blood sugar to become elevated for several days following the injection. This response usually lasts about 2-4 days before it returns to your normal level.   You should report any adverse reaction to you doctor. Call if there are any questions.

## 2018-02-14 ENCOUNTER — OFFICE VISIT (OUTPATIENT)
Dept: FAMILY MEDICINE | Facility: CLINIC | Age: 43
End: 2018-02-14
Payer: COMMERCIAL

## 2018-02-14 ENCOUNTER — RADIANT APPOINTMENT (OUTPATIENT)
Dept: GENERAL RADIOLOGY | Facility: CLINIC | Age: 43
End: 2018-02-14
Attending: NURSE PRACTITIONER
Payer: COMMERCIAL

## 2018-02-14 VITALS
TEMPERATURE: 98.7 F | HEIGHT: 66 IN | WEIGHT: 190.7 LBS | SYSTOLIC BLOOD PRESSURE: 124 MMHG | RESPIRATION RATE: 16 BRPM | HEART RATE: 66 BPM | DIASTOLIC BLOOD PRESSURE: 70 MMHG | OXYGEN SATURATION: 99 % | BODY MASS INDEX: 30.65 KG/M2

## 2018-02-14 DIAGNOSIS — M79.675 PAIN OF TOE OF LEFT FOOT: ICD-10-CM

## 2018-02-14 DIAGNOSIS — S92.502A CLOSED FRACTURE OF PHALANX OF LEFT FIFTH TOE, INITIAL ENCOUNTER: ICD-10-CM

## 2018-02-14 DIAGNOSIS — M79.675 PAIN OF TOE OF LEFT FOOT: Primary | ICD-10-CM

## 2018-02-14 PROCEDURE — 99213 OFFICE O/P EST LOW 20 MIN: CPT | Performed by: NURSE PRACTITIONER

## 2018-02-14 PROCEDURE — 73660 X-RAY EXAM OF TOE(S): CPT | Mod: LT

## 2018-02-14 NOTE — MR AVS SNAPSHOT
After Visit Summary   2/14/2018    Caitlin Oh    MRN: [id number]           Patient Information     Date Of Birth          1975        Visit Information        Provider Department      2/14/2018 8:20 AM Kailey Cedeno APRN [facility]        Today's Diagnoses     Pain of toe of left foot    -  1    Closed fracture of phalanx of left fifth toe, initial encounter          Care Instructions      Closed Toe Fracture  Your toe is broken (fractured). This causes local pain, swelling, and sometimes bruising. This injury usually takes about 4 to 6 weeks to heal, but can sometimes take longer. Toe injuries are often treated by taping the injured toe to the next one (des taping). This protects the injured toe and holds it in position.     If the toenail has been severely injured, it may fall off in 1 to 2 weeks. It takes up to 12 months for a new toenail to grow back.  Home care  Follow these guidelines when caring for yourself at home:    You may be given a cast shoe to wear to keep your toe from moving. If not, you can use a sandal or any shoe that doesn t put pressure on the injured toe until the swelling and pain go away. If using a sandal, be careful not to strike your foot against anything. Another injury could make the fracture worse. If you were given crutches, don t put full weight on the injured foot until you can do so without pain, or as directed by your healthcare provider.    Keep your foot elevated to reduce pain and swelling. When sleeping, put a pillow under the injured leg. When sitting, support the injured leg so it is above your waist. This is very important during the first 2 days (48 hours).    Put an ice pack on the injured area. Do this for 20 minutes every 1 to 2 hours the first day for pain relief. You can make an ice pack by wrapping a plastic bag of ice cubes in a thin towel. As the ice melts, be careful that any cloth or paper tape doesn t get wet.  Continue using the ice pack 3 to 4 times a day for the next 2 days. Then use the ice pack as needed to ease pain and swelling.    If buddy tape was used and it becomes wet or dirty, change it. You may replace it with paper, plastic, or cloth tape. Cloth tape and paper tapes must be kept dry.    You may use acetaminophen or ibuprofen to control pain, unless another pain medicine was prescribed. If you have chronic liver or kidney disease, talk with your healthcare provider before using these medicines. Also talk with your provider if you ve had a stomach ulcer or gastrointestinal bleeding.    You may return to sports or physical education activities after 4 weeks when you can run without pain, or as directed by your healthcare provider.  Follow-up care  Follow up with your healthcare provider in 1 week, or as advised. This is to make sure the bone is healing the way it should.  X-rays may be taken. You will be told of any new findings that may affect your care.  When to seek medical advice  Call your healthcare provider right away if any of these occur:    Pain or swelling gets worse    The cast/splint cracks    The cast and padding get wet and stays wet more than 24 hours    Bad odor from the cast/splint or wound fluid stains the cast    Tightness or pressure under the cast/splint gets worse    Toe becomes cold, blue, numb, or tingly    You can t move the toe    Signs of infection: fever, redness, warmth, swelling, or drainage from the wound or cast    Fever of 100.4 F (38 C) or higher, or as directed by your healthcare provider  Date Last Reviewed: 2/1/2017 2000-2017 [employer]. [address] [place], [place] [id number]. All rights reserved. This information is not intended as a substitute for professional medical care. Always follow your healthcare professional's instructions.                Follow-ups after your visit        Additional Services     ORTHOPEDICS ADULT REFERRAL       Your provider  has referred you to: [facility]: [facility] [phone]   [email or web address]    Please be aware that coverage of these services is subject to the terms and limitations of your health insurance plan.  Call member services at your health plan with any benefit or coverage questions.      Please bring the following to your appointment:    >>   Any x-rays, CTs or MRIs which have been performed.  Contact the facility where they were done to arrange for  prior to your scheduled appointment.    >>   List of current medications   >>   This referral request   >>   Any documents/labs given to you for this referral                  Who to contact     Normal or non-critical lab and imaging results will be communicated to you by [employer], letter or phone within 4 business days after the clinic has received the results. If you do not hear from us within 7 days, please contact the clinic through [employer] or phone. If you have a critical or abnormal lab result, we will notify you by phone as soon as possible.  Submit refill requests through [employer] or call your pharmacy and they will forward the refill request to us. Please allow 3 business days for your refill to be completed.          If you need to speak with a  for additional information , please call: [phone]             Additional Information About Your Visit        [employer] Information     [employer] gives you secure access to your electronic health record. If you see a primary care provider, you can also send messages to your care team and make appointments. If you have questions, please call your primary care clinic.  If you do not have a primary care provider, please call [phone] and they will assist you.        Care EveryWhere ID     This is your Care EveryWhere ID. This could be used by other organizations to access your  "[place] medical records  [id number]        Your Vitals Were     Pulse Temperature Respirations Height Pulse Oximetry BMI (Body Mass Index)    66 98.7  F (37.1  C) (Tympanic) 16 5' 6\" (1.676 m) 99% 30.78 kg/m2       Blood Pressure from Last 3 Encounters:   02/14/18 124/70   12/27/17 (!) 152/96   12/14/17 134/84    Weight from Last 3 Encounters:   02/14/18 190 lb 11.2 oz (86.5 kg)   12/28/17 189 lb (85.7 kg)   12/14/17 189 lb (85.7 kg)              We Performed the Following     ORTHOPEDICS ADULT REFERRAL        Primary Care Provider Office Phone # Fax #    Sujatha Maher [phone] [address] [facility] [address]        Equal Access to Services     [facility]: Hadii aad werner hadasho Soomaali, waaxda luqadaha, qaybta kaalmada adeegyada, waxay katiein hayglen keith . So [facility] [phone].    ATENCIÓN: Si habla español, tiene a galdamez disposición servicios gratuitos de asistencia lingüística. Llame al [phone].    We comply with applicable federal civil rights laws and [place] laws. We do not discriminate on the basis of race, color, national origin, age, disability, sex, sexual orientation, or gender identity.            Thank you!     Thank you for choosing [facility]  for your care. Our goal is always to provide you with excellent care. Hearing back from our patients is one way we can continue to improve our services. Please take a few minutes to complete the written survey that you may receive in the mail after your visit with us. Thank you!             Your Updated Medication List - Protect others around you: Learn how to safely use, store and throw away your medicines at www.disposemymeds.org.          This list is accurate as of 2/14/18  8:51 AM.  Always use your most recent med list.                   Brand Name Dispense Instructions for use Diagnosis    ADVIL PO      Take by mouth every 6 hours as needed        diclofenac 75 MG EC tablet    VOLTAREN "    60 tablet    Take 1 tablet (75 mg) by mouth 2 times daily as needed for moderate pain    Myofascial muscle pain       fluticasone 50 MCG/ACT spray    FLONASE    16 g    Spray 1-2 sprays into both nostrils daily    Post-nasal drainage, Chronic rhinitis       lidocaine-prilocaine cream    EMLA    30 g    Apply topically as needed for moderate pain    Myofascial muscle pain       rizatriptan 5 MG ODT tab    MAXALT-MLT    18 tablet    Take 2 tablets (10 mg) by mouth at onset of headache for migraine May repeat dose in 2 hours.  Do not exceed 30 mg in 24 hours    Headache(784.0)       tiZANidine 4 MG tablet    ZANAFLEX    135 tablet    Take 1.5 tablets (6 mg) by mouth 3 times daily as needed for muscle spasms    Muscle spasm of back       topiramate 50 MG tablet    TOPAMAX    180 tablet    Take 1 tablet (50 mg) by mouth 2 times daily    Menstrual migraine without status migrainosus, not intractable       TYLENOL PO      Take 500 mg by mouth every 6 hours as needed

## 2018-02-14 NOTE — PATIENT INSTRUCTIONS
Closed Toe Fracture  Your toe is broken (fractured). This causes local pain, swelling, and sometimes bruising. This injury usually takes about 4 to 6 weeks to heal, but can sometimes take longer. Toe injuries are often treated by taping the injured toe to the next one (buddy taping). This protects the injured toe and holds it in position.     If the toenail has been severely injured, it may fall off in 1 to 2 weeks. It takes up to 12 months for a new toenail to grow back.  Home care  Follow these guidelines when caring for yourself at home:    You may be given a cast shoe to wear to keep your toe from moving. If not, you can use a sandal or any shoe that doesn t put pressure on the injured toe until the swelling and pain go away. If using a sandal, be careful not to strike your foot against anything. Another injury could make the fracture worse. If you were given crutches, don t put full weight on the injured foot until you can do so without pain, or as directed by your healthcare provider.    Keep your foot elevated to reduce pain and swelling. When sleeping, put a pillow under the injured leg. When sitting, support the injured leg so it is above your waist. This is very important during the first 2 days (48 hours).    Put an ice pack on the injured area. Do this for 20 minutes every 1 to 2 hours the first day for pain relief. You can make an ice pack by wrapping a plastic bag of ice cubes in a thin towel. As the ice melts, be careful that any cloth or paper tape doesn t get wet. Continue using the ice pack 3 to 4 times a day for the next 2 days. Then use the ice pack as needed to ease pain and swelling.    If buddy tape was used and it becomes wet or dirty, change it. You may replace it with paper, plastic, or cloth tape. Cloth tape and paper tapes must be kept dry.    You may use acetaminophen or ibuprofen to control pain, unless another pain medicine was prescribed. If you have chronic liver or kidney disease,  talk with your healthcare provider before using these medicines. Also talk with your provider if you ve had a stomach ulcer or gastrointestinal bleeding.    You may return to sports or physical education activities after 4 weeks when you can run without pain, or as directed by your healthcare provider.  Follow-up care  Follow up with your healthcare provider in 1 week, or as advised. This is to make sure the bone is healing the way it should.  X-rays may be taken. You will be told of any new findings that may affect your care.  When to seek medical advice  Call your healthcare provider right away if any of these occur:    Pain or swelling gets worse    The cast/splint cracks    The cast and padding get wet and stays wet more than 24 hours    Bad odor from the cast/splint or wound fluid stains the cast    Tightness or pressure under the cast/splint gets worse    Toe becomes cold, blue, numb, or tingly    You can t move the toe    Signs of infection: fever, redness, warmth, swelling, or drainage from the wound or cast    Fever of 100.4 F (38 C) or higher, or as directed by your healthcare provider  Date Last Reviewed: 2/1/2017 2000-2017 [employer]. [address] [place] [address]. All rights reserved. This information is not intended as a substitute for professional medical care. Always follow your healthcare professional's instructions.

## 2018-02-14 NOTE — LETTER
[facility]  [id number] [place] [id number]  Phone: [phone]    02/14/18    Caitlin DUVALL Adriano  [address] [facility] [id number]      To whom it may concern:     Caitlin was treated today in clinic. She has been instructed to light duty/desk work until cleared by Ortho to return to full duty.    Sincerely,      BLADIMIR Hayes CNP

## 2018-02-14 NOTE — NURSING NOTE
"Chief Complaint   Patient presents with     Toe Injury       Initial /70 (BP Location: Right arm, Patient Position: Sitting, Cuff Size: Adult Large)  Pulse 66  Temp 98.7  F (37.1  C) (Tympanic)  Resp 16  Ht 5' 6\" (1.676 m)  Wt 190 lb 11.2 oz (86.5 kg)  SpO2 99%  BMI 30.78 kg/m2 Estimated body mass index is 30.78 kg/(m^2) as calculated from the following:    Height as of this encounter: 5' 6\" (1.676 m).    Weight as of this encounter: 190 lb 11.2 oz (86.5 kg).  Medication Reconciliation: complete  "

## 2018-02-14 NOTE — PROGRESS NOTES
SUBJECTIVE:   Caitlin Oh is a 43 year old female who presents to clinic today for the following health issues:      Foot Pain    Onset: last night    Description:   Location: left small toe  Character: Sharp and shooting pain    Intensity: moderate at rest, severe when on feet    Progression of Symptoms: same    Accompanying Signs & Symptoms:  Other symptoms: swelling and bruising, unable to bear weight, throbs after being on it    History:   Previous similar pain: no       Precipitating factors:   Trauma or overuse: YES- stubbed foot on door    Alleviating factors:  Improved by: nothing    Therapies Tried and outcome: ibuprofen, ice      Problem list and histories reviewed & adjusted, as indicated.  Additional history: as documented    Patient Active Problem List   Diagnosis     Esophageal reflux     CARDIOVASCULAR SCREENING; LDL GOAL LESS THAN 160     Kidney stones     Tension headache, chronic     Menstrual migraine     Menorrhagia     Cervical high risk HPV (human papillomavirus) test positive     Past Surgical History:   Procedure Laterality Date     BUNIONECTOMY RT/LT Bilateral 11/2009     CYSTOSCOPY, RETROGRADES, INSERT STENT URETER(S), COMBINED  9/19/2013    Procedure: COMBINED CYSTOSCOPY, RETROGRADES, INSERT STENT URETER(S);  Right Ureteral Stent Placement;  Surgeon: JUN Villar MD;  Location: [facility] OR     DILATION AND CURETTAGE, HYSTEROSCOPY, ABLATE ENDOMETRIUM THERMACHOICE, COMBINED N/A 4/15/2015    Procedure: COMBINED DILATION AND CURETTAGE, HYSTEROSCOPY, ABLATE ENDOMETRIUM THERMACHOICE;  Surgeon: Munira Goddard MD;  Location: [facility] OR     EXTRACORPOREAL SHOCK WAVE LITHOTRIPSY (ESWL)  9/18/2013    Procedure: EXTRACORPOREAL SHOCK WAVE LITHOTRIPSY (ESWL);  Right Extracorporeal Shock Wave Lithotripsy;  Surgeon: JUN Villar MD;  Location: [facility] OR     FOOT SURGERY      for plantar fasciitis     HC TOOTH EXTRACTION W/FORCEP      wisdom teeth     LAPAROSCOPY DIAGNOSTIC (GYN)  2007    infertility      "LASER HOLMIUM LITHOTRIPSY URETER(S), INSERT STENT, COMBINED  9/27/2013    Procedure: COMBINED CYSTOSCOPY, URETEROSCOPY, LASER HOLMIUM LITHOTRIPSY URETER(S), INSERT STENT;  Right Ureteroscopic Stone Extraction and Possible Stent Placement;  Surgeon: JUN Villar MD;  Location: [facility] OR     TONSILLECTOMY  1983       Social History   Substance Use Topics     Smoking status: Never Smoker     Smokeless tobacco: Never Used     Alcohol use No     Family History   Problem Relation Age of Onset     HEART DISEASE Maternal Grandmother      Breast Cancer Maternal Grandmother      dx'd age 70     Arthritis Mother      Hypertension Mother      CANCER Mother      skin cancer     Other Cancer Mother      Lymphoma     Prostate Cancer Father            Reviewed and updated as needed this visit by clinical staff       Reviewed and updated as needed this visit by Provider         ROS:  Constitutional, HEENT, cardiovascular, pulmonary, gi and gu systems are negative, except as otherwise noted.    OBJECTIVE:     /70 (BP Location: Right arm, Patient Position: Sitting, Cuff Size: Adult Large)  Pulse 66  Temp 98.7  F (37.1  C) (Tympanic)  Resp 16  Ht 5' 6\" (1.676 m)  Wt 190 lb 11.2 oz (86.5 kg)  SpO2 99%  BMI 30.78 kg/m2  Body mass index is 30.78 kg/(m^2).  GENERAL: healthy, alert and no distress  MS: LLE exam shows 5th toe with limited ROM, edema, and tenderness to palpation at MTP joint.   SKIN: ecchymoses - left 5th toe and left lateral foot.  NEURO: Normal strength and tone, mentation intact and speech normal    Diagnostic Test Results:  Xray -   HISTORY: Stubbed toe.    COMPARISON: None.    FINDINGS: Oblique nondisplaced fracture through the proximal phalanx  of the fifth toe with associated soft tissue swelling. Other  visualized osseous structures are intact.             Impression             IMPRESSION: Fifth toe proximal phalanx fracture.    MICHAEL MARINELLI MD          ASSESSMENT/PLAN:       ICD-10-CM    1. Pain of toe " of left foot M79.675 XR Toe Left G/E 2 Views   2. Closed fracture of phalanx of left fifth toe, initial encounter S92.502A ORTHOPEDICS ADULT REFERRAL       CONSULTATION/REFERRAL to ORTHO in 1 week. Wear post op shoe. May des tape for comfort. Motrin or Aleve PRN pain. Light duty at work () until cleared by ortho for full active duty.    Patient Instructions     Closed Toe Fracture  Your toe is broken (fractured). This causes local pain, swelling, and sometimes bruising. This injury usually takes about 4 to 6 weeks to heal, but can sometimes take longer. Toe injuries are often treated by taping the injured toe to the next one (des taping). This protects the injured toe and holds it in position.     If the toenail has been severely injured, it may fall off in 1 to 2 weeks. It takes up to 12 months for a new toenail to grow back.  Home care  Follow these guidelines when caring for yourself at home:    You may be given a cast shoe to wear to keep your toe from moving. If not, you can use a sandal or any shoe that doesn t put pressure on the injured toe until the swelling and pain go away. If using a sandal, be careful not to strike your foot against anything. Another injury could make the fracture worse. If you were given crutches, don t put full weight on the injured foot until you can do so without pain, or as directed by your healthcare provider.    Keep your foot elevated to reduce pain and swelling. When sleeping, put a pillow under the injured leg. When sitting, support the injured leg so it is above your waist. This is very important during the first 2 days (48 hours).    Put an ice pack on the injured area. Do this for 20 minutes every 1 to 2 hours the first day for pain relief. You can make an ice pack by wrapping a plastic bag of ice cubes in a thin towel. As the ice melts, be careful that any cloth or paper tape doesn t get wet. Continue using the ice pack 3 to 4 times a day for the next 2  days. Then use the ice pack as needed to ease pain and swelling.    If buddy tape was used and it becomes wet or dirty, change it. You may replace it with paper, plastic, or cloth tape. Cloth tape and paper tapes must be kept dry.    You may use acetaminophen or ibuprofen to control pain, unless another pain medicine was prescribed. If you have chronic liver or kidney disease, talk with your healthcare provider before using these medicines. Also talk with your provider if you ve had a stomach ulcer or gastrointestinal bleeding.    You may return to sports or physical education activities after 4 weeks when you can run without pain, or as directed by your healthcare provider.  Follow-up care  Follow up with your healthcare provider in 1 week, or as advised. This is to make sure the bone is healing the way it should.  X-rays may be taken. You will be told of any new findings that may affect your care.  When to seek medical advice  Call your healthcare provider right away if any of these occur:    Pain or swelling gets worse    The cast/splint cracks    The cast and padding get wet and stays wet more than 24 hours    Bad odor from the cast/splint or wound fluid stains the cast    Tightness or pressure under the cast/splint gets worse    Toe becomes cold, blue, numb, or tingly    You can t move the toe    Signs of infection: fever, redness, warmth, swelling, or drainage from the wound or cast    Fever of 100.4 F (38 C) or higher, or as directed by your healthcare provider  Date Last Reviewed: 2/1/2017 2000-2017 [email or web address]. [address] [place] [address], [place] [address]. All rights reserved. This information is not intended as a substitute for professional medical care. Always follow your healthcare professional's instructions.            BLADIMIR Hayes [facility]

## 2018-02-16 ENCOUNTER — TELEPHONE (OUTPATIENT)
Dept: FAMILY MEDICINE | Facility: CLINIC | Age: 43
End: 2018-02-16

## 2018-02-16 DIAGNOSIS — Z20.828 EXPOSURE TO THE FLU: Primary | ICD-10-CM

## 2018-02-16 RX ORDER — OSELTAMIVIR PHOSPHATE 75 MG/1
75 CAPSULE ORAL DAILY
Qty: 10 CAPSULE | Refills: 0 | Status: SHIPPED | OUTPATIENT
Start: 2018-02-16 | End: 2018-08-14

## 2018-02-16 NOTE — TELEPHONE ENCOUNTER
According to her chart, she is healthy and without chronic illness. There is no real indication for her to take the preventative dose since she is young and without chronic illness or immunosuppression. It can reduce the chance of her getting the flu. She could still get the flu however. Her susceptibility returns once she is done with the course of tamiflu.     I will order it for her but she may choose not to take it.    MARJORIE Alexander MD

## 2018-02-16 NOTE — TELEPHONE ENCOUNTER
Reason for Call:  Other prescription    Detailed comments: Caitlin's daughter was diagnosed today with influenza A.  She is a patient at [facility].  She was told to call her PCP and ask about Tamiflu.  She does not have any sx so not sure if she needs it or not. Please call and assess.  Thank you..Reny Miranda    Phone Number Patient can be reached at: Home number on file [phone] (home)    Best Time: any time    Can we leave a detailed message on this number? YES    Call taken on 2/16/2018 at 2:37 PM by Reny Miranda

## 2018-02-20 ENCOUNTER — OFFICE VISIT (OUTPATIENT)
Dept: ORTHOPEDICS | Facility: CLINIC | Age: 43
End: 2018-02-20
Payer: COMMERCIAL

## 2018-02-20 DIAGNOSIS — S92.515D CLOSED NONDISPLACED FRACTURE OF PROXIMAL PHALANX OF LESSER TOE OF LEFT FOOT WITH ROUTINE HEALING, SUBSEQUENT ENCOUNTER: Primary | ICD-10-CM

## 2018-02-20 PROCEDURE — 99214 OFFICE O/P EST MOD 30 MIN: CPT | Performed by: FAMILY MEDICINE

## 2018-02-20 NOTE — PROGRESS NOTES
Caitlin Oh  :  1975  DOS: 2018  MRN: [id number]    Sports Medicine Clinic Visit    PCP: Sujatha Maher    Caitlin Oh is a 43 year old female who is seen in consultation at the request of  Kailey Cedeno PA-C presenting with left little toe fracture.    Injury: Walking, stubbed left foot hard on door frame ~ 1 week ago.  Pain located over left little toe, nonradiating.  Additional Features:  Positive: swelling and bruising.  Symptoms are better with Rest.  Symptoms are worse with: walking, WB outside of shoe.  Other evaluation and/or treatments so far consists of: Tylenol, Rest and PCP consult, post-op shoe.  Prior imaging: X-rays completed 18.  Prior History of related problems: none    Social History: works as     Review of Systems  Musculoskeletal: as above  Remainder of review of systems is negative including constitutional, CV, pulmonary, GI, Skin and Neurologic except as noted in HPI or medical history.    Past Medical History:   Diagnosis Date     Bilateral bunions      Cervical high risk HPV (human papillomavirus) test positive 2015     Female infertility 7/10/2008    Undergoing ivf .      Personal history of gestational diabetes 2012    diet controlled     Plantar fasciitis     s/p surgery     Past Surgical History:   Procedure Laterality Date     BUNIONECTOMY RT/LT Bilateral 2009     CYSTOSCOPY, RETROGRADES, INSERT STENT URETER(S), COMBINED  2013    Procedure: COMBINED CYSTOSCOPY, RETROGRADES, INSERT STENT URETER(S);  Right Ureteral Stent Placement;  Surgeon: JUN Villar MD;  Location: [facility]     DILATION AND CURETTAGE, HYSTEROSCOPY, ABLATE ENDOMETRIUM THERMACHOICE, COMBINED N/A 4/15/2015    Procedure: COMBINED DILATION AND CURETTAGE, HYSTEROSCOPY, ABLATE ENDOMETRIUM THERMACHOICE;  Surgeon: Munira Goddard MD;  Location: [facility]     EXTRACORPOREAL SHOCK WAVE LITHOTRIPSY (ESWL)  2013    Procedure: EXTRACORPOREAL SHOCK WAVE  "LITHOTRIPSY (ESWL);  Right Extracorporeal Shock Wave Lithotripsy;  Surgeon: JUN Villar MD;  Location: [facility]     FOOT SURGERY      for plantar fasciitis     HC TOOTH EXTRACTION W/FORCEP      wisdom teeth     LAPAROSCOPY DIAGNOSTIC (GYN)  2007    infertility     LASER HOLMIUM LITHOTRIPSY URETER(S), INSERT STENT, COMBINED  9/27/2013    Procedure: COMBINED CYSTOSCOPY, URETEROSCOPY, LASER HOLMIUM LITHOTRIPSY URETER(S), INSERT STENT;  Right Ureteroscopic Stone Extraction and Possible Stent Placement;  Surgeon: JUN Villar MD;  Location: [facility]     TONSILLECTOMY  1983     Objective  BP (P) 151/86  Ht (P) 5' 6\" (1.676 m)  Wt (P) 191 lb (86.6 kg)  BMI (P) 30.83 kg/m2    General: healthy, alert and in no distress    HEENT: no scleral icterus or conjunctival erythema   Skin: no suspicious lesions or rash. No jaundice.   CV: regular rhythm by palpation, 2+ distal pulses, no pedal edema    Resp: normal respiratory effort without conversational dyspnea   Psych: normal mood and affect    Gait: mildly antalgic, appropriate coordination and balance   Neuro: normal light touch sensory exam of the extremities. Motor strength as noted below     Left Ankle/Foot Exam:    Inspection:       ecchymosis noted lateral foot       edema noted lateral foot and 5th MTP    Foot inspection:       no deformity noted    ROM:        full ROM with dorsiflexion, plantarflexion, inversion and eversion    Tender:       Most focally over proximal phalanx of L 5th toe, more mildly in surrounding soft tissues    Non-Tender:       remainder of foot and ankle       lateral malleolus       lateral ankle ligaments       deltoid ligament       posterior edge of lateral malleolus       dorsal tibiotalar joint       tarsal navicular       medial malleolus       distal tibiofibular joint       proximal 1st and 2nd intermetatarsal ligament       tibialis posterior tendon, posterior to medial malleolus       peroneal tendon sheath    Skin:       well " perfused       capillary refill less than 2 seconds    Gait:       antalgic gait    Proprioception:        deferred      Radiology:  Recent Results (from the past 744 hour(s))   XR Toe Left G/E 2 Views    Narrative    XR TOE LT G/E 2 VW 2/14/2018 8:44 AM    HISTORY: Stubbed toe.    COMPARISON: None.    FINDINGS: Oblique nondisplaced fracture through the proximal phalanx  of the fifth toe with associated soft tissue swelling. Other  visualized osseous structures are intact.      Impression    IMPRESSION: Fifth toe proximal phalanx fracture.    MICHAEL MARINELLI MD       Assessment:  1. Closed nondisplaced fracture of proximal phalanx of lesser toe of left foot with routine healing, subsequent encounter        Plan:  Discussed the assessment with the patient.  Follow up: 3 weeks prn, sooner prn  XR images independently visualized and reviewed with patient today in clinic  Likely 4-6 weeks of healing will be required to return to full duty, but will base clinically  Forego repeat imaging for now, can obtain in future if sx worsening or additional trauma  Note provided for work  Stiff soled shoe vs post op shoe vs dynaflex insert vs short walking boot options reviewed  RICE reviewed  Home handouts provided and supportive care reviewed  All questions were answered today  Contact us with additional questions or concerns  Signs and sx of concern reviewed    Thanks very much for sending this nice lady to us, I will keep you updated with her progress      Bart Carvalho DO, CAQ  Primary Care Sports Medicine  [facility]

## 2018-02-20 NOTE — MR AVS SNAPSHOT
After Visit Summary   2/20/2018    Caitlin Oh    MRN: [id number]           Patient Information     Date Of Birth          1975        Visit Information        Provider Department      2/20/2018 11:00 AM Bart Carvalho DO [facility]        Today's Diagnoses     Closed nondisplaced fracture of proximal phalanx of lesser toe of left foot with routine healing, subsequent encounter    -  1       Follow-ups after your visit        Who to contact     If you have questions or need follow up information about today's clinic visit or your schedule please contact [facility] directly at [phone].  Normal or non-critical lab and imaging results will be communicated to you by MyChart, letter or phone within 4 business days after the clinic has received the results. If you do not hear from us within 7 days, please contact the clinic through [email or web address] or phone. If you have a critical or abnormal lab result, we will notify you by phone as soon as possible.  Submit refill requests through Acceptd or call your pharmacy and they will forward the refill request to us. Please allow 3 business days for your refill to be completed.          Additional Information About Your Visit        MyChart Information     Acceptd gives you secure access to your electronic health record. If you see a primary care provider, you can also send messages to your care team and make appointments. If you have questions, please call your primary care clinic.  If you do not have a primary care provider, please call [phone] and they will assist you.        Care EveryWhere ID     This is your Care EveryWhere ID. This could be used by other organizations to access your [place] medical records  [id number]         Blood Pressure from Last 3 Encounters:   02/20/18 (P) 151/86   02/14/18 124/70   12/27/17 (!) 152/96    Weight from Last 3 Encounters:   02/20/18 (P)  191 lb (86.6 kg)   02/14/18 190 lb 11.2 oz (86.5 kg)   12/28/17 189 lb (85.7 kg)              Today, you had the following     No orders found for display       Primary Care Provider Office Phone # Fax #    Sujatha Maher [phone] [address] [facility] [address]        Equal Access to Services     TRACY BALDWIN : Hadii aad ku hadasho Soomaali, waaxda luqadaha, qaybta kaalmada adeegyada, waxay idiin hayaan adeeg kharash la'aan ah. So [facility] [phone].    ATENCIÓN: Si lesly harris, tiene a galdamez disposición servicios gratuitos de asistencia lingüística. Llame al [phone].    We comply with applicable federal civil rights laws and [place] laws. We do not discriminate on the basis of race, color, national origin, age, disability, sex, sexual orientation, or gender identity.            Thank you!     Thank you for choosing [facility]  for your care. Our goal is always to provide you with excellent care. Hearing back from our patients is one way we can continue to improve our services. Please take a few minutes to complete the written survey that you may receive in the mail after your visit with us. Thank you!             Your Updated Medication List - Protect others around you: Learn how to safely use, store and throw away your medicines at www.disposemymeds.org.          This list is accurate as of 2/20/18 12:21 PM.  Always use your most recent med list.                   Brand Name Dispense Instructions for use Diagnosis    ADVIL PO      Take by mouth every 6 hours as needed        diclofenac 75 MG EC tablet    VOLTAREN    60 tablet    Take 1 tablet (75 mg) by mouth 2 times daily as needed for moderate pain    Myofascial muscle pain       fluticasone 50 MCG/ACT spray    FLONASE    16 g    Spray 1-2 sprays into both nostrils daily    Post-nasal drainage, Chronic rhinitis       lidocaine-prilocaine cream    EMLA    30 g    Apply topically as needed for  moderate pain    Myofascial muscle pain       oseltamivir 75 MG capsule    TAMIFLU    10 capsule    Take 1 capsule (75 mg) by mouth daily    Exposure to the flu       rizatriptan 5 MG ODT tab    MAXALT-MLT    18 tablet    Take 2 tablets (10 mg) by mouth at onset of headache for migraine May repeat dose in 2 hours.  Do not exceed 30 mg in 24 hours    Headache(784.0)       tiZANidine 4 MG tablet    ZANAFLEX    135 tablet    Take 1.5 tablets (6 mg) by mouth 3 times daily as needed for muscle spasms    Muscle spasm of back       topiramate 50 MG tablet    TOPAMAX    180 tablet    Take 1 tablet (50 mg) by mouth 2 times daily    Menstrual migraine without status migrainosus, not intractable       TYLENOL PO      Take 500 mg by mouth every 6 hours as needed

## 2018-02-20 NOTE — LETTER
2018         RE: Caitlin Oh  [address] [place] [address]        Dear Colleague,    Thank you for referring your patient, Caitlin Oh, to the [facility]. Please see a copy of my visit note below.    Caitlin Oh  :  1975  DOS: 2018  MRN: [id number]    Sports Medicine Clinic Visit    PCP: Sujatha Maher    Caitlin Oh is a 43 year old female who is seen in consultation at the request of  Kailey Cedeno PA-C presenting with left little toe fracture.    Injury: Walking, stubbed left foot hard on door frame ~ 1 week ago.  Pain located over left little toe, nonradiating.  Additional Features:  Positive: swelling and bruising.  Symptoms are better with Rest.  Symptoms are worse with: walking, WB outside of shoe.  Other evaluation and/or treatments so far consists of: Tylenol, Rest and PCP consult, post-op shoe.  Prior imaging: X-rays completed 18.  Prior History of related problems: none    Social History: works as     Review of Systems  Musculoskeletal: as above  Remainder of review of systems is negative including constitutional, CV, pulmonary, GI, Skin and Neurologic except as noted in HPI or medical history.    Past Medical History:   Diagnosis Date     Bilateral bunions      Cervical high risk HPV (human papillomavirus) test positive 2015     Female infertility 7/10/2008    Undergoing ivf .      Personal history of gestational diabetes 2012    diet controlled     Plantar fasciitis     s/p surgery     Past Surgical History:   Procedure Laterality Date     BUNIONECTOMY RT/LT Bilateral 2009     CYSTOSCOPY, RETROGRADES, INSERT STENT URETER(S), COMBINED  2013    Procedure: COMBINED CYSTOSCOPY, RETROGRADES, INSERT STENT URETER(S);  Right Ureteral Stent Placement;  Surgeon: JUN Villar MD;  Location: [facility]     DILATION AND CURETTAGE, HYSTEROSCOPY, ABLATE ENDOMETRIUM THERMACHOICE, COMBINED N/A  "4/15/2015    Procedure: COMBINED DILATION AND CURETTAGE, HYSTEROSCOPY, ABLATE ENDOMETRIUM THERMACHOICE;  Surgeon: Munira Goddard MD;  Location: [facility]     EXTRACORPOREAL SHOCK WAVE LITHOTRIPSY (ESWL)  9/18/2013    Procedure: EXTRACORPOREAL SHOCK WAVE LITHOTRIPSY (ESWL);  Right Extracorporeal Shock Wave Lithotripsy;  Surgeon: JUN Villar MD;  Location: [facility]     FOOT SURGERY      for plantar fasciitis     HC TOOTH EXTRACTION W/FORCEP      wisdom teeth     LAPAROSCOPY DIAGNOSTIC (GYN)  2007    infertility     LASER HOLMIUM LITHOTRIPSY URETER(S), INSERT STENT, COMBINED  9/27/2013    Procedure: COMBINED CYSTOSCOPY, URETEROSCOPY, LASER HOLMIUM LITHOTRIPSY URETER(S), INSERT STENT;  Right Ureteroscopic Stone Extraction and Possible Stent Placement;  Surgeon: JUN Villar MD;  Location: [facility]     TONSILLECTOMY  1983     Objective  BP (P) 151/86  Ht (P) 5' 6\" (1.676 m)  Wt (P) 191 lb (86.6 kg)  BMI (P) 30.83 kg/m2    General: healthy, alert and in no distress    HEENT: no scleral icterus or conjunctival erythema   Skin: no suspicious lesions or rash. No jaundice.   CV: regular rhythm by palpation, 2+ distal pulses, no pedal edema    Resp: normal respiratory effort without conversational dyspnea   Psych: normal mood and affect    Gait: mildly antalgic, appropriate coordination and balance   Neuro: normal light touch sensory exam of the extremities. Motor strength as noted below     Left Ankle/Foot Exam:    Inspection:       ecchymosis noted lateral foot       edema noted lateral foot and 5th MTP    Foot inspection:       no deformity noted    ROM:        full ROM with dorsiflexion, plantarflexion, inversion and eversion    Tender:       Most focally over proximal phalanx of L 5th toe, more mildly in surrounding soft tissues    Non-Tender:       remainder of foot and ankle       lateral malleolus       lateral ankle ligaments       deltoid ligament       posterior edge of lateral malleolus       dorsal tibiotalar " joint       tarsal navicular       medial malleolus       distal tibiofibular joint       proximal 1st and 2nd intermetatarsal ligament       tibialis posterior tendon, posterior to medial malleolus       peroneal tendon sheath    Skin:       well perfused       capillary refill less than 2 seconds    Gait:       antalgic gait    Proprioception:        deferred      Radiology:  Recent Results (from the past 744 hour(s))   XR Toe Left G/E 2 Views    Narrative    XR TOE LT G/E 2 VW 2/14/2018 8:44 AM    HISTORY: Stubbed toe.    COMPARISON: None.    FINDINGS: Oblique nondisplaced fracture through the proximal phalanx  of the fifth toe with associated soft tissue swelling. Other  visualized osseous structures are intact.      Impression    IMPRESSION: Fifth toe proximal phalanx fracture.    MICHAEL MARINELLI MD       Assessment:  1. Closed nondisplaced fracture of proximal phalanx of lesser toe of left foot with routine healing, subsequent encounter        Plan:  Discussed the assessment with the patient.  Follow up: 3 weeks prn, sooner prn  XR images independently visualized and reviewed with patient today in clinic  Likely 4-6 weeks of healing will be required to return to full duty, but will base clinically  Forego repeat imaging for now, can obtain in future if sx worsening or additional trauma  Note provided for work  Stiff soled shoe vs post op shoe vs dynaflex insert vs short walking boot options reviewed  RICE reviewed  Home handouts provided and supportive care reviewed  All questions were answered today  Contact us with additional questions or concerns  Signs and sx of concern reviewed    Thanks very much for sending this nice lady to us, I will keep you updated with her progress      Bart Carvalho DO, CAQ  Primary Care Sports Medicine  [facility]               Again, thank you for allowing me to participate in the care of your patient.        Sincerely,        Bart Carvalho DO

## 2018-02-20 NOTE — LETTER
February 20, 2018        Caitlin was seen in my office today.  She should remain on light duty restrictions until 3/20/2018.  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  Prolonged walking, standing, running or climbing is not realistic.  I anticipate restrictions for 4-6 weeks total, but will updated recommendations based on her progress.  She can plan to return to work without restriction on 3/20/2018, and updates will be provided as needed.        Bart Wooten DO, CAQ  Primary Care Sports Medicine  [facility]

## 2018-02-27 ENCOUNTER — MYC MEDICAL ADVICE (OUTPATIENT)
Dept: PALLIATIVE MEDICINE | Facility: CLINIC | Age: 43
End: 2018-02-27

## 2018-02-27 DIAGNOSIS — M79.2 THORACIC NEURALGIA: Primary | ICD-10-CM

## 2018-02-27 NOTE — TELEPHONE ENCOUNTER
My chart patient sent from patient:    I did not get much relief from the back injections we did back in December.  What would the next step be? Some days it feels as if something is out of place (rib possibly) but I think we did an x-ray to look at that and other days it feels like a muscle. When I wake up in the morning it definitely feels as if something is out of place in my back or rib cage.  It is hard to tell. Sleeping is still very uncomfortable and laying on my back is still very difficult.  Let me know please.     Thanks Heather SÁNCHEZN-RN Care Coordinator  [facility] [place]

## 2018-02-27 NOTE — TELEPHONE ENCOUNTER
Will route to provider to review options    Charito SÁNCHEZN-RN Care Coordinator  [facility] [place]

## 2018-03-01 NOTE — TELEPHONE ENCOUNTER
The next procedure I would consider is an intercostal nerve block.  This would have to be done in the fluoroscopy suite.  I do think this is more than reasonable.  Pending patient's decision will place order.  Thanks

## 2018-03-02 NOTE — TELEPHONE ENCOUNTER
Message sent to pt:    Dr. Tyler Sow is recommending an injection called an intercostal nerve block.  This would be done in the fluoroscopy suite so we have x-ray guidance to make sure the medication is put in the right spot. Numbing and likely some steroid medication would be injected between your ribs along the intercostal nerve to hopefully provide pain relief by reducing inflammation and interfering with the pain signals.  If you would like to try this injection, please let us know and then Dr. Scott will put in the order.     Take care,     PRINCESS HuN, RN-BC  Patient Care Supervisor/Care Coordinator  [facility]

## 2018-03-05 NOTE — TELEPHONE ENCOUNTER
Chart check:  [employer] User Last Read On      Caitlin Oh 3/2/2018  4:49 PM     Will leave encounter open for patient response/chart review by nursing.

## 2018-03-07 NOTE — TELEPHONE ENCOUNTER
My chart sent from patient> Routing for FYI. Will need order then we can schedule patient.      Hello, yes we can set that appointment up.  I would like to discuss with him that something feels out of place or not right in my rib cage area.  I know x rays have been done, but not sure what else could be done to look at what might be causing this feeling.  I really doesn't feel right to me.  I am leaving tomorrow for [place] and will return on 3/19.  Let me know when you will have something available.  I would prefer something in the afternoon in possible.       Thanks, Heather SÁNCHEZN-RN Care Coordinator  [facility]

## 2018-03-09 NOTE — TELEPHONE ENCOUNTER
Please call patient to schedule intercostal nerve block    Charito SÁNCHEZN-RN Care Coordinator  [facility]

## 2018-03-09 NOTE — TELEPHONE ENCOUNTER
Routing to provider for intercostal nerve block order.     Marina Santacruz  RN-BSN Care Coordinator  [facility]

## 2018-03-12 NOTE — TELEPHONE ENCOUNTER
LVM for patient to schedule intercostal nerve block      Maria Antonia RUDOLPH    [facility]

## 2018-03-19 ENCOUNTER — RADIANT APPOINTMENT (OUTPATIENT)
Dept: GENERAL RADIOLOGY | Facility: CLINIC | Age: 43
End: 2018-03-19
Attending: PEDIATRICS
Payer: COMMERCIAL

## 2018-03-19 ENCOUNTER — OFFICE VISIT (OUTPATIENT)
Dept: ORTHOPEDICS | Facility: CLINIC | Age: 43
End: 2018-03-19
Payer: COMMERCIAL

## 2018-03-19 VITALS
HEIGHT: 66 IN | SYSTOLIC BLOOD PRESSURE: 134 MMHG | BODY MASS INDEX: 30.7 KG/M2 | DIASTOLIC BLOOD PRESSURE: 88 MMHG | WEIGHT: 191 LBS

## 2018-03-19 DIAGNOSIS — S92.515D CLOSED NONDISPLACED FRACTURE OF PROXIMAL PHALANX OF LESSER TOE OF LEFT FOOT WITH ROUTINE HEALING, SUBSEQUENT ENCOUNTER: ICD-10-CM

## 2018-03-19 DIAGNOSIS — S92.515D CLOSED NONDISPLACED FRACTURE OF PROXIMAL PHALANX OF LESSER TOE OF LEFT FOOT WITH ROUTINE HEALING, SUBSEQUENT ENCOUNTER: Primary | ICD-10-CM

## 2018-03-19 PROCEDURE — 73660 X-RAY EXAM OF TOE(S): CPT | Mod: LT

## 2018-03-19 PROCEDURE — 99213 OFFICE O/P EST LOW 20 MIN: CPT | Performed by: PEDIATRICS

## 2018-03-19 NOTE — PROGRESS NOTES
"Sports Medicine Clinic Visit - Interim History March 19, 2018      PCP: Sujatha Maher ELOINA Oh is a 43 year old female who is seen in f/u up for Closed nondisplaced fracture of proximal phalanx of lesser toe of left foot with routine healing, subsequent encounter as an [facility] walk-in. Since last visit on 2/20/18 with Dr. Carvalho patient has weaned out of using post op shoe. Reports returning from vacation in [place] where she was wearing flip flops most of the time and noticed that her toe still feels swollen and \"not normal\" and wants to check in.  Wearing tennis shoes currently.  Was given a work note by Dr. Carvalho previously that she could return to work tomorrow, unsure about this.  - Now ~ 5 weeks from initial injury    Social History:     Review of Systems  Skin: mild initial bruising, mild swelling  Musculoskeletal: as above  Neurologic: no numbness, paresthesias  Remainder of review of systems is negative including constitutional, CV, pulmonary, GI, except as noted in HPI or medical history.    Patient's current problem list, past medical and surgical history, and family history were reviewed.    Patient Active Problem List   Diagnosis     Esophageal reflux     CARDIOVASCULAR SCREENING; LDL GOAL LESS THAN 160     Kidney stones     Tension headache, chronic     Menstrual migraine     Menorrhagia     Cervical high risk HPV (human papillomavirus) test positive     Past Medical History:   Diagnosis Date     Bilateral bunions      Cervical high risk HPV (human papillomavirus) test positive 11/4/2015     Female infertility 7/10/2008    Undergoing ivf 2008.      Personal history of gestational diabetes 7/11/2012    diet controlled     Plantar fasciitis     s/p surgery     Past Surgical History:   Procedure Laterality Date     BUNIONECTOMY RT/LT Bilateral 11/2009     CYSTOSCOPY, RETROGRADES, INSERT STENT URETER(S), COMBINED  9/19/2013    Procedure: COMBINED CYSTOSCOPY, RETROGRADES, INSERT STENT " "URETER(S);  Right Ureteral Stent Placement;  Surgeon: JUN Villar MD;  Location: [facility]     DILATION AND CURETTAGE, HYSTEROSCOPY, ABLATE ENDOMETRIUM THERMACHOICE, COMBINED N/A 4/15/2015    Procedure: COMBINED DILATION AND CURETTAGE, HYSTEROSCOPY, ABLATE ENDOMETRIUM THERMACHOICE;  Surgeon: Munira Goddard MD;  Location: [facility]     EXTRACORPOREAL SHOCK WAVE LITHOTRIPSY (ESWL)  9/18/2013    Procedure: EXTRACORPOREAL SHOCK WAVE LITHOTRIPSY (ESWL);  Right Extracorporeal Shock Wave Lithotripsy;  Surgeon: JUN Villar MD;  Location: [facility]     FOOT SURGERY      for plantar fasciitis     HC TOOTH EXTRACTION W/FORCEP      wisdom teeth     LAPAROSCOPY DIAGNOSTIC (GYN)  2007    infertility     LASER HOLMIUM LITHOTRIPSY URETER(S), INSERT STENT, COMBINED  9/27/2013    Procedure: COMBINED CYSTOSCOPY, URETEROSCOPY, LASER HOLMIUM LITHOTRIPSY URETER(S), INSERT STENT;  Right Ureteroscopic Stone Extraction and Possible Stent Placement;  Surgeon: JUN Villar MD;  Location: [facility]     TONSILLECTOMY  1983     Family History   Problem Relation Age of Onset     HEART DISEASE Maternal Grandmother      Breast Cancer Maternal Grandmother      dx'd age 70     Arthritis Mother      Hypertension Mother      CANCER Mother      skin cancer     Other Cancer Mother      Lymphoma     Prostate Cancer Father        Objective  /88 (BP Location: Left arm, Patient Position: Sitting, Cuff Size: Adult Large)  Ht 5' 6\" (1.676 m)  Wt 191 lb (86.6 kg)  BMI 30.83 kg/m2    GENERAL APPEARANCE: healthy, alert and no distress   GAIT: NORMAL  SKIN: no suspicious lesions or rashes  HEENT: Sclera clear, anicteric  CV: good peripheral pulses  RESP: Breathing not labored  NEURO: Normal strength and tone, mentation intact and speech normal  PSYCH:  mentation appears normal and affect normal/bright    Bilateral Foot and Ankle Exam:  Inspection:       edema noted mild in left 5th phalanx    Foot inspection:       no deformity noted    Tender:       5th " phalanx    Non-Tender:       remainder of ankle and foot bilateral    ROM:        Full active and passive ROM, ankle dorsiflexion, plantarflexion, inversion, eversion, great toe dorsiflexion, remainder of toes, midfoot and subtalar bilateral    Gait:       normal    Neurovascular:       2+ peripheral pulses bilaterally and brisk capillary refill       sensation grossly intact    Radiology  I ordered, visualized and reviewed these images with the patient  LEFT TOE TWO OR MORE VIEWS  3/19/2018 4:21 PM   HISTORY:  Closed nondisplaced fracture of proximal phalanx of lesser  toe of left foot with routine healing, subsequent encounter.  COMPARISON: 2/14/2018 x-ray.  IMPRESSION: No change in position or alignment of obliquely oriented  fracture proximal phalanx fifth toe. Fracture line remains apparent.    Assessment:  1. Closed nondisplaced fracture of proximal phalanx of lesser toe of left foot with routine healing, subsequent encounter      Left 5th phalanx fracture, some callous formation noted on x-ray, however,  and symptomatic.  Reviewed supportive care including buddy taping and supportive shoe (boot v. Cast shoe).  Reviewed work requirements - patient will try work tomorrow and let us know if she needs additional restrictions.    Plan:  - Today's Plan of Care:  Buddy tape  Supportive shoes    Follow Up: 3-4 weeks as needed with repeat x-ray    Concerning signs and symptoms were reviewed.  The patient expressed understanding of this management plan and all questions were answered at this time.    Rashida Piedra MD CAQ  Primary Care Sports Medicine  [facility]

## 2018-03-19 NOTE — PATIENT INSTRUCTIONS
Plan:  - Today's Plan of Care:  Budroddy tape  Supportive shoes    Follow Up: 3-4 weeks as needed    If you have any further questions for your physician or physician s care team you can call [phone] and use option 3 to leave a voice message. Calls received during business hours will be returned same day.

## 2018-03-19 NOTE — MR AVS SNAPSHOT
After Visit Summary   3/19/2018    Caitlin Oh    MRN: [id number]           Patient Information     Date Of Birth          1975        Visit Information        Provider Department      3/19/2018 4:00 PM Rashida Piedra MD [facility]        Today's Diagnoses     Closed nondisplaced fracture of proximal phalanx of lesser toe of left foot with routine healing, subsequent encounter    -  1      Care Instructions    Plan:  - Today's Plan of Care:  Buddy tape  Supportive shoes    Follow Up: 3-4 weeks as needed    If you have any further questions for your physician or physician s care team you can call [phone] and use option 3 to leave a voice message. Calls received during business hours will be returned same day.                Follow-ups after your visit        Your next 10 appointments already scheduled     Apr 04, 2018 10:00 AM CDT   Return Visit with Mark Scott MD   [facility] ([facility])    [address] [id number] [phone]            Apr 06, 2018 11:15 AM CDT   Radiology Injections with Mark Scott MD   [facility] ([facility])    [address] [id number] [phone]              Who to contact     If you have questions or need follow up information about today's clinic visit or your schedule please contact [facility] directly at [phone].  Normal or non-critical lab and imaging results will be communicated to you by MyChart, letter or phone within 4 business days after the clinic has received the results. If you do not hear from us within 7 days, please contact the clinic through [employer] or phone. If you have a critical or abnormal lab result, we will notify you by phone as soon as possible.  Submit refill requests through [email or web address] or call your pharmacy and they will  "forward the refill request to us. Please allow 3 business days for your refill to be completed.          Additional Information About Your Visit        [employer] Information     [employer] gives you secure access to your electronic health record. If you see a primary care provider, you can also send messages to your care team and make appointments. If you have questions, please call your primary care clinic.  If you do not have a primary care provider, please call [phone] and they will assist you.        Care EveryWhere ID     This is your Care EveryWhere ID. This could be used by other organizations to access your [facility] medical records  [id number]        Your Vitals Were     Height BMI (Body Mass Index)                5' 6\" (1.676 m) 30.83 kg/m2           Blood Pressure from Last 3 Encounters:   03/19/18 134/88   02/20/18 (P) 151/86   02/14/18 124/70    Weight from Last 3 Encounters:   03/19/18 191 lb (86.6 kg)   02/20/18 (P) 191 lb (86.6 kg)   02/14/18 190 lb 11.2 oz (86.5 kg)               Primary Care Provider Office Phone # Fax #    Sujatha Maher [phone] [address] [facility] [address]        Equal Access to Services     MARKUS BALDWIN AH: Hadii aad ku hadasho Soomaali, waaxda luqadaha, qaybta kaalmada adeegyada, waxay idiin hayaan adeeg kharatania la'samn ah. So [facility] [phone].    ATENCIÓN: Si habla español, tiene a galdamez disposición servicios gratuitos de asistencia lingüística. Llame al [phone].    We comply with applicable federal civil rights laws and [place] laws. We do not discriminate on the basis of race, color, national origin, age, disability, sex, sexual orientation, or gender identity.            Thank you!     Thank you for choosing [facility]  for your care. Our goal is always to provide you with excellent care. Hearing back from our patients is one way we can continue to improve our services. Please take a few minutes to " complete the written survey that you may receive in the mail after your visit with us. Thank you!             Your Updated Medication List - Protect others around you: Learn how to safely use, store and throw away your medicines at www.disposemymeds.org.          This list is accurate as of 3/19/18  4:43 PM.  Always use your most recent med list.                   Brand Name Dispense Instructions for use Diagnosis    ADVIL PO      Take by mouth every 6 hours as needed        diclofenac 75 MG EC tablet    VOLTAREN    60 tablet    Take 1 tablet (75 mg) by mouth 2 times daily as needed for moderate pain    Myofascial muscle pain       fluticasone 50 MCG/ACT spray    FLONASE    16 g    Spray 1-2 sprays into both nostrils daily    Post-nasal drainage, Chronic rhinitis       lidocaine-prilocaine cream    EMLA    30 g    Apply topically as needed for moderate pain    Myofascial muscle pain       oseltamivir 75 MG capsule    TAMIFLU    10 capsule    Take 1 capsule (75 mg) by mouth daily    Exposure to the flu       rizatriptan 5 MG ODT tab    MAXALT-MLT    18 tablet    Take 2 tablets (10 mg) by mouth at onset of headache for migraine May repeat dose in 2 hours.  Do not exceed 30 mg in 24 hours    Headache(784.0)       tiZANidine 4 MG tablet    ZANAFLEX    135 tablet    Take 1.5 tablets (6 mg) by mouth 3 times daily as needed for muscle spasms    Muscle spasm of back       topiramate 50 MG tablet    TOPAMAX    180 tablet    Take 1 tablet (50 mg) by mouth 2 times daily    Menstrual migraine without status migrainosus, not intractable       TYLENOL PO      Take 500 mg by mouth every 6 hours as needed

## 2018-03-19 NOTE — LETTER
"    3/19/2018         RE: Caitlin Oh  [address] [place] [address]        Dear Colleague,    Thank you for referring your patient, Caitlin Oh, to the [facility]. Please see a copy of my visit note below.    Sports Medicine Clinic Visit - Interim History March 19, 2018      PCP: Sujatha Maher    Caitlin Oh is a 43 year old female who is seen in f/u up for Closed nondisplaced fracture of proximal phalanx of lesser toe of left foot with routine healing, subsequent encounter as an [facility] walk-in. Since last visit on 2/20/18 with Dr. Carvalho patient has weaned out of using post op shoe. Reports returning from vacation in [place] where she was wearing flip flops most of the time and noticed that her toe still feels swollen and \"not normal\" and wants to check in.  Wearing tennis shoes currently.  Was given a work note by Dr. Carvalho previously that she could return to work tomorrow, unsure about this.  - Now ~ 5 weeks from initial injury    Social History:     Review of Systems  Skin: mild initial bruising, mild swelling  Musculoskeletal: as above  Neurologic: no numbness, paresthesias  Remainder of review of systems is negative including constitutional, CV, pulmonary, GI, except as noted in HPI or medical history.    Patient's current problem list, past medical and surgical history, and family history were reviewed.    Patient Active Problem List   Diagnosis     Esophageal reflux     CARDIOVASCULAR SCREENING; LDL GOAL LESS THAN 160     Kidney stones     Tension headache, chronic     Menstrual migraine     Menorrhagia     Cervical high risk HPV (human papillomavirus) test positive     Past Medical History:   Diagnosis Date     Bilateral bunions      Cervical high risk HPV (human papillomavirus) test positive 11/4/2015     Female infertility 7/10/2008    Undergoing ivf 2008.      Personal history of gestational diabetes 7/11/2012    diet controlled     Plantar " "fasciitis     s/p surgery     Past Surgical History:   Procedure Laterality Date     BUNIONECTOMY RT/LT Bilateral 11/2009     CYSTOSCOPY, RETROGRADES, INSERT STENT URETER(S), COMBINED  9/19/2013    Procedure: COMBINED CYSTOSCOPY, RETROGRADES, INSERT STENT URETER(S);  Right Ureteral Stent Placement;  Surgeon: JUN Villar MD;  Location: [facility]     DILATION AND CURETTAGE, HYSTEROSCOPY, ABLATE ENDOMETRIUM THERMACHOICE, COMBINED N/A 4/15/2015    Procedure: COMBINED DILATION AND CURETTAGE, HYSTEROSCOPY, ABLATE ENDOMETRIUM THERMACHOICE;  Surgeon: Munira Goddard MD;  Location: [facility]     EXTRACORPOREAL SHOCK WAVE LITHOTRIPSY (ESWL)  9/18/2013    Procedure: EXTRACORPOREAL SHOCK WAVE LITHOTRIPSY (ESWL);  Right Extracorporeal Shock Wave Lithotripsy;  Surgeon: JUN Villar MD;  Location: [facility]     FOOT SURGERY      for plantar fasciitis     HC TOOTH EXTRACTION W/FORCEP      wisdom teeth     LAPAROSCOPY DIAGNOSTIC (GYN)  2007    infertility     LASER HOLMIUM LITHOTRIPSY URETER(S), INSERT STENT, COMBINED  9/27/2013    Procedure: COMBINED CYSTOSCOPY, URETEROSCOPY, LASER HOLMIUM LITHOTRIPSY URETER(S), INSERT STENT;  Right Ureteroscopic Stone Extraction and Possible Stent Placement;  Surgeon: JUN Villar MD;  Location: [facility]     TONSILLECTOMY  1983     Family History   Problem Relation Age of Onset     HEART DISEASE Maternal Grandmother      Breast Cancer Maternal Grandmother      dx'd age 70     Arthritis Mother      Hypertension Mother      CANCER Mother      skin cancer     Other Cancer Mother      Lymphoma     Prostate Cancer Father        Objective  /88 (BP Location: Left arm, Patient Position: Sitting, Cuff Size: Adult Large)  Ht 5' 6\" (1.676 m)  Wt 191 lb (86.6 kg)  BMI 30.83 kg/m2    GENERAL APPEARANCE: healthy, alert and no distress   GAIT: NORMAL  SKIN: no suspicious lesions or rashes  HEENT: Sclera clear, anicteric  CV: good peripheral pulses  RESP: Breathing not labored  NEURO: Normal strength and " tone, mentation intact and speech normal  PSYCH:  mentation appears normal and affect normal/bright    Bilateral Foot and Ankle Exam:  Inspection:       edema noted mild in left 5th phalanx    Foot inspection:       no deformity noted    Tender:       5th phalanx    Non-Tender:       remainder of ankle and foot bilateral    ROM:        Full active and passive ROM, ankle dorsiflexion, plantarflexion, inversion, eversion, great toe dorsiflexion, remainder of toes, midfoot and subtalar bilateral    Gait:       normal    Neurovascular:       2+ peripheral pulses bilaterally and brisk capillary refill       sensation grossly intact    Radiology  I ordered, visualized and reviewed these images with the patient  LEFT TOE TWO OR MORE VIEWS  3/19/2018 4:21 PM   HISTORY:  Closed nondisplaced fracture of proximal phalanx of lesser  toe of left foot with routine healing, subsequent encounter.  COMPARISON: 2/14/2018 x-ray.  IMPRESSION: No change in position or alignment of obliquely oriented  fracture proximal phalanx fifth toe. Fracture line remains apparent.    Assessment:  1. Closed nondisplaced fracture of proximal phalanx of lesser toe of left foot with routine healing, subsequent encounter      Left 5th phalanx fracture, some callous formation noted on x-ray, however,  and symptomatic.  Reviewed supportive care including buddy taping and supportive shoe (boot v. Cast shoe).  Reviewed work requirements - patient will try work tomorrow and let us know if she needs additional restrictions.    Plan:  - Today's Plan of Care:  Buddy tape  Supportive shoes    Follow Up: 3-4 weeks as needed with repeat x-ray    Concerning signs and symptoms were reviewed.  The patient expressed understanding of this management plan and all questions were answered at this time.    Rashida Piedra MD [facility]  Primary Care Sports Medicine  [facility]    Again, thank you for allowing me to participate in the care of  your patient.        Sincerely,        Rashida Piedra MD

## 2018-03-19 NOTE — TELEPHONE ENCOUNTER
Injection scheduled for 4/6 with Dr. Tyler Michelle   BSN-RN Care Coordinator  [facility] [place]

## 2018-04-05 ENCOUNTER — OFFICE VISIT (OUTPATIENT)
Dept: PALLIATIVE MEDICINE | Facility: CLINIC | Age: 43
End: 2018-04-05
Payer: COMMERCIAL

## 2018-04-05 VITALS
WEIGHT: 195 LBS | DIASTOLIC BLOOD PRESSURE: 77 MMHG | BODY MASS INDEX: 31.47 KG/M2 | HEART RATE: 95 BPM | SYSTOLIC BLOOD PRESSURE: 121 MMHG

## 2018-04-05 DIAGNOSIS — M79.18 MYOFASCIAL MUSCLE PAIN: Primary | ICD-10-CM

## 2018-04-05 DIAGNOSIS — M79.2 THORACIC NEURALGIA: ICD-10-CM

## 2018-04-05 PROCEDURE — 99213 OFFICE O/P EST LOW 20 MIN: CPT | Performed by: PAIN MEDICINE

## 2018-04-05 ASSESSMENT — PAIN SCALES - GENERAL: PAINLEVEL: NO PAIN (0)

## 2018-04-05 NOTE — MR AVS SNAPSHOT
After Visit Summary   4/5/2018    Caitlin Oh    MRN: [id number]           Patient Information     Date Of Birth          1975        Visit Information        Provider Department      4/5/2018 11:30 AM Mark Scott MD [facility] Instructions    - Further procedures recommended:                      - May consider intercostal nerve blocks in the future. Patient will schedule when ready.  - Medication Management:                         - Continue Diclofenac 75mg twice a day as needed.                         -Continue Zanaflex as prescribed.  May consider taking    1/2-1tabs in am    - Diagnostic Studies: none  - Urine toxicology screen today: none   - Follow up: Please update pending decision on how to proceed.       ----------------------------------------------------------------  Nurse Triage line:  [phone]   Call this number with any questions or concerns. You may leave a detailed message anytime. Calls are typically returned Monday through Friday between 8 AM and 4:30 PM. We usually get back to you within 2 business days depending on the issue/request.       Medication refills:    For non-narcotic medications, call your pharmacy directly to request a refill. The pharmacy will contact the [facility] for authorization. Please allow 3-4 days for these refills to be processed.     For narcotic refills, call the nurse triage line or send a [employer] message. Please contact us 7-10 days before your refill is due. The message MUST include the name of the specific medication(s) requested and how you would like to receive the prescription(s). The options are as follows:    Pain Clinic staff can mail the prescription to your pharmacy. Please tell us the name of the pharmacy.    You may pick the prescription up at the Pain Clinic (tell us the location) or during a clinic visit with your pain provider    Pain Clinic staff can deliver the prescription  to the [place] pharmacy in the clinic building. Please tell us the location.      Scheduling number: [phone].  Call this number to schedule or change appointments.    We believe regular attendance is key to your success in our program.    Any time you are unable to keep your appointment we ask that you call us at least 24 hours in advance to let us know. This will allow us to offer the appointment time to another patient.               Follow-ups after your visit        Who to contact     If you have questions or need follow up information about today's clinic visit or your schedule please contact [facility] directly at [phone].  Normal or non-critical lab and imaging results will be communicated to you by [employer], letter or phone within 4 business days after the clinic has received the results. If you do not hear from us within 7 days, please contact the clinic through [employer] or phone. If you have a critical or abnormal lab result, we will notify you by phone as soon as possible.  Submit refill requests through [employer] or call your pharmacy and they will forward the refill request to us. Please allow 3 business days for your refill to be completed.          Additional Information About Your Visit        [employer] Information     [employer] gives you secure access to your electronic health record. If you see a primary care provider, you can also send messages to your care team and make appointments. If you have questions, please call your primary care clinic.  If you do not have a primary care provider, please call [phone] and they will assist you.        Care EveryWhere ID     This is your Care EveryWhere ID. This could be used by other organizations to access your [place] medical records  [id number]        Your Vitals Were     Pulse BMI (Body Mass Index)                95 31.47 kg/m2           Blood Pressure from Last 3 Encounters:   04/05/18 121/77   03/19/18 134/88   02/20/18 (P)  151/86    Weight from Last 3 Encounters:   04/05/18 88.5 kg (195 lb)   03/19/18 86.6 kg (191 lb)   02/20/18 (P) 86.6 kg (191 lb)              Today, you had the following     No orders found for display       Primary Care Provider Office Phone # Fax #    Sujatha Maher [phone] [address] [facility] [address]        Equal Access to Services     [facility] : Hadii aad ku hadasho Soomaali, waaxda luqadaha, qaybta kaalmada adeegyada, waxay idiin hayaan adeeg kharash la'samn . So [facility] [phone].    ATENCIÓN: Si lesly harris, tiene a galdamez disposición servicios gratuitos de asistencia lingüística. [facility] [phone].    We comply with applicable federal civil rights laws and [place] laws. We do not discriminate on the basis of race, color, national origin, age, disability, sex, sexual orientation, or gender identity.            Thank you!     Thank you for choosing [facility]  for your care. Our goal is always to provide you with excellent care. Hearing back from our patients is one way we can continue to improve our services. Please take a few minutes to complete the written survey that you may receive in the mail after your visit with us. Thank you!             Your Updated Medication List - Protect others around you: Learn how to safely use, store and throw away your medicines at www.disposemymeds.org.          This list is accurate as of 4/5/18 12:15 PM.  Always use your most recent med list.                   Brand Name Dispense Instructions for use Diagnosis    ADVIL PO      Take by mouth every 6 hours as needed        diclofenac 75 MG EC tablet    VOLTAREN    60 tablet    Take 1 tablet (75 mg) by mouth 2 times daily as needed for moderate pain    Myofascial muscle pain       fluticasone 50 MCG/ACT spray    FLONASE    16 g    Spray 1-2 sprays into both nostrils daily    Post-nasal drainage, Chronic rhinitis       lidocaine-prilocaine cream    EMLA    30 g     Apply topically as needed for moderate pain    Myofascial muscle pain       oseltamivir 75 MG capsule    TAMIFLU    10 capsule    Take 1 capsule (75 mg) by mouth daily    Exposure to the flu       rizatriptan 5 MG ODT tab    MAXALT-MLT    18 tablet    Take 2 tablets (10 mg) by mouth at onset of headache for migraine May repeat dose in 2 hours.  Do not exceed 30 mg in 24 hours    Headache(784.0)       tiZANidine 4 MG tablet    ZANAFLEX    135 tablet    Take 1.5 tablets (6 mg) by mouth 3 times daily as needed for muscle spasms    Muscle spasm of back       topiramate 50 MG tablet    TOPAMAX    180 tablet    Take 1 tablet (50 mg) by mouth 2 times daily    Menstrual migraine without status migrainosus, not intractable       TYLENOL PO      Take 500 mg by mouth every 6 hours as needed

## 2018-04-05 NOTE — PATIENT INSTRUCTIONS
- Further procedures recommended:                      - May consider intercostal nerve blocks in the future. Patient will schedule when ready.  - Medication Management:                         - Continue Diclofenac 75mg twice a day as needed.                         -Continue Zanaflex as prescribed.  May consider taking    1/2-1tabs in am    - Diagnostic Studies: none  - Urine toxicology screen today: none   - Follow up: Please update pending decision on how to proceed.       ----------------------------------------------------------------  Nurse Triage line:  [phone]   Call this number with any questions or concerns. You may leave a detailed message anytime. Calls are typically returned Monday through Friday between 8 AM and 4:30 PM. We usually get back to you within 2 business days depending on the issue/request.       Medication refills:    For non-narcotic medications, call your pharmacy directly to request a refill. The pharmacy will contact the [facility] for authorization. Please allow 3-4 days for these refills to be processed.     For narcotic refills, call the nurse triage line or send a [employer] message. Please contact us 7-10 days before your refill is due. The message MUST include the name of the specific medication(s) requested and how you would like to receive the prescription(s). The options are as follows:    Pain Clinic staff can mail the prescription to your pharmacy. Please tell us the name of the pharmacy.    You may pick the prescription up at the Pain Clinic (tell us the location) or during a clinic visit with your pain provider    Pain Clinic staff can deliver the prescription to the [place] pharmacy in the clinic building. Please tell us the location.      Scheduling number: [phone].  Call this number to schedule or change appointments.    We believe regular attendance is key to your success in our program.    Any time you are unable to keep your appointment we ask  that you call us at least 24 hours in advance to let us know. This will allow us to offer the appointment time to another patient.

## 2018-04-05 NOTE — PROGRESS NOTES
[facility]    Date of visit: 4/5/2018    Chief complaint:   Chief Complaint   Patient presents with     Pain       Interval history:  Caitlin Oh was last seen by me on 12/14.      Recommendations/plan at the last visit included:   Further procedures recommended:                         - Trigger point injection-will need Ultrasound                        - May consider intercostal nerve blocks in the future  - Medication Management:                         - Start Diclofenac 75mg twice a day as needed. Do not take any other NSAIDS will taking Diclofenac                        -Continue Zanaflex as prescribed.  May consider taking 2 mg during the day if pain worsens.  - Diagnostic Studies: none  - Urine toxicology screen today: none   - Follow up: For trigger point or as needed         Since her last visit, Caitlin Oh reports:  The pt s/p intercostal trigger point injection.  The patient notes no improvement after the injection.  The patient continues to have discomfort over her left mid thoracic spine radiating to her anterior chest.  In general she just feels discomfort, but when she takes deep breaths she notes severe sharp pain.  Additionally, the pain is worse with twisting and lifting.  She notes mild improvement with Zanaflex and diclofenac.  Of note, she does not take the Zanaflex during the day and tries to limit diclofenac as much as possible.  She denies any upper respiratory symptoms.  She denies any fevers night sweats.  She denies any rash over the area.  Patient presents today to discuss possible intercostal nerve block.  Of note patient recently had a toe fracture, which is currently improving.  Pain scores:  Pain intensity on average is 5 on a scale of 0-10.     Current pain treatments:   Diclofenac, Zanaflex    Past pain treatments:  N/a    Thoracic TPI    Side Effects: no side effect    Medications:  Current Outpatient Prescriptions   Medication Sig Dispense Refill      tiZANidine (ZANAFLEX) 4 MG tablet Take 1.5 tablets (6 mg) by mouth 3 times daily as needed for muscle spasms 135 tablet 11     topiramate (TOPAMAX) 50 MG tablet Take 1 tablet (50 mg) by mouth 2 times daily 180 tablet 3     Ibuprofen (ADVIL PO) Take by mouth every 6 hours as needed       Acetaminophen (TYLENOL PO) Take 500 mg by mouth every 6 hours as needed       oseltamivir (TAMIFLU) 75 MG capsule Take 1 capsule (75 mg) by mouth daily (Patient not taking: Reported on 4/5/2018) 10 capsule 0     lidocaine-prilocaine (EMLA) cream Apply topically as needed for moderate pain (Patient not taking: Reported on 2/14/2018) 30 g 1     diclofenac (VOLTAREN) 75 MG EC tablet Take 1 tablet (75 mg) by mouth 2 times daily as needed for moderate pain (Patient not taking: Reported on 12/27/2017) 60 tablet 1     fluticasone (FLONASE) 50 MCG/ACT nasal spray Spray 1-2 sprays into both nostrils daily (Patient not taking: Reported on 12/27/2017) 16 g 11     rizatriptan (MAXALT-MLT) 5 MG disintegrating tablet Take 2 tablets (10 mg) by mouth at onset of headache for migraine May repeat dose in 2 hours.  Do not exceed 30 mg in 24 hours (Patient not taking: Reported on 12/27/2017) 18 tablet 1       Medical History: any changes in medical history since they were last seen? No    Review of Systems:  The 14 system ROS was reviewed   Any bowel or bladder problems: none  Mood: wnl    Physical Exam:  /77  Pulse 95  Wt 88.5 kg (195 lb)  BMI 31.47 kg/m2  Constitutional: healthy, alert and no distress  Head: normocephalic. Atraumatic.   Eyes: no redness or jaundice noted   ENT: oropharnx normal.  MMM.  Neck supple.    Cardiovascular: RRR no m/g/r   Respiratory: clear   Gastrointestinal: soft, non-tender, normoactive bowel sounds   Skin: no suspicious lesions or rashes  Psychiatric: mentation appears normal and affect normal/bright     Musculoskeletal exam:  Gait/Station/Posture: wnl  Cervical spine: ROM         Thoracic spine:    ++++  Tenderness to deep palpation over the left T6-T10 area which reproduces some of her pain.  The pain is mostly in the intercostal space limited tenderness over the ribs themselves     Lumbar spine:                         ROM: wnl                        Myofascial tenderness:  neg      Neurologic exam:  CN:  Cranial nerves 2-12 are normal  Motor:  5/5 UE and LE strength  Reflexes:                          Biceps:                                            +2                        Brachioradialis                      +2                                Sensory:  (upper and lower extremities):                        Light touch: normal                         Allodynia: absent                         Dysethesia: absent                         Hyperalgesia: absent      Diagnostic tests:  MRI       IMPRESSION:  1. Mild or early degenerative disc disease, greatest at L4-L5.  2. L4-L5 small central disc protrusion without stenosis or apparent  neural impingement.  3. Apophyseal joints appear essentially within normal limits. No  periarticular reactive marrow edema.   Assessment/Plan:   Caitlin Oh is a 43 year old female who is seen at the pain clinic for pain.  Caitlin was seen today for pain.    Diagnoses and all orders for this visit:    Myofascial muscle pain  -     PAIN INJECTION EVAL/TREAT/FOLLOW UP    Thoracic neuralgia  -     PAIN INJECTION EVAL/TREAT/FOLLOW UP      Constitutional: healthy, alert and no distress  Head: normocephalic. Atraumatic.   Eyes: no redness or jaundice noted   ENT: oropharnx normal.  MMM.  Neck supple.    Cardiovascular: RRR no m/g/r   Respiratory: clear   Gastrointestinal: soft, non-tender, normoactive bowel sounds   Skin: no suspicious lesions or rashes  Psychiatric: mentation appears normal and affect normal/bright     Musculoskeletal exam:  Gait/Station/Posture: wnl  Cervical spine: ROM         Thoracic spine:    ++++ Tenderness to deep palpation over the left T6-T10 area which reproduces  some of her pain.  The pain is mostly in the intercostal space limited tenderness over the ribs themselves     Lumbar spine:                         ROM: wnl                        Myofascial tenderness:  neg      Neurologic exam:  CN:  Cranial nerves 2-12 are normal  Motor:  5/5 UE and LE strength  Reflexes:                          Biceps:                                            +2                        Brachioradialis                      +2                                Sensory:  (upper and lower extremities):                        Light touch: normal                         Allodynia: absent                         Dysethesia: absent                         Hyperalgesia: absent      Diagnostic tests:  MRI       IMPRESSION:  1. Mild or early degenerative disc disease, greatest at L4-L5.  2. L4-L5 small central disc protrusion without stenosis or apparent  neural impingement.  3. Apophyseal joints appear essentially within normal limits. No  periarticular reactive marrow edema.    Myofascial muscle pain   Thoracic neuralgia    Caitlin Oh is a 42 year old female who presents with the complaints of Left rib pain   Caitlin was seen today for pain.  - Further procedures recommended:                      -  Intercostal nerve blocks in the future. Patient will schedule when ready.  - Medication Management:                         - Continue Diclofenac 75mg twice a day as needed.                         -Continue Zanaflex as prescribed.  May consider taking    1/2-1tabs in am    - Diagnostic Studies: none  - Urine toxicology screen today: none   - Follow up: Please update pending decision on how to proceed.       Total time spent was 30 minutes, and more than 50% of face to face time was spent in counseling and/or coordination of care regarding Left thoracic rib pain.    Mark Scott MD  [facility]

## 2018-04-05 NOTE — LETTER
4/5/2018             RE:  Caitlin Oh         [id number] [place] [address]      To whom it may concern:    RE: Caitlin Bearquynh    Patient was seen and treated today at our clinic for myofascial pain and rib injury.  Patient should not perform any crunches until further evaluation.    Please contact me for questions or concerns.      Sincerely,        Mark Scott MD

## 2018-05-21 ENCOUNTER — TRANSFERRED RECORDS (OUTPATIENT)
Dept: HEALTH INFORMATION MANAGEMENT | Facility: CLINIC | Age: 43
End: 2018-05-21

## 2018-06-21 ENCOUNTER — RADIANT APPOINTMENT (OUTPATIENT)
Dept: RADIOLOGY | Facility: CLINIC | Age: 43
End: 2018-06-21
Attending: PAIN MEDICINE
Payer: COMMERCIAL

## 2018-06-21 ENCOUNTER — RADIOLOGY INJECTION OFFICE VISIT (OUTPATIENT)
Dept: PALLIATIVE MEDICINE | Facility: CLINIC | Age: 43
End: 2018-06-21
Payer: COMMERCIAL

## 2018-06-21 VITALS — HEART RATE: 67 BPM | DIASTOLIC BLOOD PRESSURE: 96 MMHG | OXYGEN SATURATION: 98 % | SYSTOLIC BLOOD PRESSURE: 152 MMHG

## 2018-06-21 DIAGNOSIS — M79.2 THORACIC NEURALGIA: Primary | ICD-10-CM

## 2018-06-21 DIAGNOSIS — M79.2 THORACIC NEURALGIA: ICD-10-CM

## 2018-06-21 PROCEDURE — 64420 NJX AA&/STRD NTRCOST NRV 1: CPT | Performed by: PAIN MEDICINE

## 2018-06-21 ASSESSMENT — PAIN SCALES - GENERAL: PAINLEVEL: NO PAIN (1)

## 2018-06-21 NOTE — PROGRESS NOTES
Pre procedure Diagnosis: Thoracic Neuralgia   Post procedure Diagnosis: Same  Procedure performed: Left T10 & 11 intercostal nerve blocks   Indication:  Diagnostic   Anesthesia: none  Complications: none  Operators: Mark Scott MD   Indications:   Caitlin Oh is a 42 year old female who first started having problems with pain approx 1.5 yrs ago. Of note pt  at that time had a kidney stone. patient had negative workup for kidney stone with her current pain. Patient reports her current pain is different from her prior pain,  more cephalad approximately T6-T10 area.  Her current pain started approx 6month ago. No inciting event. The pain is intermittent. Only occurs when sleeping. The pain starts in the upper back and radiates to front. The pain is a deep ach, but occasional sharp shooting.  She denies any radiation into her abdomen or groin.  Occasionally the pt feels like her rib flips out. She takes advil prn for HAs and pain. She takes zanaflex with some relief at night. Pain is worse with sleeping.The pain is worse with deep breaths and deep palpation.   The pain is better when she is not putting pressure over that area. she denies any respiratory/pneumonia like symptoms. Neg chest xray. Denies burning. She denies any allodynia. She denies any pain when putting on cloths. She denies any rash or hx of shingles(Hx of chicken pox as a kid)    Of note she reports no relief with TPI.     Options/alternatives, benefits and risks were discussed with the patient including but not limited to bleeding, infection, tissue trauma, exposure to radiation, reaction to medications, spinal cord injury, weakness, numbness and paralysis.  Questions were answered to her satisfaction and she agrees to proceed. Voluntary informed consent was obtained and signed.     Vitals were reviewed: Yes  Allergies were reviewed:  Yes   Medications were reviewed:  Yes   Pre-procedure pain score: 5/10    Procedure:  After obtaining signed  informed consent, the patient was brought into the procedure suite and was placed in a prone position on the procedure table.   A Pause for the Cause was performed.  The patient was prepped and draped in the usual sterile fashion.     The location of maximal pain was determined to be at T10-11. At a location several centimeters lateral from the spine, injections were completed. Using 30Ga 1 inch needle, lidocaine 1% was used to anesthetize the skin. A 25G 3.5 inch needle was advanced with a cephalad tilt, to touch down on the anterior aspect of the rib. Intermittent fluoroscopy in both the AP and lateral projection was used to verify location superficial to lung tissue. After touching down on the rib, the needle was walked off inferiorly, into the area of the neurovascular bundle. Advancement was made 1-2mm at a time. Contrast (Omnipaque) 1ml was injected and 9ml was wasted. There was no evidence of vascular uptake or spread towards the spinal canal. Due to concerns about further advancement of the needle, at the other locations, the needle was not advanced further to avoid lung injury.   In total, 4ml of Bupivacaine 0.25%, and 40mg of Kenalog was injected.  Breath sounds were normal.    Post-procedure pain score: 5/10  Follow-up includes:   -f/u phone call in one week  - f/u 2 months in clinic     Mark Scott MD  [facility]

## 2018-06-21 NOTE — NURSING NOTE
Pre-procedure Intake    Have you been fasting? NA    If yes, for how long? No     Are you taking a prescribed blood thinner such as coumadin, Plavix, Xarelto?    No    If yes, when did you take your last dose? No     Do you take aspirin?  No    If cervical procedure, have you held aspirin for 6 days?   NA    Do you have any allergies to contrast dye, iodine, steroid and/or numbing medications?  NO    Are you currently taking antibiotics or have an active infection?  NO    Have you had a fever/elevated temperature within the past week? NO    Are you currently taking oral steroids? NO    Do you have a ? Yes       Are you pregnant or breastfeeding?  NO    Are the vital signs normal?  Yes    Antonina Alberts MA

## 2018-06-21 NOTE — MR AVS SNAPSHOT
After Visit Summary   6/21/2018    Caitlin Oh    MRN: [id number]           Patient Information     Date Of Birth          1975        Visit Information        Provider Department      6/21/2018 2:15 PM Mark Scott MD [facility]        Care Instructions    [facility]   Procedure Discharge Instructions    Today you saw: Dr. Mark Scott     You had an:  Intercostal nerve block      Medications used:  Lidocaine   Bupivacaine    Omnipaque   Kenalog           Go to the ER or call 911 if you experience shortness of breath    Be cautious when walking. Numbness and/or weakness in the lower extremities may occur for up to 6-8 hours after the procedure due to effect of the local anesthetic    Do not drive for 6 hours. The effect of the local anesthetic could slow your reflexes.     You may resume your regular activities after 24 hours    Avoid strenuous activity for the first 24 hours    You may shower, however avoid swimming, tub baths or hot tubs for 24 hours following your procedure    You may have a mild to moderate increase in pain for several days following the injection.    It may take up to 14 days for the steroid medication to start working although you may feel the effect as early as a few days after the procedure.       You may use ice packs for 10-15 minutes, 3 to 4 times a day at the injection site for comfort    Do not use heat to painful areas for 6 to 8 hours. This will give the local anesthetic time to wear off and prevent you from accidentally burning your skin.     You may use anti-inflammatory medications (such as Ibuprofen or Aleve or Advil) or Tylenol for pain control if necessary    If you were fasting, you may resume your normal diet and medications after the procedure    If you have diabetes, check your blood sugar more frequently than usual as your blood sugar may be higher than normal for 10-14 days following a steroid  injection. Contact your doctor who manages your diabetes if your blood sugar is higher than usual    If you experience any of the following, call the Pain Clinic during work hours at [phone] or the Provider Line after hours at [phone]:  -Fever over 100 degree F  -Swelling, bleeding, redness, drainage, warmth at the injection site  -Progressive weakness or numbness in your legs or arms  -Loss of bowel or bladder function  -Unusual headache that is not relieved by Tylenol or other pain reliever  -Unusual new onset of pain that is not improving                Follow-ups after your visit        Who to contact     If you have questions or need follow up information about today's clinic visit or your schedule please contact [facility] directly at [phone].  Normal or non-critical lab and imaging results will be communicated to you by MyChart, letter or phone within 4 business days after the clinic has received the results. If you do not hear from us within 7 days, please contact the clinic through MyChart or phone. If you have a critical or abnormal lab result, we will notify you by phone as soon as possible.  Submit refill requests through [employer] or call your pharmacy and they will forward the refill request to us. Please allow 3 business days for your refill to be completed.          Additional Information About Your Visit        [employer] Information     [employer] gives you secure access to your electronic health record. If you see a primary care provider, you can also send messages to your care team and make appointments. If you have questions, please call your primary care clinic.  If you do not have a primary care provider, please call [phone] and they will assist you.        Care EveryWhere ID     This is your Care EveryWhere ID. This could be used by other organizations to access your [facility] medical records  [id number]        Your Vitals Were     Pulse                   71             Blood Pressure from Last 3 Encounters:   06/21/18 124/81   04/05/18 121/77   03/19/18 134/88    Weight from Last 3 Encounters:   04/05/18 88.5 kg (195 lb)   03/19/18 86.6 kg (191 lb)   02/20/18 (P) 86.6 kg (191 lb)              Today, you had the following     No orders found for display       Primary Care Provider Office Phone # Fax #    Sujatha Maher [phone] [address] [place] [address]        Equal Access to Services     [facility]: Hadii aad ku hadasho Soomaali, waaxda luqadaha, qaybta kaalmada adebrett, miesha keith . So [facility] [phone].    ATENCIÓN: Si habla español, tiene a galdamez disposición servicios gratuitos de asistencia lingüística. [facility] [phone].    We comply with applicable federal civil rights laws and [place] laws. We do not discriminate on the basis of race, color, national origin, age, disability, sex, sexual orientation, or gender identity.            Thank you!     Thank you for choosing [facility]  for your care. Our goal is always to provide you with excellent care. Hearing back from our patients is one way we can continue to improve our services. Please take a few minutes to complete the written survey that you may receive in the mail after your visit with us. Thank you!             Your Updated Medication List - Protect others around you: Learn how to safely use, store and throw away your medicines at www.disposemymeds.org.          This list is accurate as of 6/21/18  2:56 PM.  Always use your most recent med list.                   Brand Name Dispense Instructions for use Diagnosis    ADVIL PO      Take by mouth every 6 hours as needed        diclofenac 75 MG EC tablet    VOLTAREN    60 tablet    Take 1 tablet (75 mg) by mouth 2 times daily as needed for moderate pain    Myofascial muscle pain       fluticasone 50 MCG/ACT spray    FLONASE    16 g    Spray 1-2 sprays into both nostrils daily     Post-nasal drainage, Chronic rhinitis       lidocaine-prilocaine cream    EMLA    30 g    Apply topically as needed for moderate pain    Myofascial muscle pain       oseltamivir 75 MG capsule    TAMIFLU    10 capsule    Take 1 capsule (75 mg) by mouth daily    Exposure to the flu       rizatriptan 5 MG ODT tab    MAXALT-MLT    18 tablet    Take 2 tablets (10 mg) by mouth at onset of headache for migraine May repeat dose in 2 hours.  Do not exceed 30 mg in 24 hours    Headache(784.0)       tiZANidine 4 MG tablet    ZANAFLEX    135 tablet    Take 1.5 tablets (6 mg) by mouth 3 times daily as needed for muscle spasms    Muscle spasm of back       topiramate 50 MG tablet    TOPAMAX    180 tablet    Take 1 tablet (50 mg) by mouth 2 times daily    Menstrual migraine without status migrainosus, not intractable       TYLENOL PO      Take 500 mg by mouth every 6 hours as needed

## 2018-06-21 NOTE — PATIENT INSTRUCTIONS
[facility]   Procedure Discharge Instructions    Today you saw: Dr. Mark Scott     You had an:  Intercostal nerve block      Medications used:  Lidocaine   Bupivacaine    Omnipaque   Kenalog           Go to the ER or call 911 if you experience shortness of breath    Be cautious when walking. Numbness and/or weakness in the lower extremities may occur for up to 6-8 hours after the procedure due to effect of the local anesthetic    Do not drive for 6 hours. The effect of the local anesthetic could slow your reflexes.     You may resume your regular activities after 24 hours    Avoid strenuous activity for the first 24 hours    You may shower, however avoid swimming, tub baths or hot tubs for 24 hours following your procedure    You may have a mild to moderate increase in pain for several days following the injection.    It may take up to 14 days for the steroid medication to start working although you may feel the effect as early as a few days after the procedure.       You may use ice packs for 10-15 minutes, 3 to 4 times a day at the injection site for comfort    Do not use heat to painful areas for 6 to 8 hours. This will give the local anesthetic time to wear off and prevent you from accidentally burning your skin.     You may use anti-inflammatory medications (such as Ibuprofen or Aleve or Advil) or Tylenol for pain control if necessary    If you were fasting, you may resume your normal diet and medications after the procedure    If you have diabetes, check your blood sugar more frequently than usual as your blood sugar may be higher than normal for 10-14 days following a steroid injection. Contact your doctor who manages your diabetes if your blood sugar is higher than usual    If you experience any of the following, call the Pain Clinic during work hours at [phone] or the Provider Line after hours at [phone]:  -Fever over 100 degree F  -Swelling, bleeding, redness, drainage,  warmth at the injection site  -Progressive weakness or numbness in your legs or arms  -Loss of bowel or bladder function  -Unusual headache that is not relieved by Tylenol or other pain reliever  -Unusual new onset of pain that is not improving

## 2018-06-21 NOTE — NURSING NOTE
Discharge Information    IV Discontiued Time:  NA    Amount of Fluid Infused:  NA    Discharge Criteria = When patient returns to baseline or as per MD order    Consciousness:  Pt is fully awake    Circulation:  BP +/- 20% of pre-procedure level    Respiration:  Patient is able to breathe deeply    O2 Sat:  Patient is able to maintain O2 Sat >92% on room air    Activity:  Moves 4 extremities on command    Ambulation:  Patient is able to stand and walk or stand and pivot into wheelchair    Dressing:  Clean/dry or No Dressing    Notes:   Discharge instructions and AVS given to patient    Patient meets criteria for discharge?  YES    Admitted to PCU?  No    Responsible adult present to accompany patient home?  Yes    Signature/Title:    phylicia washburn RN Care Coordinator  [facility]

## 2018-07-19 ENCOUNTER — OFFICE VISIT (OUTPATIENT)
Dept: PODIATRY | Facility: CLINIC | Age: 43
End: 2018-07-19
Payer: COMMERCIAL

## 2018-07-19 ENCOUNTER — RADIANT APPOINTMENT (OUTPATIENT)
Dept: GENERAL RADIOLOGY | Facility: CLINIC | Age: 43
End: 2018-07-19
Attending: PODIATRIST
Payer: COMMERCIAL

## 2018-07-19 VITALS
HEART RATE: 64 BPM | DIASTOLIC BLOOD PRESSURE: 83 MMHG | BODY MASS INDEX: 31.34 KG/M2 | SYSTOLIC BLOOD PRESSURE: 130 MMHG | WEIGHT: 195 LBS | HEIGHT: 66 IN

## 2018-07-19 DIAGNOSIS — M79.675 TOE PAIN, LEFT: Primary | ICD-10-CM

## 2018-07-19 DIAGNOSIS — M79.675 TOE PAIN, LEFT: ICD-10-CM

## 2018-07-19 DIAGNOSIS — S92.502D: ICD-10-CM

## 2018-07-19 PROCEDURE — 73660 X-RAY EXAM OF TOE(S): CPT | Mod: LT

## 2018-07-19 PROCEDURE — 99213 OFFICE O/P EST LOW 20 MIN: CPT | Performed by: PODIATRIST

## 2018-07-19 NOTE — PROGRESS NOTES
PATIENT HISTORY:  Caitlin Oh is a 43 year old female who returns to clinic with a chief complaint of a painful left foot.  The patient relates the pain is located on the fifth toe  on the left foot.  The patient relates injuring the fifth toe several months ago with continued pain.    REVIEW OF SYSTEMS:  Constitutional, HEENT, cardiovascular, pulmonary, GI, , musculoskeletal, neuro, skin, endocrine and psych systems are negative, except as otherwise noted.     PAST MEDICAL HISTORY:   Past Medical History:   Diagnosis Date     Bilateral bunions      Cervical high risk HPV (human papillomavirus) test positive 11/4/2015     Female infertility 7/10/2008    Undergoing ivf 2008.      Personal history of gestational diabetes 7/11/2012    diet controlled     Plantar fasciitis     s/p surgery        PAST SURGICAL HISTORY:   Past Surgical History:   Procedure Laterality Date     BUNIONECTOMY RT/LT Bilateral 11/2009     CYSTOSCOPY, RETROGRADES, INSERT STENT URETER(S), COMBINED  9/19/2013    Procedure: COMBINED CYSTOSCOPY, RETROGRADES, INSERT STENT URETER(S);  Right Ureteral Stent Placement;  Surgeon: JUN Villar MD;  Location: [facility] OR     DILATION AND CURETTAGE, HYSTEROSCOPY, ABLATE ENDOMETRIUM THERMACHOICE, COMBINED N/A 4/15/2015    Procedure: COMBINED DILATION AND CURETTAGE, HYSTEROSCOPY, ABLATE ENDOMETRIUM THERMACHOICE;  Surgeon: Munira Goddard MD;  Location: [facility] OR     EXTRACORPOREAL SHOCK WAVE LITHOTRIPSY (ESWL)  9/18/2013    Procedure: EXTRACORPOREAL SHOCK WAVE LITHOTRIPSY (ESWL);  Right Extracorporeal Shock Wave Lithotripsy;  Surgeon: JUN Villar MD;  Location: [facility] OR     FOOT SURGERY      for plantar fasciitis     HC TOOTH EXTRACTION W/FORCEP      wisdom teeth     LAPAROSCOPY DIAGNOSTIC (GYN)  2007    infertility     LASER HOLMIUM LITHOTRIPSY URETER(S), INSERT STENT, COMBINED  9/27/2013    Procedure: COMBINED CYSTOSCOPY, URETEROSCOPY, LASER HOLMIUM LITHOTRIPSY URETER(S), INSERT STENT;  Right Ureteroscopic  Stone Extraction and Possible Stent Placement;  Surgeon: JUN Villar MD;  Location: [facility] OR     TONSILLECTOMY  1983        MEDICATIONS:   Current Outpatient Prescriptions:      Acetaminophen (TYLENOL PO), Take 500 mg by mouth every 6 hours as needed, Disp: , Rfl:      Ibuprofen (ADVIL PO), Take by mouth every 6 hours as needed, Disp: , Rfl:      tiZANidine (ZANAFLEX) 4 MG tablet, Take 1.5 tablets (6 mg) by mouth 3 times daily as needed for muscle spasms, Disp: 135 tablet, Rfl: 11     topiramate (TOPAMAX) 50 MG tablet, Take 1 tablet (50 mg) by mouth 2 times daily, Disp: 180 tablet, Rfl: 3     diclofenac (VOLTAREN) 75 MG EC tablet, Take 1 tablet (75 mg) by mouth 2 times daily as needed for moderate pain (Patient not taking: Reported on 12/27/2017), Disp: 60 tablet, Rfl: 1     fluticasone (FLONASE) 50 MCG/ACT nasal spray, Spray 1-2 sprays into both nostrils daily (Patient not taking: Reported on 12/27/2017), Disp: 16 g, Rfl: 11     lidocaine-prilocaine (EMLA) cream, Apply topically as needed for moderate pain (Patient not taking: Reported on 2/14/2018), Disp: 30 g, Rfl: 1     oseltamivir (TAMIFLU) 75 MG capsule, Take 1 capsule (75 mg) by mouth daily (Patient not taking: Reported on 4/5/2018), Disp: 10 capsule, Rfl: 0     rizatriptan (MAXALT-MLT) 5 MG disintegrating tablet, Take 2 tablets (10 mg) by mouth at onset of headache for migraine May repeat dose in 2 hours.  Do not exceed 30 mg in 24 hours (Patient not taking: Reported on 12/27/2017), Disp: 18 tablet, Rfl: 1     ALLERGIES:    Allergies   Allergen Reactions     Penicillins Rash        SOCIAL HISTORY:   Social History     Social History     Marital status:      Spouse name: Robi Oh     Number of children: 1     Years of education: 18     Occupational History      [employer]     Social History Main Topics     Smoking status: Never Smoker     Smokeless tobacco: Never Used     Alcohol use No     Drug use: No     Sexual  "activity: Yes     Partners: Male     Birth control/ protection: Male Surgical      Comment: infertility, vasectomy     Other Topics Concern     Parent/Sibling W/ Cabg, Mi Or Angioplasty Before 65f 55m? No     Social History Narrative    Caffeine intake/servings daily - 1    Calcium intake/servings daily - 3    Exercise 6 times weekly - describe cardio and weightlifting    Sunscreen used - Yes    Seatbelts used - Yes    Guns stored in the home - Yes    Self Breast Exam - Yes    Pap test up to date -  Yes 1/04    Eye exam up to date -  No    Dental exam up to date -  Yes    DEXA scan up to date -  Not Applicable    Flex Sig/Colonoscopy up to date -  Not Applicable    Mammography up to date -  Not Applicable    Immunizations reviewed and up to date - Yes    Abuse: Current or Past (Physical, Sexual or Emotional) - No    Do you feel safe in your environment - Yes    Do you cope well with stress - Yes    Do you suffer from insomnia - No    Last updated by: Joselyn Massey  1/28/2005                                FAMILY HISTORY:   Family History   Problem Relation Age of Onset     HEART DISEASE Maternal Grandmother      Breast Cancer Maternal Grandmother      dx'd age 70     Arthritis Mother      Hypertension Mother      Cancer Mother      skin cancer     Other Cancer Mother      Lymphoma     Prostate Cancer Father         EXAM:Vitals: /83 (BP Location: Left arm, Patient Position: Sitting, Cuff Size: Adult Regular)  Pulse 64  Ht 5' 6\" (1.676 m)  Wt 195 lb (88.5 kg)  BMI 31.47 kg/m2  BMI= Body mass index is 31.47 kg/(m^2).  Weight management plan: Patient was referred to their PCP to discuss a diet and exercise plan.    General appearance: Patient is alert and fully cooperative with history & exam.  No sign of distress is noted during the visit.     Psychiatric: Affect is pleasant & appropriate.  Patient appears motivated to improve health.     Respiratory: Breathing is regular & unlabored while sitting.     HEENT: " Hearing is intact to spoken word.  Speech is clear.  No gross evidence of visual impairment that would impact ambulation.     Dermatologic: Skin is intact to both lower extremities without significant lesions, rash or abrasion.  No paronychia or evidence of soft tissue infection is noted.     Vascular: DP & PT pulses are intact & regular bilaterally.  No significant edema or varicosities noted.  CFT and skin temperature is normal to both lower extremities.     Neurologic: Lower extremity sensation is intact to light touch.  No evidence of weakness or contracture in the lower extremities.  No evidence of neuropathy.     Musculoskeletal: Patient is non-ambulatory with crutches.  No gross ankle deformity noted.  No foot or ankle joint effusion is noted.    One notes positive edema, negative ecchymosis.  One notes pain with palpation over the dorsal aspect overlying the fifth toe on the left.    Radiograph review of previous films including non weightbearing AP, lateral and medial oblique views of the left foot reveals increased trabeculation across the fracture of the proximal phalanx of the fifth toe.       Assessment:  1.  Fifth toe fracture of the left foot.    Plan:  I have explained to Caitlin  about the conditions.  We discussed both conservative and surgical treatment options with all associated risks and benefits.  At this time, the patient will continue wearing supportive stiff soled shoes to better relieve the tension forces to the fifth toe.  The patient was instructed to return to the office if any symptoms worsen.    Disclaimer: This note consists of symbols derived from keyboarding, dictation and/or voice recognition software. As a result, there may be errors in the script that have gone undetected. Please consider this when interpreting information found in this chart.       FELTON Vargas D.P.M., [email or web address].[email or web address].

## 2018-07-19 NOTE — LETTER
7/19/2018         RE: Caitlin Oh  [address] [facility] [id number]        Dear Colleague,    Thank you for referring your patient, Caitlin Oh, to the [facility]. Please see a copy of my visit note below.    PATIENT HISTORY:  Caitlin Oh is a 43 year old female who returns to clinic with a chief complaint of a painful left foot.  The patient relates the pain is located on the fifth toe  on the left foot.  The patient relates injuring the fifth toe several months ago with continued pain.    REVIEW OF SYSTEMS:  Constitutional, HEENT, cardiovascular, pulmonary, GI, , musculoskeletal, neuro, skin, endocrine and psych systems are negative, except as otherwise noted.     PAST MEDICAL HISTORY:   Past Medical History:   Diagnosis Date     Bilateral bunions      Cervical high risk HPV (human papillomavirus) test positive 11/4/2015     Female infertility 7/10/2008    Undergoing ivf 2008.      Personal history of gestational diabetes 7/11/2012    diet controlled     Plantar fasciitis     s/p surgery        PAST SURGICAL HISTORY:   Past Surgical History:   Procedure Laterality Date     BUNIONECTOMY RT/LT Bilateral 11/2009     CYSTOSCOPY, RETROGRADES, INSERT STENT URETER(S), COMBINED  9/19/2013    Procedure: COMBINED CYSTOSCOPY, RETROGRADES, INSERT STENT URETER(S);  Right Ureteral Stent Placement;  Surgeon: JUN Villar MD;  Location: [facility]     DILATION AND CURETTAGE, HYSTEROSCOPY, ABLATE ENDOMETRIUM THERMACHOICE, COMBINED N/A 4/15/2015    Procedure: COMBINED DILATION AND CURETTAGE, HYSTEROSCOPY, ABLATE ENDOMETRIUM THERMACHOICE;  Surgeon: Munira Goddard MD;  Location: [facility]     EXTRACORPOREAL SHOCK WAVE LITHOTRIPSY (ESWL)  9/18/2013    Procedure: EXTRACORPOREAL SHOCK WAVE LITHOTRIPSY (ESWL);  Right Extracorporeal Shock Wave Lithotripsy;  Surgeon: JUN Villar MD;  Location: [facility]     FOOT SURGERY      for plantar fasciitis     HC TOOTH EXTRACTION W/FORCEP      wisdom  teeth     LAPAROSCOPY DIAGNOSTIC (GYN)  2007    infertility     LASER HOLMIUM LITHOTRIPSY URETER(S), INSERT STENT, COMBINED  9/27/2013    Procedure: COMBINED CYSTOSCOPY, URETEROSCOPY, LASER HOLMIUM LITHOTRIPSY URETER(S), INSERT STENT;  Right Ureteroscopic Stone Extraction and Possible Stent Placement;  Surgeon: JUN Villar MD;  Location: [facility]     TONSILLECTOMY  1983        MEDICATIONS:   Current Outpatient Prescriptions:      Acetaminophen (TYLENOL PO), Take 500 mg by mouth every 6 hours as needed, Disp: , Rfl:      Ibuprofen (ADVIL PO), Take by mouth every 6 hours as needed, Disp: , Rfl:      tiZANidine (ZANAFLEX) 4 MG tablet, Take 1.5 tablets (6 mg) by mouth 3 times daily as needed for muscle spasms, Disp: 135 tablet, Rfl: 11     topiramate (TOPAMAX) 50 MG tablet, Take 1 tablet (50 mg) by mouth 2 times daily, Disp: 180 tablet, Rfl: 3     diclofenac (VOLTAREN) 75 MG EC tablet, Take 1 tablet (75 mg) by mouth 2 times daily as needed for moderate pain (Patient not taking: Reported on 12/27/2017), Disp: 60 tablet, Rfl: 1     fluticasone (FLONASE) 50 MCG/ACT nasal spray, Spray 1-2 sprays into both nostrils daily (Patient not taking: Reported on 12/27/2017), Disp: 16 g, Rfl: 11     lidocaine-prilocaine (EMLA) cream, Apply topically as needed for moderate pain (Patient not taking: Reported on 2/14/2018), Disp: 30 g, Rfl: 1     oseltamivir (TAMIFLU) 75 MG capsule, Take 1 capsule (75 mg) by mouth daily (Patient not taking: Reported on 4/5/2018), Disp: 10 capsule, Rfl: 0     rizatriptan (MAXALT-MLT) 5 MG disintegrating tablet, Take 2 tablets (10 mg) by mouth at onset of headache for migraine May repeat dose in 2 hours.  Do not exceed 30 mg in 24 hours (Patient not taking: Reported on 12/27/2017), Disp: 18 tablet, Rfl: 1     ALLERGIES:    Allergies   Allergen Reactions     Penicillins Rash        SOCIAL HISTORY:   Social History     Social History     Marital status:      Spouse name: Robi Oh     Number of  "children: 1     Years of education: 18     Occupational History      [employer]     Social History Main Topics     Smoking status: Never Smoker     Smokeless tobacco: Never Used     Alcohol use No     Drug use: No     Sexual activity: Yes     Partners: Male     Birth control/ protection: Male Surgical      Comment: infertility, vasectomy     Other Topics Concern     Parent/Sibling W/ Cabg, Mi Or Angioplasty Before 65f 55m? No     Social History Narrative    Caffeine intake/servings daily - 1    Calcium intake/servings daily - 3    Exercise 6 times weekly - describe cardio and weightlifting    Sunscreen used - Yes    Seatbelts used - Yes    Guns stored in the home - Yes    Self Breast Exam - Yes    Pap test up to date -  Yes 1/04    Eye exam up to date -  No    Dental exam up to date -  Yes    DEXA scan up to date -  Not Applicable    Flex Sig/Colonoscopy up to date -  Not Applicable    Mammography up to date -  Not Applicable    Immunizations reviewed and up to date - Yes    Abuse: Current or Past (Physical, Sexual or Emotional) - No    Do you feel safe in your environment - Yes    Do you cope well with stress - Yes    Do you suffer from insomnia - No    Last updated by: Joselyn Massey  1/28/2005                                FAMILY HISTORY:   Family History   Problem Relation Age of Onset     HEART DISEASE Maternal Grandmother      Breast Cancer Maternal Grandmother      dx'd age 70     Arthritis Mother      Hypertension Mother      Cancer Mother      skin cancer     Other Cancer Mother      Lymphoma     Prostate Cancer Father         EXAM:Vitals: /83 (BP Location: Left arm, Patient Position: Sitting, Cuff Size: Adult Regular)  Pulse 64  Ht 5' 6\" (1.676 m)  Wt 195 lb (88.5 kg)  BMI 31.47 kg/m2  BMI= Body mass index is 31.47 kg/(m^2).  Weight management plan: Patient was referred to their PCP to discuss a diet and exercise plan.    General appearance: Patient is alert and fully " cooperative with history & exam.  No sign of distress is noted during the visit.     Psychiatric: Affect is pleasant & appropriate.  Patient appears motivated to improve health.     Respiratory: Breathing is regular & unlabored while sitting.     HEENT: Hearing is intact to spoken word.  Speech is clear.  No gross evidence of visual impairment that would impact ambulation.     Dermatologic: Skin is intact to both lower extremities without significant lesions, rash or abrasion.  No paronychia or evidence of soft tissue infection is noted.     Vascular: DP & PT pulses are intact & regular bilaterally.  No significant edema or varicosities noted.  CFT and skin temperature is normal to both lower extremities.     Neurologic: Lower extremity sensation is intact to light touch.  No evidence of weakness or contracture in the lower extremities.  No evidence of neuropathy.     Musculoskeletal: Patient is non-ambulatory with crutches.  No gross ankle deformity noted.  No foot or ankle joint effusion is noted.    One notes positive edema, negative ecchymosis.  One notes pain with palpation over the dorsal aspect overlying the fifth toe on the left.    Radiograph review of previous films including non weightbearing AP, lateral and medial oblique views of the left foot reveals increased trabeculation across the fracture of the proximal phalanx of the fifth toe.       Assessment:  1.  Fifth toe fracture of the left foot.    Plan:  I have explained to Caitlin  about the conditions.  We discussed both conservative and surgical treatment options with all associated risks and benefits.  At this time, the patient will continue wearing supportive stiff soled shoes to better relieve the tension forces to the fifth toe.  The patient was instructed to return to the office if any symptoms worsen.    Disclaimer: This note consists of symbols derived from keyboarding, dictation and/or voice recognition software. As a result, there may be errors  in the script that have gone undetected. Please consider this when interpreting information found in this chart.       FELTON Vargas D.P.M., F.A.C.F.A.S.      Again, thank you for allowing me to participate in the care of your patient.        Sincerely,        Dain Vargas DPM

## 2018-07-26 NOTE — PATIENT INSTRUCTIONS
TOE & METATARSAL FRACTURES  The structure of the foot is complex, consisting of bones, muscles, tendons, and other soft tissues. Of the 26 bones in the foot, 19 are toe bones (phalanges) and metatarsal bones (the long bones in the midfoot). Fractures of the toe and metatarsal bones are common and require evaluation by a specialist. A foot and ankle surgeon should be seen for proper diagnosis and treatment, even if initial treatment has been received in an emergency room.  A fracture is a break in the bone. Fractures can be divided into two categories: traumatic fractures and stress fractures.  TRAUMATIC FRACTURES (also called acute fractures) are caused by a direct blow or impact, such as seriously stubbing your toe. Traumatic fractures can be displaced or non-displaced. If the fracture is displaced, the bone is broken in such a way that it has changed in position (dislocated).  Signs and symptoms of a traumatic fracture include:  You may hear a sound at the time of the break.    Pinpoint pain  (pain at the place of impact) at the time the fracture occurs and perhaps for a few hours later, but often the pain goes away after several hours.   Crooked or abnormal appearance of the toe.   Bruising and swelling the next day.   It is not true that  if you can walk on it, it s not broken.  Evaluation by a foot and ankle surgeon is always recommended.   STRESS FRACTURES are tiny, hairline breaks that are usually caused by repetitive stress. Stress fractures often afflict athletes who, for example, too rapidly increase their running mileage. They can also be caused by an abnormal foot structure, deformities, or osteoporosis. Improper footwear may also lead to stress fractures. Stress fractures should not be ignored. They require proper medical attention to heal correctly.  Symptoms of stress fractures include:  Pain with or after normal activity   Pain that goes away when resting and then returns when standing or during  activity    Pinpoint pain  (pain at the site of the fracture) when touched   Swelling, but no bruising   IMPROPER TREATMENT  Some people say that  the doctor can t do anything for a broken bone in the foot.  This is usually not true. In fact, if a fractured toe or metatarsal bone is not treated correctly, serious complications may develop. For example:  A deformity in the bony architecture which may limit the ability to move the foot or cause difficulty in fitting shoes   Arthritis, which may be caused by a fracture in a joint (the juncture where two bones meet), or may be a result of angular deformities that develop when a displaced fracture is severe or hasn t been properly corrected   Chronic pain and deformity   Non-union, or failure to heal, can lead to subsequent surgery or chronic pain.   PROPER TREATMENT FOR TOES  Fractures of the toe bones are almost always traumatic fractures. Treatment for traumatic fractures depends on the break itself and may include these options:  Rest. Sometimes rest is all that is needed to treat a traumatic fracture of the toe.   Splinting. The toe may be fitted with a splint to keep it in a fixed position.   Rigid or stiff-soled shoe. Wearing a stiff-soled shoe protects the toe and helps keep it properly positioned.    Juanjose taping  the fractured toe to another toe is sometimes appropriate, but in other cases it may be harmful.   Surgery. If the break is badly displaced or if the joint is affected, surgery may be necessary. Surgery often involves the use of fixation devices, such as pins.   PROPER TREATMENT OF METATARSALS  Breaks in the metatarsal bones may be either stress or traumatic fractures. Certain kinds of fractures of the metatarsal bones present unique challenges.  For example, sometimes a fracture of the first metatarsal bone (behind the big toe) can lead to arthritis. Since the big toe is used so frequently and bears more weight than other toes, arthritis in that area  can make it painful to walk, bend, or even stand.  Another type of break, called a Soares fracture, occurs at the base of the fifth metatarsal bone (behind the little toe). It is often misdiagnosed as an ankle sprain, and misdiagnosis can have serious consequences since sprains and fractures require different treatments. Your foot and ankle surgeon is an expert in correctly identifying these conditions as well as other problems of the foot.  Treatment of metatarsal fractures depends on the type and extent of the fracture, and may include:  Rest. Sometimes rest is the only treatment needed to promote healing of a stress or traumatic fracture of a metatarsal bone.   Avoid the offending activity. Because stress fractures result from repetitive stress, it is important to avoid the activity that led to the fracture. Crutches or a wheelchair are sometimes required to offload weight from the foot to give it time to heal.   Immobilization, casting, or rigid shoe. A stiff-soled shoe or other form of immobilization may be used to protect the fractured bone while it is healing.   Surgery. Some traumatic fractures of the metatarsal bones require surgery, especially if the break is badly displaced.   Follow-up care. Your foot and ankle surgeon will provide instructions for care following surgical or non-surgical treatment. Physical therapy, exercises and rehabilitation may be included in a schedule for return to normal activities.

## 2018-08-10 NOTE — PATIENT INSTRUCTIONS
Preventive Health Recommendations  Female Ages 40 to 49    Yearly exam:     See your health care provider every year in order to  1. Review health changes.   2. Discuss preventive care.    3. Review your medicines if your doctor prescribed any.      Get a Pap test every three years (unless you have an abnormal result and your provider advises testing more often).      If you get Pap tests with HPV test, you only need to test every 5 years, unless you have an abnormal result. You do not need a Pap test if your uterus was removed (hysterectomy) and you have not had cancer.      You should be tested each year for STDs (sexually transmitted diseases), if you're at risk.     Ask your doctor if you should have a mammogram.      Have a colonoscopy (test for colon cancer) if someone in your family has had colon cancer or polyps before age 50.       Have a cholesterol test every 5 years.       Have a diabetes test (fasting glucose) after age 45. If you are at risk for diabetes, you should have this test every 3 years.    Shots: Get a flu shot each year. Get a tetanus shot every 10 years.     Nutrition:     Eat at least 5 servings of fruits and vegetables each day.    Eat whole-grain bread, whole-wheat pasta and brown rice instead of white grains and rice.    Get adequate Calcium and Vitamin D.      Lifestyle    Exercise at least 150 minutes a week (an average of 30 minutes a day, 5 days a week). This will help you control your weight and prevent disease.    Limit alcohol to one drink per day.    No smoking.     Wear sunscreen to prevent skin cancer.    See your dentist every six months for an exam and cleaning.    Preventive Health Recommendations  Female Ages 40 to 49    Yearly exam:     See your health care provider every year in order to  4. Review health changes.   5. Discuss preventive care.    6. Review your medicines if your doctor prescribed any.      Get a Pap test every three years (unless you have an abnormal  result and your provider advises testing more often).      If you get Pap tests with HPV test, you only need to test every 5 years, unless you have an abnormal result. You do not need a Pap test if your uterus was removed (hysterectomy) and you have not had cancer.      You should be tested each year for STDs (sexually transmitted diseases), if you're at risk.     Ask your doctor if you should have a mammogram.      Have a colonoscopy (test for colon cancer) if someone in your family has had colon cancer or polyps before age 50.       Have a cholesterol test every 5 years.       Have a diabetes test (fasting glucose) after age 45. If you are at risk for diabetes, you should have this test every 3 years.    Shots: Get a flu shot each year. Get a tetanus shot every 10 years.     Nutrition:     Eat at least 5 servings of fruits and vegetables each day.    Eat whole-grain bread, whole-wheat pasta and brown rice instead of white grains and rice.    Get adequate Calcium and Vitamin D.      Lifestyle    Exercise at least 150 minutes a week (an average of 30 minutes a day, 5 days a week). This will help you control your weight and prevent disease.    Limit alcohol to one drink per day.    No smoking.     Wear sunscreen to prevent skin cancer.    See your dentist every six months for an exam and cleaning.

## 2018-08-14 ENCOUNTER — OFFICE VISIT (OUTPATIENT)
Dept: FAMILY MEDICINE | Facility: CLINIC | Age: 43
End: 2018-08-14
Payer: COMMERCIAL

## 2018-08-14 VITALS
HEIGHT: 66 IN | HEART RATE: 60 BPM | DIASTOLIC BLOOD PRESSURE: 76 MMHG | BODY MASS INDEX: 30.86 KG/M2 | SYSTOLIC BLOOD PRESSURE: 124 MMHG | WEIGHT: 192 LBS

## 2018-08-14 DIAGNOSIS — G43.829 MENSTRUAL MIGRAINE WITHOUT STATUS MIGRAINOSUS, NOT INTRACTABLE: ICD-10-CM

## 2018-08-14 DIAGNOSIS — Z13.6 CARDIOVASCULAR SCREENING; LDL GOAL LESS THAN 160: ICD-10-CM

## 2018-08-14 DIAGNOSIS — Z00.00 ROUTINE GENERAL MEDICAL EXAMINATION AT A HEALTH CARE FACILITY: Primary | ICD-10-CM

## 2018-08-14 LAB
CHOLEST SERPL-MCNC: 169 MG/DL
HDLC SERPL-MCNC: 47 MG/DL
LDLC SERPL CALC-MCNC: 96 MG/DL
NONHDLC SERPL-MCNC: 122 MG/DL
TRIGL SERPL-MCNC: 132 MG/DL

## 2018-08-14 PROCEDURE — 80061 LIPID PANEL: CPT | Performed by: FAMILY MEDICINE

## 2018-08-14 PROCEDURE — 36415 COLL VENOUS BLD VENIPUNCTURE: CPT | Performed by: FAMILY MEDICINE

## 2018-08-14 PROCEDURE — 99396 PREV VISIT EST AGE 40-64: CPT | Performed by: FAMILY MEDICINE

## 2018-08-14 RX ORDER — RIZATRIPTAN BENZOATE 5 MG/1
5-10 TABLET, ORALLY DISINTEGRATING ORAL
Qty: 18 TABLET | Refills: 1 | Status: SHIPPED | OUTPATIENT
Start: 2018-08-14 | End: 2019-08-29

## 2018-08-14 ASSESSMENT — ANXIETY QUESTIONNAIRES
2. NOT BEING ABLE TO STOP OR CONTROL WORRYING: NOT AT ALL
1. FEELING NERVOUS, ANXIOUS, OR ON EDGE: NOT AT ALL
7. FEELING AFRAID AS IF SOMETHING AWFUL MIGHT HAPPEN: NOT AT ALL
6. BECOMING EASILY ANNOYED OR IRRITABLE: NOT AT ALL
5. BEING SO RESTLESS THAT IT IS HARD TO SIT STILL: NOT AT ALL
GAD7 TOTAL SCORE: 0
3. WORRYING TOO MUCH ABOUT DIFFERENT THINGS: NOT AT ALL

## 2018-08-14 ASSESSMENT — PATIENT HEALTH QUESTIONNAIRE - PHQ9: 5. POOR APPETITE OR OVEREATING: NOT AT ALL

## 2018-08-14 NOTE — MR AVS SNAPSHOT
After Visit Summary   8/14/2018    Caitlin Oh    MRN: [id number]           Patient Information     Date Of Birth          1975        Visit Information        Provider Department      8/14/2018 8:00 AM Sujatha Maher MD [facility]        Today's Diagnoses     Routine general medical examination at a health care facility    -  1    CARDIOVASCULAR SCREENING; LDL GOAL LESS THAN 160        Menstrual migraine without status migrainosus, not intractable          Care Instructions      Preventive Health Recommendations  Female Ages 40 to 49    Yearly exam:     See your health care provider every year in order to  1. Review health changes.   2. Discuss preventive care.    3. Review your medicines if your doctor prescribed any.      Get a Pap test every three years (unless you have an abnormal result and your provider advises testing more often).      If you get Pap tests with HPV test, you only need to test every 5 years, unless you have an abnormal result. You do not need a Pap test if your uterus was removed (hysterectomy) and you have not had cancer.      You should be tested each year for STDs (sexually transmitted diseases), if you're at risk.     Ask your doctor if you should have a mammogram.      Have a colonoscopy (test for colon cancer) if someone in your family has had colon cancer or polyps before age 50.       Have a cholesterol test every 5 years.       Have a diabetes test (fasting glucose) after age 45. If you are at risk for diabetes, you should have this test every 3 years.    Shots: Get a flu shot each year. Get a tetanus shot every 10 years.     Nutrition:     Eat at least 5 servings of fruits and vegetables each day.    Eat whole-grain bread, whole-wheat pasta and brown rice instead of white grains and rice.    Get adequate Calcium and Vitamin D.      Lifestyle    Exercise at least 150 minutes a week (an average of 30 minutes a day, 5 days a week). This will help you  control your weight and prevent disease.    Limit alcohol to one drink per day.    No smoking.     Wear sunscreen to prevent skin cancer.    See your dentist every six months for an exam and cleaning.    Preventive Health Recommendations  Female Ages 40 to 49    Yearly exam:     See your health care provider every year in order to  4. Review health changes.   5. Discuss preventive care.    6. Review your medicines if your doctor prescribed any.      Get a Pap test every three years (unless you have an abnormal result and your provider advises testing more often).      If you get Pap tests with HPV test, you only need to test every 5 years, unless you have an abnormal result. You do not need a Pap test if your uterus was removed (hysterectomy) and you have not had cancer.      You should be tested each year for STDs (sexually transmitted diseases), if you're at risk.     Ask your doctor if you should have a mammogram.      Have a colonoscopy (test for colon cancer) if someone in your family has had colon cancer or polyps before age 50.       Have a cholesterol test every 5 years.       Have a diabetes test (fasting glucose) after age 45. If you are at risk for diabetes, you should have this test every 3 years.    Shots: Get a flu shot each year. Get a tetanus shot every 10 years.     Nutrition:     Eat at least 5 servings of fruits and vegetables each day.    Eat whole-grain bread, whole-wheat pasta and brown rice instead of white grains and rice.    Get adequate Calcium and Vitamin D.      Lifestyle    Exercise at least 150 minutes a week (an average of 30 minutes a day, 5 days a week). This will help you control your weight and prevent disease.    Limit alcohol to one drink per day.    No smoking.     Wear sunscreen to prevent skin cancer.    See your dentist every six months for an exam and cleaning.          Follow-ups after your visit        Your next 10 appointments already scheduled     Aug 16, 2018  1:00 PM  "CDT   Office Visit with Munira Goddard MD   [facility] ([facility]    [address] [address]   [phone]           Bring a current list of meds and any records pertaining to this visit. For Physicals, please bring immunization records and any forms needing to be filled out. Please arrive 10 minutes early to complete paperwork.            Aug 21, 2018  2:00 PM CDT   MA SCREENING DIGITAL BILATERAL with [place] [facility] ([facility])    [address] [address]   [phone]           Do not use any powder, lotion or deodorant under your arms or on your breast. If you do, we will ask you to remove it before your exam.  Wear comfortable, two-piece clothing.  If you have any allergies, tell your care team.  Bring any previous mammograms from other facilities or have them mailed to the breast center. Three-dimensional (3D) mammograms are available at [facility] locations in [facility], [facility], [facility], and [place]. [facility] locations include [facility] in [place]. Benefits of 3D mammograms include: - Improved rate of cancer detection - Decreases your chance of having to go back for more tests, which means fewer: - \"False-positive\" results (This means that there is an abnormal area but it isn't cancer.) - Invasive testing procedures, such as a biopsy or surgery - Can provide clearer images of the breast if you have dense breast tissue. 3D mammography is an optional exam that anyone can have with a 2D mammogram. It doesn't replace or take the place of a 2D mammogram. 2D mammograms remain an effective screening test for all women.  Not all insurance companies cover the cost of a 3D mammogram. Check with your insurance.              Future tests that were ordered for you today     Open Future Orders        Priority Expected Expires Ordered    " "MA Screening Digital Bilateral Routine  8/13/2019 8/13/2018            Who to contact     Normal or non-critical lab and imaging results will be communicated to you by [facility], letter or phone within 4 business days after the clinic has received the results. If you do not hear from us within 7 days, please contact the clinic through [facility] or phone. If you have a critical or abnormal lab result, we will notify you by phone as soon as possible.  Submit refill requests through Apsalar or call your pharmacy and they will forward the refill request to us. Please allow 3 business days for your refill to be completed.          If you need to speak with a  for additional information , please call: [phone]             Additional Information About Your Visit        Apsalar Information     Apsalar gives you secure access to your electronic health record. If you see a primary care provider, you can also send messages to your care team and make appointments. If you have questions, please call your primary care clinic.  If you do not have a primary care provider, please call [phone] and they will assist you.        Care EveryWhere ID     This is your Care EveryWhere ID. This could be used by other organizations to access your [place] medical records  [id number]        Your Vitals Were     Pulse Height BMI (Body Mass Index)             60 5' 6\" (1.676 m) 30.99 kg/m2          Blood Pressure from Last 3 Encounters:   08/14/18 124/76   07/19/18 130/83   06/21/18 (!) 152/96    Weight from Last 3 Encounters:   08/14/18 192 lb (87.1 kg)   07/19/18 195 lb (88.5 kg)   04/05/18 195 lb (88.5 kg)              We Performed the Following     Lipid panel reflex to direct LDL Fasting          Today's Medication Changes          These changes are accurate as of 8/14/18  8:51 AM.  If you have any questions, ask your nurse or doctor.               These medicines have changed or have updated prescriptions.        " Dose/Directions    rizatriptan 5 MG ODT tab   Commonly known as:  MAXALT-MLT   This may have changed:  how much to take   Used for:  Menstrual migraine without status migrainosus, not intractable   Changed by:  Sujatha Maher MD        Dose:  5-10 mg   Take 1-2 tablets (5-10 mg) by mouth at onset of headache for migraine May repeat dose in 2 hours.  Do not exceed 30 mg in 24 hours   Quantity:  18 tablet   Refills:  1            Where to get your medicines      These medications were sent to [place] [address] [place] [facility] - [address] [address]     Phone:  [phone]     rizatriptan 5 MG ODT tab                Primary Care Provider Office Phone # Fax #    Sujatha Maher [phone] [address] [address]        Equal Access to Services     [facility]: Hadii aad ku hadasho Soomaali, waaxda luqadaha, qaybta kaalmada adeegyada, waxay idiin hayaalouis keith . So [facility] [phone].    ATENCIÓN: Si habla español, tiene a galdamez disposición servicios gratuitos de asistencia lingüística. Llame al [phone].    We comply with applicable federal civil rights laws and [place] laws. We do not discriminate on the basis of race, color, national origin, age, disability, sex, sexual orientation, or gender identity.            Thank you!     Thank you for choosing [facility]  for your care. Our goal is always to provide you with excellent care. Hearing back from our patients is one way we can continue to improve our services. Please take a few minutes to complete the written survey that you may receive in the mail after your visit with us. Thank you!             Your Updated Medication List - Protect others around you: Learn how to safely use, store and throw away your medicines at www.disposemymeds.org.          This list is accurate as of 8/14/18  8:51 AM.  Always use your most recent med list.                    Brand Name Dispense Instructions for use Diagnosis    ADVIL PO      Take by mouth every 6 hours as needed        diclofenac 75 MG EC tablet    VOLTAREN    60 tablet    Take 1 tablet (75 mg) by mouth 2 times daily as needed for moderate pain    Myofascial muscle pain       fluticasone 50 MCG/ACT spray    FLONASE    16 g    Spray 1-2 sprays into both nostrils daily    Post-nasal drainage, Chronic rhinitis       lidocaine-prilocaine cream    EMLA    30 g    Apply topically as needed for moderate pain    Myofascial muscle pain       rizatriptan 5 MG ODT tab    MAXALT-MLT    18 tablet    Take 1-2 tablets (5-10 mg) by mouth at onset of headache for migraine May repeat dose in 2 hours.  Do not exceed 30 mg in 24 hours    Menstrual migraine without status migrainosus, not intractable       tiZANidine 4 MG tablet    ZANAFLEX    135 tablet    Take 1.5 tablets (6 mg) by mouth 3 times daily as needed for muscle spasms    Muscle spasm of back       topiramate 50 MG tablet    TOPAMAX    180 tablet    Take 1 tablet (50 mg) by mouth 2 times daily    Menstrual migraine without status migrainosus, not intractable       TYLENOL PO      Take 500 mg by mouth every 6 hours as needed

## 2018-08-14 NOTE — LETTER
[facility]  [address] [address]  [phone]        August 14, 2018    Regarding:  Caitlin Oh  [address]  [place] [address]              To Whom It May Concern;  Due to a medical condition, Caitlin is unable to do sit ups for her fitness testing. She has had a complete work up and there is no further treatment that can be offered to correct this issue.           Sincerely,        Sujatha Maher MD

## 2018-08-15 ENCOUNTER — TRANSFERRED RECORDS (OUTPATIENT)
Dept: HEALTH INFORMATION MANAGEMENT | Facility: CLINIC | Age: 43
End: 2018-08-15

## 2018-08-15 ASSESSMENT — ANXIETY QUESTIONNAIRES: GAD7 TOTAL SCORE: 0

## 2018-08-16 ENCOUNTER — OFFICE VISIT (OUTPATIENT)
Dept: OBGYN | Facility: CLINIC | Age: 43
End: 2018-08-16
Payer: COMMERCIAL

## 2018-08-16 VITALS
DIASTOLIC BLOOD PRESSURE: 78 MMHG | HEART RATE: 70 BPM | HEIGHT: 66 IN | SYSTOLIC BLOOD PRESSURE: 127 MMHG | BODY MASS INDEX: 30.76 KG/M2 | RESPIRATION RATE: 18 BRPM | WEIGHT: 191.4 LBS | TEMPERATURE: 98.5 F

## 2018-08-16 DIAGNOSIS — N94.6 DYSMENORRHEA: Primary | ICD-10-CM

## 2018-08-16 PROCEDURE — 99203 OFFICE O/P NEW LOW 30 MIN: CPT | Performed by: OBSTETRICS & GYNECOLOGY

## 2018-08-16 NOTE — MR AVS SNAPSHOT
After Visit Summary   8/16/2018    Caitlin Oh    MRN: [id number]           Patient Information     Date Of Birth          1975        Visit Information        Provider Department      8/16/2018 1:00 PM Munira Goddard MD [facility]        Today's Diagnoses     Dysmenorrhea    -  1       Follow-ups after your visit        Your next 10 appointments already scheduled     Aug 17, 2018  1:15 PM CDT   XR SHOULDER CONTRAST CT/MR INJECTION with WYXR3, WY RAD   [facility] ([facility])    [address] [address]   [phone]           Stop drinking 1 hour before the exam.  You may take your medicines as usual, except for blood thinners (Coumadin, Plavix, Ticlid, Persantine, Aggrenox, Pletal, Effient, Brilliant). Talk to your doctor if you take these.  Tell your doctor if:   You have ever had an allergic reaction to X-ray dye (contrast fluid).   There is a chance you may be pregnant.  Please bring a list of your current medicines to your exam. Include vitamins, minerals and over-the-counter medicines.  Please call the Imaging Department at your exam site with any questions.            Aug 17, 2018  2:00 PM CDT   MR SHOULDER ARTHROGRAM  LEFT W CONTRAST with WYMR2   [facility] ([facility])    [address] [address] [phone]           Take your medicines as usual, unless your doctor tells you not to. Bring a list of your current medicines to your exam (including vitamins, minerals and over-the-counter drugs).  You may or may not receive intravenous (IV) contrast for this exam pending the discretion of the Radiologist.  You do not need to do anything special to prepare.  The MRI machine uses a strong magnet. Please wear clothes without metal (snaps, zippers). A sweatsuit works well, or we may give you a hospital gown.  Please remove any body piercings and hair extensions before you arrive.  You will also remove watches, jewelry, hairpins, wallets, dentures, partial dental plates and hearing aids. You may wear contact lenses, and you may be able to wear your rings. We have a safe place to keep your personal items, but it is safer to leave them at home.  **IMPORTANT** THE INSTRUCTIONS BELOW ARE ONLY FOR THOSE PATIENTS WHO HAVE BEEN PRESCRIBED SEDATION OR GENERAL ANESTHESIA DURING THEIR MRI PROCEDURE:  IF YOUR DOCTOR PRESCRIBED ORAL SEDATION (take medicine to help you relax during your exam):   You must get the medicine from your doctor (oral medication) before you arrive. Bring the medicine to the exam. Do not take it at home. You ll be told when to take it upon arriving for your exam.   Arrive one hour early. Bring someone who can take you home after the test. Your medicine will make you sleepy. After the exam, you may not drive, take a bus or take a taxi by yourself.  IF YOUR DOCTOR PRESCRIBED IV SEDATION:   Arrive one hour early. Bring someone who can take you home after the test. Your medicine will make you sleepy. After the exam, you may not drive, take a bus or take a taxi by yourself.   No eating 6 hours before your exam. You may have clear liquids up until 4 hours before your exam. (Clear liquids include water, clear tea, black coffee and fruit juice without pulp.)  IF YOUR DOCTOR PRESCRIBED ANESTHESIA (be asleep for your exam):   Arrive 1 1/2 hours early. Bring someone who can take you home after the test. You may not drive, take a bus or take a taxi by yourself.   No eating 8 hours before your exam. You may have clear liquids up until 4 hours before your exam. (Clear liquids include water, clear tea, black coffee and fruit juice without pulp.)   You will spend four to five hours in the recovery room.  Please call the Imaging Department at your exam site with any questions.            Aug 20, 2018  6:30 PM CDT   (Arrive by 6:15 PM)   US PELVIC COMPLETE W TRANSVAGINAL with WYUS2   [facility] ([facility])    [address] [address]   [phone]           Please bring a list of your medicines (including vitamins, minerals and over-the-counter drugs). Also, tell your doctor about any allergies you may have. Wear comfortable clothes and leave your valuables at home.  Adults: Drink four 8-ounce glasses of fluid an hour before your exam. If you need to empty your bladder before your exam, try to release only a little urine. Then, drink another glass of fluid.  Children: Children who are potty trained up to 6 years old should drink at least 2 cups (16 oz) of water/non-carbonated beverage 30 minutes prior to the exam. Children who are 6-10 years should drink at least 3 cups (24 oz) of water/non-carbonated beverage 45 minutes prior to the exam. Children who are 10 years or older should drink at least 4 cups (32 oz) of water/non-carbonated beverage 45 minutes prior to the exam. If your child is very uncomfortable or has an urgent need to pee, please notify a technologist; they will try to find out how much longer the wait may be and provide instructions to help relieve the pressure.  Please call the Imaging Department at your exam site with any questions.            Aug 21, 2018  2:00 PM CDT   MA SCREENING DIGITAL BILATERAL with WYMA2   [facility] ([facility])    [address] [address]   [phone]           Do not use any powder, lotion or deodorant under your arms or on your breast. If you do, we will ask you to remove it before your exam.  Wear comfortable, two-piece clothing.  If you have any allergies, tell your care team.  Bring any previous mammograms from other facilities or have them mailed to the breast center. Three-dimensional (3D) mammograms are available at [facility] locations in [facility], [facility], and [place]. -[facility] locations include  "[facility] in [place]. Benefits of 3D mammograms include: - Improved rate of cancer detection - Decreases your chance of having to go back for more tests, which means fewer: - \"False-positive\" results (This means that there is an abnormal area but it isn't cancer.) - Invasive testing procedures, such as a biopsy or surgery - Can provide clearer images of the breast if you have dense breast tissue. 3D mammography is an optional exam that anyone can have with a 2D mammogram. It doesn't replace or take the place of a 2D mammogram. 2D mammograms remain an effective screening test for all women.  Not all insurance companies cover the cost of a 3D mammogram. Check with your insurance.              Future tests that were ordered for you today     Open Future Orders        Priority Expected Expires Ordered    US Pelvic Complete w Transvaginal Routine  8/16/2019 8/16/2018    XR Shoulder Contrast CT/MR Injection Routine 8/16/2018 8/16/2019 8/16/2018    MR Shoulder Arthrogram Left w Contrast Routine  8/15/2019 8/15/2018            Who to contact     If you have questions or need follow up information about today's clinic visit or your schedule please contact [facility] directly at [phone].  Normal or non-critical lab and imaging results will be communicated to you by PlaySpanhart, letter or phone within 4 business days after the clinic has received the results. If you do not hear from us within 7 days, please contact the clinic through Oct[employer]ol[employer] or phone. If you have a critical or abnormal lab result, we will notify you by phone as soon as possible.  Submit refill requests through apomio or call your pharmacy and they will forward the refill request to us. Please allow 3 business days for your refill to be completed.          Additional Information About Your Visit        apomio Information     apomio gives you secure access to your electronic health record. If you see a primary " "care provider, you can also send messages to your care team and make appointments. If you have questions, please call your primary care clinic.  If you do not have a primary care provider, please call [phone] and they will assist you.        Care EveryWhere ID     This is your Care EveryWhere ID. This could be used by other organizations to access your [place] medical records  [id number]        Your Vitals Were     Pulse Temperature Respirations Height Last Period Breastfeeding?    70 98.5  F (36.9  C) (Tympanic) 18 5' 6\" (1.676 m) 07/25/2018 No    BMI (Body Mass Index)                   30.89 kg/m2            Blood Pressure from Last 3 Encounters:   08/16/18 127/78   08/14/18 124/76   07/19/18 130/83    Weight from Last 3 Encounters:   08/16/18 191 lb 6.4 oz (86.8 kg)   08/14/18 192 lb (87.1 kg)   07/19/18 195 lb (88.5 kg)               Primary Care Provider Office Phone # Fax #    Sujatha Maher [phone] [address] [facility] [address]        Equal Access to Services     [facility]: Hadii aad ku hadasho Soomaali, waaxda luqadaha, qaybta kaalmada adeegyada, waxay katiein haysamn adry myers la'samn ah. So [facility] [place] [phone].    ATENCIÓN: Si habla español, tiene a galdamez disposición servicios gratuitos de asistencia lingüística. Llame al [phone].    We comply with applicable federal civil rights laws and [place] laws. We do not discriminate on the basis of race, color, national origin, age, disability, sex, sexual orientation, or gender identity.            Thank you!     Thank you for choosing [facility]  for your care. Our goal is always to provide you with excellent care. Hearing back from our patients is one way we can continue to improve our services. Please take a few minutes to complete the written survey that you may receive in the mail after your visit with us. Thank you!             Your Updated Medication List - Protect others around you: Learn how " to safely use, store and throw away your medicines at www.disposemymeds.org.          This list is accurate as of 8/16/18  4:02 PM.  Always use your most recent med list.                   Brand Name Dispense Instructions for use Diagnosis    ADVIL PO      Take by mouth every 6 hours as needed        fluticasone 50 MCG/ACT spray    FLONASE    16 g    Spray 1-2 sprays into both nostrils daily    Post-nasal drainage, Chronic rhinitis       lidocaine-prilocaine cream    EMLA    30 g    Apply topically as needed for moderate pain    Myofascial muscle pain       rizatriptan 5 MG ODT tab    MAXALT-MLT    18 tablet    Take 1-2 tablets (5-10 mg) by mouth at onset of headache for migraine May repeat dose in 2 hours.  Do not exceed 30 mg in 24 hours    Menstrual migraine without status migrainosus, not intractable       tiZANidine 4 MG tablet    ZANAFLEX    135 tablet    Take 1.5 tablets (6 mg) by mouth 3 times daily as needed for muscle spasms    Muscle spasm of back       topiramate 50 MG tablet    TOPAMAX    180 tablet    Take 1 tablet (50 mg) by mouth 2 times daily    Menstrual migraine without status migrainosus, not intractable       TYLENOL PO      Take 500 mg by mouth every 6 hours as needed

## 2018-08-16 NOTE — NURSING NOTE
"Initial /78 (BP Location: Right arm, Patient Position: Chair, Cuff Size: Adult Large)  Pulse 70  Temp 98.5  F (36.9  C) (Tympanic)  Resp 18  Ht 5' 6\" (1.676 m)  Wt 191 lb 6.4 oz (86.8 kg)  LMP 07/25/2018  Breastfeeding? No  BMI 30.89 kg/m2 Estimated body mass index is 30.89 kg/(m^2) as calculated from the following:    Height as of this encounter: 5' 6\" (1.676 m).    Weight as of this encounter: 191 lb 6.4 oz (86.8 kg). .    Rahel Vigil, BRO    "

## 2018-08-16 NOTE — PROGRESS NOTES
Caitlin is a 43 year old  here for consultation at the request of self for cramps.  Had D&C hysteroscopy with ablation in .  Bleeding improved, but in the last few months, she has been getting more cramps, which is unusual for her.  In the last month, the bleeding was a little heavier than it had been.  Menses are still generally monthly.  She had previously tried oral contraceptive pills to suppress her menses, but it wasn't very effective (this was prior to her ablation).    ROS: Ten point review of systems was reviewed and negative except the above.    Past Medical History:   Diagnosis Date     Bilateral bunions      Cervical high risk HPV (human papillomavirus) test positive 2015     Female infertility 7/10/2008    Undergoing ivf .      Kidney stones 2010    [facility] 11/24/10      Personal history of gestational diabetes 2012    diet controlled     Plantar fasciitis     s/p surgery     Past Surgical History:   Procedure Laterality Date     BUNIONECTOMY RT/LT Bilateral 2009     CYSTOSCOPY, RETROGRADES, INSERT STENT URETER(S), COMBINED  2013    Procedure: COMBINED CYSTOSCOPY, RETROGRADES, INSERT STENT URETER(S);  Right Ureteral Stent Placement;  Surgeon: JUN Villar MD;  Location: [facility]     DILATION AND CURETTAGE, HYSTEROSCOPY, ABLATE ENDOMETRIUM THERMACHOICE, COMBINED N/A 4/15/2015    Procedure: COMBINED DILATION AND CURETTAGE, HYSTEROSCOPY, ABLATE ENDOMETRIUM THERMACHOICE;  Surgeon: Munira Goddard MD;  Location: [facility]     EXTRACORPOREAL SHOCK WAVE LITHOTRIPSY (ESWL)  2013    Procedure: EXTRACORPOREAL SHOCK WAVE LITHOTRIPSY (ESWL);  Right Extracorporeal Shock Wave Lithotripsy;  Surgeon: JUN Villar MD;  Location: [facility] OR     FOOT SURGERY      for plantar fasciitis     HC TOOTH EXTRACTION W/FORCEP      wisdom teeth     LAPAROSCOPY DIAGNOSTIC (GYN)      infertility     LASER HOLMIUM LITHOTRIPSY URETER(S), INSERT STENT, COMBINED  2013    Procedure:  "COMBINED CYSTOSCOPY, URETEROSCOPY, LASER HOLMIUM LITHOTRIPSY URETER(S), INSERT STENT;  Right Ureteroscopic Stone Extraction and Possible Stent Placement;  Surgeon: JUN Villar MD;  Location: [facility] OR     TONSILLECTOMY  1983     Patient Active Problem List   Diagnosis     Esophageal reflux     CARDIOVASCULAR SCREENING; LDL GOAL LESS THAN 160     Tension headache, chronic     Menstrual migraine     Menorrhagia     Cervical high risk HPV (human papillomavirus) test positive       ALL/Meds: Her medication and allergy histories were reviewed and are documented in their appropriate chart areas.    Social History   Substance Use Topics     Smoking status: Never Smoker     Smokeless tobacco: Never Used     Alcohol use No       FH: Her family history was reviewed and documented in its appropriate chart area.    PE: /78 (BP Location: Right arm, Patient Position: Chair, Cuff Size: Adult Large)  Pulse 70  Temp 98.5  F (36.9  C) (Tympanic)  Resp 18  Ht 5' 6\" (1.676 m)  Wt 191 lb 6.4 oz (86.8 kg)  LMP 07/25/2018  Breastfeeding? No  BMI 30.89 kg/m2  Body mass index is 30.89 kg/(m^2).    General Appearance:  healthy, alert, active, no distress  HEENT: NCAT  Abdomen: Soft, nontender.  Normal bowel sounds.  No masses  Pelvic: deferred to planned ultrasound    A/P     ICD-10-CM    1. Dysmenorrhea N94.6 US Pelvic Complete w Transvaginal       1. We discussed post-ablation dysmenorrhea concerns.  Reviewed why Asherman's can cause new onset dysmenorrhea.  Reviewed plan for ultrasound assessment.  Discussed options of cervical dilation to allow for better menstrual egress, hormonal contraception, D&C hysteroscopy and hysterectomy.  Patient considering options but will start with pelvic U/S assessment.    Munira Goddard M.D.    30 minutes was spent face to face with the patient today discussing her history, diagnosis, and follow-up plan as noted above.  Over 50% of the visit was spent in counseling and coordination of " care.

## 2018-08-17 ENCOUNTER — HOSPITAL ENCOUNTER (OUTPATIENT)
Dept: MRI IMAGING | Facility: CLINIC | Age: 43
End: 2018-08-17
Attending: PHYSICIAN ASSISTANT
Payer: COMMERCIAL

## 2018-08-17 ENCOUNTER — HOSPITAL ENCOUNTER (OUTPATIENT)
Dept: GENERAL RADIOLOGY | Facility: CLINIC | Age: 43
Discharge: HOME | End: 2018-08-17
Attending: PHYSICIAN ASSISTANT | Admitting: PHYSICIAN ASSISTANT
Payer: COMMERCIAL

## 2018-08-17 DIAGNOSIS — M25.512 LEFT SHOULDER PAIN: ICD-10-CM

## 2018-08-17 PROCEDURE — 25000125 ZZHC RX 250: Performed by: RADIOLOGY

## 2018-08-17 PROCEDURE — 27210995 ZZH RX 272: Performed by: RADIOLOGY

## 2018-08-17 PROCEDURE — A9579 GAD-BASE MR CONTRAST NOS,1ML: HCPCS | Performed by: RADIOLOGY

## 2018-08-17 PROCEDURE — 77002 NEEDLE LOCALIZATION BY XRAY: CPT

## 2018-08-17 PROCEDURE — 73222 MRI JOINT UPR EXTREM W/DYE: CPT | Mod: LT

## 2018-08-17 PROCEDURE — 25000128 H RX IP 250 OP 636: Performed by: RADIOLOGY

## 2018-08-17 PROCEDURE — 25500064 ZZH RX 255 OP 636: Performed by: RADIOLOGY

## 2018-08-17 RX ORDER — EPINEPHRINE 1 MG/ML
0.05 INJECTION, SOLUTION, CONCENTRATE INTRAVENOUS ONCE
Status: COMPLETED | OUTPATIENT
Start: 2018-08-17 | End: 2018-08-17

## 2018-08-17 RX ORDER — LIDOCAINE HYDROCHLORIDE 10 MG/ML
5 INJECTION, SOLUTION EPIDURAL; INFILTRATION; INTRACAUDAL; PERINEURAL ONCE
Status: COMPLETED | OUTPATIENT
Start: 2018-08-17 | End: 2018-08-17

## 2018-08-17 RX ORDER — IOPAMIDOL 408 MG/ML
20 INJECTION, SOLUTION INTRATHECAL ONCE
Status: COMPLETED | OUTPATIENT
Start: 2018-08-17 | End: 2018-08-17

## 2018-08-17 RX ORDER — ACYCLOVIR 200 MG/1
10 CAPSULE ORAL ONCE
Status: COMPLETED | OUTPATIENT
Start: 2018-08-17 | End: 2018-08-17

## 2018-08-17 RX ADMIN — GADOPENTETATE DIMEGLUMINE 1 ML: 469.01 INJECTION INTRAVENOUS at 13:49

## 2018-08-17 RX ADMIN — IOPAMIDOL 1 ML: 408 INJECTION, SOLUTION INTRATHECAL at 13:48

## 2018-08-17 RX ADMIN — LIDOCAINE HYDROCHLORIDE 5 ML: 10 INJECTION, SOLUTION EPIDURAL; INFILTRATION; INTRACAUDAL; PERINEURAL at 13:49

## 2018-08-17 RX ADMIN — SODIUM CHLORIDE 10 ML: 9 INJECTION, SOLUTION INTRAMUSCULAR; INTRAVENOUS; SUBCUTANEOUS at 13:48

## 2018-08-17 RX ADMIN — EPINEPHRINE 0.05 MG: 1 INJECTION, SOLUTION, CONCENTRATE INTRAVENOUS at 13:48

## 2018-08-17 NOTE — PROGRESS NOTES
RADIOLOGY PROCEDURE NOTE  Patient name: Caitlin Oh  MRN: [id number]  : 1975    Pre-procedure diagnosis: Pain.  Post-procedure diagnosis: Same    Procedure Date/Time: 2018  1:57 PM  Procedure: Left shoulder intraarticular CHRISSY injection for MRI to follow.  Estimated blood loss: None  Specimen(s) collected:  None.  The patient tolerated the procedure well with no immediate complications.    See imaging dictation for procedural details.    Provider name: Rivera De Los Santos  Assistant(s):None

## 2018-08-20 ENCOUNTER — HOSPITAL ENCOUNTER (OUTPATIENT)
Dept: ULTRASOUND IMAGING | Facility: CLINIC | Age: 43
Discharge: HOME | End: 2018-08-20
Attending: OBSTETRICS & GYNECOLOGY | Admitting: OBSTETRICS & GYNECOLOGY
Payer: COMMERCIAL

## 2018-08-20 DIAGNOSIS — N94.6 DYSMENORRHEA: ICD-10-CM

## 2018-08-20 PROCEDURE — 76830 TRANSVAGINAL US NON-OB: CPT

## 2018-08-21 ENCOUNTER — HOSPITAL ENCOUNTER (OUTPATIENT)
Dept: MAMMOGRAPHY | Facility: CLINIC | Age: 43
Discharge: HOME | End: 2018-08-21
Attending: FAMILY MEDICINE | Admitting: FAMILY MEDICINE
Payer: COMMERCIAL

## 2018-08-21 DIAGNOSIS — Z12.31 VISIT FOR SCREENING MAMMOGRAM: ICD-10-CM

## 2018-08-21 PROCEDURE — 77063 BREAST TOMOSYNTHESIS BI: CPT

## 2018-08-22 ENCOUNTER — TRANSFERRED RECORDS (OUTPATIENT)
Dept: HEALTH INFORMATION MANAGEMENT | Facility: CLINIC | Age: 43
End: 2018-08-22

## 2018-08-22 ENCOUNTER — HOSPITAL ENCOUNTER (OUTPATIENT)
Dept: ULTRASOUND IMAGING | Facility: CLINIC | Age: 43
Discharge: HOME | End: 2018-08-22
Attending: FAMILY MEDICINE | Admitting: FAMILY MEDICINE
Payer: COMMERCIAL

## 2018-08-22 DIAGNOSIS — R92.8 ABNORMAL MAMMOGRAM: ICD-10-CM

## 2018-08-22 PROCEDURE — 76642 ULTRASOUND BREAST LIMITED: CPT | Mod: LT

## 2018-08-24 DIAGNOSIS — G43.829 MENSTRUAL MIGRAINE WITHOUT STATUS MIGRAINOSUS, NOT INTRACTABLE: ICD-10-CM

## 2018-08-24 NOTE — TELEPHONE ENCOUNTER
"Requested Prescriptions   Pending Prescriptions Disp Refills     topiramate (TOPAMAX) 50 MG tablet [Pharmacy Med Name: TOPIRAMATE 50 MG TABLET] 180 tablet 3     Sig: TAKE 1 TABLET (50 MG) BY MOUTH 2 TIMES DAILY    Anti-Seizure Meds Protocol  Failed    8/24/2018  1:40 AM       Failed - Review Authorizing provider's last note.     Refer to last progress notes: confirm request is for original authorizing provider (cannot be through other providers).         Passed - Recent (12 mo) or future (30 days) visit within the authorizing provider's specialty    Patient had office visit in the last 12 months or has a visit in the next 30 days with authorizing provider or within the authorizing provider's specialty.  See \"Patient Info\" tab in inbasket, or \"Choose Columns\" in Meds & Orders section of the refill encounter.           Passed - Normal CBC on file in past 26 months    Recent Labs   Lab Test  10/25/17   0857   WBC  8.4   RBC  4.09   HGB  13.0   HCT  38.2   PLT  270       For [facility] ONLY: [id number] = WBC, IUNL901 = RBC         Passed - Normal ALT or AST on file in past 26 months    Recent Labs   Lab Test  10/25/17   0857   ALT  28     Recent Labs   Lab Test  10/25/17   0857   AST  13            Passed - Normal platelet count on file in past 26 months    Recent Labs   Lab Test  10/25/17   0857   PLT  270              Passed - No active pregnancy on record       Passed - No positive pregnancy test in last 12 months        Last Written Prescription Date:  8/24/17  Last Fill Quantity: 180,  # refills: 3   Last office visit: 8/14/2018 with prescribing provider:  DOLORES   Future Office Visit:      "

## 2018-08-27 RX ORDER — TOPIRAMATE 50 MG/1
TABLET, FILM COATED ORAL
Qty: 180 TABLET | Refills: 3 | Status: SHIPPED | OUTPATIENT
Start: 2018-08-27 | End: 2020-03-11

## 2018-09-29 ENCOUNTER — OFFICE VISIT (OUTPATIENT)
Dept: URGENT CARE | Facility: URGENT CARE | Age: 43
End: 2018-09-29
Payer: COMMERCIAL

## 2018-09-29 VITALS
BODY MASS INDEX: 29.54 KG/M2 | HEART RATE: 68 BPM | WEIGHT: 183 LBS | RESPIRATION RATE: 16 BRPM | SYSTOLIC BLOOD PRESSURE: 124 MMHG | DIASTOLIC BLOOD PRESSURE: 60 MMHG | TEMPERATURE: 99.1 F

## 2018-09-29 DIAGNOSIS — L30.1 DYSHIDROTIC ECZEMA: ICD-10-CM

## 2018-09-29 DIAGNOSIS — L03.011 CELLULITIS OF FINGER OF RIGHT HAND: Primary | ICD-10-CM

## 2018-09-29 PROCEDURE — 99213 OFFICE O/P EST LOW 20 MIN: CPT | Performed by: PHYSICIAN ASSISTANT

## 2018-09-29 RX ORDER — CEPHALEXIN 500 MG/1
500 CAPSULE ORAL 3 TIMES DAILY
Qty: 21 CAPSULE | Refills: 0 | Status: SHIPPED | OUTPATIENT
Start: 2018-09-29 | End: 2018-10-06

## 2018-09-29 ASSESSMENT — ENCOUNTER SYMPTOMS
PALPITATIONS: 0
NAUSEA: 0
UNEXPECTED WEIGHT CHANGE: 0
DIARRHEA: 0
RHINORRHEA: 0
WHEEZING: 0
MYALGIAS: 0
FATIGUE: 0
SHORTNESS OF BREATH: 0
TROUBLE SWALLOWING: 0
CHEST TIGHTNESS: 0
VOMITING: 0
FEVER: 0
EYE REDNESS: 0
BACK PAIN: 0
ARTHRALGIAS: 0
COUGH: 0
EYE PAIN: 0
SINUS PRESSURE: 0
SORE THROAT: 0
CHILLS: 0
ABDOMINAL PAIN: 0

## 2018-09-29 NOTE — PROGRESS NOTES
SUBJECTIVE:   Caitlin Oh is a 43 year old female presenting with a chief complaint of   Chief Complaint   Patient presents with     Derm Problem     Started Thursday/. Started with a blister between right index and middle.  Spreading today.  Ithcing, painful, burning.  Did virtuel though though needed to be looked at        She is an established patient of [facility].    Rash    Onset of rash was 3 day(s) ago.   Course of illness is sudden onset.  Severity moderate  Current and Associated symptoms: itching, red and blistering   Location of the rash: between fingers on right hand.  Previous history of a similar rash? No  Recent exposure history: none known  Denies exposure to: none known  Associated symptoms include: nothing.  Treatment measures tried include: triamcinolone     Was given rx for triamcinolone from [facility] visit but has now noticed spreading redness and tenderness. She is concern about infection.     Review of Systems   Constitutional: Negative for chills, fatigue, fever and unexpected weight change.   HENT: Negative for congestion, ear pain, postnasal drip, rhinorrhea, sinus pressure, sore throat and trouble swallowing.    Eyes: Negative for pain, redness and visual disturbance.   Respiratory: Negative for cough, chest tightness, shortness of breath and wheezing.    Cardiovascular: Negative for chest pain and palpitations.   Gastrointestinal: Negative for abdominal pain, diarrhea, nausea and vomiting.   Musculoskeletal: Negative for arthralgias, back pain and myalgias.   Skin: Positive for rash.       Past Medical History:   Diagnosis Date     Bilateral bunions      Cervical high risk HPV (human papillomavirus) test positive 11/4/2015     Female infertility 7/10/2008    Undergoing ivf 2008.      Kidney stones 11/30/2010    [facility] er 11/24/10      Personal history of gestational diabetes 7/11/2012    diet controlled     Plantar fasciitis     s/p surgery     Family History   Problem Relation Age  of Onset     HEART DISEASE Maternal Grandmother      Breast Cancer Maternal Grandmother      dx'd age 70     Arthritis Mother      Hypertension Mother      Cancer Mother      skin cancer     Other Cancer Mother      Lymphoma     Prostate Cancer Father      Current Outpatient Prescriptions   Medication Sig Dispense Refill     Acetaminophen (TYLENOL PO) Take 500 mg by mouth every 6 hours as needed       cephALEXin (KEFLEX) 500 MG capsule Take 1 capsule (500 mg) by mouth 3 times daily for 7 days 21 capsule 0     Ibuprofen (ADVIL PO) Take by mouth every 6 hours as needed       norethindrone-ethinyl estradiol (MICROGESTIN 1/20) 1-20 MG-MCG per tablet Take 1 tablet by mouth daily 63 tablet 0     tiZANidine (ZANAFLEX) 4 MG tablet Take 1.5 tablets (6 mg) by mouth 3 times daily as needed for muscle spasms 135 tablet 11     topiramate (TOPAMAX) 50 MG tablet TAKE 1 TABLET (50 MG) BY MOUTH 2 TIMES DAILY 180 tablet 3     fluticasone (FLONASE) 50 MCG/ACT nasal spray Spray 1-2 sprays into both nostrils daily 16 g 11     lidocaine-prilocaine (EMLA) cream Apply topically as needed for moderate pain 30 g 1     rizatriptan (MAXALT-MLT) 5 MG ODT tab Take 1-2 tablets (5-10 mg) by mouth at onset of headache for migraine May repeat dose in 2 hours.  Do not exceed 30 mg in 24 hours 18 tablet 1     Social History   Substance Use Topics     Smoking status: Never Smoker     Smokeless tobacco: Never Used     Alcohol use No       OBJECTIVE  /60 (BP Location: Right arm, Cuff Size: Adult Regular)  Pulse 68  Temp 99.1  F (37.3  C) (Tympanic)  Resp 16  Wt 183 lb (83 kg)  BMI 29.54 kg/m2    Physical Exam   Constitutional: She appears well-developed and well-nourished.   HENT:   Head: Normocephalic.   Right Ear: Tympanic membrane and ear canal normal.   Left Ear: Tympanic membrane and ear canal normal.   Mouth/Throat: Oropharynx is clear and moist.   Eyes: Conjunctivae are normal. Pupils are equal, round, and reactive to light.    Cardiovascular: Normal rate and normal heart sounds.    Pulmonary/Chest: Effort normal and breath sounds normal.   Skin: Skin is warm and dry.   There are small blisters in the webbing space between right index and middle finger. There is redness and tenderness on the sides of both fingers and a small area going up hand.    Psychiatric: She has a normal mood and affect. Her behavior is normal.       Labs:  No results found for this or any previous visit (from the past 24 hour(s)).    X-Ray was not done.    ASSESSMENT:      ICD-10-CM    1. Cellulitis of finger of right hand L03.011 cephALEXin (KEFLEX) 500 MG capsule   2. Dyshidrotic eczema L30.1         Medical Decision Making:    Differential Diagnosis:  Rash: Atopic dermatitis  Dermatitis  Eczema    Serious Comorbid Conditions:  Adult:  None    PLAN:    Rash: will treat with keflex x 7 days. Keep area clean and dry. Can use triamcinolone as needed for itching related to dyshidrotic eczema. Return to clinic if symptoms worsen or do not improve; otherwise follow up as needed      Followup:    If not improving or if condition worsens, follow up with your Primary Care Provider    There are no Patient Instructions on file for this visit.

## 2018-09-29 NOTE — NURSING NOTE
"Chief Complaint   Patient presents with     Derm Problem     Started Thursday/. Started with a blister between right index and middle.  Spreading today.  Ithcing, painful, burning.  Did virtuel though though needed to be looked at        Initial /60 (BP Location: Right arm, Cuff Size: Adult Regular)  Pulse 68  Temp 99.1  F (37.3  C) (Tympanic)  Resp 16  Wt 183 lb (83 kg)  BMI 29.54 kg/m2 Estimated body mass index is 29.54 kg/(m^2) as calculated from the following:    Height as of 8/16/18: 5' 6\" (1.676 m).    Weight as of this encounter: 183 lb (83 kg).    Patient presents to the clinic using No DME    Health Maintenance that is potentially due pending provider review:  NONE    n/a    Is there anyone who you would like to be able to receive your results? Not Applicable  If yes have patient fill out SOFYA  Virginie Bush M.A.        "

## 2018-09-29 NOTE — MR AVS SNAPSHOT
After Visit Summary   9/29/2018    Caitlin Oh    MRN: [id number]           Patient Information     Date Of Birth          1975        Visit Information        Provider Department      9/29/2018 3:50 PM Page Hogue PA-C [facility]        Today's Diagnoses     Cellulitis of finger of right hand    -  1    Dyshidrotic eczema           Follow-ups after your visit        Follow-up notes from your care team     Return if symptoms worsen or fail to improve.      Your next 10 appointments already scheduled     Oct 05, 2018  3:40 PM CDT   [facility]    [address] [place] [id number] [phone]              Who to contact     If you have questions or need follow up information about today's clinic visit or your schedule please contact [facility] directly at [phone].  Normal or non-critical lab and imaging results will be communicated to you by Narratohart, letter or phone within 4 business days after the clinic has received the results. If you do not hear from us within 7 days, please contact the clinic through Narratohart or phone. If you have a critical or abnormal lab result, we will notify you by phone as soon as possible.  Submit refill requests through [employer] or call your pharmacy and they will forward the refill request to us. Please allow 3 business days for your refill to be completed.          Additional Information About Your Visit        MyChart Information     [employer] gives you secure access to your electronic health record. If you see a primary care provider, you can also send messages to your care team and make appointments. If you have questions, please call your primary care clinic.  If you do not have a primary care provider, please call [phone] and they will assist you.        Care EveryWhere ID     This  is your Care EveryWhere ID. This could be used by other organizations to access your [facility] medical records  [id number]        Your Vitals Were     Pulse Temperature Respirations BMI (Body Mass Index)          68 99.1  F (37.3  C) (Tympanic) 16 29.54 kg/m2         Blood Pressure from Last 3 Encounters:   10/01/18 140/84   09/29/18 124/60   08/16/18 127/78    Weight from Last 3 Encounters:   10/01/18 183 lb (83 kg)   09/29/18 183 lb (83 kg)   08/16/18 191 lb 6.4 oz (86.8 kg)              Today, you had the following     No orders found for display         Today's Medication Changes          These changes are accurate as of 9/29/18 11:59 PM.  If you have any questions, ask your nurse or doctor.               Start taking these medicines.        Dose/Directions    cephALEXin 500 MG capsule   Commonly known as:  KEFLEX   Used for:  Cellulitis of finger of right hand   Started by:  Page Hogue PA-C        Dose:  500 mg   Take 1 capsule (500 mg) by mouth 3 times daily for 7 days   Quantity:  21 capsule   Refills:  0            Where to get your medicines      These medications were sent to [place] [address] [place] [address] [address]     Phone:  [phone]     cephALEXin 500 MG capsule                Primary Care Provider Office Phone # Fax #    Sujatha LEXX Maher [phone] [address] [facility] [address]        Equal Access to Services     [facility] NICOLASA AH: Hadii ra oquendo Soyu, waaxda luimeldaadaha, qaybta kaalalexus mchugh cady garcia. So [facility] [phone].    ATENCIÓN: Si habla español, tiene a galdamez disposición servicios gratuitos de asistencia lingüística. Llame al [phone].    We comply with applicable federal civil rights laws and [place] laws. We do not discriminate on the basis of race, color, national origin, age, disability, sex, sexual orientation, or gender identity.             Thank you!     Thank you for choosing [facility]  for your care. Our goal is always to provide you with excellent care. Hearing back from our patients is one way we can continue to improve our services. Please take a few minutes to complete the written survey that you may receive in the mail after your visit with us. Thank you!             Your Updated Medication List - Protect others around you: Learn how to safely use, store and throw away your medicines at www.disposemymeds.org.          This list is accurate as of 9/29/18 11:59 PM.  Always use your most recent med list.                   Brand Name Dispense Instructions for use Diagnosis    ADVIL PO      Take by mouth every 6 hours as needed        cephALEXin 500 MG capsule    KEFLEX    21 capsule    Take 1 capsule (500 mg) by mouth 3 times daily for 7 days    Cellulitis of finger of right hand       fluticasone 50 MCG/ACT spray    FLONASE    16 g    Spray 1-2 sprays into both nostrils daily    Post-nasal drainage, Chronic rhinitis       lidocaine-prilocaine cream    EMLA    30 g    Apply topically as needed for moderate pain    Myofascial muscle pain       norethindrone-ethinyl estradiol 1-20 MG-MCG per tablet    MICROGESTIN 1/20    63 tablet    Take 1 tablet by mouth daily    Dysmenorrhea       rizatriptan 5 MG ODT tab    MAXALT-MLT    18 tablet    Take 1-2 tablets (5-10 mg) by mouth at onset of headache for migraine May repeat dose in 2 hours.  Do not exceed 30 mg in 24 hours    Menstrual migraine without status migrainosus, not intractable       tiZANidine 4 MG tablet    ZANAFLEX    135 tablet    Take 1.5 tablets (6 mg) by mouth 3 times daily as needed for muscle spasms    Muscle spasm of back       topiramate 50 MG tablet    TOPAMAX    180 tablet    TAKE 1 TABLET (50 MG) BY MOUTH 2 TIMES DAILY    Menstrual migraine without status migrainosus, not intractable       TYLENOL PO      Take 500 mg by mouth every 6 hours as  needed

## 2018-10-01 ENCOUNTER — OFFICE VISIT (OUTPATIENT)
Dept: FAMILY MEDICINE | Facility: CLINIC | Age: 43
End: 2018-10-01
Payer: COMMERCIAL

## 2018-10-01 VITALS
SYSTOLIC BLOOD PRESSURE: 140 MMHG | BODY MASS INDEX: 29.41 KG/M2 | HEART RATE: 84 BPM | HEIGHT: 66 IN | DIASTOLIC BLOOD PRESSURE: 84 MMHG | WEIGHT: 183 LBS | OXYGEN SATURATION: 96 % | TEMPERATURE: 98.5 F | RESPIRATION RATE: 16 BRPM

## 2018-10-01 DIAGNOSIS — L30.1 DYSHIDROTIC ECZEMA: Primary | ICD-10-CM

## 2018-10-01 DIAGNOSIS — L08.89 SECONDARY INFECTION OF SKIN: ICD-10-CM

## 2018-10-01 PROCEDURE — 99213 OFFICE O/P EST LOW 20 MIN: CPT | Performed by: NURSE PRACTITIONER

## 2018-10-01 NOTE — PROGRESS NOTES
SUBJECTIVE:   Caitlin Oh is a 43 year old female who presents to clinic today for the following health issues:      ED/UC Followup:    Facility:  [facility]  Date of visit: 9/29/18  Reason for visit: Pt was seen for an infection on her hand (Right index and middle finger). It was dx as cellulitis and she was given Cephalexin x 7 days. She has been on the treatment x 3 days.  Current Status: Pt states that the finger is still painful and would like to know if she can get the blister drained. She was using Triamcinolone cream and states that it was getting worse with use. She was prescribed this through [employer].     Patient is here for f/u after UC visit 4 days ago.  Patient states that the redness is getting better around the finger.  She was using the steroid cream and it seemed to get worse and there is a large hard blister between the 2nd and 3rd finger in the web of the fingers.  Patient states that she was unable to bend fingers and is able to do that now.    Problem list and histories reviewed & adjusted, as indicated.  Additional history: as documented    Patient Active Problem List   Diagnosis     Esophageal reflux     CARDIOVASCULAR SCREENING; LDL GOAL LESS THAN 160     Tension headache, chronic     Menstrual migraine     Menorrhagia     Cervical high risk HPV (human papillomavirus) test positive     Past Surgical History:   Procedure Laterality Date     BUNIONECTOMY RT/LT Bilateral 11/2009     CYSTOSCOPY, RETROGRADES, INSERT STENT URETER(S), COMBINED  9/19/2013    Procedure: COMBINED CYSTOSCOPY, RETROGRADES, INSERT STENT URETER(S);  Right Ureteral Stent Placement;  Surgeon: JUN Villar MD;  Location: [facility] OR     DILATION AND CURETTAGE, HYSTEROSCOPY, ABLATE ENDOMETRIUM THERMACHOICE, COMBINED N/A 4/15/2015    Procedure: COMBINED DILATION AND CURETTAGE, HYSTEROSCOPY, ABLATE ENDOMETRIUM THERMACHOICE;  Surgeon: Munira Goddard MD;  Location: [facility] OR     EXTRACORPOREAL SHOCK WAVE  LITHOTRIPSY (ESWL)  9/18/2013    Procedure: EXTRACORPOREAL SHOCK WAVE LITHOTRIPSY (ESWL);  Right Extracorporeal Shock Wave Lithotripsy;  Surgeon: JUN Villar MD;  Location: [facility] OR     FOOT SURGERY      for plantar fasciitis     HC TOOTH EXTRACTION W/FORCEP      wisdom teeth     LAPAROSCOPY DIAGNOSTIC (GYN)  2007    infertility     LASER HOLMIUM LITHOTRIPSY URETER(S), INSERT STENT, COMBINED  9/27/2013    Procedure: COMBINED CYSTOSCOPY, URETEROSCOPY, LASER HOLMIUM LITHOTRIPSY URETER(S), INSERT STENT;  Right Ureteroscopic Stone Extraction and Possible Stent Placement;  Surgeon: JUN Villar MD;  Location: [facility] OR     TONSILLECTOMY  1983       Social History   Substance Use Topics     Smoking status: Never Smoker     Smokeless tobacco: Never Used     Alcohol use No     Family History   Problem Relation Age of Onset     HEART DISEASE Maternal Grandmother      Breast Cancer Maternal Grandmother      dx'd age 70     Arthritis Mother      Hypertension Mother      Cancer Mother      skin cancer     Other Cancer Mother      Lymphoma     Prostate Cancer Father          Current Outpatient Prescriptions   Medication Sig Dispense Refill     Acetaminophen (TYLENOL PO) Take 500 mg by mouth every 6 hours as needed       cephALEXin (KEFLEX) 500 MG capsule Take 1 capsule (500 mg) by mouth 3 times daily for 7 days 21 capsule 0     fluticasone (FLONASE) 50 MCG/ACT nasal spray Spray 1-2 sprays into both nostrils daily 16 g 11     Ibuprofen (ADVIL PO) Take by mouth every 6 hours as needed       lidocaine-prilocaine (EMLA) cream Apply topically as needed for moderate pain 30 g 1     norethindrone-ethinyl estradiol (MICROGESTIN 1/20) 1-20 MG-MCG per tablet Take 1 tablet by mouth daily 63 tablet 0     rizatriptan (MAXALT-MLT) 5 MG ODT tab Take 1-2 tablets (5-10 mg) by mouth at onset of headache for migraine May repeat dose in 2 hours.  Do not exceed 30 mg in 24 hours 18 tablet 1     tiZANidine (ZANAFLEX) 4 MG tablet Take 1.5 tablets (6  "mg) by mouth 3 times daily as needed for muscle spasms 135 tablet 11     topiramate (TOPAMAX) 50 MG tablet TAKE 1 TABLET (50 MG) BY MOUTH 2 TIMES DAILY 180 tablet 3     Allergies   Allergen Reactions     Penicillins Rash       Reviewed and updated as needed this visit by clinical staff  Allergies  Meds  Problems       Reviewed and updated as needed this visit by Provider  Allergies  Meds  Problems         ROS:  CONSTITUTIONAL: NEGATIVE for fever, chills, change in weight  INTEGUMENTARY/SKIN: POSITIVE for blister and blistering like rash in the web of the 2nd and 3rd fingers of the right hand with mild redness and swelling in the fingers and at the base of the fingers.  RESP: NEGATIVE for significant cough or SOB  CV: NEGATIVE for chest pain, palpitations or peripheral edema  PSYCHIATRIC: NEGATIVE for changes in mood or affect  ROS otherwise negative    OBJECTIVE:     /84  Pulse 84  Temp 98.5  F (36.9  C) (Tympanic)  Resp 16  Ht 5' 6\" (1.676 m)  Wt 183 lb (83 kg)  SpO2 96%  BMI 29.54 kg/m2  Body mass index is 29.54 kg/(m^2).  GENERAL: healthy, alert and no distress  RESP: lungs clear to auscultation - no rales, rhonchi or wheezes  CV: regular rate and rhythm, normal S1 S2, no S3 or S4, no murmur, click or rub, no peripheral edema and peripheral pulses strong  SKIN: dyshidrotic eczema on right hand in the web of the 2nd and 3rd fingers with large blister central.  This was cleaned with alcohol wipe and punctured with sterile needle and clear fluid was expressed out of the blister.  Bacitracin was applied and a bandaid was used to cover the area to reduce infection.  There is minimal redness extending out from this area and minimal swelling around the area and in the fingers.       Diagnostic Test Results:  none     ASSESSMENT/PLAN:     1. Dyshidrotic eczema  Recommended bacitracin and covering for a couple days to reduce infection from opening the blister.  Also moisturize to reduce eczema symptoms. "  Discussed that this is usually due to contact to something that she is allergic too and will resolve on its own if she is not in contact with it anymore.  She believe's that it was candle wax since that is the only thing that she has had contact with that is not the usual.  Discontinue steroid cream since she is having worsening symptoms with use.    2. Secondary infection of skin  Finish antibiotic as ordered.  Follow-up at the end of the week if any residual redness or swelling or increase symptoms of infection over the weekend for longer course.    See Patient Instructions    Palma Zuniga NP  [facility]

## 2018-10-01 NOTE — MR AVS SNAPSHOT
After Visit Summary   10/1/2018    Caitlin Oh    MRN: [id number]           Patient Information     Date Of Birth          1975        Visit Information        Provider Department      10/1/2018 2:40 PM Palma Zuniga NP [facility]        Today's Diagnoses     Dyshidrotic eczema    -  1    Secondary infection of skin          Care Instructions    1.  Use bacitracin/triple antibiotic ointment on the area to reduce chance of infection.  2.  Cover with bandaid for a couple days.  3.  Moisturize the hand over night with an Aveeno lotion since this does not have ingredients that can sometimes cause allergy to the skin.  4.  Finish medication.  If symptoms are not resolved by the end of the week, follow-up in clinic for longer course of antibiotics.          Follow-ups after your visit        Follow-up notes from your care team     Return in about 4 days (around 10/5/2018), or if symptoms worsen or fail to improve.      Your next 10 appointments already scheduled     Oct 05, 2018  3:40 PM CDT   SHORT with Palma Zuniga NP   [facility] ([facility])    [address] [place] [id number]   [phone]              Who to contact     If you have questions or need follow up information about today's clinic visit or your schedule please contact [facility] directly at [phone].  Normal or non-critical lab and imaging results will be communicated to you by MyChart, letter or phone within 4 business days after the clinic has received the results. If you do not hear from us within 7 days, please contact the clinic through MyChart or phone. If you have a critical or abnormal lab result, we will notify you by phone as soon as possible.  Submit refill requests through [employer] or call your pharmacy and they will forward the refill request to us. Please allow 3 business days for your refill to be completed.     "      Additional Information About Your Visit        MyChart Information     [employer] gives you secure access to your electronic health record. If you see a primary care provider, you can also send messages to your care team and make appointments. If you have questions, please call your primary care clinic.  If you do not have a primary care provider, please call [phone] and they will assist you.        Care EveryWhere ID     This is your Care EveryWhere ID. This could be used by other organizations to access your [place] medical records  [id number]        Your Vitals Were     Pulse Temperature Respirations Height Pulse Oximetry BMI (Body Mass Index)    84 98.5  F (36.9  C) (Tympanic) 16 5' 6\" (1.676 m) 96% 29.54 kg/m2       Blood Pressure from Last 3 Encounters:   10/01/18 140/84   09/29/18 124/60   08/16/18 127/78    Weight from Last 3 Encounters:   10/01/18 183 lb (83 kg)   09/29/18 183 lb (83 kg)   08/16/18 191 lb 6.4 oz (86.8 kg)              Today, you had the following     No orders found for display       Primary Care Provider Office Phone # Fax #    Sujatha Maher [phone] [address] [facility] [address]        Equal Access to Services     MARKUS BALDWIN AH: Hadii aad ku hadasho Soomaali, waaxda luqadaha, qaybta kaalmada adeegyada, waxay idiin haysamn adry keith . So [facility] [phone].    ATENCIÓN: Si habla español, tiene a galdamez disposición servicios gratuitos de asistencia lingüística. Llame al [phone].    We comply with applicable federal civil rights laws and [place] laws. We do not discriminate on the basis of race, color, national origin, age, disability, sex, sexual orientation, or gender identity.            Thank you!     Thank you for choosing [facility]  for your care. Our goal is always to provide you with excellent care. Hearing back from our patients is one way we can continue to improve our services. Please take a few " minutes to complete the written survey that you may receive in the mail after your visit with us. Thank you!             Your Updated Medication List - Protect others around you: Learn how to safely use, store and throw away your medicines at www.disposemymeds.org.          This list is accurate as of 10/1/18 11:59 PM.  Always use your most recent med list.                   Brand Name Dispense Instructions for use Diagnosis    ADVIL PO      Take by mouth every 6 hours as needed        cephALEXin 500 MG capsule    KEFLEX    21 capsule    Take 1 capsule (500 mg) by mouth 3 times daily for 7 days    Cellulitis of finger of right hand       fluticasone 50 MCG/ACT spray    FLONASE    16 g    Spray 1-2 sprays into both nostrils daily    Post-nasal drainage, Chronic rhinitis       lidocaine-prilocaine cream    EMLA    30 g    Apply topically as needed for moderate pain    Myofascial muscle pain       norethindrone-ethinyl estradiol 1-20 MG-MCG per tablet    MICROGESTIN 1/20    63 tablet    Take 1 tablet by mouth daily    Dysmenorrhea       rizatriptan 5 MG ODT tab    MAXALT-MLT    18 tablet    Take 1-2 tablets (5-10 mg) by mouth at onset of headache for migraine May repeat dose in 2 hours.  Do not exceed 30 mg in 24 hours    Menstrual migraine without status migrainosus, not intractable       tiZANidine 4 MG tablet    ZANAFLEX    135 tablet    Take 1.5 tablets (6 mg) by mouth 3 times daily as needed for muscle spasms    Muscle spasm of back       topiramate 50 MG tablet    TOPAMAX    180 tablet    TAKE 1 TABLET (50 MG) BY MOUTH 2 TIMES DAILY    Menstrual migraine without status migrainosus, not intractable       TYLENOL PO      Take 500 mg by mouth every 6 hours as needed

## 2018-10-02 NOTE — PATIENT INSTRUCTIONS
1.  Use bacitracin/triple antibiotic ointment on the area to reduce chance of infection.  2.  Cover with bandaid for a couple days.  3.  Moisturize the hand over night with an Aveeno lotion since this does not have ingredients that can sometimes cause allergy to the skin.  4.  Finish medication.  If symptoms are not resolved by the end of the week, follow-up in clinic for longer course of antibiotics.

## 2018-10-10 ENCOUNTER — MYC MEDICAL ADVICE (OUTPATIENT)
Dept: OBGYN | Facility: CLINIC | Age: 43
End: 2018-10-10

## 2018-10-10 DIAGNOSIS — N94.5 SECONDARY DYSMENORRHEA: Primary | ICD-10-CM

## 2018-10-10 NOTE — TELEPHONE ENCOUNTER
LOV 8/16/18:  1. We discussed post-ablation dysmenorrhea concerns.  Reviewed why Asherman's can cause new onset dysmenorrhea.  Reviewed plan for ultrasound assessment.  Discussed options of cervical dilation to allow for better menstrual egress, hormonal contraception, D&C hysteroscopy and hysterectomy.  Patient considering options but will start with pelvic U/S assessment    Please see MyChart message below.     Thank you,   Kadeem BROOKS RN   Specialty Clinics

## 2018-10-11 ENCOUNTER — OFFICE VISIT (OUTPATIENT)
Dept: FAMILY MEDICINE | Facility: CLINIC | Age: 43
End: 2018-10-11
Payer: COMMERCIAL

## 2018-10-11 VITALS
TEMPERATURE: 98.2 F | SYSTOLIC BLOOD PRESSURE: 137 MMHG | HEIGHT: 66 IN | DIASTOLIC BLOOD PRESSURE: 85 MMHG | HEART RATE: 75 BPM | WEIGHT: 190.4 LBS | BODY MASS INDEX: 30.6 KG/M2

## 2018-10-11 DIAGNOSIS — B37.2 CANDIDIASIS OF SKIN: Primary | ICD-10-CM

## 2018-10-11 DIAGNOSIS — Z23 NEED FOR PROPHYLACTIC VACCINATION AND INOCULATION AGAINST INFLUENZA: ICD-10-CM

## 2018-10-11 PROCEDURE — 90471 IMMUNIZATION ADMIN: CPT | Performed by: FAMILY MEDICINE

## 2018-10-11 PROCEDURE — 99213 OFFICE O/P EST LOW 20 MIN: CPT | Mod: 25 | Performed by: FAMILY MEDICINE

## 2018-10-11 PROCEDURE — 90686 IIV4 VACC NO PRSV 0.5 ML IM: CPT | Performed by: FAMILY MEDICINE

## 2018-10-11 NOTE — MR AVS SNAPSHOT
After Visit Summary   10/11/2018    Caitlin Oh    MRN: [id number]           Patient Information     Date Of Birth          1975        Visit Information        Provider Department      10/11/2018 7:30 AM Veronique Alexander MD [facility]        Today's Diagnoses     Candidiasis of skin    -  1       Follow-ups after your visit        Your next 10 appointments already scheduled     Oct 18, 2018  9:00 AM CDT   SHORT with Sujatha Maher MD   [facility] ([facility])    [address] [facility] [address] [phone]              Who to contact     Normal or non-critical lab and imaging results will be communicated to you by MyChart, letter or phone within 4 business days after the clinic has received the results. If you do not hear from us within 7 days, please contact the clinic through MyChart or phone. If you have a critical or abnormal lab result, we will notify you by phone as soon as possible.  Submit refill requests through [employer] or call your pharmacy and they will forward the refill request to us. Please allow 3 business days for your refill to be completed.          If you need to speak with a  for additional information , please call: [phone]             Additional Information About Your Visit        [employer] Information     [employer] gives you secure access to your electronic health record. If you see a primary care provider, you can also send messages to your care team and make appointments. If you have questions, please call your primary care clinic.  If you do not have a primary care provider, please call [phone] and they will assist you.        Care EveryWhere ID     This is your Care EveryWhere ID. This could be used by other organizations to access your [place] medical records  [id number]        Your Vitals Were     Pulse Temperature Height BMI (Body Mass Index)          75 98.2  F (36.8  C) (Tympanic) 5'  "6\" (1.676 m) 30.73 kg/m2         Blood Pressure from Last 3 Encounters:   10/11/18 137/85   10/01/18 140/84   09/29/18 124/60    Weight from Last 3 Encounters:   10/11/18 190 lb 6.4 oz (86.4 kg)   10/01/18 183 lb (83 kg)   09/29/18 183 lb (83 kg)              Today, you had the following     No orders found for display       Primary Care Provider Office Phone # Fax #    Sujatha Maher [phone] [address] [facility] [address]        Equal Access to Services     [facility]: Hadii ra Knott, waneno pugh, qaybta kaalmada charleen, miesha keith . So [facility] [phone].    ATENCIÓN: Si habla español, tiene a galdamez disposición servicios gratuitos de asistencia lingüística. [facility] [phone].    We comply with applicable federal civil rights laws and [place] laws. We do not discriminate on the basis of race, color, national origin, age, disability, sex, sexual orientation, or gender identity.            Thank you!     Thank you for choosing [facility]  for your care. Our goal is always to provide you with excellent care. Hearing back from our patients is one way we can continue to improve our services. Please take a few minutes to complete the written survey that you may receive in the mail after your visit with us. Thank you!             Your Updated Medication List - Protect others around you: Learn how to safely use, store and throw away your medicines at www.disposemymeds.org.          This list is accurate as of 10/11/18  8:10 AM.  Always use your most recent med list.                   Brand Name Dispense Instructions for use Diagnosis    ADVIL PO      Take by mouth every 6 hours as needed        fluticasone 50 MCG/ACT spray    FLONASE    16 g    Spray 1-2 sprays into both nostrils daily    Post-nasal drainage, Chronic rhinitis       lidocaine-prilocaine cream    EMLA    30 g    Apply topically as needed for moderate pain "    Myofascial muscle pain       norethindrone-ethinyl estradiol 1-20 MG-MCG per tablet    MICROGESTIN 1/20    63 tablet    Take 1 tablet by mouth daily    Dysmenorrhea       rizatriptan 5 MG ODT tab    MAXALT-MLT    18 tablet    Take 1-2 tablets (5-10 mg) by mouth at onset of headache for migraine May repeat dose in 2 hours.  Do not exceed 30 mg in 24 hours    Menstrual migraine without status migrainosus, not intractable       tiZANidine 4 MG tablet    ZANAFLEX    135 tablet    Take 1.5 tablets (6 mg) by mouth 3 times daily as needed for muscle spasms    Muscle spasm of back       topiramate 50 MG tablet    TOPAMAX    180 tablet    TAKE 1 TABLET (50 MG) BY MOUTH 2 TIMES DAILY    Menstrual migraine without status migrainosus, not intractable       TYLENOL PO      Take 500 mg by mouth every 6 hours as needed

## 2018-10-11 NOTE — PROGRESS NOTES
SUBJECTIVE:   Caitlin Oh is a 43 year old female who presents to clinic today for the following health issues:      Rash  Onset: Tuesday night     Description:   Location: left breast   Character: blotchy, painful, red  Itching (Pruritis): YES    Progression of Symptoms:  same    Accompanying Signs & Symptoms:  Fever: no   Body aches or joint pain: no   Sore throat symptoms: no   Recent cold symptoms: YES-  Last week     History:   Previous similar rash: YES-  Possibly     Precipitating factors:   Exposure to similar rash: no   New exposures:  medication birth control been on it for almost 3 months though. Recently was on Keflex for rash on hand.    Recent travel: no     Alleviating factors:  hydrocortisone cream    antifungal cream     Therapies Tried and outcome:  Was on antibiotics recently due to rash on hand       Worsening over last two days  Was on antibiotics recently   Very itchy/sore      Review of systems:  No f/c   No n/v   No fatigue/malaise    Problem list and histories reviewed & adjusted, as indicated.  Additional history: as documented    Patient Active Problem List   Diagnosis     Esophageal reflux     CARDIOVASCULAR SCREENING; LDL GOAL LESS THAN 160     Tension headache, chronic     Menstrual migraine     Menorrhagia     Cervical high risk HPV (human papillomavirus) test positive     Current Outpatient Prescriptions   Medication     Acetaminophen (TYLENOL PO)     norethindrone-ethinyl estradiol (MICROGESTIN 1/20) 1-20 MG-MCG per tablet     topiramate (TOPAMAX) 50 MG tablet     fluticasone (FLONASE) 50 MCG/ACT nasal spray     Ibuprofen (ADVIL PO)     lidocaine-prilocaine (EMLA) cream     rizatriptan (MAXALT-MLT) 5 MG ODT tab     tiZANidine (ZANAFLEX) 4 MG tablet     No current facility-administered medications for this visit.         Allergies   Allergen Reactions     Penicillins Rash       /85 (BP Location: Right arm, Patient Position: Sitting, Cuff Size: Adult Regular)  Pulse 75   "Temp 98.2  F (36.8  C) (Tympanic)  Ht 5' 6\" (1.676 m)  Wt 190 lb 6.4 oz (86.4 kg)  BMI 30.73 kg/m2  Pt is alert and oriented in no acute distress.  Affect is appropriate. Good eye contact.  Skin - under the left breast there is an area of confluent erythema - slightly damp appearing. Small pinpoint erythematous dots at the edge of the erythematous area.      Assessment/Plan -  (B37.2) Candidiasis of skin  (primary encounter diagnosis)  Comment: Discussed treatment. Use antifungal powder 2-3x/day for 2-3 weeks. Try to keep the area cool and dry. The patient indicates understanding of these issues and agrees with the plan.   Plan:     (Z23) Need for prophylactic vaccination and inoculation against influenza  Comment:   Plan: FLU VACCINE, SPLIT VIRUS, IM (QUADRIVALENT)         [42730]- >3 YRS, Vaccine Administration,         Initial [71740]          MARJORIE Alexander MD       "

## 2018-10-11 NOTE — PROGRESS NOTES

## 2018-10-17 ENCOUNTER — OFFICE VISIT (OUTPATIENT)
Dept: FAMILY MEDICINE | Facility: CLINIC | Age: 43
End: 2018-10-17
Payer: COMMERCIAL

## 2018-10-17 VITALS
WEIGHT: 191.06 LBS | TEMPERATURE: 97.3 F | HEIGHT: 66 IN | HEART RATE: 76 BPM | DIASTOLIC BLOOD PRESSURE: 70 MMHG | BODY MASS INDEX: 30.7 KG/M2 | SYSTOLIC BLOOD PRESSURE: 110 MMHG

## 2018-10-17 DIAGNOSIS — B37.2 CANDIDIASIS OF SKIN: Primary | ICD-10-CM

## 2018-10-17 PROCEDURE — 99213 OFFICE O/P EST LOW 20 MIN: CPT | Performed by: FAMILY MEDICINE

## 2018-10-17 RX ORDER — NYSTATIN 100000 U/G
CREAM TOPICAL 3 TIMES DAILY
Refills: 1 | COMMUNITY
Start: 2018-10-14 | End: 2018-12-04

## 2018-10-17 RX ORDER — FLUCONAZOLE 150 MG/1
150 TABLET ORAL
Qty: 4 TABLET | Refills: 0 | Status: SHIPPED | OUTPATIENT
Start: 2018-10-17 | End: 2018-10-30

## 2018-10-17 NOTE — TELEPHONE ENCOUNTER
"  You are now scheduled for surgery at [facility].  Below are the details for your surgery.  Please read the \"Preparing for Your Surgery\" instructions and let us know if you have any questions.    Type of surgery: dilation curettage and hysteroscopy  Surgeon:  Munira Goddard MD  Location of surgery: [facility] OR    Date of surgery: 11-7-18    Time: 10:00am   Arrival Time: 9:00am    Time can change, to be confirmed a couple of days prior by pre-op surgery nurse.    Pre-Op Appt Date: Patient to schedule with a PCP or Family Practice Provider within 30 days to the surgery.  Post-Op Appt Date: To be determined by provider     Packet sent out: Yes  Pre-cert/Authorization completed:  TBD by Financial Securing Office.   MA Sterilization/Hysterectomy Acknowledgment Consent signed: ROCÍO    [facility]  [phone]    Fax: [phone]  Same Day Surgery [phone]  Fax: [phone]    "

## 2018-10-17 NOTE — MR AVS SNAPSHOT
"              After Visit Summary   10/17/2018    Caitlin Oh    MRN: [id number]           Patient Information     Date Of Birth          1975        Visit Information        Provider Department      10/17/2018 8:30 AM Sujatha Maher MD [facility]        Today's Diagnoses     Candidiasis of skin    -  1       Follow-ups after your visit        Follow-up notes from your care team     Return in about 1 year (around 10/17/2019) for Physical Exam.      Who to contact     Normal or non-critical lab and imaging results will be communicated to you by [employer], letter or phone within 4 business days after the clinic has received the results. If you do not hear from us within 7 days, please contact the clinic through [employer] or phone. If you have a critical or abnormal lab result, we will notify you by phone as soon as possible.  Submit refill requests through [email or web address] or call your pharmacy and they will forward the refill request to us. Please allow 3 business days for your refill to be completed.          If you need to speak with a  for additional information , please call: [phone]             Additional Information About Your Visit        MyChart Information     [email or web address] gives you secure access to your electronic health record. If you see a primary care provider, you can also send messages to your care team and make appointments. If you have questions, please call your primary care clinic.  If you do not have a primary care provider, please call [phone] and they will assist you.        Care EveryWhere ID     This is your Care EveryWhere ID. This could be used by other organizations to access your [facility] medical records  [id number]        Your Vitals Were     Pulse Temperature Height BMI (Body Mass Index)          76 97.3  F (36.3  C) (Tympanic) 5' 6\" (1.676 m) 30.84 kg/m2         Blood Pressure from Last 3 Encounters:   10/17/18 110/70   10/11/18 137/85   10/01/18 140/84 "    Weight from Last 3 Encounters:   10/17/18 191 lb 1 oz (86.7 kg)   10/11/18 190 lb 6.4 oz (86.4 kg)   10/01/18 183 lb (83 kg)              Today, you had the following     No orders found for display         Today's Medication Changes          These changes are accurate as of 10/17/18  3:33 PM.  If you have any questions, ask your nurse or doctor.               Start taking these medicines.        Dose/Directions    fluconazole 150 MG tablet   Commonly known as:  DIFLUCAN   Used for:  Candidiasis of skin   Started by:  Sujatha Maher MD        Dose:  150 mg   Take 1 tablet (150 mg) by mouth every 3 days   Quantity:  4 tablet   Refills:  0         Stop taking these medicines if you haven't already. Please contact your care team if you have questions.     fluticasone 50 MCG/ACT spray   Commonly known as:  FLONASE   Stopped by:  Sujatha Maher MD                Where to get your medicines      These medications were sent to [place] [address] [place] [address] [address]     Phone:  [phone]     fluconazole 150 MG tablet                Primary Care Provider Office Phone # Fax #    Sujatha Maher [phone] [address] [facility] [address]        Equal Access to Services     MARKUS BALDWIN AH: Hadii ra caldera hadasho Soomaali, waaxda luqadaha, qaybta kaalmada adeegyada, waxay cady garcia. So [facility] [phone].    ATENCIÓN: Si habla español, tiene a galdamez disposición servicios gratuitos de asistencia lingüística. Llame al [phone].    We comply with applicable federal civil rights laws and [place] laws. We do not discriminate on the basis of race, color, national origin, age, disability, sex, sexual orientation, or gender identity.            Thank you!     Thank you for choosing [facility]  for your care. Our goal is always to provide you with excellent care. Hearing back from our patients is  one way we can continue to improve our services. Please take a few minutes to complete the written survey that you may receive in the mail after your visit with us. Thank you!             Your Updated Medication List - Protect others around you: Learn how to safely use, store and throw away your medicines at www.disposemymeds.org.          This list is accurate as of 10/17/18  3:33 PM.  Always use your most recent med list.                   Brand Name Dispense Instructions for use Diagnosis    ADVIL PO      Take by mouth every 6 hours as needed        fluconazole 150 MG tablet    DIFLUCAN    4 tablet    Take 1 tablet (150 mg) by mouth every 3 days    Candidiasis of skin       lidocaine-prilocaine cream    EMLA    30 g    Apply topically as needed for moderate pain    Myofascial muscle pain       norethindrone-ethinyl estradiol 1-20 MG-MCG per tablet    MICROGESTIN 1/20    63 tablet    Take 1 tablet by mouth daily    Dysmenorrhea       nystatin cream    MYCOSTATIN     3 times daily        rizatriptan 5 MG ODT tab    MAXALT-MLT    18 tablet    Take 1-2 tablets (5-10 mg) by mouth at onset of headache for migraine May repeat dose in 2 hours.  Do not exceed 30 mg in 24 hours    Menstrual migraine without status migrainosus, not intractable       tiZANidine 4 MG tablet    ZANAFLEX    135 tablet    Take 1.5 tablets (6 mg) by mouth 3 times daily as needed for muscle spasms    Muscle spasm of back       topiramate 50 MG tablet    TOPAMAX    180 tablet    TAKE 1 TABLET (50 MG) BY MOUTH 2 TIMES DAILY    Menstrual migraine without status migrainosus, not intractable       TYLENOL PO      Take 500 mg by mouth every 6 hours as needed

## 2018-10-17 NOTE — PROGRESS NOTES
SUBJECTIVE:   Caitlin Oh is a 43 year old female who presents to clinic today for the following health issues:    - Follow up after 10/11/2018 visit.    Rash on left breast is  Continuing to be reddened, inflamed and painful. No open areas or drainagebut she is now noticing some blistering. She is using Nystatin 100,000U/gm cream on area tid and keeping area cool and dry with minimal improvement. No fevers.    Problem list and histories reviewed & adjusted, as indicated.  Additional history: as documented  Reviewed pics of the hand rash looks more like an insect bite and then some cellulitis  That is mostly better  Now she has a rash under the left breast itchy burny  She was started on antifungal not helping then given nystatin   Slowly improving going to the [facility] on Friday       Patient Active Problem List   Diagnosis     Esophageal reflux     CARDIOVASCULAR SCREENING; LDL GOAL LESS THAN 160     Tension headache, chronic     Menstrual migraine     Menorrhagia     Cervical high risk HPV (human papillomavirus) test positive     Past Surgical History:   Procedure Laterality Date     BUNIONECTOMY RT/LT Bilateral 11/2009     CYSTOSCOPY, RETROGRADES, INSERT STENT URETER(S), COMBINED  9/19/2013    Procedure: COMBINED CYSTOSCOPY, RETROGRADES, INSERT STENT URETER(S);  Right Ureteral Stent Placement;  Surgeon: JUN Villar MD;  Location: [facility] OR     DILATION AND CURETTAGE, HYSTEROSCOPY, ABLATE ENDOMETRIUM THERMACHOICE, COMBINED N/A 4/15/2015    Procedure: COMBINED DILATION AND CURETTAGE, HYSTEROSCOPY, ABLATE ENDOMETRIUM THERMACHOICE;  Surgeon: Munira Goddard MD;  Location: [facility] OR     EXTRACORPOREAL SHOCK WAVE LITHOTRIPSY (ESWL)  9/18/2013    Procedure: EXTRACORPOREAL SHOCK WAVE LITHOTRIPSY (ESWL);  Right Extracorporeal Shock Wave Lithotripsy;  Surgeon: JUN Villar MD;  Location: [facility] OR     FOOT SURGERY      for plantar fasciitis     HC TOOTH EXTRACTION W/FORCEP      wisdom teeth     LAPAROSCOPY DIAGNOSTIC  "(GYN)  2007    infertility     LASER HOLMIUM LITHOTRIPSY URETER(S), INSERT STENT, COMBINED  9/27/2013    Procedure: COMBINED CYSTOSCOPY, URETEROSCOPY, LASER HOLMIUM LITHOTRIPSY URETER(S), INSERT STENT;  Right Ureteroscopic Stone Extraction and Possible Stent Placement;  Surgeon: JUN Villar MD;  Location: [facility] OR     TONSILLECTOMY  1983       Social History   Substance Use Topics     Smoking status: Never Smoker     Smokeless tobacco: Never Used     Alcohol use No     Family History   Problem Relation Age of Onset     HEART DISEASE Maternal Grandmother      Breast Cancer Maternal Grandmother      dx'd age 70     Arthritis Mother      Hypertension Mother      Cancer Mother      skin cancer     Other Cancer Mother      Lymphoma     Prostate Cancer Father            Reviewed and updated as needed this visit by clinical staff       Reviewed and updated as needed this visit by Provider         ROS:  Constitutional, HEENT, cardiovascular, pulmonary, gi and gu systems are negative, except as otherwise noted.    OBJECTIVE:     /70  Pulse 76  Temp 97.3  F (36.3  C) (Tympanic)  Ht 5' 6\" (1.676 m)  Wt 191 lb 1 oz (86.7 kg)  BMI 30.84 kg/m2  Body mass index is 30.84 kg/(m^2).  GENERAL APPEARANCE: healthy, alert and no distress  SKIN: erythema - breast and under left breast extending onto the chest wall no ulceration not warm  and opened blisterhealing between 2/3 finger right hand     Diagnostic Test Results:  none     ASSESSMENT/PLAN:         BMI:   Estimated body mass index is 30.84 kg/(m^2) as calculated from the following:    Height as of this encounter: 5' 6\" (1.676 m).    Weight as of this encounter: 191 lb 1 oz (86.7 kg).   Weight management plan: Discussed healthy diet and exercise guidelines and patient will follow up in 12 months in clinic to re-evaluate.      1. Candidiasis of skin  Will try this keep area dry   - fluconazole (DIFLUCAN) 150 MG tablet; Take 1 tablet (150 mg) by mouth every 3 days  " Dispense: 4 tablet; Refill: 0    FUTURE APPOINTMENTS:       - Follow-up visit in as needed     Sujatha Maher MD  [facility]

## 2018-10-17 NOTE — TELEPHONE ENCOUNTER
Left a message for patient to call back to pick a date.    -Praveena PEARSON [facility]  Clinic Station

## 2018-10-25 ENCOUNTER — TRANSFERRED RECORDS (OUTPATIENT)
Dept: HEALTH INFORMATION MANAGEMENT | Facility: CLINIC | Age: 43
End: 2018-10-25

## 2018-10-30 ENCOUNTER — OFFICE VISIT (OUTPATIENT)
Dept: FAMILY MEDICINE | Facility: CLINIC | Age: 43
End: 2018-10-30
Payer: COMMERCIAL

## 2018-10-30 VITALS
BODY MASS INDEX: 30.1 KG/M2 | SYSTOLIC BLOOD PRESSURE: 134 MMHG | TEMPERATURE: 99.3 F | HEIGHT: 66 IN | DIASTOLIC BLOOD PRESSURE: 86 MMHG | HEART RATE: 65 BPM | WEIGHT: 187.3 LBS

## 2018-10-30 DIAGNOSIS — N94.6 DYSMENORRHEA: ICD-10-CM

## 2018-10-30 DIAGNOSIS — Z01.818 PREOP GENERAL PHYSICAL EXAM: Primary | ICD-10-CM

## 2018-10-30 LAB
BASOPHILS # BLD AUTO: 0 10E9/L (ref 0–0.2)
BASOPHILS NFR BLD AUTO: 0.3 %
DIFFERENTIAL METHOD BLD: NORMAL
EOSINOPHIL # BLD AUTO: 0.3 10E9/L (ref 0–0.7)
EOSINOPHIL NFR BLD AUTO: 2.8 %
ERYTHROCYTE [DISTWIDTH] IN BLOOD BY AUTOMATED COUNT: 12.5 % (ref 10–15)
HCT VFR BLD AUTO: 41.5 % (ref 35–47)
HGB BLD-MCNC: 13.8 G/DL (ref 11.7–15.7)
LYMPHOCYTES # BLD AUTO: 2.6 10E9/L (ref 0.8–5.3)
LYMPHOCYTES NFR BLD AUTO: 28 %
MCH RBC QN AUTO: 31.3 PG (ref 26.5–33)
MCHC RBC AUTO-ENTMCNC: 33.3 G/DL (ref 31.5–36.5)
MCV RBC AUTO: 94 FL (ref 78–100)
MONOCYTES # BLD AUTO: 1 10E9/L (ref 0–1.3)
MONOCYTES NFR BLD AUTO: 11.1 %
NEUTROPHILS # BLD AUTO: 5.3 10E9/L (ref 1.6–8.3)
NEUTROPHILS NFR BLD AUTO: 57.8 %
PLATELET # BLD AUTO: 355 10E9/L (ref 150–450)
RBC # BLD AUTO: 4.41 10E12/L (ref 3.8–5.2)
WBC # BLD AUTO: 9.1 10E9/L (ref 4–11)

## 2018-10-30 PROCEDURE — 85025 COMPLETE CBC W/AUTO DIFF WBC: CPT | Performed by: FAMILY MEDICINE

## 2018-10-30 PROCEDURE — 36415 COLL VENOUS BLD VENIPUNCTURE: CPT | Performed by: FAMILY MEDICINE

## 2018-10-30 PROCEDURE — 99214 OFFICE O/P EST MOD 30 MIN: CPT | Performed by: FAMILY MEDICINE

## 2018-10-30 NOTE — MR AVS SNAPSHOT
After Visit Summary   10/30/2018    Caitlin Oh    MRN: [id number]           Patient Information     Date Of Birth          1975        Visit Information        Provider Department      10/30/2018 4:00 PM Sujatha Maher MD [facility]        Today's Diagnoses     Preop general physical exam    -  1    Dysmenorrhea          Care Instructions      Before Your Surgery      Call your surgeon if there is any change in your health. This includes signs of a cold or flu (such as a sore throat, runny nose, cough, rash or fever).    Do not smoke, drink alcohol or take over the counter medicine (unless your surgeon or primary care doctor tells you to) for the 24 hours before and after surgery.    If you take prescribed drugs: Follow your doctor s orders about which medicines to take and which to stop until after surgery.    Eating and drinking prior to surgery: follow the instructions from your surgeon    Take a shower or bath the night before surgery. Use the soap your surgeon gave you to gently clean your skin. If you do not have soap from your surgeon, use your regular soap. Do not shave or scrub the surgery site.  Wear clean pajamas and have clean sheets on your bed.           Follow-ups after your visit        Follow-up notes from your care team     Return in about 1 year (around 10/30/2019) for Physical Exam.      Your next 10 appointments already scheduled     Nov 07, 2018   Procedure with Munira Goddard MD   [facility] (--)    [address] [place] [id number]   [phone]           The medical center is located at [address]. (between [address] in [place], [place] miles north of [place]).              Who to contact     Normal or non-critical lab and imaging results will be communicated to you by MyChart, letter or phone within 4 business days after the clinic has received the results. If you do not hear from us within 7 days, please  "contact the clinic through [employer] or phone. If you have a critical or abnormal lab result, we will notify you by phone as soon as possible.  Submit refill requests through [employer] or call your pharmacy and they will forward the refill request to us. Please allow 3 business days for your refill to be completed.          If you need to speak with a  for additional information , please call: [phone]             Additional Information About Your Visit        [employer] Information     [employer] gives you secure access to your electronic health record. If you see a primary care provider, you can also send messages to your care team and make appointments. If you have questions, please call your primary care clinic.  If you do not have a primary care provider, please call [phone] and they will assist you.        Care EveryWhere ID     This is your Care EveryWhere ID. This could be used by other organizations to access your [facility] medical records  [id number]        Your Vitals Were     Pulse Temperature Height BMI (Body Mass Index)          65 99.3  F (37.4  C) (Tympanic) 5' 6\" (1.676 m) 30.23 kg/m2         Blood Pressure from Last 3 Encounters:   10/30/18 134/86   10/17/18 110/70   10/11/18 137/85    Weight from Last 3 Encounters:   10/30/18 187 lb 4.8 oz (85 kg)   10/17/18 191 lb 1 oz (86.7 kg)   10/11/18 190 lb 6.4 oz (86.4 kg)              We Performed the Following     CBC with platelets differential        Primary Care Provider Office Phone # Fax #    Sujatha Maher [phone] [address] [facility] [address]        Equal Access to Services     [facility] Center[facility] AH: Hadii ra oquendo Soyu, waaxda luqadaha, qaybta kaalmiesha mchugh. So [facility] [place] [phone].    ATENCIÓN: Si habla español, tiene a galdamez disposición servicios gratuitos de asistencia lingüística. Llame al [phone].    We comply with applicable federal civil " rights laws and [place] laws. We do not discriminate on the basis of race, color, national origin, age, disability, sex, sexual orientation, or gender identity.            Thank you!     Thank you for choosing [facility]  for your care. Our goal is always to provide you with excellent care. Hearing back from our patients is one way we can continue to improve our services. Please take a few minutes to complete the written survey that you may receive in the mail after your visit with us. Thank you!             Your Updated Medication List - Protect others around you: Learn how to safely use, store and throw away your medicines at www.disposemymeds.org.          This list is accurate as of 10/30/18  4:48 PM.  Always use your most recent med list.                   Brand Name Dispense Instructions for use Diagnosis    ADVIL PO      Take by mouth every 6 hours as needed        lidocaine-prilocaine cream    EMLA    30 g    Apply topically as needed for moderate pain    Myofascial muscle pain       norethindrone-ethinyl estradiol 1-20 MG-MCG per tablet    MICROGESTIN 1/20    63 tablet    Take 1 tablet by mouth daily    Dysmenorrhea       nystatin cream    MYCOSTATIN     3 times daily        rizatriptan 5 MG ODT tab    MAXALT-MLT    18 tablet    Take 1-2 tablets (5-10 mg) by mouth at onset of headache for migraine May repeat dose in 2 hours.  Do not exceed 30 mg in 24 hours    Menstrual migraine without status migrainosus, not intractable       tiZANidine 4 MG tablet    ZANAFLEX    135 tablet    Take 1.5 tablets (6 mg) by mouth 3 times daily as needed for muscle spasms    Muscle spasm of back       topiramate 50 MG tablet    TOPAMAX    180 tablet    TAKE 1 TABLET (50 MG) BY MOUTH 2 TIMES DAILY    Menstrual migraine without status migrainosus, not intractable       TYLENOL PO      Take 500 mg by mouth every 6 hours as needed

## 2018-10-30 NOTE — PROGRESS NOTES
[facility]  [address] [address]  [phone]  Dept: [phone]    PRE-OP EVALUATION:  Today's date: 10/30/2018    Caitlin Oh (: 1975) presents for pre-operative evaluation assessment as requested by Dr. Munira Goddard. She requires evaluation and anesthesia risk assessment prior to undergoing surgery/procedure for treatment of dysmenorrhea.    Proposed Surgery/ Procedure: COMBINED DILATION AND CURETTAGE, OPERATIVE HYSTEROSCOPY WITH MORCELLATOR  Date of Surgery/ Procedure: 2018  Time of Surgery/ Procedure: 10:00AM  Hospital/Surgical Facility: [facility]  Primary Physician: Sujatha Maher  Type of Anesthesia Anticipated: General    Patient has a Health Care Directive or Living Will:  NO    1. NO - Do you have a history of heart attack, stroke, stent, bypass or surgery on an artery in the head, neck, heart or legs?  2. NO - Do you ever have any pain or discomfort in your chest?  3. NO - Do you have a history of  Heart Failure?  4. NO - Are you troubled by shortness of breath when: walking on the level, up a slight hill or at night?  5. NO - Do you currently have a cold, bronchitis or other respiratory infection?  6. NO - Do you have a cough, shortness of breath or wheezing?  7. NO - Do you sometimes get pains in the calves of your legs when you walk?  8. NO - Do you or anyone in your family have previous history of blood clots?  9. NO - Do you or does anyone in your family have a serious bleeding problem such as prolonged bleeding following surgeries or cuts?  10. NO - Have you ever had problems with anemia or been told to take iron pills?  11. NO - Have you had any abnormal blood loss such as black, tarry or bloody stools, or abnormal vaginal bleeding?  12. NO - Have you ever had a blood transfusion?  13. NO - Have you or any of your relatives ever had problems with anesthesia?  14. NO - Do you have sleep apnea, excessive snoring or daytime  drowsiness?  15. NO - Do you have any prosthetic heart valves?  16. NO - Do you have prosthetic joints?  17. NO - Is there any chance that you may be pregnant?    Lina Godoy CMA  HPI:     HPI related to upcoming procedure: dysmenorrhea and bleeding       See problem list for active medical problems.  Problems all longstanding and stable, except as noted/documented.  See ROS for pertinent symptoms related to these conditions.                                                                                                                                                          .    MEDICAL HISTORY:     Patient Active Problem List    Diagnosis Date Noted     Cervical high risk HPV (human papillomavirus) test positive 11/04/2015     Priority: Medium     10/28/2015:Pap--NIL, +HR HPV (NOT type 16 or 18). Plan to repeat Pap + HPV cotesting in 1 year. Reminder placed in TRACKING  9/27/16:Pap--NIL, neg HPV. Plan to repeat Pap + HPV cotesting in 3 years (2019).         Menstrual migraine 12/02/2014     Priority: Medium     Menorrhagia 12/02/2014     Priority: Medium     Tension headache, chronic 03/22/2013     Priority: Medium     CARDIOVASCULAR SCREENING; LDL GOAL LESS THAN 160 10/31/2010     Priority: Medium     Esophageal reflux 06/07/2004     Priority: Medium      Past Medical History:   Diagnosis Date     Bilateral bunions      Cervical high risk HPV (human papillomavirus) test positive 11/4/2015     Female infertility 7/10/2008    Undergoing ivf 2008.      Kidney stones 11/30/2010    [facility] 11/24/10      Personal history of gestational diabetes 7/11/2012    diet controlled     Plantar fasciitis     s/p surgery     Past Surgical History:   Procedure Laterality Date     BUNIONECTOMY RT/LT Bilateral 11/2009     CYSTOSCOPY, RETROGRADES, INSERT STENT URETER(S), COMBINED  9/19/2013    Procedure: COMBINED CYSTOSCOPY, RETROGRADES, INSERT STENT URETER(S);  Right Ureteral Stent Placement;  Surgeon: JUN Villar MD;   Location: [facility] OR     DILATION AND CURETTAGE, HYSTEROSCOPY, ABLATE ENDOMETRIUM THERMACHOICE, COMBINED N/A 4/15/2015    Procedure: COMBINED DILATION AND CURETTAGE, HYSTEROSCOPY, ABLATE ENDOMETRIUM THERMACHOICE;  Surgeon: Munira Goddard MD;  Location: [facility] OR     EXTRACORPOREAL SHOCK WAVE LITHOTRIPSY (ESWL)  9/18/2013    Procedure: EXTRACORPOREAL SHOCK WAVE LITHOTRIPSY (ESWL);  Right Extracorporeal Shock Wave Lithotripsy;  Surgeon: JUN Villar MD;  Location: [facility] OR     FOOT SURGERY      for plantar fasciitis     HC TOOTH EXTRACTION W/FORCEP      wisdom teeth     LAPAROSCOPY DIAGNOSTIC (GYN)  2007    infertility     LASER HOLMIUM LITHOTRIPSY URETER(S), INSERT STENT, COMBINED  9/27/2013    Procedure: COMBINED CYSTOSCOPY, URETEROSCOPY, LASER HOLMIUM LITHOTRIPSY URETER(S), INSERT STENT;  Right Ureteroscopic Stone Extraction and Possible Stent Placement;  Surgeon: JUN Villar MD;  Location: [facility] OR     TONSILLECTOMY  1983     Current Outpatient Prescriptions   Medication Sig Dispense Refill     Acetaminophen (TYLENOL PO) Take 500 mg by mouth every 6 hours as needed       fluconazole (DIFLUCAN) 150 MG tablet Take 1 tablet (150 mg) by mouth every 3 days 4 tablet 0     Ibuprofen (ADVIL PO) Take by mouth every 6 hours as needed       lidocaine-prilocaine (EMLA) cream Apply topically as needed for moderate pain 30 g 1     norethindrone-ethinyl estradiol (MICROGESTIN 1/20) 1-20 MG-MCG per tablet Take 1 tablet by mouth daily 63 tablet 0     nystatin (MYCOSTATIN) cream 3 times daily  1     rizatriptan (MAXALT-MLT) 5 MG ODT tab Take 1-2 tablets (5-10 mg) by mouth at onset of headache for migraine May repeat dose in 2 hours.  Do not exceed 30 mg in 24 hours 18 tablet 1     tiZANidine (ZANAFLEX) 4 MG tablet Take 1.5 tablets (6 mg) by mouth 3 times daily as needed for muscle spasms 135 tablet 11     topiramate (TOPAMAX) 50 MG tablet TAKE 1 TABLET (50 MG) BY MOUTH 2 TIMES DAILY 180 tablet 3     OTC products: None, except as noted  "above    Allergies   Allergen Reactions     Penicillins Rash      Latex Allergy: NO    Social History   Substance Use Topics     Smoking status: Never Smoker     Smokeless tobacco: Never Used     Alcohol use No     History   Drug Use No       REVIEW OF SYSTEMS:   Constitutional, neuro, ENT, endocrine, pulmonary, cardiac, gastrointestinal, genitourinary, musculoskeletal, integument and psychiatric systems are negative, except as otherwise noted.    EXAM:   /86  Pulse 65  Temp 99.3  F (37.4  C) (Tympanic)  Ht 5' 6\" (1.676 m)  Wt 187 lb 4.8 oz (85 kg)  BMI 30.23 kg/m2    GENERAL APPEARANCE: healthy, alert and no distress     HENT: ear canals and TM's normal and nose and mouth without ulcers or lesions     NECK: no adenopathy, no asymmetry, masses, or scars and thyroid normal to palpation     RESP: lungs clear to auscultation - no rales, rhonchi or wheezes     CV: regular rates and rhythm, normal S1 S2, no S3 or S4 and no murmur, click or rub     ABDOMEN:  soft, nontender, no HSM or masses and bowel sounds normal     MS: extremities normal- no gross deformities noted, no evidence of inflammation in joints, FROM in all extremities.     SKIN: no suspicious lesions or rashes     NEURO: Normal strength and tone, sensory exam grossly normal, mentation intact and speech normal     PSYCH: mentation appears normal. and affect normal/bright     LYMPHATICS: No cervical adenopathy    DIAGNOSTICS:     Labs Resulted Today:   Results for orders placed or performed in visit on 10/30/18   CBC with platelets differential   Result Value Ref Range    WBC 9.1 4.0 - 11.0 10e9/L    RBC Count 4.41 3.8 - 5.2 10e12/L    Hemoglobin 13.8 11.7 - 15.7 g/dL    Hematocrit 41.5 35.0 - 47.0 %    MCV 94 78 - 100 fl    MCH 31.3 26.5 - 33.0 pg    MCHC 33.3 31.5 - 36.5 g/dL    RDW 12.5 10.0 - 15.0 %    Platelet Count 355 150 - 450 10e9/L    % Neutrophils 57.8 %    % Lymphocytes 28.0 %    % Monocytes 11.1 %    % Eosinophils 2.8 %    % Basophils " 0.3 %    Absolute Neutrophil 5.3 1.6 - 8.3 10e9/L    Absolute Lymphocytes 2.6 0.8 - 5.3 10e9/L    Absolute Monocytes 1.0 0.0 - 1.3 10e9/L    Absolute Eosinophils 0.3 0.0 - 0.7 10e9/L    Absolute Basophils 0.0 0.0 - 0.2 10e9/L    Diff Method Automated Method        Recent Labs   Lab Test  10/25/17   0857  08/24/17   0901  04/24/17   1637   07/11/12   1008   HGB  13.0   --   13.1   < >   --    PLT  270   --   265   < >   --    NA  141  138   --    < >   --    POTASSIUM  3.8  3.9   --    < >   --    CR  0.79  0.80   --    < >   --    A1C   --    --    --    --   5.5    < > = values in this interval not displayed.        IMPRESSION:   Reason for surgery/procedure: dysmenorrhea     The proposed surgical procedure is considered LOW risk.    REVISED CARDIAC RISK INDEX  The patient has the following serious cardiovascular risks for perioperative complications such as (MI, PE, VFib and 3  AV Block):  No serious cardiac risks  INTERPRETATION: 0 risks: Class I (very low risk - 0.4% complication rate)    The patient has the following additional risks for perioperative complications:  No identified additional risks      ICD-10-CM    1. Preop general physical exam Z01.818 CBC with platelets differential   2. Dysmenorrhea N94.6        RECOMMENDATIONS:         --Patient is to take all scheduled medications on the day of surgery EXCEPT for modifications listed below.    APPROVAL GIVEN to proceed with proposed procedure, without further diagnostic evaluation       Signed Electronically by: Sujatha Maher MD    Copy of this evaluation report is provided to requesting physician.    [place] Preop Guidelines    Revised Cardiac Risk Index

## 2018-11-06 ENCOUNTER — ANESTHESIA EVENT (OUTPATIENT)
Dept: SURGERY | Facility: CLINIC | Age: 43
End: 2018-11-06
Payer: COMMERCIAL

## 2018-11-07 ENCOUNTER — ANESTHESIA (OUTPATIENT)
Dept: SURGERY | Facility: CLINIC | Age: 43
End: 2018-11-07
Payer: COMMERCIAL

## 2018-11-07 ENCOUNTER — SURGERY (OUTPATIENT)
Age: 43
End: 2018-11-07

## 2018-11-07 ENCOUNTER — HOSPITAL ENCOUNTER (OUTPATIENT)
Facility: CLINIC | Age: 43
Discharge: HOME | End: 2018-11-07
Attending: OBSTETRICS & GYNECOLOGY | Admitting: OBSTETRICS & GYNECOLOGY
Payer: COMMERCIAL

## 2018-11-07 VITALS
BODY MASS INDEX: 30.05 KG/M2 | OXYGEN SATURATION: 97 % | WEIGHT: 187 LBS | HEART RATE: 74 BPM | HEIGHT: 66 IN | RESPIRATION RATE: 20 BRPM | DIASTOLIC BLOOD PRESSURE: 74 MMHG | TEMPERATURE: 99.1 F | SYSTOLIC BLOOD PRESSURE: 132 MMHG

## 2018-11-07 DIAGNOSIS — N94.6 DYSMENORRHEA: Primary | ICD-10-CM

## 2018-11-07 DIAGNOSIS — N85.6 ASHERMAN'S SYNDROME: ICD-10-CM

## 2018-11-07 LAB — HCG UR QL: NEGATIVE

## 2018-11-07 PROCEDURE — 25000128 H RX IP 250 OP 636: Performed by: OBSTETRICS & GYNECOLOGY

## 2018-11-07 PROCEDURE — 25000128 H RX IP 250 OP 636: Performed by: NURSE ANESTHETIST, CERTIFIED REGISTERED

## 2018-11-07 PROCEDURE — 37000008 ZZH ANESTHESIA TECHNICAL FEE, 1ST 30 MIN: Performed by: OBSTETRICS & GYNECOLOGY

## 2018-11-07 PROCEDURE — 37000009 ZZH ANESTHESIA TECHNICAL FEE, EACH ADDTL 15 MIN: Performed by: OBSTETRICS & GYNECOLOGY

## 2018-11-07 PROCEDURE — 40000305 ZZH STATISTIC PRE PROC ASSESS I: Performed by: OBSTETRICS & GYNECOLOGY

## 2018-11-07 PROCEDURE — 25000125 ZZHC RX 250: Performed by: OBSTETRICS & GYNECOLOGY

## 2018-11-07 PROCEDURE — 27210794 ZZH OR GENERAL SUPPLY STERILE: Performed by: OBSTETRICS & GYNECOLOGY

## 2018-11-07 PROCEDURE — 25000125 ZZHC RX 250: Performed by: NURSE ANESTHETIST, CERTIFIED REGISTERED

## 2018-11-07 PROCEDURE — 71000027 ZZH RECOVERY PHASE 2 EACH 15 MINS: Performed by: OBSTETRICS & GYNECOLOGY

## 2018-11-07 PROCEDURE — 81025 URINE PREGNANCY TEST: CPT | Performed by: NURSE ANESTHETIST, CERTIFIED REGISTERED

## 2018-11-07 PROCEDURE — 88305 TISSUE EXAM BY PATHOLOGIST: CPT | Performed by: OBSTETRICS & GYNECOLOGY

## 2018-11-07 PROCEDURE — 36000056 ZZH SURGERY LEVEL 3 1ST 30 MIN: Performed by: OBSTETRICS & GYNECOLOGY

## 2018-11-07 PROCEDURE — 36000058 ZZH SURGERY LEVEL 3 EA 15 ADDTL MIN: Performed by: OBSTETRICS & GYNECOLOGY

## 2018-11-07 PROCEDURE — 88305 TISSUE EXAM BY PATHOLOGIST: CPT | Mod: 26 | Performed by: OBSTETRICS & GYNECOLOGY

## 2018-11-07 PROCEDURE — 25000132 ZZH RX MED GY IP 250 OP 250 PS 637: Performed by: OBSTETRICS & GYNECOLOGY

## 2018-11-07 RX ORDER — ACETAMINOPHEN 325 MG/1
975 TABLET ORAL ONCE
Status: COMPLETED | OUTPATIENT
Start: 2018-11-07 | End: 2018-11-07

## 2018-11-07 RX ORDER — SODIUM CHLORIDE, SODIUM LACTATE, POTASSIUM CHLORIDE, CALCIUM CHLORIDE 600; 310; 30; 20 MG/100ML; MG/100ML; MG/100ML; MG/100ML
INJECTION, SOLUTION INTRAVENOUS CONTINUOUS
Status: DISCONTINUED | OUTPATIENT
Start: 2018-11-07 | End: 2018-11-07 | Stop reason: HOSPADM

## 2018-11-07 RX ORDER — DEXAMETHASONE SODIUM PHOSPHATE 4 MG/ML
INJECTION, SOLUTION INTRA-ARTICULAR; INTRALESIONAL; INTRAMUSCULAR; INTRAVENOUS; SOFT TISSUE PRN
Status: DISCONTINUED | OUTPATIENT
Start: 2018-11-07 | End: 2018-11-07

## 2018-11-07 RX ORDER — LIDOCAINE HYDROCHLORIDE 10 MG/ML
INJECTION, SOLUTION INFILTRATION; PERINEURAL PRN
Status: DISCONTINUED | OUTPATIENT
Start: 2018-11-07 | End: 2018-11-07 | Stop reason: HOSPADM

## 2018-11-07 RX ORDER — HYDROXYZINE HYDROCHLORIDE 50 MG/1
50 TABLET, FILM COATED ORAL EVERY 6 HOURS PRN
Status: CANCELLED | OUTPATIENT
Start: 2018-11-07

## 2018-11-07 RX ORDER — ACETAMINOPHEN 325 MG/1
650 TABLET ORAL EVERY 4 HOURS PRN
Qty: 50 TABLET | Refills: 0 | COMMUNITY
Start: 2018-11-07 | End: 2020-08-31

## 2018-11-07 RX ORDER — LIDOCAINE 40 MG/G
CREAM TOPICAL
Status: DISCONTINUED | OUTPATIENT
Start: 2018-11-07 | End: 2018-11-07 | Stop reason: HOSPADM

## 2018-11-07 RX ORDER — OXYCODONE HYDROCHLORIDE 5 MG/1
5-10 TABLET ORAL EVERY 4 HOURS PRN
Qty: 5 TABLET | Refills: 0 | Status: SHIPPED | OUTPATIENT
Start: 2018-11-07 | End: 2018-12-04

## 2018-11-07 RX ORDER — LIDOCAINE HYDROCHLORIDE 10 MG/ML
INJECTION, SOLUTION INFILTRATION; PERINEURAL PRN
Status: DISCONTINUED | OUTPATIENT
Start: 2018-11-07 | End: 2018-11-07

## 2018-11-07 RX ORDER — HYDROXYZINE HYDROCHLORIDE 25 MG/1
25 TABLET, FILM COATED ORAL EVERY 6 HOURS PRN
Status: CANCELLED | OUTPATIENT
Start: 2018-11-07

## 2018-11-07 RX ORDER — IBUPROFEN 600 MG/1
600 TABLET, FILM COATED ORAL EVERY 6 HOURS PRN
Qty: 30 TABLET | Refills: 0 | Status: SHIPPED | OUTPATIENT
Start: 2018-11-07 | End: 2020-08-31

## 2018-11-07 RX ORDER — ONDANSETRON 4 MG/1
4 TABLET, ORALLY DISINTEGRATING ORAL EVERY 30 MIN PRN
Status: CANCELLED | OUTPATIENT
Start: 2018-11-07

## 2018-11-07 RX ORDER — IBUPROFEN 600 MG/1
600 TABLET, FILM COATED ORAL
Status: CANCELLED | OUTPATIENT
Start: 2018-11-07

## 2018-11-07 RX ORDER — FENTANYL CITRATE 50 UG/ML
INJECTION, SOLUTION INTRAMUSCULAR; INTRAVENOUS PRN
Status: DISCONTINUED | OUTPATIENT
Start: 2018-11-07 | End: 2018-11-07

## 2018-11-07 RX ORDER — AMOXICILLIN 250 MG
1-2 CAPSULE ORAL 2 TIMES DAILY PRN
Qty: 30 TABLET | Refills: 0 | COMMUNITY
Start: 2018-11-07 | End: 2018-12-04

## 2018-11-07 RX ORDER — DEXAMETHASONE SODIUM PHOSPHATE 4 MG/ML
4 INJECTION, SOLUTION INTRA-ARTICULAR; INTRALESIONAL; INTRAMUSCULAR; INTRAVENOUS; SOFT TISSUE EVERY 10 MIN PRN
Status: CANCELLED | OUTPATIENT
Start: 2018-11-07

## 2018-11-07 RX ORDER — MEPERIDINE HYDROCHLORIDE 25 MG/ML
12.5 INJECTION INTRAMUSCULAR; INTRAVENOUS; SUBCUTANEOUS
Status: CANCELLED | OUTPATIENT
Start: 2018-11-07

## 2018-11-07 RX ORDER — PROPOFOL 10 MG/ML
INJECTION, EMULSION INTRAVENOUS CONTINUOUS PRN
Status: DISCONTINUED | OUTPATIENT
Start: 2018-11-07 | End: 2018-11-07

## 2018-11-07 RX ORDER — SODIUM CHLORIDE, SODIUM LACTATE, POTASSIUM CHLORIDE, CALCIUM CHLORIDE 600; 310; 30; 20 MG/100ML; MG/100ML; MG/100ML; MG/100ML
INJECTION, SOLUTION INTRAVENOUS CONTINUOUS
Status: CANCELLED | OUTPATIENT
Start: 2018-11-07

## 2018-11-07 RX ORDER — HYDROXYZINE HYDROCHLORIDE 25 MG/1
25 TABLET, FILM COATED ORAL
Status: CANCELLED | OUTPATIENT
Start: 2018-11-07

## 2018-11-07 RX ORDER — ONDANSETRON 4 MG/1
4 TABLET, ORALLY DISINTEGRATING ORAL
Status: CANCELLED | OUTPATIENT
Start: 2018-11-07

## 2018-11-07 RX ORDER — NALOXONE HYDROCHLORIDE 0.4 MG/ML
.1-.4 INJECTION, SOLUTION INTRAMUSCULAR; INTRAVENOUS; SUBCUTANEOUS
Status: CANCELLED | OUTPATIENT
Start: 2018-11-07 | End: 2018-11-08

## 2018-11-07 RX ORDER — ONDANSETRON 2 MG/ML
4 INJECTION INTRAMUSCULAR; INTRAVENOUS EVERY 30 MIN PRN
Status: CANCELLED | OUTPATIENT
Start: 2018-11-07

## 2018-11-07 RX ORDER — KETOROLAC TROMETHAMINE 30 MG/ML
30 INJECTION, SOLUTION INTRAMUSCULAR; INTRAVENOUS ONCE
Status: COMPLETED | OUTPATIENT
Start: 2018-11-07 | End: 2018-11-07

## 2018-11-07 RX ORDER — FENTANYL CITRATE 50 UG/ML
25-50 INJECTION, SOLUTION INTRAMUSCULAR; INTRAVENOUS
Status: CANCELLED | OUTPATIENT
Start: 2018-11-07

## 2018-11-07 RX ORDER — OXYCODONE HYDROCHLORIDE 5 MG/1
5 TABLET ORAL
Status: CANCELLED | OUTPATIENT
Start: 2018-11-07

## 2018-11-07 RX ADMIN — MIDAZOLAM HYDROCHLORIDE 5 MG: 1 INJECTION, SOLUTION INTRAMUSCULAR; INTRAVENOUS at 10:12

## 2018-11-07 RX ADMIN — LIDOCAINE HYDROCHLORIDE 0.1 ML: 10 INJECTION, SOLUTION EPIDURAL; INFILTRATION; INTRACAUDAL; PERINEURAL at 09:39

## 2018-11-07 RX ADMIN — SODIUM CHLORIDE, POTASSIUM CHLORIDE, SODIUM LACTATE AND CALCIUM CHLORIDE: 600; 310; 30; 20 INJECTION, SOLUTION INTRAVENOUS at 09:39

## 2018-11-07 RX ADMIN — DEXAMETHASONE SODIUM PHOSPHATE 4 MG: 4 INJECTION, SOLUTION INTRA-ARTICULAR; INTRALESIONAL; INTRAMUSCULAR; INTRAVENOUS; SOFT TISSUE at 10:20

## 2018-11-07 RX ADMIN — KETOROLAC TROMETHAMINE 30 MG: 30 INJECTION, SOLUTION INTRAMUSCULAR at 09:38

## 2018-11-07 RX ADMIN — LIDOCAINE HYDROCHLORIDE 50 MG: 10 INJECTION, SOLUTION INFILTRATION; PERINEURAL at 10:10

## 2018-11-07 RX ADMIN — FENTANYL CITRATE 100 MCG: 50 INJECTION, SOLUTION INTRAMUSCULAR; INTRAVENOUS at 10:10

## 2018-11-07 RX ADMIN — PROPOFOL 200 MCG/KG/MIN: 10 INJECTION, EMULSION INTRAVENOUS at 10:10

## 2018-11-07 RX ADMIN — LIDOCAINE HYDROCHLORIDE 10 ML: 10 INJECTION, SOLUTION INFILTRATION; PERINEURAL at 10:22

## 2018-11-07 RX ADMIN — ACETAMINOPHEN 975 MG: 325 TABLET, FILM COATED ORAL at 09:29

## 2018-11-07 NOTE — IP AVS SNAPSHOT
MRN:[id number]                      After Visit Summary   11/7/2018    Caitlin Oh    MRN: [id number]           Thank you!     Thank you for choosing [facility] for your care. Our goal is always to provide you with excellent care. Hearing back from our patients is one way we can continue to improve our services. Please take a few minutes to complete the written survey that you may receive in the mail after you visit with us. Thank you!        Patient Information     Date Of Birth          1975        About your hospital stay     You were admitted on:  November 7, 2018 You last received care in the:  [facility] PreOP/Phase II    You were discharged on:  November 7, 2018       Who to Call     For medical emergencies, please call 911.  For non-urgent questions about your medical care, please call your primary care provider or clinic, [phone]  For questions related to your surgery, please call your surgery clinic        Attending Provider     Provider Specialty    Munira Goddard MD OB/Gyn       Primary Care Provider Office Phone # Fax #    Sujatha Maher [phone]      After Care Instructions     Discharge Instructions       Pelvic Rest. No tampons, douching or intercourse for  1-2  weeks.            Discharge Instructions       Patient to arrange follow up appointment in 2  Weeks as needed                  Further instructions from your care team                           Same Day Surgery Discharge Instructions  Special Precautions After Surgery - Adult    1. It is not unusual to feel lightheaded or faint, up to 24 hours after surgery or while taking pain medication.  If you have these symptoms; sit for a few minutes before standing and have someone assist you when getting up.  2. You should rest and relax for the next 24 hours and must have someone stay with you for at least 24 hours after your discharge.  3. DO NOT DRIVE any vehicle or operate mechanical  equipment for 24 hours following the end of your surgery.  DO NOT DRIVE while taking narcotic pain medications that have been prescribed by your physician.  If you had a limb operated on, you must be able to use it fully to drive.  4. DO NOT drink alcoholic beverages for 24 hours following surgery or while taking prescription pain medication.  5. Drink clear liquids (apple juice, ginger ale, broth, 7-Up, etc.).  Progress to your regular diet as you feel able.  6. Any questions call your physician and do not make important decisions for 24 hours.    ACTIVITY  ? Per surgeon     INCISIONAL CARE  ? Call if saturating more than one pad per hour for 2 consecutive hours or passing large blood clots  ? Fever >100.5  ? Malodorous vaginal discharge  ? May use ice or heat for cramping     Call for an appointment to return to the clinic in 2 weeks if needed    Medications:  ? Acetaminophen as directed for mild pain and cramping  ? Ibuprofen as directed for mild pain and cramping. May use with Acetaminophen  ? Oxycodone 5mg tabs as directed for moderate/severe pain and cramping\  ? Stool softener to avoid constipation       __________________________________________________________________________________________________________________________________  IMPORTANT NUMBERS:    [facility] [place]  [phone], [phone]  Pharmacy:  [phone]  Same Day Surgery:  [phone], Monday - Friday until 8:30 p.m.  Urgent Care:  [phone]  Emergency Room:  [phone]      [facility]:  [phone]                                                                              OB Clinic:  [phone]           Pending Results     Date and Time Order Name Status Description    11/7/2018 1043 Surgical pathology exam In process             Admission Information     Date & Time Provider Department Dept. Phone    11/7/2018 Munira Goddard MD [facility] PreOP/Phase [phone]      Your Vitals Were     Blood Pressure  "Pulse Temperature Respirations Height Weight    132/74 74 99.1  F (37.3  C) (Oral) 20 1.676 m (5' 6\") 84.8 kg (187 lb)    Pulse Oximetry BMI (Body Mass Index)                97% 30.18 kg/m2          MyChart Information     [facility] gives you secure access to your electronic health record. If you see a primary care provider, you can also send messages to your care team and make appointments. If you have questions, please call your primary care clinic.  If you do not have a primary care provider, please call [phone] and they will assist you.        Care EveryWhere ID     This is your Care EveryWhere ID. This could be used by other organizations to access your [facility] medical records  [id number]        Equal Access to Services     MARKUS BALDWIN : Hima Knott, hansel pugh, maurice villaseñor, miesha garcia. So [facility] [phone].    ATENCIÓN: Si habla español, tiene a galdamez disposición servicios gratuitos de asistencia lingüística. Llame al [phone].    We comply with applicable federal civil rights laws and [place] laws. We do not discriminate on the basis of race, color, national origin, age, disability, sex, sexual orientation, or gender identity.               Review of your medicines      START taking        Dose / Directions    oxyCODONE IR 5 MG tablet   Commonly known as:  ROXICODONE   Used for:  Dysmenorrhea, Asherman's syndrome        Dose:  5-10 mg   Take 1-2 tablets (5-10 mg) by mouth every 4 hours as needed for moderate to severe pain   Quantity:  5 tablet   Refills:  0       senna-docusate 8.6-50 MG per tablet   Commonly known as:  SENOKOT-S;PERICOLACE   Used for:  Dysmenorrhea, Asherman's syndrome        Dose:  1-2 tablet   Take 1-2 tablets by mouth 2 times daily as needed for constipation (While on oral opioids.)   Quantity:  30 tablet   Refills:  0         CONTINUE these medicines which may have CHANGED, or have new prescriptions. If we are " uncertain of the size of tablets/capsules you have at home, strength may be listed as something that might have changed.        Dose / Directions    * ADVIL PO   This may have changed:  Another medication with the same name was added. Make sure you understand how and when to take each.        Take by mouth every 6 hours as needed   Refills:  0       * ibuprofen 600 MG tablet   Commonly known as:  ADVIL/MOTRIN   This may have changed:  You were already taking a medication with the same name, and this prescription was added. Make sure you understand how and when to take each.   Used for:  Dysmenorrhea, Asherman's syndrome        Dose:  600 mg   Take 1 tablet (600 mg) by mouth every 6 hours as needed for other (mild and/or inflammatory pain)   Quantity:  30 tablet   Refills:  0       * TYLENOL PO   This may have changed:  Another medication with the same name was added. Make sure you understand how and when to take each.        Dose:  500 mg   Take 500 mg by mouth every 6 hours as needed   Refills:  0       * acetaminophen 325 MG tablet   Commonly known as:  TYLENOL   This may have changed:  You were already taking a medication with the same name, and this prescription was added. Make sure you understand how and when to take each.   Used for:  Dysmenorrhea, Asherman's syndrome        Dose:  650 mg   Take 2 tablets (650 mg) by mouth every 4 hours as needed for mild pain   Quantity:  50 tablet   Refills:  0       * Notice:  This list has 4 medication(s) that are the same as other medications prescribed for you. Read the directions carefully, and ask your doctor or other care provider to review them with you.      CONTINUE these medicines which have NOT CHANGED        Dose / Directions    lidocaine-prilocaine cream   Commonly known as:  EMLA   Used for:  Myofascial muscle pain        Apply topically as needed for moderate pain   Quantity:  30 g   Refills:  1       nystatin cream   Commonly known as:  MYCOSTATIN        3  times daily   Refills:  1       rizatriptan 5 MG ODT tab   Commonly known as:  MAXALT-MLT   Used for:  Menstrual migraine without status migrainosus, not intractable        Dose:  5-10 mg   Take 1-2 tablets (5-10 mg) by mouth at onset of headache for migraine May repeat dose in 2 hours.  Do not exceed 30 mg in 24 hours   Quantity:  18 tablet   Refills:  1       tiZANidine 4 MG tablet   Commonly known as:  ZANAFLEX   Used for:  Muscle spasm of back        Dose:  6 mg   Take 1.5 tablets (6 mg) by mouth 3 times daily as needed for muscle spasms   Quantity:  135 tablet   Refills:  11       topiramate 50 MG tablet   Commonly known as:  TOPAMAX   Used for:  Menstrual migraine without status migrainosus, not intractable        TAKE 1 TABLET (50 MG) BY MOUTH 2 TIMES DAILY   Quantity:  180 tablet   Refills:  3         STOP taking     norethindrone-ethinyl estradiol 1-20 MG-MCG per tablet   Commonly known as:  MICROGESTIN 1/20                Where to get your medicines      These medications were sent to [facility] [address]  [address] [address]     Phone:  [phone]     ibuprofen 600 MG tablet         Some of these will need a paper prescription and others can be bought over the counter. Ask your nurse if you have questions.     Bring a paper prescription for each of these medications     oxyCODONE IR 5 MG tablet       You don't need a prescription for these medications     acetaminophen 325 MG tablet    senna-docusate 8.6-50 MG per tablet                Protect others around you: Learn how to safely use, store and throw away your medicines at www.disposemymeds.org.        Information about OPIOIDS     PRESCRIPTION OPIOIDS: WHAT YOU NEED TO KNOW   We gave you an opioid (narcotic) pain medicine. It is important to manage your pain, but opioids are not always the best choice. You should first try all the other options your care team gave you. Take this medicine for as  short a time (and as few doses) as possible.    Some activities can increase your pain, such as bandage changes or therapy sessions. It may help to take your pain medicine 30 to 60 minutes before these activities. Reduce your stress by getting enough sleep, working on hobbies you enjoy and practicing relaxation or meditation. Talk to your care team about ways to manage your pain beyond prescription opioids.    These medicines have risks:    DO NOT drive when on new or higher doses of pain medicine. These medicines can affect your alertness and reaction times, and you could be arrested for driving under the influence (DUI). If you need to use opioids long-term, talk to your care team about driving.    DO NOT operate heavy machinery    DO NOT do any other dangerous activities while taking these medicines.    DO NOT drink any alcohol while taking these medicines.     If the opioid prescribed includes acetaminophen, DO NOT take with any other medicines that contain acetaminophen. Read all labels carefully. Look for the word  acetaminophen  or  Tylenol.  Ask your pharmacist if you have questions or are unsure.    You can get addicted to pain medicines, especially if you have a history of addiction (chemical, alcohol or substance dependence). Talk to your care team about ways to reduce this risk.    All opioids tend to cause constipation. Drink plenty of water and eat foods that have a lot of fiber, such as fruits, vegetables, prune juice, apple juice and high-fiber cereal. Take a laxative (Miralax, milk of magnesia, Colace, Senna) if you don t move your bowels at least every other day. Other side effects include upset stomach, sleepiness, dizziness, throwing up, tolerance (needing more of the medicine to have the same effect), physical dependence and slowed breathing.    Store your pills in a secure place, locked if possible. We will not replace any lost or stolen medicine. If you don t finish your medicine, please throw  away (dispose) as directed by your pharmacist. The [place] Pollution Control Agency has more information about safe disposal: https://www.pca.[facility].[place].us/living-green/managing-unwanted-medications             Medication List: This is a list of all your medications and when to take them. Check marks below indicate your daily home schedule. Keep this list as a reference.      Medications           Morning Afternoon Evening Bedtime As Needed    * ADVIL PO   Take by mouth every 6 hours as needed                                * ibuprofen 600 MG tablet   Commonly known as:  ADVIL/MOTRIN   Take 1 tablet (600 mg) by mouth every 6 hours as needed for other (mild and/or inflammatory pain)                                lidocaine-prilocaine cream   Commonly known as:  EMLA   Apply topically as needed for moderate pain                                nystatin cream   Commonly known as:  MYCOSTATIN   3 times daily                                oxyCODONE IR 5 MG tablet   Commonly known as:  ROXICODONE   Take 1-2 tablets (5-10 mg) by mouth every 4 hours as needed for moderate to severe pain                                rizatriptan 5 MG ODT tab   Commonly known as:  MAXALT-MLT   Take 1-2 tablets (5-10 mg) by mouth at onset of headache for migraine May repeat dose in 2 hours.  Do not exceed 30 mg in 24 hours                                senna-docusate 8.6-50 MG per tablet   Commonly known as:  SENOKOT-S;PERICOLACE   Take 1-2 tablets by mouth 2 times daily as needed for constipation (While on oral opioids.)                                tiZANidine 4 MG tablet   Commonly known as:  ZANAFLEX   Take 1.5 tablets (6 mg) by mouth 3 times daily as needed for muscle spasms                                topiramate 50 MG tablet   Commonly known as:  TOPAMAX   TAKE 1 TABLET (50 MG) BY MOUTH 2 TIMES DAILY                                * TYLENOL PO   Take 500 mg by mouth every 6 hours as needed   Last time this was given:  975 mg on  11/7/2018  9:29 AM                                * acetaminophen 325 MG tablet   Commonly known as:  TYLENOL   Take 2 tablets (650 mg) by mouth every 4 hours as needed for mild pain   Last time this was given:  975 mg on 11/7/2018  9:29 AM                                * Notice:  This list has 4 medication(s) that are the same as other medications prescribed for you. Read the directions carefully, and ask your doctor or other care provider to review them with you.

## 2018-11-07 NOTE — ANESTHESIA POSTPROCEDURE EVALUATION
Patient: Caitlin Oh    Procedure(s):  COMBINED DILATION AND CURETTAGE, OPERATIVE HYSTEROSCOPY WITH MORCELLATOR    Diagnosis:worsening dysmenorrhea after endometrial ablation  Diagnosis Additional Information: No value filed.    Anesthesia Type:  MAC    Note:  Anesthesia Post Evaluation    Patient location during evaluation: Phase 2  Patient participation: Able to fully participate in evaluation  Level of consciousness: awake and alert  Pain management: adequate  Airway patency: patent  Cardiovascular status: acceptable  Respiratory status: acceptable  Hydration status: acceptable  PONV: none     Anesthetic complications: None          Last vitals:  Vitals:    11/07/18 0914   BP: 130/80   Resp: 16   Temp: 37.3  C (99.1  F)   SpO2: 98%         Electronically Signed By: BLADIMIR Zurita CRNA  November 7, 2018  10:59 AM

## 2018-11-07 NOTE — ANESTHESIA PREPROCEDURE EVALUATION
Anesthesia Evaluation     .             ROS/MED HX    ENT/Pulmonary:       Neurologic:     (+)migraines,     Cardiovascular:     (+) Dyslipidemia, ----. : . . . :. .       METS/Exercise Tolerance:     Hematologic:         Musculoskeletal:   (+) , , other musculoskeletal- bilareal bunions      GI/Hepatic:     (+) GERD       Renal/Genitourinary:     (+) Nephrolithiasis ,       Endo:         Psychiatric:         Infectious Disease: Comment: HPV positive        Malignancy:         Other:                     Physical Exam  Normal systems: cardiovascular, pulmonary and dental    Airway   Mallampati: II  TM distance: >3 FB  Neck ROM: full    Dental     Cardiovascular       Pulmonary                     Anesthesia Plan      History & Physical Review  History and physical reviewed and following examination; no interval change.    ASA Status:  2 .    NPO Status:  > 6 hours    Plan for MAC Reason for MAC:  Deep or markedly invasive procedure (G8)  PONV prophylaxis:  Ondansetron (or other 5HT-3) and Dexamethasone or Solumedrol       Postoperative Care      Consents  Anesthetic plan, risks, benefits and alternatives discussed with:  Patient..                          .

## 2018-11-07 NOTE — OP NOTE
Op Note    Preop Dx: 1. Worsening dysmenorrhea after endometrial ablation    Postop DX: 1. Worsening dysmenorrhea after endometrial ablation    Procedure: Dilation and curettage hysteroscopy     Surgeon: Munira Goddard M.D.     Assist: n/a    Anesthesia: local MAC    FRA Score: 2    EBL: minimal    IVF: see anesthesia records    UOP: see anesthesia records    Complications: none    Findings: dense intrauterine adhesions with poor visualization    Indications: Caitlin is a 43 year old  who had a D&C hysteroscopy with ablation in .  Her bleeding improved, but in the last few months, she has been getting more cramps, which is unusual for her.  In the last month, the bleeding was a little heavier than it had been.  Her menses are still generally monthly.  She had previously tried oral contraceptive pills to suppress her menses, but it wasn't very effective (this was prior to her ablation).  We discussed the likelihood that intrauterine adhesions are possibly making her menses more painful due to trapped intrauterine fluid.  Decision was made to proceed with dilation and curettage hysteroscopy in an attempt to widen the cervical opening.  Risks and benefits were discussed including risks of bleeding, infection and damage to the uterus.  Patient desired to proceed.     Procedure:  Patient was brought to the operating room and monitored anesthesia care was provided without difficulty.  She was prepped and draped in the usual sterile fashion in the dorsal lithotomy position.  A bivalve speculum was placed in the vagina.  The anterior lip of the cervix was infiltrated with 2 cc of 2% lidocaine and grasped with a single-toothed tenaculum.  A paracervical block was performed using 4 cc of 2% lidocaine at the 4 and 7 o'clock positions.  Using an os finder, the cervical canal was dilated.  The hysteroscope was introduced, but it was unable to be passed into the cavity due to dense adhesions.  Once a deficit of 1000 ml  was reached, further attempts at direct dilation and visualization was discontinued.  All instruments were removed from the vagina and excellent hemostasis was noted.      Patient tolerated the procedure well.  Sponge, lap and needle counts are correct.  I was present for the entire procedure.  The patient was taken to her recovery room in stable condition.

## 2018-11-07 NOTE — DISCHARGE INSTRUCTIONS
Same Day Surgery Discharge Instructions  Special Precautions After Surgery - Adult    1. It is not unusual to feel lightheaded or faint, up to 24 hours after surgery or while taking pain medication.  If you have these symptoms; sit for a few minutes before standing and have someone assist you when getting up.  2. You should rest and relax for the next 24 hours and must have someone stay with you for at least 24 hours after your discharge.  3. DO NOT DRIVE any vehicle or operate mechanical equipment for 24 hours following the end of your surgery.  DO NOT DRIVE while taking narcotic pain medications that have been prescribed by your physician.  If you had a limb operated on, you must be able to use it fully to drive.  4. DO NOT drink alcoholic beverages for 24 hours following surgery or while taking prescription pain medication.  5. Drink clear liquids (apple juice, ginger ale, broth, 7-Up, etc.).  Progress to your regular diet as you feel able.  6. Any questions call your physician and do not make important decisions for 24 hours.    ACTIVITY  ? Per surgeon     INCISIONAL CARE  ? Call if saturating more than one pad per hour for 2 consecutive hours or passing large blood clots  ? Fever >100.5  ? Malodorous vaginal discharge  ? May use ice or heat for cramping     Call for an appointment to return to the clinic in 2 weeks if needed    Medications:  ? Acetaminophen as directed for mild pain and cramping  ? Ibuprofen as directed for mild pain and cramping. May use with Acetaminophen  ? Oxycodone 5mg tabs as directed for moderate/severe pain and cramping\  ? Stool softener to avoid constipation       __________________________________________________________________________________________________________________________________  IMPORTANT NUMBERS:    [facility] Main Number:  [phone], [phone]  Pharmacy:  [phone]  Same Day Surgery:  [phone], Monday - Friday until 8:30 p.m.  Urgent Care:   [phone]  Emergency Room:  [phone]      [facility]:  [phone]                                                                              [facility]:  [phone]

## 2018-11-07 NOTE — H&P (VIEW-ONLY)
[facility]  [address] [address]  [phone]  Dept: [phone]    PRE-OP EVALUATION:  Today's date: 10/30/2018    Caitlin Oh (: 1975) presents for pre-operative evaluation assessment as requested by Dr. Munira Goddard. She requires evaluation and anesthesia risk assessment prior to undergoing surgery/procedure for treatment of dysmenorrhea.    Proposed Surgery/ Procedure: COMBINED DILATION AND CURETTAGE, OPERATIVE HYSTEROSCOPY WITH MORCELLATOR  Date of Surgery/ Procedure: 2018  Time of Surgery/ Procedure: 10:00AM  Hospital/Surgical Facility: [facility]  Primary Physician: Sujatha Maher  Type of Anesthesia Anticipated: General    Patient has a Health Care Directive or Living Will:  NO    1. NO - Do you have a history of heart attack, stroke, stent, bypass or surgery on an artery in the head, neck, heart or legs?  2. NO - Do you ever have any pain or discomfort in your chest?  3. NO - Do you have a history of  Heart Failure?  4. NO - Are you troubled by shortness of breath when: walking on the level, up a slight hill or at night?  5. NO - Do you currently have a cold, bronchitis or other respiratory infection?  6. NO - Do you have a cough, shortness of breath or wheezing?  7. NO - Do you sometimes get pains in the calves of your legs when you walk?  8. NO - Do you or anyone in your family have previous history of blood clots?  9. NO - Do you or does anyone in your family have a serious bleeding problem such as prolonged bleeding following surgeries or cuts?  10. NO - Have you ever had problems with anemia or been told to take iron pills?  11. NO - Have you had any abnormal blood loss such as black, tarry or bloody stools, or abnormal vaginal bleeding?  12. NO - Have you ever had a blood transfusion?  13. NO - Have you or any of your relatives ever had problems with anesthesia?  14. NO - Do you have sleep apnea, excessive snoring or daytime  drowsiness?  15. NO - Do you have any prosthetic heart valves?  16. NO - Do you have prosthetic joints?  17. NO - Is there any chance that you may be pregnant?    Lina Godoy CMA  HPI:     HPI related to upcoming procedure: dysmenorrhea and bleeding       See problem list for active medical problems.  Problems all longstanding and stable, except as noted/documented.  See ROS for pertinent symptoms related to these conditions.                                                                                                                                                          .    MEDICAL HISTORY:     Patient Active Problem List    Diagnosis Date Noted     Cervical high risk HPV (human papillomavirus) test positive 11/04/2015     Priority: Medium     10/28/2015:Pap--NIL, +HR HPV (NOT type 16 or 18). Plan to repeat Pap + HPV cotesting in 1 year. Reminder placed in TRACKING  9/27/16:Pap--NIL, neg HPV. Plan to repeat Pap + HPV cotesting in 3 years (2019).         Menstrual migraine 12/02/2014     Priority: Medium     Menorrhagia 12/02/2014     Priority: Medium     Tension headache, chronic 03/22/2013     Priority: Medium     CARDIOVASCULAR SCREENING; LDL GOAL LESS THAN 160 10/31/2010     Priority: Medium     Esophageal reflux 06/07/2004     Priority: Medium      Past Medical History:   Diagnosis Date     Bilateral bunions      Cervical high risk HPV (human papillomavirus) test positive 11/4/2015     Female infertility 7/10/2008    Undergoing ivf 2008.      Kidney stones 11/30/2010    [facility] 11/24/10      Personal history of gestational diabetes 7/11/2012    diet controlled     Plantar fasciitis     s/p surgery     Past Surgical History:   Procedure Laterality Date     BUNIONECTOMY RT/LT Bilateral 11/2009     CYSTOSCOPY, RETROGRADES, INSERT STENT URETER(S), COMBINED  9/19/2013    Procedure: COMBINED CYSTOSCOPY, RETROGRADES, INSERT STENT URETER(S);  Right Ureteral Stent Placement;  Surgeon: JUN Villar MD;   Location: [facility] OR     DILATION AND CURETTAGE, HYSTEROSCOPY, ABLATE ENDOMETRIUM THERMACHOICE, COMBINED N/A 4/15/2015    Procedure: COMBINED DILATION AND CURETTAGE, HYSTEROSCOPY, ABLATE ENDOMETRIUM THERMACHOICE;  Surgeon: Munira Goddard MD;  Location: [facility] OR     EXTRACORPOREAL SHOCK WAVE LITHOTRIPSY (ESWL)  9/18/2013    Procedure: EXTRACORPOREAL SHOCK WAVE LITHOTRIPSY (ESWL);  Right Extracorporeal Shock Wave Lithotripsy;  Surgeon: JUN Villar MD;  Location: [facility] OR     FOOT SURGERY      for plantar fasciitis     HC TOOTH EXTRACTION W/FORCEP      wisdom teeth     LAPAROSCOPY DIAGNOSTIC (GYN)  2007    infertility     LASER HOLMIUM LITHOTRIPSY URETER(S), INSERT STENT, COMBINED  9/27/2013    Procedure: COMBINED CYSTOSCOPY, URETEROSCOPY, LASER HOLMIUM LITHOTRIPSY URETER(S), INSERT STENT;  Right Ureteroscopic Stone Extraction and Possible Stent Placement;  Surgeon: JUN Villar MD;  Location: [facility] OR     TONSILLECTOMY  1983     Current Outpatient Prescriptions   Medication Sig Dispense Refill     Acetaminophen (TYLENOL PO) Take 500 mg by mouth every 6 hours as needed       fluconazole (DIFLUCAN) 150 MG tablet Take 1 tablet (150 mg) by mouth every 3 days 4 tablet 0     Ibuprofen (ADVIL PO) Take by mouth every 6 hours as needed       lidocaine-prilocaine (EMLA) cream Apply topically as needed for moderate pain 30 g 1     norethindrone-ethinyl estradiol (MICROGESTIN 1/20) 1-20 MG-MCG per tablet Take 1 tablet by mouth daily 63 tablet 0     nystatin (MYCOSTATIN) cream 3 times daily  1     rizatriptan (MAXALT-MLT) 5 MG ODT tab Take 1-2 tablets (5-10 mg) by mouth at onset of headache for migraine May repeat dose in 2 hours.  Do not exceed 30 mg in 24 hours 18 tablet 1     tiZANidine (ZANAFLEX) 4 MG tablet Take 1.5 tablets (6 mg) by mouth 3 times daily as needed for muscle spasms 135 tablet 11     topiramate (TOPAMAX) 50 MG tablet TAKE 1 TABLET (50 MG) BY MOUTH 2 TIMES DAILY 180 tablet 3     OTC products: None, except as noted  "above    Allergies   Allergen Reactions     Penicillins Rash      Latex Allergy: NO    Social History   Substance Use Topics     Smoking status: Never Smoker     Smokeless tobacco: Never Used     Alcohol use No     History   Drug Use No       REVIEW OF SYSTEMS:   Constitutional, neuro, ENT, endocrine, pulmonary, cardiac, gastrointestinal, genitourinary, musculoskeletal, integument and psychiatric systems are negative, except as otherwise noted.    EXAM:   /86  Pulse 65  Temp 99.3  F (37.4  C) (Tympanic)  Ht 5' 6\" (1.676 m)  Wt 187 lb 4.8 oz (85 kg)  BMI 30.23 kg/m2    GENERAL APPEARANCE: healthy, alert and no distress     HENT: ear canals and TM's normal and nose and mouth without ulcers or lesions     NECK: no adenopathy, no asymmetry, masses, or scars and thyroid normal to palpation     RESP: lungs clear to auscultation - no rales, rhonchi or wheezes     CV: regular rates and rhythm, normal S1 S2, no S3 or S4 and no murmur, click or rub     ABDOMEN:  soft, nontender, no HSM or masses and bowel sounds normal     MS: extremities normal- no gross deformities noted, no evidence of inflammation in joints, FROM in all extremities.     SKIN: no suspicious lesions or rashes     NEURO: Normal strength and tone, sensory exam grossly normal, mentation intact and speech normal     PSYCH: mentation appears normal. and affect normal/bright     LYMPHATICS: No cervical adenopathy    DIAGNOSTICS:     Labs Resulted Today:   Results for orders placed or performed in visit on 10/30/18   CBC with platelets differential   Result Value Ref Range    WBC 9.1 4.0 - 11.0 10e9/L    RBC Count 4.41 3.8 - 5.2 10e12/L    Hemoglobin 13.8 11.7 - 15.7 g/dL    Hematocrit 41.5 35.0 - 47.0 %    MCV 94 78 - 100 fl    MCH 31.3 26.5 - 33.0 pg    MCHC 33.3 31.5 - 36.5 g/dL    RDW 12.5 10.0 - 15.0 %    Platelet Count 355 150 - 450 10e9/L    % Neutrophils 57.8 %    % Lymphocytes 28.0 %    % Monocytes 11.1 %    % Eosinophils 2.8 %    % Basophils " 0.3 %    Absolute Neutrophil 5.3 1.6 - 8.3 10e9/L    Absolute Lymphocytes 2.6 0.8 - 5.3 10e9/L    Absolute Monocytes 1.0 0.0 - 1.3 10e9/L    Absolute Eosinophils 0.3 0.0 - 0.7 10e9/L    Absolute Basophils 0.0 0.0 - 0.2 10e9/L    Diff Method Automated Method        Recent Labs   Lab Test  10/25/17   0857  08/24/17   0901  04/24/17   1637   07/11/12   1008   HGB  13.0   --   13.1   < >   --    PLT  270   --   265   < >   --    NA  141  138   --    < >   --    POTASSIUM  3.8  3.9   --    < >   --    CR  0.79  0.80   --    < >   --    A1C   --    --    --    --   5.5    < > = values in this interval not displayed.        IMPRESSION:   Reason for surgery/procedure: dysmenorrhea     The proposed surgical procedure is considered LOW risk.    REVISED CARDIAC RISK INDEX  The patient has the following serious cardiovascular risks for perioperative complications such as (MI, PE, VFib and 3  AV Block):  No serious cardiac risks  INTERPRETATION: 0 risks: Class I (very low risk - 0.4% complication rate)    The patient has the following additional risks for perioperative complications:  No identified additional risks      ICD-10-CM    1. Preop general physical exam Z01.818 CBC with platelets differential   2. Dysmenorrhea N94.6        RECOMMENDATIONS:         --Patient is to take all scheduled medications on the day of surgery EXCEPT for modifications listed below.    APPROVAL GIVEN to proceed with proposed procedure, without further diagnostic evaluation       Signed Electronically by: Sujatha Maher MD    Copy of this evaluation report is provided to requesting physician.    [place] Preop Guidelines    Revised Cardiac Risk Index

## 2018-11-07 NOTE — BRIEF OP NOTE
Brief Op Note    Preop Dx: worsening dysmenorrhea after endometrial ablation  Postop DX: same  Procedure: D&C hysteroscopy   Surgeon: Munira Goddard M.D.   Assist: n/a  Anesthesia: local/MAC  FRA Score: 2  EBL: minimal  I/O: see anesthesia records  Complications: none  Findings: dense intrauterine scarring  Disposition: stable

## 2018-11-07 NOTE — IP AVS SNAPSHOT
[facility] PreOP/Phase II    [address] [address]    Phone:  [phone]    Fax:  [phone]                                       After Visit Summary   11/7/2018    Caitlin Oh    MRN: [id number]           After Visit Summary Signature Page     I have received my discharge instructions, and my questions have been answered. I have discussed any challenges I see with this plan with the nurse or doctor.    ..........................................................................................................................................  Patient/Patient Representative Signature      ..........................................................................................................................................  Patient Representative Print Name and Relationship to Patient    ..................................................               ................................................  Date                                   Time    ..........................................................................................................................................  Reviewed by Signature/Title    ...................................................              ..............................................  Date                                               Time          22EPIC Rev 08/18

## 2018-11-07 NOTE — OR NURSING
Patient IV out and dressed.  Up to bathroom and voided.  Patient alert and oriented.  Stable.  Discharge instructions gone over with verbal understanding of patient.  Discharge instructions placed in patient folder and sent home with patient.

## 2018-11-07 NOTE — ANESTHESIA CARE TRANSFER NOTE
Patient: Caitlin Oh    Procedure(s):  COMBINED DILATION AND CURETTAGE, OPERATIVE HYSTEROSCOPY WITH MORCELLATOR    Diagnosis: worsening dysmenorrhea after endometrial ablation  Diagnosis Additional Information: No value filed.    Anesthesia Type:   MAC     Note:    Patient transferred to:Phase II  Handoff Report: Identifed the Patient, Identified the Reponsible Provider, Reviewed the pertinent medical history, Discussed the surgical course, Reviewed Intra-OP anesthesia mangement and issues during anesthesia, Set expectations for post-procedure period and Allowed opportunity for questions and acknowledgement of understanding      Vitals: (Last set prior to Anesthesia Care Transfer)    CRNA VITALS  11/7/2018 1020 - 11/7/2018 1058      11/7/2018             Pulse: 69    SpO2: 100 %                Electronically Signed By: BLADIMIR Zurita CRNA  November 7, 2018  10:58 AM

## 2018-11-09 LAB — COPATH REPORT: NORMAL

## 2018-11-26 DIAGNOSIS — M62.830 MUSCLE SPASM OF BACK: ICD-10-CM

## 2018-11-26 NOTE — TELEPHONE ENCOUNTER
tiZANidine (ZANAFLEX) 4 MG tablet      Last Written Prescription Date:  11/15/17  Last Fill Quantity: 135,   # refills: 11  Last Office Visit: 10/30/18  Future Office visit:       Routing refill request to provider for review/approval because:  Drug not on the [facility], [facility] or [facility] refill protocol or controlled substance

## 2018-12-04 ENCOUNTER — OFFICE VISIT (OUTPATIENT)
Dept: FAMILY MEDICINE | Facility: CLINIC | Age: 43
End: 2018-12-04
Payer: COMMERCIAL

## 2018-12-04 VITALS
BODY MASS INDEX: 30.05 KG/M2 | SYSTOLIC BLOOD PRESSURE: 130 MMHG | DIASTOLIC BLOOD PRESSURE: 78 MMHG | WEIGHT: 187 LBS | TEMPERATURE: 98.2 F | HEART RATE: 68 BPM | HEIGHT: 66 IN

## 2018-12-04 DIAGNOSIS — Z01.818 PREOP GENERAL PHYSICAL EXAM: Primary | ICD-10-CM

## 2018-12-04 DIAGNOSIS — M79.672 LEFT FOOT PAIN: ICD-10-CM

## 2018-12-04 PROCEDURE — 99214 OFFICE O/P EST MOD 30 MIN: CPT | Performed by: FAMILY MEDICINE

## 2018-12-04 NOTE — H&P (VIEW-ONLY)
[facility]  [address] [facility] [address]  [phone]  Dept: [phone]    PRE-OP EVALUATION:  Today's date: 2018    Caitlin Oh (: 1975) presents for pre-operative evaluation assessment as requested by Dr. Fuller, Nick Velez, DPELOINA.  She requires evaluation and anesthesia risk assessment prior to undergoing surgery/procedure for treatment of 5th metatarsal osteotomy w/ exploration    Proposed Surgery/ Procedure: 5th metatarsal osteotomy  Date of Surgery/ Procedure: 18  Time of Surgery/ Procedure: 12:30pm  Hospital/Surgical Facility: [facility]  Primary Physician: Sujatha Maher  Type of Anesthesia Anticipated: Combined MAC with Local    Patient has a Health Care Directive or Living Will:  NO    1. NO - Do you have a history of heart attack, stroke, stent, bypass or surgery on an artery in the head, neck, heart or legs?  2. NO - Do you ever have any pain or discomfort in your chest?  3. NO - Do you have a history of  Heart Failure?  4. NO - Are you troubled by shortness of breath when: walking on the level, up a slight hill or at night?  5. NO - Do you currently have a cold, bronchitis or other respiratory infection?  6. NO - Do you have a cough, shortness of breath or wheezing?  7. NO - Do you sometimes get pains in the calves of your legs when you walk?  8. NO - Do you or anyone in your family have previous history of blood clots?  9. NO - Do you or does anyone in your family have a serious bleeding problem such as prolonged bleeding following surgeries or cuts?  10. NO - Have you ever had problems with anemia or been told to take iron pills?  11. NO - Have you had any abnormal blood loss such as black, tarry or bloody stools, or abnormal vaginal bleeding?  12. NO - Have you ever had a blood transfusion?  13. NO - Have you or any of your relatives ever had problems with anesthesia?  14. NO - Do you have sleep apnea, excessive snoring or daytime  drowsiness?  15. NO - Do you have any prosthetic heart valves?  16. NO - Do you have prosthetic joints?  17. NO - Is there any chance that you may be pregnant?      HPI:     HPI related to upcoming procedure: foot pain needing treatment       See problem list for active medical problems.  Problems all longstanding and stable, except as noted/documented.  See ROS for pertinent symptoms related to these conditions.                                                                                                                                                          .    MEDICAL HISTORY:     Patient Active Problem List    Diagnosis Date Noted     Cervical high risk HPV (human papillomavirus) test positive 11/04/2015     Priority: Medium     10/28/2015:Pap--NIL, +HR HPV (NOT type 16 or 18). Plan to repeat Pap + HPV cotesting in 1 year. Reminder placed in TRACKING  9/27/16:Pap--NIL, neg HPV. Plan to repeat Pap + HPV cotesting in 3 years (2019).         Menstrual migraine 12/02/2014     Priority: Medium     Menorrhagia 12/02/2014     Priority: Medium     Tension headache, chronic 03/22/2013     Priority: Medium     CARDIOVASCULAR SCREENING; LDL GOAL LESS THAN 160 10/31/2010     Priority: Medium     Esophageal reflux 06/07/2004     Priority: Medium      Past Medical History:   Diagnosis Date     Bilateral bunions      Cervical high risk HPV (human papillomavirus) test positive 11/4/2015     Female infertility 7/10/2008    Undergoing ivf 2008.      Kidney stones 11/30/2010    [facility] er 11/24/10      Personal history of gestational diabetes 7/11/2012    diet controlled     Plantar fasciitis     s/p surgery     Past Surgical History:   Procedure Laterality Date     BUNIONECTOMY RT/LT Bilateral 11/2009     CYSTOSCOPY, RETROGRADES, INSERT STENT URETER(S), COMBINED  9/19/2013    Procedure: COMBINED CYSTOSCOPY, RETROGRADES, INSERT STENT URETER(S);  Right Ureteral Stent Placement;  Surgeon: JUN Villar MD;  Location: [facility]      DILATION AND CURETTAGE, HYSTEROSCOPY, ABLATE ENDOMETRIUM THERMACHOICE, COMBINED N/A 4/15/2015    Procedure: COMBINED DILATION AND CURETTAGE, HYSTEROSCOPY, ABLATE ENDOMETRIUM THERMACHOICE;  Surgeon: Munira Goddard MD;  Location: [facility]     DILATION AND CURETTAGE, OPERATIVE HYSTEROSCOPY WITH MORCELLATOR, COMBINED N/A 11/7/2018    Procedure: COMBINED DILATION AND CURETTAGE, OPERATIVE HYSTEROSCOPY WITH MORCELLATOR;  Surgeon: Munira Goddard MD;  Location: [facility]     EXTRACORPOREAL SHOCK WAVE LITHOTRIPSY (ESWL)  9/18/2013    Procedure: EXTRACORPOREAL SHOCK WAVE LITHOTRIPSY (ESWL);  Right Extracorporeal Shock Wave Lithotripsy;  Surgeon: JUN Villar MD;  Location: [facility]     FOOT SURGERY      for plantar fasciitis     HC TOOTH EXTRACTION W/FORCEP      wisdom teeth     LAPAROSCOPY DIAGNOSTIC (GYN)  2007    infertility     LASER HOLMIUM LITHOTRIPSY URETER(S), INSERT STENT, COMBINED  9/27/2013    Procedure: COMBINED CYSTOSCOPY, URETEROSCOPY, LASER HOLMIUM LITHOTRIPSY URETER(S), INSERT STENT;  Right Ureteroscopic Stone Extraction and Possible Stent Placement;  Surgeon: JUN Villar MD;  Location: [facility]     TONSILLECTOMY  1983     Current Outpatient Prescriptions   Medication Sig Dispense Refill     acetaminophen (TYLENOL) 325 MG tablet Take 2 tablets (650 mg) by mouth every 4 hours as needed for mild pain 50 tablet 0     ibuprofen (ADVIL/MOTRIN) 600 MG tablet Take 1 tablet (600 mg) by mouth every 6 hours as needed for other (mild and/or inflammatory pain) 30 tablet 0     rizatriptan (MAXALT-MLT) 5 MG ODT tab Take 1-2 tablets (5-10 mg) by mouth at onset of headache for migraine May repeat dose in 2 hours.  Do not exceed 30 mg in 24 hours 18 tablet 1     tiZANidine (ZANAFLEX) 4 MG tablet Take 1.5 tablets (6 mg) by mouth 3 times daily as needed for muscle spasms 135 tablet 11     topiramate (TOPAMAX) 50 MG tablet TAKE 1 TABLET (50 MG) BY MOUTH 2 TIMES DAILY 180 tablet 3     OTC products: None, except as noted  "above    Allergies   Allergen Reactions     Penicillins Rash      Latex Allergy: NO    Social History   Substance Use Topics     Smoking status: Never Smoker     Smokeless tobacco: Never Used     Alcohol use No     History   Drug Use No       REVIEW OF SYSTEMS:   Constitutional, neuro, ENT, endocrine, pulmonary, cardiac, gastrointestinal, genitourinary, musculoskeletal, integument and psychiatric systems are negative, except as otherwise noted.    EXAM:   /78  Pulse 68  Temp 98.2  F (36.8  C) (Tympanic)  Ht 5' 6\" (1.676 m)  Wt 187 lb (84.8 kg)  BMI 30.18 kg/m2    GENERAL APPEARANCE: healthy, alert and no distress     EYES: EOMI, PERRL     HENT: ear canals and TM's normal and nose and mouth without ulcers or lesions     NECK: no adenopathy, no asymmetry, masses, or scars and thyroid normal to palpation     RESP: lungs clear to auscultation - no rales, rhonchi or wheezes     CV: regular rates and rhythm, normal S1 S2, no S3 or S4 and no murmur, click or rub     ABDOMEN:  soft, nontender, no HSM or masses and bowel sounds normal     MS: extremities normal- no gross deformities noted, no evidence of inflammation in joints, FROM in all extremities.     SKIN: no suspicious lesions or rashes     NEURO: Normal strength and tone, sensory exam grossly normal, mentation intact and speech normal     PSYCH: mentation appears normal. and affect normal/bright     LYMPHATICS: No cervical adenopathy    DIAGNOSTICS:   No labs or EKG required for low risk surgery (cataract, skin procedure, breast biopsy, etc)    Recent Labs   Lab Test  10/30/18   1633  10/25/17   0857  08/24/17   0901   07/11/12   1008   HGB  13.8  13.0   --    < >   --    PLT  355  270   --    < >   --    NA   --   141  138   < >   --    POTASSIUM   --   3.8  3.9   < >   --    CR   --   0.79  0.80   < >   --    A1C   --    --    --    --   5.5    < > = values in this interval not displayed.        IMPRESSION:   Reason for surgery/procedure: chronic foot " pain     The proposed surgical procedure is considered INTERMEDIATE risk.    REVISED CARDIAC RISK INDEX  The patient has the following serious cardiovascular risks for perioperative complications such as (MI, PE, VFib and 3  AV Block):  No serious cardiac risks  INTERPRETATION: 0 risks: Class I (very low risk - 0.4% complication rate)    The patient has the following additional risks for perioperative complications:  No identified additional risks      ICD-10-CM    1. Preop general physical exam Z01.818    2. Left foot pain M79.672        RECOMMENDATIONS:         --Patient is to take all scheduled medications on the day of surgery EXCEPT for modifications listed below.    APPROVAL GIVEN to proceed with proposed procedure, without further diagnostic evaluation       Signed Electronically by: Sujatha Maher MD    Copy of this evaluation report is provided to requesting physician.    [facility] Preop Guidelines    Revised Cardiac Risk Index

## 2018-12-04 NOTE — PROGRESS NOTES
[facility]  [address] [address]  [phone]  Dept: [phone]    PRE-OP EVALUATION:  Today's date: 2018    Caitlin Oh (: 1975) presents for pre-operative evaluation assessment as requested by Dr. Fuller, Nick Velez, DPELOINA.  She requires evaluation and anesthesia risk assessment prior to undergoing surgery/procedure for treatment of 5th metatarsal osteotomy w/ exploration    Proposed Surgery/ Procedure: 5th metatarsal osteotomy  Date of Surgery/ Procedure: 18  Time of Surgery/ Procedure: 12:30pm  Hospital/Surgical Facility: [facility]  Primary Physician: Sujatha Maher  Type of Anesthesia Anticipated: Combined MAC with Local    Patient has a Health Care Directive or Living Will:  NO    1. NO - Do you have a history of heart attack, stroke, stent, bypass or surgery on an artery in the head, neck, heart or legs?  2. NO - Do you ever have any pain or discomfort in your chest?  3. NO - Do you have a history of  Heart Failure?  4. NO - Are you troubled by shortness of breath when: walking on the level, up a slight hill or at night?  5. NO - Do you currently have a cold, bronchitis or other respiratory infection?  6. NO - Do you have a cough, shortness of breath or wheezing?  7. NO - Do you sometimes get pains in the calves of your legs when you walk?  8. NO - Do you or anyone in your family have previous history of blood clots?  9. NO - Do you or does anyone in your family have a serious bleeding problem such as prolonged bleeding following surgeries or cuts?  10. NO - Have you ever had problems with anemia or been told to take iron pills?  11. NO - Have you had any abnormal blood loss such as black, tarry or bloody stools, or abnormal vaginal bleeding?  12. NO - Have you ever had a blood transfusion?  13. NO - Have you or any of your relatives ever had problems with anesthesia?  14. NO - Do you have sleep apnea, excessive snoring or daytime  drowsiness?  15. NO - Do you have any prosthetic heart valves?  16. NO - Do you have prosthetic joints?  17. NO - Is there any chance that you may be pregnant?      HPI:     HPI related to upcoming procedure: foot pain needing treatment       See problem list for active medical problems.  Problems all longstanding and stable, except as noted/documented.  See ROS for pertinent symptoms related to these conditions.                                                                                                                                                          .    MEDICAL HISTORY:     Patient Active Problem List    Diagnosis Date Noted     Cervical high risk HPV (human papillomavirus) test positive 11/04/2015     Priority: Medium     10/28/2015:Pap--NIL, +HR HPV (NOT type 16 or 18). Plan to repeat Pap + HPV cotesting in 1 year. Reminder placed in TRACKING  9/27/16:Pap--NIL, neg HPV. Plan to repeat Pap + HPV cotesting in 3 years (2019).         Menstrual migraine 12/02/2014     Priority: Medium     Menorrhagia 12/02/2014     Priority: Medium     Tension headache, chronic 03/22/2013     Priority: Medium     CARDIOVASCULAR SCREENING; LDL GOAL LESS THAN 160 10/31/2010     Priority: Medium     Esophageal reflux 06/07/2004     Priority: Medium      Past Medical History:   Diagnosis Date     Bilateral bunions      Cervical high risk HPV (human papillomavirus) test positive 11/4/2015     Female infertility 7/10/2008    Undergoing ivf 2008.      Kidney stones 11/30/2010    [facility] er 11/24/10      Personal history of gestational diabetes 7/11/2012    diet controlled     Plantar fasciitis     s/p surgery     Past Surgical History:   Procedure Laterality Date     BUNIONECTOMY RT/LT Bilateral 11/2009     CYSTOSCOPY, RETROGRADES, INSERT STENT URETER(S), COMBINED  9/19/2013    Procedure: COMBINED CYSTOSCOPY, RETROGRADES, INSERT STENT URETER(S);  Right Ureteral Stent Placement;  Surgeon: JUN Villar MD;  Location: [facility]      DILATION AND CURETTAGE, HYSTEROSCOPY, ABLATE ENDOMETRIUM THERMACHOICE, COMBINED N/A 4/15/2015    Procedure: COMBINED DILATION AND CURETTAGE, HYSTEROSCOPY, ABLATE ENDOMETRIUM THERMACHOICE;  Surgeon: Munira Goddard MD;  Location: [facility]     DILATION AND CURETTAGE, OPERATIVE HYSTEROSCOPY WITH MORCELLATOR, COMBINED N/A 11/7/2018    Procedure: COMBINED DILATION AND CURETTAGE, OPERATIVE HYSTEROSCOPY WITH MORCELLATOR;  Surgeon: Munira Goddard MD;  Location: [facility]     EXTRACORPOREAL SHOCK WAVE LITHOTRIPSY (ESWL)  9/18/2013    Procedure: EXTRACORPOREAL SHOCK WAVE LITHOTRIPSY (ESWL);  Right Extracorporeal Shock Wave Lithotripsy;  Surgeon: JUN Villar MD;  Location: [facility]     FOOT SURGERY      for plantar fasciitis     HC TOOTH EXTRACTION W/FORCEP      wisdom teeth     LAPAROSCOPY DIAGNOSTIC (GYN)  2007    infertility     LASER HOLMIUM LITHOTRIPSY URETER(S), INSERT STENT, COMBINED  9/27/2013    Procedure: COMBINED CYSTOSCOPY, URETEROSCOPY, LASER HOLMIUM LITHOTRIPSY URETER(S), INSERT STENT;  Right Ureteroscopic Stone Extraction and Possible Stent Placement;  Surgeon: JUN Villar MD;  Location: [facility]     TONSILLECTOMY  1983     Current Outpatient Prescriptions   Medication Sig Dispense Refill     acetaminophen (TYLENOL) 325 MG tablet Take 2 tablets (650 mg) by mouth every 4 hours as needed for mild pain 50 tablet 0     ibuprofen (ADVIL/MOTRIN) 600 MG tablet Take 1 tablet (600 mg) by mouth every 6 hours as needed for other (mild and/or inflammatory pain) 30 tablet 0     rizatriptan (MAXALT-MLT) 5 MG ODT tab Take 1-2 tablets (5-10 mg) by mouth at onset of headache for migraine May repeat dose in 2 hours.  Do not exceed 30 mg in 24 hours 18 tablet 1     tiZANidine (ZANAFLEX) 4 MG tablet Take 1.5 tablets (6 mg) by mouth 3 times daily as needed for muscle spasms 135 tablet 11     topiramate (TOPAMAX) 50 MG tablet TAKE 1 TABLET (50 MG) BY MOUTH 2 TIMES DAILY 180 tablet 3     OTC products: None, except as noted  "above    Allergies   Allergen Reactions     Penicillins Rash      Latex Allergy: NO    Social History   Substance Use Topics     Smoking status: Never Smoker     Smokeless tobacco: Never Used     Alcohol use No     History   Drug Use No       REVIEW OF SYSTEMS:   Constitutional, neuro, ENT, endocrine, pulmonary, cardiac, gastrointestinal, genitourinary, musculoskeletal, integument and psychiatric systems are negative, except as otherwise noted.    EXAM:   /78  Pulse 68  Temp 98.2  F (36.8  C) (Tympanic)  Ht 5' 6\" (1.676 m)  Wt 187 lb (84.8 kg)  BMI 30.18 kg/m2    GENERAL APPEARANCE: healthy, alert and no distress     EYES: EOMI, PERRL     HENT: ear canals and TM's normal and nose and mouth without ulcers or lesions     NECK: no adenopathy, no asymmetry, masses, or scars and thyroid normal to palpation     RESP: lungs clear to auscultation - no rales, rhonchi or wheezes     CV: regular rates and rhythm, normal S1 S2, no S3 or S4 and no murmur, click or rub     ABDOMEN:  soft, nontender, no HSM or masses and bowel sounds normal     MS: extremities normal- no gross deformities noted, no evidence of inflammation in joints, FROM in all extremities.     SKIN: no suspicious lesions or rashes     NEURO: Normal strength and tone, sensory exam grossly normal, mentation intact and speech normal     PSYCH: mentation appears normal. and affect normal/bright     LYMPHATICS: No cervical adenopathy    DIAGNOSTICS:   No labs or EKG required for low risk surgery (cataract, skin procedure, breast biopsy, etc)    Recent Labs   Lab Test  10/30/18   1633  10/25/17   0857  08/24/17   0901   07/11/12   1008   HGB  13.8  13.0   --    < >   --    PLT  355  270   --    < >   --    NA   --   141  138   < >   --    POTASSIUM   --   3.8  3.9   < >   --    CR   --   0.79  0.80   < >   --    A1C   --    --    --    --   5.5    < > = values in this interval not displayed.        IMPRESSION:   Reason for surgery/procedure: chronic foot " pain     The proposed surgical procedure is considered INTERMEDIATE risk.    REVISED CARDIAC RISK INDEX  The patient has the following serious cardiovascular risks for perioperative complications such as (MI, PE, VFib and 3  AV Block):  No serious cardiac risks  INTERPRETATION: 0 risks: Class I (very low risk - 0.4% complication rate)    The patient has the following additional risks for perioperative complications:  No identified additional risks      ICD-10-CM    1. Preop general physical exam Z01.818    2. Left foot pain M79.672        RECOMMENDATIONS:         --Patient is to take all scheduled medications on the day of surgery EXCEPT for modifications listed below.    APPROVAL GIVEN to proceed with proposed procedure, without further diagnostic evaluation       Signed Electronically by: Sujatha Maher MD    Copy of this evaluation report is provided to requesting physician.    [facility] Preop Guidelines    Revised Cardiac Risk Index

## 2018-12-04 NOTE — MR AVS SNAPSHOT
After Visit Summary   12/4/2018    Caitlin Oh    MRN: [id number]           Patient Information     Date Of Birth          1975        Visit Information        Provider Department      12/4/2018 3:00 PM Sujatha Maher MD [facility]        Today's Diagnoses     Preop general physical exam    -  1    Left foot pain          Care Instructions      Before Your Surgery      Call your surgeon if there is any change in your health. This includes signs of a cold or flu (such as a sore throat, runny nose, cough, rash or fever).    Do not smoke, drink alcohol or take over the counter medicine (unless your surgeon or primary care doctor tells you to) for the 24 hours before and after surgery.    If you take prescribed drugs: Follow your doctor s orders about which medicines to take and which to stop until after surgery.    Eating and drinking prior to surgery: follow the instructions from your surgeon    Take a shower or bath the night before surgery. Use the soap your surgeon gave you to gently clean your skin. If you do not have soap from your surgeon, use your regular soap. Do not shave or scrub the surgery site.  Wear clean pajamas and have clean sheets on your bed.           Follow-ups after your visit        Follow-up notes from your care team     Return in about 1 year (around 12/4/2019) for Routine Visit.      Your next 10 appointments already scheduled     Dec 13, 2018   Procedure with Nick Fuller DPM   [facility] (--)    [address] [place] [address] [phone]           The medical center is located at [address] [place] [address]. (between [address] in [place], [place] north of [place]).              Who to contact     Normal or non-critical lab and imaging results will be communicated to you by MyChart, letter or phone within 4 business days after the clinic has received the results. If you do not hear from us within 7 days,  "please contact the clinic through [employer] or phone. If you have a critical or abnormal lab result, we will notify you by phone as soon as possible.  Submit refill requests through [employer] or call your pharmacy and they will forward the refill request to us. Please allow 3 business days for your refill to be completed.          If you need to speak with a  for additional information , please call: [phone]             Additional Information About Your Visit        [employer] Information     [employer] gives you secure access to your electronic health record. If you see a primary care provider, you can also send messages to your care team and make appointments. If you have questions, please call your primary care clinic.  If you do not have a primary care provider, please call [phone] and they will assist you.        Care EveryWhere ID     This is your Care EveryWhere ID. This could be used by other organizations to access your [place] medical records  [id number]        Your Vitals Were     Pulse Temperature Height BMI (Body Mass Index)          68 98.2  F (36.8  C) (Tympanic) 5' 6\" (1.676 m) 30.18 kg/m2         Blood Pressure from Last 3 Encounters:   12/04/18 130/78   11/07/18 132/74   10/30/18 134/86    Weight from Last 3 Encounters:   12/04/18 187 lb (84.8 kg)   11/07/18 187 lb (84.8 kg)   10/30/18 187 lb 4.8 oz (85 kg)              Today, you had the following     No orders found for display       Primary Care Provider Office Phone # Fax #    Sujatha Maher [phone] [id number]  [facility] [address]        Equal Access to Services     [facility] HospitalGISELLE AH: Hadii ra oquendo Soyu, waaxda luqadaha, qaybta kaalmamiesha washburn. So [facility] [phone].    ATENCIÓN: Si habla español, tiene a galdamez disposición servicios gratuitos de asistencia lingüística. Joselyname al [phone].    We comply with applicable federal civil rights " laws and [place] laws. We do not discriminate on the basis of race, color, national origin, age, disability, sex, sexual orientation, or gender identity.            Thank you!     Thank you for choosing [facility]  for your care. Our goal is always to provide you with excellent care. Hearing back from our patients is one way we can continue to improve our services. Please take a few minutes to complete the written survey that you may receive in the mail after your visit with us. Thank you!             Your Updated Medication List - Protect others around you: Learn how to safely use, store and throw away your medicines at www.disposemymeds.org.          This list is accurate as of 12/4/18  3:49 PM.  Always use your most recent med list.                   Brand Name Dispense Instructions for use Diagnosis    acetaminophen 325 MG tablet    TYLENOL    50 tablet    Take 2 tablets (650 mg) by mouth every 4 hours as needed for mild pain    Dysmenorrhea, Asherman's syndrome       ibuprofen 600 MG tablet    ADVIL/MOTRIN    30 tablet    Take 1 tablet (600 mg) by mouth every 6 hours as needed for other (mild and/or inflammatory pain)    Dysmenorrhea, Asherman's syndrome       rizatriptan 5 MG ODT    MAXALT-MLT    18 tablet    Take 1-2 tablets (5-10 mg) by mouth at onset of headache for migraine May repeat dose in 2 hours.  Do not exceed 30 mg in 24 hours    Menstrual migraine without status migrainosus, not intractable       tiZANidine 4 MG tablet    ZANAFLEX    135 tablet    Take 1.5 tablets (6 mg) by mouth 3 times daily as needed for muscle spasms    Muscle spasm of back       topiramate 50 MG tablet    TOPAMAX    180 tablet    TAKE 1 TABLET (50 MG) BY MOUTH 2 TIMES DAILY    Menstrual migraine without status migrainosus, not intractable

## 2018-12-05 ENCOUNTER — TRANSFERRED RECORDS (OUTPATIENT)
Dept: HEALTH INFORMATION MANAGEMENT | Facility: CLINIC | Age: 43
End: 2018-12-05

## 2018-12-10 RX ORDER — LIDOCAINE/PRILOCAINE 2.5 %-2.5%
CREAM (GRAM) TOPICAL
Refills: 1 | COMMUNITY
Start: 2017-12-15 | End: 2019-08-29

## 2018-12-13 ENCOUNTER — APPOINTMENT (OUTPATIENT)
Dept: GENERAL RADIOLOGY | Facility: CLINIC | Age: 43
End: 2018-12-13
Attending: PODIATRIST
Payer: COMMERCIAL

## 2018-12-13 ENCOUNTER — ANESTHESIA EVENT (OUTPATIENT)
Dept: SURGERY | Facility: CLINIC | Age: 43
End: 2018-12-13
Payer: COMMERCIAL

## 2018-12-13 ENCOUNTER — ANESTHESIA (OUTPATIENT)
Dept: SURGERY | Facility: CLINIC | Age: 43
End: 2018-12-13
Payer: COMMERCIAL

## 2018-12-13 ENCOUNTER — HOSPITAL ENCOUNTER (OUTPATIENT)
Facility: CLINIC | Age: 43
Discharge: HOME | End: 2018-12-13
Attending: PODIATRIST | Admitting: PODIATRIST
Payer: COMMERCIAL

## 2018-12-13 VITALS
HEART RATE: 61 BPM | RESPIRATION RATE: 16 BRPM | OXYGEN SATURATION: 99 % | DIASTOLIC BLOOD PRESSURE: 76 MMHG | TEMPERATURE: 98.3 F | HEIGHT: 66 IN | BODY MASS INDEX: 30.18 KG/M2 | SYSTOLIC BLOOD PRESSURE: 126 MMHG

## 2018-12-13 DIAGNOSIS — G89.18 POST-OP PAIN: Primary | ICD-10-CM

## 2018-12-13 LAB — HCG UR QL: NEGATIVE

## 2018-12-13 PROCEDURE — 25000125 ZZHC RX 250: Performed by: NURSE ANESTHETIST, CERTIFIED REGISTERED

## 2018-12-13 PROCEDURE — 81025 URINE PREGNANCY TEST: CPT | Performed by: NURSE ANESTHETIST, CERTIFIED REGISTERED

## 2018-12-13 PROCEDURE — 71000027 ZZH RECOVERY PHASE 2 EACH 15 MINS: Performed by: PODIATRIST

## 2018-12-13 PROCEDURE — 40000277 XR SURGERY CARM FLUORO LESS THAN 5 MIN W STILLS: Mod: TC

## 2018-12-13 PROCEDURE — 40000305 ZZH STATISTIC PRE PROC ASSESS I: Performed by: PODIATRIST

## 2018-12-13 PROCEDURE — 25000128 H RX IP 250 OP 636: Performed by: NURSE ANESTHETIST, CERTIFIED REGISTERED

## 2018-12-13 PROCEDURE — 25000128 H RX IP 250 OP 636: Performed by: PODIATRIST

## 2018-12-13 PROCEDURE — 36000060 ZZH SURGERY LEVEL 3 W FLUORO 1ST 30 MIN: Performed by: PODIATRIST

## 2018-12-13 PROCEDURE — 37000008 ZZH ANESTHESIA TECHNICAL FEE, 1ST 30 MIN: Performed by: PODIATRIST

## 2018-12-13 PROCEDURE — 36000058 ZZH SURGERY LEVEL 3 EA 15 ADDTL MIN: Performed by: PODIATRIST

## 2018-12-13 PROCEDURE — C1713 ANCHOR/SCREW BN/BN,TIS/BN: HCPCS | Performed by: PODIATRIST

## 2018-12-13 PROCEDURE — 37000009 ZZH ANESTHESIA TECHNICAL FEE, EACH ADDTL 15 MIN: Performed by: PODIATRIST

## 2018-12-13 PROCEDURE — 27210794 ZZH OR GENERAL SUPPLY STERILE: Performed by: PODIATRIST

## 2018-12-13 DEVICE — IMP SCR ARTHREX QUICKFIX TI 2X12MM AR-8930-12: Type: IMPLANTABLE DEVICE | Site: FOOT | Status: FUNCTIONAL

## 2018-12-13 DEVICE — IMP SCR ARTHREX QUICKFIX TI 2X11MM AR-8930-11: Type: IMPLANTABLE DEVICE | Site: FOOT | Status: FUNCTIONAL

## 2018-12-13 RX ORDER — MEPERIDINE HYDROCHLORIDE 25 MG/ML
12.5 INJECTION INTRAMUSCULAR; INTRAVENOUS; SUBCUTANEOUS
Status: DISCONTINUED | OUTPATIENT
Start: 2018-12-13 | End: 2018-12-13 | Stop reason: HOSPADM

## 2018-12-13 RX ORDER — NALOXONE HYDROCHLORIDE 0.4 MG/ML
.1-.4 INJECTION, SOLUTION INTRAMUSCULAR; INTRAVENOUS; SUBCUTANEOUS
Status: DISCONTINUED | OUTPATIENT
Start: 2018-12-13 | End: 2018-12-13 | Stop reason: HOSPADM

## 2018-12-13 RX ORDER — ONDANSETRON 2 MG/ML
4 INJECTION INTRAMUSCULAR; INTRAVENOUS EVERY 30 MIN PRN
Status: DISCONTINUED | OUTPATIENT
Start: 2018-12-13 | End: 2018-12-13 | Stop reason: HOSPADM

## 2018-12-13 RX ORDER — BUPIVACAINE HYDROCHLORIDE 5 MG/ML
INJECTION, SOLUTION PERINEURAL PRN
Status: DISCONTINUED | OUTPATIENT
Start: 2018-12-13 | End: 2018-12-13 | Stop reason: HOSPADM

## 2018-12-13 RX ORDER — OXYCODONE AND ACETAMINOPHEN 5; 325 MG/1; MG/1
1 TABLET ORAL
Status: DISCONTINUED | OUTPATIENT
Start: 2018-12-13 | End: 2018-12-13 | Stop reason: HOSPADM

## 2018-12-13 RX ORDER — ONDANSETRON 4 MG/1
4 TABLET, ORALLY DISINTEGRATING ORAL EVERY 30 MIN PRN
Status: DISCONTINUED | OUTPATIENT
Start: 2018-12-13 | End: 2018-12-13 | Stop reason: HOSPADM

## 2018-12-13 RX ORDER — DEXAMETHASONE SODIUM PHOSPHATE 4 MG/ML
4 INJECTION, SOLUTION INTRA-ARTICULAR; INTRALESIONAL; INTRAMUSCULAR; INTRAVENOUS; SOFT TISSUE EVERY 10 MIN PRN
Status: DISCONTINUED | OUTPATIENT
Start: 2018-12-13 | End: 2018-12-13 | Stop reason: HOSPADM

## 2018-12-13 RX ORDER — HYDROXYZINE PAMOATE 50 MG/1
50 CAPSULE ORAL 3 TIMES DAILY PRN
Qty: 18 CAPSULE | Refills: 0 | Status: SHIPPED | OUTPATIENT
Start: 2018-12-13 | End: 2018-12-23

## 2018-12-13 RX ORDER — OXYCODONE AND ACETAMINOPHEN 5; 325 MG/1; MG/1
1-2 TABLET ORAL EVERY 4 HOURS PRN
Qty: 16 TABLET | Refills: 0 | Status: SHIPPED | OUTPATIENT
Start: 2018-12-13 | End: 2018-12-16

## 2018-12-13 RX ORDER — HYDROXYZINE HYDROCHLORIDE 50 MG/1
50 TABLET, FILM COATED ORAL EVERY 6 HOURS PRN
Status: DISCONTINUED | OUTPATIENT
Start: 2018-12-13 | End: 2018-12-13 | Stop reason: HOSPADM

## 2018-12-13 RX ORDER — ALBUTEROL SULFATE 0.83 MG/ML
2.5 SOLUTION RESPIRATORY (INHALATION) EVERY 4 HOURS PRN
Status: DISCONTINUED | OUTPATIENT
Start: 2018-12-13 | End: 2018-12-13 | Stop reason: HOSPADM

## 2018-12-13 RX ORDER — HYDROXYZINE HYDROCHLORIDE 25 MG/1
25 TABLET, FILM COATED ORAL EVERY 6 HOURS PRN
Status: DISCONTINUED | OUTPATIENT
Start: 2018-12-13 | End: 2018-12-13 | Stop reason: HOSPADM

## 2018-12-13 RX ORDER — DEXAMETHASONE SODIUM PHOSPHATE 4 MG/ML
INJECTION, SOLUTION INTRA-ARTICULAR; INTRALESIONAL; INTRAMUSCULAR; INTRAVENOUS; SOFT TISSUE PRN
Status: DISCONTINUED | OUTPATIENT
Start: 2018-12-13 | End: 2018-12-13

## 2018-12-13 RX ORDER — SODIUM CHLORIDE, SODIUM LACTATE, POTASSIUM CHLORIDE, CALCIUM CHLORIDE 600; 310; 30; 20 MG/100ML; MG/100ML; MG/100ML; MG/100ML
INJECTION, SOLUTION INTRAVENOUS CONTINUOUS
Status: DISCONTINUED | OUTPATIENT
Start: 2018-12-13 | End: 2018-12-13 | Stop reason: HOSPADM

## 2018-12-13 RX ORDER — FENTANYL CITRATE 50 UG/ML
25-50 INJECTION, SOLUTION INTRAMUSCULAR; INTRAVENOUS
Status: DISCONTINUED | OUTPATIENT
Start: 2018-12-13 | End: 2018-12-13 | Stop reason: HOSPADM

## 2018-12-13 RX ORDER — LIDOCAINE 40 MG/G
CREAM TOPICAL
Status: DISCONTINUED | OUTPATIENT
Start: 2018-12-13 | End: 2018-12-13 | Stop reason: HOSPADM

## 2018-12-13 RX ORDER — FENTANYL CITRATE 50 UG/ML
INJECTION, SOLUTION INTRAMUSCULAR; INTRAVENOUS PRN
Status: DISCONTINUED | OUTPATIENT
Start: 2018-12-13 | End: 2018-12-13

## 2018-12-13 RX ORDER — LIDOCAINE HYDROCHLORIDE 10 MG/ML
INJECTION, SOLUTION INFILTRATION; PERINEURAL PRN
Status: DISCONTINUED | OUTPATIENT
Start: 2018-12-13 | End: 2018-12-13

## 2018-12-13 RX ORDER — CLINDAMYCIN PHOSPHATE 900 MG/50ML
INJECTION, SOLUTION INTRAVENOUS PRN
Status: DISCONTINUED | OUTPATIENT
Start: 2018-12-13 | End: 2018-12-13

## 2018-12-13 RX ORDER — METOPROLOL TARTRATE 1 MG/ML
1-2 INJECTION, SOLUTION INTRAVENOUS EVERY 5 MIN PRN
Status: DISCONTINUED | OUTPATIENT
Start: 2018-12-13 | End: 2018-12-13 | Stop reason: HOSPADM

## 2018-12-13 RX ORDER — PROPOFOL 10 MG/ML
INJECTION, EMULSION INTRAVENOUS CONTINUOUS PRN
Status: DISCONTINUED | OUTPATIENT
Start: 2018-12-13 | End: 2018-12-13

## 2018-12-13 RX ORDER — HYDROMORPHONE HYDROCHLORIDE 1 MG/ML
.3-.5 INJECTION, SOLUTION INTRAMUSCULAR; INTRAVENOUS; SUBCUTANEOUS EVERY 10 MIN PRN
Status: DISCONTINUED | OUTPATIENT
Start: 2018-12-13 | End: 2018-12-13 | Stop reason: HOSPADM

## 2018-12-13 RX ORDER — ONDANSETRON 2 MG/ML
INJECTION INTRAMUSCULAR; INTRAVENOUS PRN
Status: DISCONTINUED | OUTPATIENT
Start: 2018-12-13 | End: 2018-12-13

## 2018-12-13 RX ADMIN — SODIUM CHLORIDE, POTASSIUM CHLORIDE, SODIUM LACTATE AND CALCIUM CHLORIDE: 600; 310; 30; 20 INJECTION, SOLUTION INTRAVENOUS at 11:52

## 2018-12-13 RX ADMIN — CLINDAMYCIN PHOSPHATE 900 MG: 18 INJECTION, SOLUTION INTRAVENOUS at 12:52

## 2018-12-13 RX ADMIN — DEXAMETHASONE SODIUM PHOSPHATE 4 MG: 4 INJECTION, SOLUTION INTRA-ARTICULAR; INTRALESIONAL; INTRAMUSCULAR; INTRAVENOUS; SOFT TISSUE at 13:02

## 2018-12-13 RX ADMIN — FENTANYL CITRATE 100 MCG: 50 INJECTION, SOLUTION INTRAMUSCULAR; INTRAVENOUS at 12:57

## 2018-12-13 RX ADMIN — LIDOCAINE HYDROCHLORIDE 1 ML: 10 INJECTION, SOLUTION EPIDURAL; INFILTRATION; INTRACAUDAL; PERINEURAL at 11:53

## 2018-12-13 RX ADMIN — LIDOCAINE HYDROCHLORIDE 50 MG: 10 INJECTION, SOLUTION INFILTRATION; PERINEURAL at 12:57

## 2018-12-13 RX ADMIN — PROPOFOL 150 MCG/KG/MIN: 10 INJECTION, EMULSION INTRAVENOUS at 12:57

## 2018-12-13 RX ADMIN — MIDAZOLAM HYDROCHLORIDE 2 MG: 1 INJECTION, SOLUTION INTRAMUSCULAR; INTRAVENOUS at 12:57

## 2018-12-13 RX ADMIN — MIDAZOLAM HYDROCHLORIDE 3 MG: 1 INJECTION, SOLUTION INTRAMUSCULAR; INTRAVENOUS at 12:52

## 2018-12-13 RX ADMIN — ONDANSETRON 4 MG: 2 INJECTION INTRAMUSCULAR; INTRAVENOUS at 13:00

## 2018-12-13 NOTE — ANESTHESIA POSTPROCEDURE EVALUATION
Patient: Caitlin Burtonalexaadrian    Procedure(s):  Left 5th metatarsal corrective osteotomy    Diagnosis:Left tailors bunion  Diagnosis Additional Information: No value filed.    Anesthesia Type:  MAC, General    Note:  Anesthesia Post Evaluation    Patient location during evaluation: Bedside  Patient participation: Able to fully participate in evaluation  Level of consciousness: awake and alert  Pain management: adequate  Airway patency: patent  Cardiovascular status: stable  Respiratory status: room air  Hydration status: stable  PONV: none     Anesthetic complications: None          Last vitals:  Vitals:    12/13/18 1119   BP: 132/84   Pulse: 61   Resp: 16   Temp: 36.7  C (98.1  F)   SpO2: 100%         Electronically Signed By: BLADIMIR Romero CRNA  December 13, 2018  1:42 PM

## 2018-12-13 NOTE — ANESTHESIA PREPROCEDURE EVALUATION
Anesthesia Pre-Procedure Evaluation    Patient: Caitlin Oh   MRN: [id number] : 1975          Preoperative Diagnosis: Left tailors bunion    Procedure(s):  5th metatarsal osteotomy    Past Medical History:   Diagnosis Date     Bilateral bunions      Cervical high risk HPV (human papillomavirus) test positive 2015     Female infertility 7/10/2008    Undergoing ivf .      Kidney stones 2010    [facility] 11/24/10      Personal history of gestational diabetes 2012    diet controlled     Plantar fasciitis     s/p surgery     Past Surgical History:   Procedure Laterality Date     BUNIONECTOMY RT/LT Bilateral 2009     CYSTOSCOPY, RETROGRADES, INSERT STENT URETER(S), COMBINED  2013    Procedure: COMBINED CYSTOSCOPY, RETROGRADES, INSERT STENT URETER(S);  Right Ureteral Stent Placement;  Surgeon: JUN Villar MD;  Location: [facility]     DILATION AND CURETTAGE, HYSTEROSCOPY, ABLATE ENDOMETRIUM THERMACHOICE, COMBINED N/A 4/15/2015    Procedure: COMBINED DILATION AND CURETTAGE, HYSTEROSCOPY, ABLATE ENDOMETRIUM THERMACHOICE;  Surgeon: Munira Goddard MD;  Location: [facility]     DILATION AND CURETTAGE, OPERATIVE HYSTEROSCOPY WITH MORCELLATOR, COMBINED N/A 2018    Procedure: COMBINED DILATION AND CURETTAGE, OPERATIVE HYSTEROSCOPY WITH MORCELLATOR;  Surgeon: Munira Goddard MD;  Location: [facility]     EXTRACORPOREAL SHOCK WAVE LITHOTRIPSY (ESWL)  2013    Procedure: EXTRACORPOREAL SHOCK WAVE LITHOTRIPSY (ESWL);  Right Extracorporeal Shock Wave Lithotripsy;  Surgeon: JUN Villar MD;  Location: [facility] OR     FOOT SURGERY      for plantar fasciitis     HC TOOTH EXTRACTION W/FORCEP      wisdom teeth     LAPAROSCOPY DIAGNOSTIC (GYN)      infertility     LASER HOLMIUM LITHOTRIPSY URETER(S), INSERT STENT, COMBINED  2013    Procedure: COMBINED CYSTOSCOPY, URETEROSCOPY, LASER HOLMIUM LITHOTRIPSY URETER(S), INSERT STENT;  Right Ureteroscopic Stone Extraction and Possible Stent  Placement;  Surgeon: JUN Villar MD;  Location: [facility] OR     TONSILLECTOMY  1983       Anesthesia Evaluation     .             ROS/MED HX    ENT/Pulmonary:  - neg pulmonary ROS     Neurologic:     (+)migraines,     Cardiovascular:  - neg cardiovascular ROS       METS/Exercise Tolerance:  >4 METS   Hematologic:  - neg hematologic  ROS       Musculoskeletal:   (+) , , other musculoskeletal- left foot pain      GI/Hepatic:     (+) GERD       Renal/Genitourinary:     (+) chronic renal disease,       Endo:     (+) Obesity, .      Psychiatric:  - neg psychiatric ROS       Infectious Disease:  - neg infectious disease ROS       Malignancy:      - no malignancy   Other: Comment: HPV  menorrhagia                         Physical Exam  Normal systems: cardiovascular, pulmonary and dental    Airway   Mallampati: II  TM distance: >3 FB  Neck ROM: full    Dental     Cardiovascular       Pulmonary             Lab Results   Component Value Date    WBC 9.1 10/30/2018    HGB 13.8 10/30/2018    HCT 41.5 10/30/2018     10/30/2018    CRP 5.7 10/25/2017    SED 10 10/25/2017     10/25/2017    POTASSIUM 3.8 10/25/2017    CHLORIDE 112 (H) 10/25/2017    CO2 21 10/25/2017    BUN 12 10/25/2017    CR 0.79 10/25/2017    GLC 92 10/25/2017    FEDE 8.8 10/25/2017    PHOS 3.2 12/27/2013    MAG 2.2 12/27/2013    ALBUMIN 3.8 10/25/2017    PROTTOTAL 7.0 10/25/2017    ALT 28 10/25/2017    AST 13 10/25/2017    ALKPHOS 78 10/25/2017    BILITOTAL 0.2 10/25/2017    TSH 2.13 04/24/2017    HCG Negative 11/07/2018       Preop Vitals  BP Readings from Last 3 Encounters:   12/04/18 130/78   11/07/18 132/74   10/30/18 134/86    Pulse Readings from Last 3 Encounters:   12/04/18 68   11/07/18 74   10/30/18 65      Resp Readings from Last 3 Encounters:   11/07/18 20   10/01/18 16   09/29/18 16    SpO2 Readings from Last 3 Encounters:   11/07/18 97%   10/01/18 96%   06/21/18 98%      Temp Readings from Last 1 Encounters:   12/04/18 36.8  C (98.2  F)  "(Tympanic)    Ht Readings from Last 1 Encounters:   12/04/18 1.676 m (5' 6\")      Wt Readings from Last 1 Encounters:   12/04/18 84.8 kg (187 lb)    Estimated body mass index is 30.18 kg/m  as calculated from the following:    Height as of 12/4/18: 1.676 m (5' 6\").    Weight as of 12/4/18: 84.8 kg (187 lb).       Anesthesia Plan      History & Physical Review  History and physical reviewed and following examination; no interval change.    ASA Status:  2 .    NPO Status:  > 8 hours    Plan for MAC and General with Propofol and Intravenous induction. Reason for MAC:  Deep or markedly invasive procedure (G8)  PONV prophylaxis:  Ondansetron (or other 5HT-3) and Dexamethasone or Solumedrol       Postoperative Care  Postoperative pain management:  IV analgesics and Oral pain medications.      Consents  Anesthetic plan, risks, benefits and alternatives discussed with:  Patient and Spouse..                 BLADIMIR Romero CRNA  "

## 2018-12-13 NOTE — DISCHARGE INSTRUCTIONS
Same Day Surgery Discharge Instructions  Special Precautions After Surgery - Adult    1. It is not unusual to feel lightheaded or faint, up to 24 hours after surgery or while taking pain medication.  If you have these symptoms; sit for a few minutes before standing and have someone assist you when getting up.  2. You should rest and relax for the next 24 hours and must have someone stay with you for at least 24 hours after your discharge.  3. DO NOT DRIVE any vehicle or operate mechanical equipment for 24 hours following the end of your surgery.  DO NOT DRIVE while taking narcotic pain medications that have been prescribed by your physician.  If you had a limb operated on, you must be able to use it fully to drive.  4. DO NOT drink alcoholic beverages for 24 hours following surgery or while taking prescription pain medication.  5. Drink clear liquids (apple juice, ginger ale, broth, 7-Up, etc.).  Progress to your regular diet as you feel able.  6. Any questions call your physician and do not make important decisions for 24 hours.      __________________________________________________________________________________________________________________________________  IMPORTANT NUMBERS:    [facility] Main Number:  [phone], [phone]  Pharmacy:  [phone]  Same Day Surgery:  [phone], Monday - Friday until 8:30 p.m.  Urgent Care:  [phone]  Emergency Room:  [phone]      [facility]:  [phone]                                                                             [facility]:  [phone] option 1  [facility]:  [phone]     [facility]:  [phone]   [facility]:  [phone]   Home Medical Equipment: [phone]  [facility]:  [phone]

## 2018-12-13 NOTE — OP NOTE
[facility] ORTHOPEDICS OPERATIVE REPORT  Operative Report - Orthopedics  Caitlin Oh,  1975, MRN [id number]    Surgery Date: 18    PCP: Sujatha Maher, [phone]   Code status:  Prior       OPERATION SITE:  [facility] Operating Room       OPERATIVE REPORT  DR. LISSETT FULLER  FOOT & ANKLE SURGEON  [facility] ORTHOPEDICS    DATE OF PROCEDURE: 18    SITE: [facility] Operating Room    SURGEON: Dr. Lissett Fuller - [facility]    ASSISTANT: None      Pre-Operative Diagnosis:  1.   Left foot distal lateral fifth metatarsal flare  2.  Left fifth toe adductovarus contracture    Post-Operative Diagnosis:  1.  Left foot distal lateral fifth metatarsal flare  2.  Left fifth toe adductovarus contracture    Procedures Performed:  1.  Left fifth metatarsal realignment osteotomy  2.  Left fifth toe flexor tendon release/tenotomy, percutaneous plantar approach  3.  Intraprocedural fluoroscopic assistance    Anesthesia: Monitored Anesthesia Care with Local/Regional  Hemostasis: Ankle Tourniquet at 250mmHg  EBL: < 10 mL  Findings: Distal lateral left fifth metatarsal flare with adductovarus deformity of the fifth toe, semirigid.  Implants: To fixate the fifth metatarsal realignment step osteotomy 2, 2.0 snap off screws were utilized to 12 and 11 mm.  Specimens: None    Indications for the Operation:  The patient has been seen and evaluated in clinic for the above-mentioned diagnoses.  They have failed to respond to nonoperative care measures and/or surgical care for condition was indicated.  They have elected to proceed as recommended/indicated with surgical care after a thorough discussion of the associated pros, cons, risks and benefits of the operations as well as the postoperative course and details.  All associated questions were answered.  Verbal and written form informed consent was obtained.  Please see additional information within the clinical  notes.    Description of the Procedure:  Patient was seen and evaluated in the preoperative holding area.  The surgical site was marked.  The consent was signed.  The H&P was updated/reviewed.  Patient was transported from the preoperative holding area to the surgical suite.  The patient was placed on the operative table.  Anesthesia was obtained.  Antibiotics were administered via IV.  Tourniquet was applied.  The operative extremity was prepped and draped sterilely.  Then, a timeout was performed to identify the proper patient, surgical site and the procedures to be performed.  Local anesthetic was infiltrated about the operative margins for regional blockade utilizing a one-to-one mixture of 2% lidocaine plain and 0.5% Marcaine plain approximately 40 cc of the mixture was utilized.  The foot/ankle was exsanguinated, and the tourniquet was inflated.    Attention was then directed to the left lateral foot.  A dorsal lateral incision was made approximately 6 cm in linear length.  This was then dissected down in layers.  A dorsal lateral periosteal and joint dissection was conducted reactive inflammatory synovium was debrided.  The lateral abductor was removed to the digit.  The distal lateral eminence of the fifth metatarsal was removed with a sagittal saw.  Then, with an axis guide a distal long arm chevron osteotomy with a dorsal step was conducted.  The capital, distal fragment was translated and rotated and clamped into place with a small bone clamp.  Then 2, 2.0 snap off screws from dorsal distal plantar proximal perpendicular to the dorsal arm of the osteotomy were implanted at 11 and 12 mm for alignment and compression which was confirmed with intraoperative scopic assistance.  The overhang of the lateral bone was removed with a sagittal saw and a rongeur.  After irrigation biased capsular closure with related weightbearing conducted with 2-0 Vicryl.  A residual flexor tendon contracture of the fifth toe was  appreciated with a #15 blade a percutaneous anterior approach tenotomy was conducted and soft tissue mobilization improve the position of the fifth toe.  This was irrigated and closed with a nylon stitch.    A compressive sterile splint/dressing was applied.  Vascular status was intact after deflation of the tourniquet.    COMPLICATIONS: No direct complications encountered throughout the case.    The patient tolerated the procedure & anesthesia well.  They were transported from the operative suite to the postoperative holding area.  The patient was given postoperative orders as well as specific postoperative instructions which were reviewed by the nursing staff.  Orders were placed for weightbearing status/activity, postoperative oral pain management, DVT prophylaxis measures both with mechanical and medicinal measures reviewed.  Splint/dressing care measures were reviewed as well as appropriate cryotherapy measures and nutrition.  Postoperative follow-up to be conducted in the next 10-14 days for outpatient clinical follow-up in the Orthopedic clinic at [facility].  If concerns or questions arise or develop they will contact our clinic and postoperative contact numbers provided.  Case details and post-operative care requirements reviewed with family/support present today.  Additionally, a detailed postoperative instruction sheet was provided to the patient and family.  All additional questions were answered postoperatively.    Please note that this report was completed with the assistance of voice recognition and transcription services.  Although every effort has been made to correct and avoid errors, errors may remain.    Dr. Nikc Fuller, DPELOINA, FACFAS  Foot & Ankle Surgeon/Specialist  [facility]          CC: [facility], Dr. Fuller's Clinical Team

## 2018-12-13 NOTE — ANESTHESIA CARE TRANSFER NOTE
Patient: Caitlin Oh    Procedure(s):  Left 5th metatarsal corrective osteotomy    Diagnosis: Left tailors bunion  Diagnosis Additional Information: No value filed.    Anesthesia Type:   MAC, General     Note:  Airway :Room Air  Patient transferred to:Phase II  Handoff Report: Identifed the Patient, Identified the Reponsible Provider, Reviewed the pertinent medical history, Discussed the surgical course, Reviewed Intra-OP anesthesia mangement and issues during anesthesia, Set expectations for post-procedure period and Allowed opportunity for questions and acknowledgement of understanding      Vitals: (Last set prior to Anesthesia Care Transfer)    CRNA VITALS  12/13/2018 1304 - 12/13/2018 1342      12/13/2018             Pulse:  66    SpO2:  98 %                Electronically Signed By: BLADIMIR Romero CRNA  December 13, 2018  1:42 PM

## 2018-12-13 NOTE — ANESTHESIA POSTPROCEDURE EVALUATION
Patient: Caitlin Oh    Procedure(s):  Left 5th metatarsal corrective osteotomy    Diagnosis:Left tailors bunion  Diagnosis Additional Information: No value filed.    Anesthesia Type:  MAC, General    Note:  Anesthesia Post Evaluation    Patient location during evaluation: Phase 2  Patient participation: Able to fully participate in evaluation  Level of consciousness: awake and alert  Pain management: adequate  Airway patency: patent  Cardiovascular status: acceptable  Respiratory status: acceptable  Hydration status: acceptable  PONV: none     Anesthetic complications: None          Last vitals:  Vitals:    12/13/18 1400 12/13/18 1415 12/13/18 1430   BP: 135/84 137/81 126/76   Pulse:      Resp: 16 16 16   Temp:      SpO2: 100% 99%          Electronically Signed By: Jaydon Richards CRNA, APRN CRNA  December 13, 2018  4:25 PM

## 2018-12-13 NOTE — BRIEF OP NOTE
[facility] OR   Brief Operative Note    Pre-operative diagnosis: Left tailors bunion   Post-operative diagnosis * No post-op diagnosis entered *   Procedure: Procedure(s):  Left 5th metatarsal corrective osteotomy   Surgeon: Nick Fuller DPM   Anesthesia: Combined MAC with Local    Estimated blood loss: Less than 10 ml   Blood transfusion: No transfusion was given during surgery   Drains: None   Specimens: None   Findings: Left 5th metatarsal lateral flare deformity.  5th toe adductovarus flexion contracture associated.  Adequate bone stock appreciated.   Complications: None   Condition: Stable   Comments: See dictated operative report for full details.           Nick Fuller DPM, FACFAS  Foot & Ankle Surgeon/Specialist  [facility]

## 2018-12-18 DIAGNOSIS — N94.6 DYSMENORRHEA: ICD-10-CM

## 2018-12-18 RX ORDER — NORETHINDRONE ACETATE AND ETHINYL ESTRADIOL 1MG-20(21)
1 KIT ORAL DAILY
Qty: 84 TABLET | Refills: 4 | Status: CANCELLED | OUTPATIENT
Start: 2018-12-18 | End: 2019-12-18

## 2018-12-18 NOTE — TELEPHONE ENCOUNTER
Spoke with patient on the phone.  Received refill request on Junel 1/20.  Med is not active on patient profile.  Patient is no longer using the Junel Fe 1/20.  Tyler at [facility] pharmacy informed of this.  He will deactivate the med from their system.    Seda Aguilar   [facility] Clinic  RN

## 2018-12-18 NOTE — TELEPHONE ENCOUNTER
Routing refill request to provider for review/approval because:  Drug not active on patient's medication list     PRINCESS NunezN, RN

## 2018-12-27 ENCOUNTER — TRANSFERRED RECORDS (OUTPATIENT)
Dept: HEALTH INFORMATION MANAGEMENT | Facility: CLINIC | Age: 43
End: 2018-12-27

## 2019-01-24 ENCOUNTER — TRANSFERRED RECORDS (OUTPATIENT)
Dept: HEALTH INFORMATION MANAGEMENT | Facility: CLINIC | Age: 44
End: 2019-01-24

## 2019-03-04 ENCOUNTER — TRANSFERRED RECORDS (OUTPATIENT)
Dept: HEALTH INFORMATION MANAGEMENT | Facility: CLINIC | Age: 44
End: 2019-03-04

## 2019-03-21 ENCOUNTER — MYC MEDICAL ADVICE (OUTPATIENT)
Dept: FAMILY MEDICINE | Facility: CLINIC | Age: 44
End: 2019-03-21

## 2019-03-23 ENCOUNTER — E-VISIT (OUTPATIENT)
Dept: FAMILY MEDICINE | Facility: CLINIC | Age: 44
End: 2019-03-23
Payer: COMMERCIAL

## 2019-03-23 DIAGNOSIS — J06.9 UPPER RESPIRATORY TRACT INFECTION, UNSPECIFIED TYPE: Primary | ICD-10-CM

## 2019-03-23 PROCEDURE — 99207 ZZC NO BILLABLE SERVICE THIS VISIT: CPT | Performed by: FAMILY MEDICINE

## 2019-06-10 ENCOUNTER — TRANSFERRED RECORDS (OUTPATIENT)
Dept: HEALTH INFORMATION MANAGEMENT | Facility: CLINIC | Age: 44
End: 2019-06-10

## 2019-06-11 ENCOUNTER — HOSPITAL ENCOUNTER (OUTPATIENT)
Dept: PHYSICAL THERAPY | Facility: CLINIC | Age: 44
Setting detail: THERAPIES SERIES
End: 2019-06-11
Attending: PODIATRIST
Payer: COMMERCIAL

## 2019-06-11 PROCEDURE — 97535 SELF CARE MNGMENT TRAINING: CPT | Mod: GP | Performed by: PHYSICAL THERAPIST

## 2019-06-11 PROCEDURE — 97140 MANUAL THERAPY 1/> REGIONS: CPT | Mod: GP | Performed by: PHYSICAL THERAPIST

## 2019-06-11 PROCEDURE — 97161 PT EVAL LOW COMPLEX 20 MIN: CPT | Mod: GP | Performed by: PHYSICAL THERAPIST

## 2019-06-11 NOTE — PROGRESS NOTES
Caitlin Oh   PHYSICAL THERAPY EVALUATION    06/11/19 0700   General Information   Type of Visit Initial OP Ortho PT Evaluation   Start of Care Date 06/11/19   Referring Physician DR. Fuller   Patient/Family Goals Statement decrease pain   Orders Evaluate and Treat   Date of Order 06/10/19   Certification Required? No   Medical Diagnosis L 5th met realignment and 5th toe flexor tendon release   Body Part(s)   Body Part(s) Ankle/Foot   Presentation and Etiology   Pertinent history of current problem (include personal factors and/or comorbidities that impact the POC) Broke L 5th toe Feb 2018, was never right, foot stillhurt.  Now had 5th realignment osteotomy, has pin in it.  Aches everyday. Sx increase the more she is on it. Pain scale average 3/10 after work, can be 8/10 at worst, whole foot can ache at end of shift. Wears boots at work, one pair of tennis shoes she likes best and sandals - some tennis shoes too tight   Onset date of current episode/exacerbation 12/13/18   Prior Level of Function   Functional Level Prior Comment ,  - stand walk all day   Current Level of Function   Patient role/employment history A. Employed   Employment Comments [employer]   Fall Risk Screen   Fall screen completed by PT   Have you fallen 2 or more times in the past year? No   Have you fallen and had an injury in the past year? No   Is patient a fall risk? No   Ankle/Foot Objective Findings   Side (if bilateral, select both right and left) Left   Integumentary mild swelling present 4-5th interspace, noticeable that A-P 5th metatarsal is thicker than R, scar tightened 5th MP joint   Foot Position In Standing arches WNL   Ankle/Foot ROM Comment ankle PROM WNL   Accessory Motion/Joint Mobility mid foot mobs WNL   Planned Therapy Interventions   Planned Therapy Interventions manual therapy;strengthening;stretching   Clinical Impression   Criteria for Skilled Therapeutic Interventions Met  yes, treatment indicated   PT Diagnosis L lateral foot swelling and pain   Functional limitations due to impairments walking, standing too long, work duties   Clinical Presentation Stable/Uncomplicated   Clinical Decision Making (Complexity) Low complexity   Therapy Frequency 1 time/week   Predicted Duration of Therapy Intervention (days/wks) 4wks   Risk & Benefits of therapy have been explained Yes   Patient, Family & other staff in agreement with plan of care Yes   Education Assessment   Preferred Learning Style Listening   Barriers to Learning No barriers   ORTHO GOALS   PT Ortho Eval Goals 1;2   Ortho Goal 1   Goal Description pt will be able to stand for work with pain no greater than 2/10 in 4wks   Target Date 07/11/19   Ortho Goal 2   Goal Description pt will have decreased edema/swelling in foot in order to wear all shoes comfortably in 4wk   Target Date 07/11/19   Total Evaluation Time   PT Eval, Low Complexity Minutes (58822) 15   Kris Hoenk, PT #[id number]  [facility] [place]

## 2019-06-21 ENCOUNTER — HOSPITAL ENCOUNTER (OUTPATIENT)
Dept: PHYSICAL THERAPY | Facility: CLINIC | Age: 44
Setting detail: THERAPIES SERIES
End: 2019-06-21
Attending: PODIATRIST
Payer: COMMERCIAL

## 2019-06-21 PROCEDURE — 97110 THERAPEUTIC EXERCISES: CPT | Mod: GP | Performed by: PHYSICAL THERAPIST

## 2019-06-21 PROCEDURE — 97140 MANUAL THERAPY 1/> REGIONS: CPT | Mod: GP | Performed by: PHYSICAL THERAPIST

## 2019-06-28 ENCOUNTER — HOSPITAL ENCOUNTER (OUTPATIENT)
Dept: PHYSICAL THERAPY | Facility: CLINIC | Age: 44
Setting detail: THERAPIES SERIES
End: 2019-06-28
Attending: PODIATRIST
Payer: COMMERCIAL

## 2019-06-28 PROCEDURE — 97140 MANUAL THERAPY 1/> REGIONS: CPT | Mod: GP | Performed by: PHYSICAL THERAPIST

## 2019-06-28 PROCEDURE — 97535 SELF CARE MNGMENT TRAINING: CPT | Mod: GP | Performed by: PHYSICAL THERAPIST

## 2019-08-01 ENCOUNTER — TRANSFERRED RECORDS (OUTPATIENT)
Dept: HEALTH INFORMATION MANAGEMENT | Facility: CLINIC | Age: 44
End: 2019-08-01

## 2019-08-07 NOTE — ADDENDUM NOTE
Encounter addended by: Hoenk, Kris, PT on: 8/7/2019 9:01 AM   Actions taken: Sign clinical note, Flowsheet accepted, Episode resolved

## 2019-08-07 NOTE — PROGRESS NOTES
Caitlin Oh   PHYSICAL THERAPY DISCHARGE  06/28/19 0700   Signing Clinician's Name / Credentials   Signing clinician's name / credentials Kris Hoenk, PT   Session Number   Session Number 3 medica, seen from 6/11-6/28/19   Adult Goals   PT Ortho Eval Goals 1;2   Ortho Goal 1   Goal Description pt will be able to stand for work with pain no greater than 2/10 in 4wks  (not met)   Target Date 07/11/19   Ortho Goal 2   Goal Description pt will have decreased edema/swelling in foot in order to wear all shoes comfortably in 4wk  (can only wear a couple)   Target Date 07/11/19   Subjective Report   Subjective Report still hurts, mostly unchanged, the longer she is on it it aches more   Objective Measures   Objective Measures Objective Measure 1   Objective Measure 1   Details mild restrictions in distal scar, mild edema between 4-5th metatarsals   Treatment Interventions   Interventions Therapeutic Procedure/Exercise;Self Care/Home Management   Manual Therapy   Manual Therapy: Mobilization, MFR, MLD, friction massage minutes (35018) 15   Skilled Intervention TFM and retro mass for scar mobility, swelling   Treatment Detail STM retromass, TFM scar mob L foot, tool asst TFM for scar mob, jt mob all mets, midfoot   Self Care/home Management   ADL/Home Mgmt Training (54297) 8   Skilled Intervention instruct in self care, plan   Treatment Detail pt brought in orthotics, very thin padding at forefoot, trial homemade metatarsal pad, suggest orthotic remake, cont strengthening for foot intrinsics   Plan   Plan for next session hold open, get new orthotics made, cont HEP  Discharge to home program 8/7/19   Total Session Time   Timed Code Treatment Minutes 23   Total Treatment Time (sum of timed and untimed services) 23   Kris Hoenk, PT #[id number]  [facility]

## 2019-08-26 ENCOUNTER — TELEPHONE (OUTPATIENT)
Dept: FAMILY MEDICINE | Facility: CLINIC | Age: 44
End: 2019-08-26

## 2019-08-26 DIAGNOSIS — Z13.6 CARDIOVASCULAR SCREENING; LDL GOAL LESS THAN 160: Primary | ICD-10-CM

## 2019-08-26 NOTE — TELEPHONE ENCOUNTER
Reason for Call: Request for an order or referral:    Order or referral being requested: Caitlin has physical on 8/29/19.  She has lab appointment on 8/28/19 and would like labs drawn.  No orders in chart.  Could you please review and place orders.  Thank you..Reny Miranda    Date needed: as soon as possible    Has the patient been seen by the PCP for this problem? NO - has future appointment on 8/29/19    Phone number Patient can be reached at:  Home number on file [phone] (home)    Best Time:  Any time if needed    Can we leave a detailed message on this number?  YES    Call taken on 8/26/2019 at 2:57 PM by Reny Miranda

## 2019-08-26 NOTE — TELEPHONE ENCOUNTER
Lipids and bmp ordered. Please order any others that you would like done. Thank you.  Asad Corea RN

## 2019-08-28 DIAGNOSIS — Z13.6 CARDIOVASCULAR SCREENING; LDL GOAL LESS THAN 160: ICD-10-CM

## 2019-08-28 LAB
ANION GAP SERPL CALCULATED.3IONS-SCNC: 6 MMOL/L (ref 3–14)
BUN SERPL-MCNC: 14 MG/DL (ref 7–30)
CALCIUM SERPL-MCNC: 8.5 MG/DL (ref 8.5–10.1)
CHLORIDE SERPL-SCNC: 108 MMOL/L (ref 94–109)
CHOLEST SERPL-MCNC: 158 MG/DL
CO2 SERPL-SCNC: 24 MMOL/L (ref 20–32)
CREAT SERPL-MCNC: 0.72 MG/DL (ref 0.52–1.04)
GFR SERPL CREATININE-BSD FRML MDRD: >90 ML/MIN/{1.73_M2}
GLUCOSE SERPL-MCNC: 97 MG/DL (ref 70–99)
HDLC SERPL-MCNC: 57 MG/DL
LDLC SERPL CALC-MCNC: 88 MG/DL
NONHDLC SERPL-MCNC: 101 MG/DL
POTASSIUM SERPL-SCNC: 4.4 MMOL/L (ref 3.4–5.3)
SODIUM SERPL-SCNC: 138 MMOL/L (ref 133–144)
TRIGL SERPL-MCNC: 65 MG/DL

## 2019-08-28 PROCEDURE — 80048 BASIC METABOLIC PNL TOTAL CA: CPT | Performed by: FAMILY MEDICINE

## 2019-08-28 PROCEDURE — 80061 LIPID PANEL: CPT | Performed by: FAMILY MEDICINE

## 2019-08-28 PROCEDURE — 36415 COLL VENOUS BLD VENIPUNCTURE: CPT | Performed by: FAMILY MEDICINE

## 2019-08-29 ENCOUNTER — OFFICE VISIT (OUTPATIENT)
Dept: FAMILY MEDICINE | Facility: CLINIC | Age: 44
End: 2019-08-29
Payer: COMMERCIAL

## 2019-08-29 VITALS
SYSTOLIC BLOOD PRESSURE: 122 MMHG | WEIGHT: 187 LBS | DIASTOLIC BLOOD PRESSURE: 80 MMHG | OXYGEN SATURATION: 99 % | HEART RATE: 64 BPM | BODY MASS INDEX: 30.05 KG/M2 | TEMPERATURE: 99.5 F | HEIGHT: 66 IN

## 2019-08-29 DIAGNOSIS — J34.89 RHINORRHEA: ICD-10-CM

## 2019-08-29 DIAGNOSIS — G43.829 MENSTRUAL MIGRAINE WITHOUT STATUS MIGRAINOSUS, NOT INTRACTABLE: ICD-10-CM

## 2019-08-29 DIAGNOSIS — Z00.00 ROUTINE GENERAL MEDICAL EXAMINATION AT A HEALTH CARE FACILITY: Primary | ICD-10-CM

## 2019-08-29 DIAGNOSIS — Z12.31 ENCOUNTER FOR SCREENING MAMMOGRAM FOR BREAST CANCER: ICD-10-CM

## 2019-08-29 DIAGNOSIS — M25.551 HIP PAIN, RIGHT: ICD-10-CM

## 2019-08-29 DIAGNOSIS — M62.830 MUSCLE SPASM OF BACK: ICD-10-CM

## 2019-08-29 DIAGNOSIS — Z23 NEED FOR PROPHYLACTIC VACCINATION WITH TETANUS-DIPHTHERIA (TD): ICD-10-CM

## 2019-08-29 PROCEDURE — 99396 PREV VISIT EST AGE 40-64: CPT | Mod: 25 | Performed by: FAMILY MEDICINE

## 2019-08-29 PROCEDURE — 99213 OFFICE O/P EST LOW 20 MIN: CPT | Mod: 25 | Performed by: FAMILY MEDICINE

## 2019-08-29 PROCEDURE — 90715 TDAP VACCINE 7 YRS/> IM: CPT | Performed by: FAMILY MEDICINE

## 2019-08-29 PROCEDURE — 90471 IMMUNIZATION ADMIN: CPT | Performed by: FAMILY MEDICINE

## 2019-08-29 RX ORDER — IPRATROPIUM BROMIDE 42 UG/1
2 SPRAY, METERED NASAL 3 TIMES DAILY
Qty: 15 ML | Refills: 3 | Status: SHIPPED | OUTPATIENT
Start: 2019-08-29 | End: 2020-03-11

## 2019-08-29 RX ORDER — RIZATRIPTAN BENZOATE 5 MG/1
5-10 TABLET, ORALLY DISINTEGRATING ORAL
Qty: 18 TABLET | Refills: 1 | Status: SHIPPED | OUTPATIENT
Start: 2019-08-29 | End: 2020-08-31

## 2019-08-29 ASSESSMENT — ENCOUNTER SYMPTOMS
FEVER: 0
FREQUENCY: 0
PARESTHESIAS: 0
JOINT SWELLING: 0
NAUSEA: 0
SHORTNESS OF BREATH: 0
HEMATOCHEZIA: 0
WEAKNESS: 0
DYSURIA: 0
ABDOMINAL PAIN: 0
HEADACHES: 1
ARTHRALGIAS: 1
HEMATURIA: 0
DIARRHEA: 0
MYALGIAS: 0
BREAST MASS: 0
COUGH: 0
DIZZINESS: 0
EYE PAIN: 0
HEARTBURN: 0
CONSTIPATION: 0
PALPITATIONS: 0
NERVOUS/ANXIOUS: 0
SORE THROAT: 0
CHILLS: 0

## 2019-08-29 ASSESSMENT — MIFFLIN-ST. JEOR: SCORE: 1519.98

## 2019-08-29 NOTE — PATIENT INSTRUCTIONS
Preventive Health Recommendations  Female Ages 40 to 49    Yearly exam:     See your health care provider every year in order to  1. Review health changes.   2. Discuss preventive care.    3. Review your medicines if your doctor prescribed any.      Get a Pap test every three years (unless you have an abnormal result and your provider advises testing more often).      If you get Pap tests with HPV test, you only need to test every 5 years, unless you have an abnormal result. You do not need a Pap test if your uterus was removed (hysterectomy) and you have not had cancer.      You should be tested each year for STDs (sexually transmitted diseases), if you're at risk.     Ask your doctor if you should have a mammogram.      Have a colonoscopy (test for colon cancer) if someone in your family has had colon cancer or polyps before age 50.       Have a cholesterol test every 5 years.       Have a diabetes test (fasting glucose) after age 45. If you are at risk for diabetes, you should have this test every 3 years.    Shots: Get a flu shot each year. Get a tetanus shot every 10 years.     Nutrition:     Eat at least 5 servings of fruits and vegetables each day.    Eat whole-grain bread, whole-wheat pasta and brown rice instead of white grains and rice.    Get adequate Calcium and Vitamin D.      Lifestyle    Exercise at least 150 minutes a week (an average of 30 minutes a day, 5 days a week). This will help you control your weight and prevent disease.    Limit alcohol to one drink per day.    No smoking.     Wear sunscreen to prevent skin cancer.    See your dentist every six months for an exam and cleaning.          Try the nasal spray , if it works, let me know and I can send in more    See the physical therapist at least a few times to get the hip straightened out

## 2019-08-29 NOTE — PROGRESS NOTES
SUBJECTIVE:   CC: Caitlin Oh is an 44 year old woman who presents for preventive health visit.     Answers for HPI/ROS submitted by the patient on 8/29/2019   Annual Exam:  Frequency of exercise:: 2-3 days/week  Getting at least 3 servings of Calcium per day:: Yes  Diet:: Regular (no restrictions)  Taking medications regularly:: Yes  Bi-annual eye exam:: Yes  Dental care twice a year:: Yes  Sleep apnea or symptoms of sleep apnea:: None  abdominal pain: No  Blood in stool: No  Blood in urine: No  chest pain: No  chills: No  congestion: No  constipation: No  cough: No  diarrhea: No  dizziness: No  ear pain: No  eye pain: No  nervous/anxious: No  fever: No  frequency: No  genital sores: No  headaches: Yes  hearing loss: No  heartburn: No  arthralgias: Yes  joint swelling: No  peripheral edema: No  mood changes: No  myalgias: No  nausea: No  dysuria: No  palpitations: No  Skin sensation changes: No  sore throat: No  urgency: No  rash: No  shortness of breath: No  visual disturbance: No  weakness: No  pelvic pain: No  vaginal bleeding: No  vaginal discharge: No  tenderness: Yes  breast mass: No  breast discharge: No  Additional concerns today:: Yes  Duration of exercise:: 30-45 minutes          Still having ongoing foot pain she is seeing Dr. Moore    Today's PHQ-2 Score:   PHQ-2 ( 1999 [employer]) 8/29/2019 8/13/2018   Q1: Little interest or pleasure in doing things 0 0   Q2: Feeling down, depressed or hopeless 0 0   PHQ-2 Score 0 0   Q1: Little interest or pleasure in doing things Not at all Not at all   Q2: Feeling down, depressed or hopeless Not at all Not at all   PHQ-2 Score 0 0       Abuse: Current or Past(Physical, Sexual or Emotional)- No  Do you feel safe in your environment? Yes    Social History     Tobacco Use     Smoking status: Never Smoker     Smokeless tobacco: Never Used   Substance Use Topics     Alcohol use: No     If you drink alcohol do you typically have >3 drinks per day or >7 drinks per week?  No                     Reviewed orders with patient.  Reviewed health maintenance and updated orders accordingly - Yes  Labs reviewed in EPIC    Mammogram Screening: Patient under age 50, mutual decision reflected in health maintenance.      Pertinent mammograms are reviewed under the imaging tab.  History of abnormal Pap smear: NO - age 30-65 PAP every 5 years with negative HPV co-testing recommended    PAP / HPV Latest Ref Rng & Units 9/27/2016 10/28/2015 7/11/2012   PAP - NIL NIL NIL   HPV 16 DNA NEG Negative Negative -   HPV 18 DNA NEG Negative Negative -   OTHER HR HPV NEG Negative Positive(A) -     Reviewed and updated as needed this visit by clinical staff  Tobacco       Having pin in the right groin/hip area no locking or clicking feels deep she did have left foot surgery and it may be worse due to this.,   She is not limping but it keeps her from sleeping and it is getting worse.   She has a cyclical pain in the left breast she did have a cyst in that area last year.   Also has a chronic runny nose takes her allergy med but it still doesn't stop very annoying has not tried atrovent  Reviewed and updated as needed this visit by Provider            ROS:  CONSTITUTIONAL: NEGATIVE for fever, chills, change in weight  INTEGUMENTARU/SKIN: NEGATIVE for worrisome rashes, moles or lesions  EYES: NEGATIVE for vision changes or irritation  ENT: NEGATIVE for ear, mouth and throat problems  RESP: NEGATIVE for significant cough or SOB  BREAST: NEGATIVE for masses, tenderness or discharge  CV: NEGATIVE for chest pain, palpitations or peripheral edema  GI: NEGATIVE for nausea, abdominal pain, heartburn, or change in bowel habits  : NEGATIVE for unusual urinary or vaginal symptoms. Periods are regular.  MUSCULOSKELETAL: NEGATIVE for significant arthralgias or myalgia  NEURO: NEGATIVE for weakness, dizziness or paresthesias  PSYCHIATRIC: NEGATIVE for changes in mood or affect    OBJECTIVE:   /80   Pulse 64   Temp  "99.5  F (37.5  C) (Tympanic)   Ht 1.676 m (5' 6\")   Wt 84.8 kg (187 lb)   SpO2 99%   BMI 30.18 kg/m    EXAM:  GENERAL: healthy, alert and no distress  HENT: both ears: clear effusion, nose and mouth without ulcers or lesions, oropharynx clear and oral mucous membranes moist  NECK: no adenopathy, no asymmetry, masses, or scars and thyroid normal to palpation  RESP: lungs clear to auscultation - no rales, rhonchi or wheezes  BREAST: normal without masses, tenderness or nipple discharge and no palpable axillary masses or adenopathy  CV: regular rate and rhythm, normal S1 S2, no S3 or S4, no murmur, click or rub, no peripheral edema and peripheral pulses strong  ABDOMEN: soft, nontender, no hepatosplenomegaly, no masses and bowel sounds normal  MS: no gross musculoskeletal defects noted, no edema  MS: RLE exam shows no deformities, no evidence of joint effusion, ROM of all joints is normal and non tender over the greater trochanter lle with healing scar over lateral dorsal foot   SKIN: no suspicious lesions or rashes  NEURO: Normal strength and tone, mentation intact and speech normal    Diagnostic Test Results:  Labs reviewed in Epic    ASSESSMENT/PLAN:   1. Routine general medical examination at a health care facility      2. Menstrual migraine without status migrainosus, not intractable    - rizatriptan (MAXALT-MLT) 5 MG ODT; Take 1-2 tablets (5-10 mg) by mouth at onset of headache for migraine May repeat dose in 2 hours.  Do not exceed 30 mg in 24 hours  Dispense: 18 tablet; Refill: 1    3. Muscle spasm of back    - tiZANidine (ZANAFLEX) 4 MG tablet; Take 1.5 tablets (6 mg) by mouth 3 times daily as needed for muscle spasms  Dispense: 135 tablet; Refill: 11    4. Need for prophylactic vaccination with tetanus-diphtheria (Td)    -      ADMIN VACCINE, FIRST    5. Encounter for screening mammogram for breast cancer    - MA Screening Digital Bilateral; Future    6. Rhinorrhea    - ipratropium (ATROVENT) 0.06 % nasal " "spray; Spray 2 sprays into both nostrils 3 times daily  Dispense: 15 mL; Refill: 3    7. Hip pain, right    - [facility] PT, HAND, AND CHIROPRACTIC REFERRAL; Future    COUNSELING:   Reviewed preventive health counseling, as reflected in patient instructions    Estimated body mass index is 30.18 kg/m  as calculated from the following:    Height as of this encounter: 1.676 m (5' 6\").    Weight as of this encounter: 84.8 kg (187 lb).    Weight management plan: Discussed healthy diet and exercise guidelines     reports that she has never smoked. She has never used smokeless tobacco.    Try the nasal spray , if it works, let me know and I can send in more    See the physical therapist at least a few times to get the hip straightened out     Counseling Resources:  ATP IV Guidelines  Pooled Cohorts Equation Calculator  Breast Cancer Risk Calculator  FRAX Risk Assessment  ICSI Preventive Guidelines  Dietary Guidelines for Americans, 2010  USDA's MyPlate  ASA Prophylaxis  Lung CA Screening    Sujatha Maher MD  [facility]  "

## 2019-09-09 ENCOUNTER — THERAPY VISIT (OUTPATIENT)
Dept: PHYSICAL THERAPY | Facility: CLINIC | Age: 44
End: 2019-09-09
Payer: COMMERCIAL

## 2019-09-09 DIAGNOSIS — M25.551 HIP PAIN, RIGHT: ICD-10-CM

## 2019-09-09 PROCEDURE — 97140 MANUAL THERAPY 1/> REGIONS: CPT | Mod: GP | Performed by: PHYSICAL THERAPIST

## 2019-09-09 PROCEDURE — 97110 THERAPEUTIC EXERCISES: CPT | Mod: GP | Performed by: PHYSICAL THERAPIST

## 2019-09-09 PROCEDURE — 97162 PT EVAL MOD COMPLEX 30 MIN: CPT | Mod: GP | Performed by: PHYSICAL THERAPIST

## 2019-09-09 ASSESSMENT — ACTIVITIES OF DAILY LIVING (ADL)
GETTING_INTO_AND_OUT_OF_A_BATHTUB: NO DIFFICULTY AT ALL
STEPPING_UP_AND_DOWN_CURBS: NO DIFFICULTY AT ALL
WALKING_UP_STEEP_HILLS: NO DIFFICULTY AT ALL
WALKING_APPROXIMATELY_10_MINUTES: NO DIFFICULTY AT ALL
HOW_WOULD_YOU_RATE_YOUR_CURRENT_LEVEL_OF_FUNCTION_DURING_YOUR_USUAL_ACTIVITIES_OF_DAILY_LIVING_FROM_0_TO_100_WITH_100_BEING_YOUR_LEVEL_OF_FUNCTION_PRIOR_TO_YOUR_HIP_PROBLEM_AND_0_BEING_THE_INABILITY_TO_PERFORM_ANY_OF_YOUR_USUAL_DAILY_ACTIVITIES?: 100
DEEP_SQUATTING: NO DIFFICULTY AT ALL
LIGHT_TO_MODERATE_WORK: NO DIFFICULTY AT ALL
PUTTING_ON_SOCKS_AND_SHOES: NO DIFFICULTY AT ALL
GOING_UP_1_FLIGHT_OF_STAIRS: NO DIFFICULTY AT ALL
TWISTING/PIVOTING_ON_INVOLVED_LEG: NO DIFFICULTY AT ALL
STANDING_FOR_15_MINUTES: NO DIFFICULTY AT ALL
GETTING_INTO_AND_OUT_OF_AN_AVERAGE_CAR: NO DIFFICULTY AT ALL
HOS_ADL_SCORE(%): 100
RECREATIONAL_ACTIVITIES: NO DIFFICULTY AT ALL
HOS_ADL_COUNT: 17
WALKING_INITIALLY: NO DIFFICULTY AT ALL
WALKING_DOWN_STEEP_HILLS: NO DIFFICULTY AT ALL
GOING_DOWN_1_FLIGHT_OF_STAIRS: NO DIFFICULTY AT ALL
WALKING_15_MINUTES_OR_GREATER: NO DIFFICULTY AT ALL
HOS_ADL_ITEM_SCORE_TOTAL: 68
HEAVY_WORK: NO DIFFICULTY AT ALL
SITTING_FOR_15_MINUTES: NO DIFFICULTY AT ALL
ROLLING_OVER_IN_BED: NO DIFFICULTY AT ALL
HOS_ADL_HIGHEST_POTENTIAL_SCORE: 68

## 2019-09-09 NOTE — PROGRESS NOTES
[facility] Initial Evaluation  Subjective:  The history is provided by the patient and medical records.   Type of problem:  Right hip   Condition occurred with:  Insidious onset. This is a new condition   Problem details: 6-9 months ago, unknown cause.   Patient reports pain:  Anterior and lateral. Radiates to:  No radiation.  Symptoms are exacerbated by lying on extremity and nothing and relieved by other (orthotics maybe has helped a lot).    Where condition occurred: for unknown reasons.  and reported as 2/10 on pain scale. General health as reported by patient is excellent.     Surgeries include:  Orthopedic surgery (foot).  Current medications:  Muscle relaxants.     Pain is described as aching and is constant. Pain is worse during the night. Since onset symptoms are unchanged.      Patient is .     Red flags:  None as reported by patient.                      Objective:        Flexibility/Screens:       Lower Extremity:      Decreased right lower extremity flexibility:  Piriformis and Hip Flexors               Lumbar/SI Evaluation  ROM:    AROM Lumbar:   Flexion:        Ext:                    Mild loss with press up , no effect on hip pain   Side Bend:        Left:     Right:   Rotation:           Left:     Right:   Side Glide:        Left:     Right:                             SI joint/Sacrum:          Right positive at:    Forward bend standing (and pos march test)    Sacral conclusion right:  Anterior inominate                                  Hip Evaluation  Hip PROM:    Flexion: Left: normal   Right: normal but pain in anterior hip area          External Rotation: Left: normal    Right: normal but feels some increased pain in lateral anterior hip area              Hip Strength:    Flexion:   Right: 5/5   -  Pain:                    Extension:  Right: 5/5    -  Pain:    Abduction:  Right: 5/5   -   Pain:  Adduction:  Right: 5/5   -  Pain:                Hip Special  Testing:      Right hip negative for the following special tests:  Piriformis; Kenny or Fadir/Labrum    Hip Palpation:      Right hip tenderness present at:  Greater Trachanter; hip flexors; Iliac Crest and Gluteus Medius  Right hip tenderness not present at:  IT Band or Piriformis             General     ROS    Assessment/Plan:    Patient is a 44 year old female with right side hip complaints.    Patient has the following significant findings with corresponding treatment plan.                Diagnosis 1:  Right hip pain  Pain -  manual therapy  Decreased ROM/flexibility - manual therapy and therapeutic exercise    Therapy Evaluation Codes:   1) History comprised of:   Personal factors that impact the plan of care:      None.    Comorbidity factors that impact the plan of care are:      None.     Medications impacting care: Muscle relaxant.  2) Examination of Body Systems comprised of:   Body structures and functions that impact the plan of care:      Hip and Sacral illiac joint.   Activity limitations that impact the plan of care are:      Standing and Laying down.  3) Clinical presentation characteristics are:   Evolving/Changing.  4) Decision-Making    Moderate complexity using standardized patient assessment instrument and/or measureable assessment of functional outcome.  Cumulative Therapy Evaluation is: Moderate complexity.    Previous and current functional limitations:  (See Goal Flow Sheet for this information)    Short term and Long term goals: (See Goal Flow Sheet for this information)     Communication ability:  Patient appears to be able to clearly communicate and understand verbal and written communication and follow directions correctly.  Treatment Explanation - The following has been discussed with the patient:   RX ordered/plan of care  Anticipated outcomes  Possible risks and side effects  This patient would benefit from PT intervention to resume normal activities.   Rehab potential is  excellent.    Frequency:  1 X week, once daily  Duration:  for 6 weeks  Discharge Plan:  Achieve all LTG.  Independent in home treatment program.  Reach maximal therapeutic benefit.    Please refer to the daily flowsheet for treatment today, total treatment time and time spent performing 1:1 timed codes.

## 2019-09-16 ENCOUNTER — THERAPY VISIT (OUTPATIENT)
Dept: PHYSICAL THERAPY | Facility: CLINIC | Age: 44
End: 2019-09-16
Payer: COMMERCIAL

## 2019-09-16 DIAGNOSIS — M25.551 HIP PAIN, RIGHT: Primary | ICD-10-CM

## 2019-09-16 PROCEDURE — 97110 THERAPEUTIC EXERCISES: CPT | Mod: GP | Performed by: PHYSICAL THERAPIST

## 2019-09-16 PROCEDURE — 97140 MANUAL THERAPY 1/> REGIONS: CPT | Mod: GP | Performed by: PHYSICAL THERAPIST

## 2019-09-23 ENCOUNTER — THERAPY VISIT (OUTPATIENT)
Dept: PHYSICAL THERAPY | Facility: CLINIC | Age: 44
End: 2019-09-23
Payer: COMMERCIAL

## 2019-09-23 DIAGNOSIS — M25.551 HIP PAIN, RIGHT: Primary | ICD-10-CM

## 2019-09-23 PROCEDURE — 97110 THERAPEUTIC EXERCISES: CPT | Mod: GP | Performed by: PHYSICAL THERAPIST

## 2019-09-23 PROCEDURE — 97140 MANUAL THERAPY 1/> REGIONS: CPT | Mod: GP | Performed by: PHYSICAL THERAPIST

## 2019-09-30 ENCOUNTER — HEALTH MAINTENANCE LETTER (OUTPATIENT)
Age: 44
End: 2019-09-30

## 2019-10-26 ENCOUNTER — HOSPITAL ENCOUNTER (OUTPATIENT)
Dept: MAMMOGRAPHY | Facility: CLINIC | Age: 44
Discharge: HOME | End: 2019-10-26
Attending: FAMILY MEDICINE | Admitting: FAMILY MEDICINE
Payer: COMMERCIAL

## 2019-10-26 DIAGNOSIS — Z12.31 ENCOUNTER FOR SCREENING MAMMOGRAM FOR BREAST CANCER: ICD-10-CM

## 2019-10-26 PROCEDURE — 77067 SCR MAMMO BI INCL CAD: CPT

## 2019-10-26 PROCEDURE — 77063 BREAST TOMOSYNTHESIS BI: CPT

## 2019-11-17 DIAGNOSIS — G43.009 MIGRAINE WITHOUT AURA AND WITHOUT STATUS MIGRAINOSUS, NOT INTRACTABLE: ICD-10-CM

## 2019-11-18 NOTE — TELEPHONE ENCOUNTER
"TOPIRAMATE 25 MG TABLET      Last Written Prescription Date:  10/21/19  Last Fill Quantity: 80,   # refills: 1  Last Office Visit: 19  Future Office visit:       Requested Prescriptions   Pending Prescriptions Disp Refills     topiramate (TOPAMAX) 25 MG tablet [Pharmacy Med Name: TOPIRAMATE 25 MG TABLET] 80 tablet 1     Si TAB AT BED FOR 7 DAYS ,2 AT BED FOR 7 DAYS,1 TAB IN AM & 2 IN PM FOR 7 DAYS, 2 TABS 2 TIMES DAILY       Anti-Seizure Meds Protocol  Failed - 2019 10:33 AM        Failed - Review Authorizing provider's last note.      Refer to last progress notes: confirm request is for original authorizing provider (cannot be through other providers).          Passed - Recent (12 mo) or future (30 days) visit within the authorizing provider's specialty     Patient has had an office visit with the authorizing provider or a provider within the authorizing providers department within the previous 12 mos or has a future within next 30 days. See \"Patient Info\" tab in inbasket, or \"Choose Columns\" in Meds & Orders section of the refill encounter.              Passed - Normal CBC on file in past 26 months     Recent Labs   Lab Test 10/30/18  1633   WBC 9.1   RBC 4.41   HGB 13.8   HCT 41.5                    Passed - Normal ALT or AST on file in past 26 months     Recent Labs   Lab Test 10/25/17  0857   ALT 28     Recent Labs   Lab Test 10/25/17  0857   AST 13             Passed - Normal platelet count on file in past 26 months     Recent Labs   Lab Test 10/30/18  1633                  Passed - Medication is active on med list        Passed - No active pregnancy on record        Passed - No positive pregnancy test in last 12 months          "

## 2019-11-19 RX ORDER — TOPIRAMATE 25 MG/1
TABLET, FILM COATED ORAL
Qty: 80 TABLET | Refills: 1 | Status: SHIPPED | OUTPATIENT
Start: 2019-11-19 | End: 2019-12-23

## 2019-12-13 ENCOUNTER — ANCILLARY PROCEDURE (OUTPATIENT)
Dept: GENERAL RADIOLOGY | Facility: CLINIC | Age: 44
End: 2019-12-13
Attending: FAMILY MEDICINE
Payer: COMMERCIAL

## 2019-12-13 ENCOUNTER — OFFICE VISIT (OUTPATIENT)
Dept: ORTHOPEDICS | Facility: CLINIC | Age: 44
End: 2019-12-13
Payer: COMMERCIAL

## 2019-12-13 VITALS — SYSTOLIC BLOOD PRESSURE: 142 MMHG | DIASTOLIC BLOOD PRESSURE: 78 MMHG | HEIGHT: 66 IN | BODY MASS INDEX: 30.18 KG/M2

## 2019-12-13 DIAGNOSIS — M25.551 RIGHT HIP PAIN: ICD-10-CM

## 2019-12-13 DIAGNOSIS — M25.551 RIGHT HIP PAIN: Primary | ICD-10-CM

## 2019-12-13 PROCEDURE — 73502 X-RAY EXAM HIP UNI 2-3 VIEWS: CPT

## 2019-12-13 PROCEDURE — 99214 OFFICE O/P EST MOD 30 MIN: CPT | Performed by: FAMILY MEDICINE

## 2019-12-13 NOTE — PROGRESS NOTES
ASSESSMENT & PLAN  Caitlin was seen today for pain.    Diagnoses and all orders for this visit:    Right hip pain  -     XR Pelvis w Hip RT 1 View; Future      Patient is a 44 year old female presenting for evaluation of   Chief Complaint   Patient presents with     Right Hip - Pain      # Right Ant/Lateral Hip Pain: Notable over the past year worse over prox/ant/lat hip near origin of glute minimus.  No sig TTP over groin/FADDIR but pain at location with TATI and ipsilateral one leg stance as well as TTP over GT.  XR neg for arthritis.  Likely cause glute tendinopathy extending to muscles vs IT band syndrome with lower concern for ant impingement.  Pt did go to PT and had a lot of manual work but no sig strengthening of glute tendons.  Will try this and f/u if not improved or worsening  Treatment: activities as tolerated  Physical Therapy HEP for glute tendinopathy, if not improved can refer for formal PT  Injection none now can consider if pain worsening  Medications  Limited NSAIDs/Tylenol    Concerning signs/sx that would warrant urgent evaluation were discussed.  All questions were answered, patient understands and agrees with plan.      No follow-ups on file.    -----    SUBJECTIVE  Caitlin Oh is a/an 44 year old female who is seen in consultation at the request of Dr. Maher for evaluation of right hip pain. The patient is seen by themselves.    Onset: 1 years(s) ago. Reports insidious onset without acute precipitating event.  Location of Pain: right anterior hip, occasionally radiating down lateral thigh   Rating of Pain at worst: 4/10  Rating of Pain Currently: 0/10  Worsened by: hip flexion   Better with: nothing   Treatments tried: physical therapy  Quality: aching, dull, sharp  Associated symptoms: no distal numbness or tingling; denies swelling or warmth  Orthopedic history: NO  Relevant surgical history: NO  Social History: Patient works as [employer] Officer   Past Medical History:   Diagnosis  Date     Bilateral bunions      Cervical high risk HPV (human papillomavirus) test positive 11/4/2015     Female infertility 7/10/2008    Undergoing ivf 2008.      Kidney stones 11/30/2010    [facility] 11/24/10      Personal history of gestational diabetes 7/11/2012    diet controlled     Plantar fasciitis     s/p surgery     Social History     Socioeconomic History     Marital status:      Spouse name: Robi Oh     Number of children: 1     Years of education: 18     Highest education level: Not on file   Occupational History     Occupation:      Employer: [employer]   Social Needs     Financial resource strain: Not on file     Food insecurity:     Worry: Not on file     Inability: Not on file     Transportation needs:     Medical: Not on file     Non-medical: Not on file   Tobacco Use     Smoking status: Never Smoker     Smokeless tobacco: Never Used   Substance and Sexual Activity     Alcohol use: No     Drug use: No     Sexual activity: Yes     Partners: Male     Birth control/protection: Male Surgical     Comment: infertility, vasectomy   Lifestyle     Physical activity:     Days per week: Not on file     Minutes per session: Not on file     Stress: Not on file   Relationships     Social connections:     Talks on phone: Not on file     Gets together: Not on file     Attends Sikhism service: Not on file     Active member of club or organization: Not on file     Attends meetings of clubs or organizations: Not on file     Relationship status: Not on file     Intimate partner violence:     Fear of current or ex partner: Not on file     Emotionally abused: Not on file     Physically abused: Not on file     Forced sexual activity: Not on file   Other Topics Concern     Parent/sibling w/ CABG, MI or angioplasty before 65F 55M? No   Social History Narrative    Caffeine intake/servings daily - 1    Calcium intake/servings daily - 3    Exercise 6 times weekly - describe cardio and  "weightlifting    Sunscreen used - Yes    Seatbelts used - Yes    Guns stored in the home - Yes    Self Breast Exam - Yes    Pap test up to date -  Yes 1/04    Eye exam up to date -  No    Dental exam up to date -  Yes    DEXA scan up to date -  Not Applicable    Flex Sig/Colonoscopy up to date -  Not Applicable    Mammography up to date -  Not Applicable    Immunizations reviewed and up to date - Yes    Abuse: Current or Past (Physical, Sexual or Emotional) - No    Do you feel safe in your environment - Yes    Do you cope well with stress - Yes    Do you suffer from insomnia - No    Last updated by: Joselyn Massey  1/28/2005                             Patient's past medical, surgical, social, and family histories were reviewed today and no changes are noted.  No family history pertinent to the patient's problem today    REVIEW OF SYSTEMS:  10 point ROS is negative other than symptoms noted above in HPI, Past Medical History or as stated below  Constitutional: NEGATIVE for fever, chills, change in weight  Skin: NEGATIVE for worrisome rashes, moles or lesions  GI/: NEGATIVE for bowel or bladder changes  Neuro: NEGATIVE for weakness, dizziness or paresthesias    OBJECTIVE:  BP (!) 142/78   Ht 1.676 m (5' 6\")   BMI 30.18 kg/m     General: healthy, alert and in no distress  HEENT: no scleral icterus or conjunctival erythema  Skin: no suspicious lesions or rash. No jaundice.  CV: no pedal edema  Resp: normal respiratory effort without conversational dyspnea   Psych: normal mood and affect  Gait: normal steady gait with appropriate coordination and balance  Neuro: Normal light sensory exam of lower extremity  MSK:  RIGHT HIP  Inspection:    No obvious deformity or asymmetry, level pelvis  Palpation:    Tender about the gluteal muscles, GT. Otherwise all other landmarks are nontender.  Active Range of Motion:     Flexion full, IR full, ER  full  Strength:    Flexion 5/5, adduction 5/5, abduction 5/5  Special Tests:    " Positive: TATI pain over lateral hip, Paty's, pain on lateral hip with ipsilateral one leg stance    Negative: Logroll, anterior impingement (FADIR)    Independent visualization of the below image:  Recent Results (from the past 24 hour(s))   XR Pelvis w Hip RT 1 View    Narrative    XR PELVIS AND RIGHT HIP ONE VIEW   12/13/2019 4:04 PM     HISTORY: Right hip pain.    COMPARISON: None.      Impression    IMPRESSION: Joint spaces are well-preserved. No fracture or AVN.    MICHAEL DIXON MD       I  ordered, visualized and reviewed these images with the patient  No sig arthritis         Ashok Griggs MD, [facility] [place] [facility]

## 2019-12-13 NOTE — PATIENT INSTRUCTIONS
Caitlin to follow up with Primary Care provider regarding elevated blood pressure.    Diagnosis: Right glute tendinopathy  Image Findings: neg hip xr for arthritis   Treatment: home exercises  Job: no restrictions  Medications: Limited tylenol/ibuprofen for pain for 1-2 weeks  Follow-up: 3-4 weeks if not improved, follow-up sooner if worsening    It was great seeing you today!    Ashok Griggs

## 2019-12-13 NOTE — LETTER
12/13/2019         RE: Caitlin Oh  [address] [place] [address]        Dear Colleague,    Thank you for referring your patient, Caitlin Oh, to the [facility]. Please see a copy of my visit note below.    ASSESSMENT & PLAN  Caitlin was seen today for pain.    Diagnoses and all orders for this visit:    Right hip pain  -     XR Pelvis w Hip RT 1 View; Future      Patient is a 44 year old female presenting for evaluation of   Chief Complaint   Patient presents with     Right Hip - Pain      # Right Ant/Lateral Hip Pain: Notable over the past year worse over prox/ant/lat hip near origin of glute minimus.  No sig TTP over groin/FADDIR but pain at location with TATI and ipsilateral one leg stance as well as TTP over GT.  XR neg for arthritis.  Likely cause glute tendinopathy extending to muscles vs IT band syndrome with lower concern for ant impingement.  Pt did go to PT and had a lot of manual work but no sig strengthening of glute tendons.  Will try this and f/u if not improved or worsening  Treatment: activities as tolerated  Physical Therapy HEP for glute tendinopathy, if not improved can refer for formal PT  Injection none now can consider if pain worsening  Medications  Limited NSAIDs/Tylenol    Concerning signs/sx that would warrant urgent evaluation were discussed.  All questions were answered, patient understands and agrees with plan.      No follow-ups on file.    -----    SUBJECTIVE  Caitlin Oh is a/an 44 year old female who is seen in consultation at the request of Dr. Maher for evaluation of right hip pain. The patient is seen by themselves.    Onset: 1 years(s) ago. Reports insidious onset without acute precipitating event.  Location of Pain: right anterior hip, occasionally radiating down lateral thigh   Rating of Pain at worst: 4/10  Rating of Pain Currently: 0/10  Worsened by: hip flexion   Better with: nothing   Treatments tried: physical  therapy  Quality: aching, dull, sharp  Associated symptoms: no distal numbness or tingling; denies swelling or warmth  Orthopedic history: NO  Relevant surgical history: NO  Social History: Patient works as [id number]    Past Medical History:   Diagnosis Date     Bilateral bunions      Cervical high risk HPV (human papillomavirus) test positive 11/4/2015     Female infertility 7/10/2008    Undergoing ivf 2008.      Kidney stones 11/30/2010    [facility] 11/24/10      Personal history of gestational diabetes 7/11/2012    diet controlled     Plantar fasciitis     s/p surgery     Social History     Socioeconomic History     Marital status:      Spouse name: Robi Oh     Number of children: 1     Years of education: 18     Highest education level: Not on file   Occupational History     Occupation:      Employer: [employer]   Social Needs     Financial resource strain: Not on file     Food insecurity:     Worry: Not on file     Inability: Not on file     Transportation needs:     Medical: Not on file     Non-medical: Not on file   Tobacco Use     Smoking status: Never Smoker     Smokeless tobacco: Never Used   Substance and Sexual Activity     Alcohol use: No     Drug use: No     Sexual activity: Yes     Partners: Male     Birth control/protection: Male Surgical     Comment: infertility, vasectomy   Lifestyle     Physical activity:     Days per week: Not on file     Minutes per session: Not on file     Stress: Not on file   Relationships     Social connections:     Talks on phone: Not on file     Gets together: Not on file     Attends Anabaptist service: Not on file     Active member of club or organization: Not on file     Attends meetings of clubs or organizations: Not on file     Relationship status: Not on file     Intimate partner violence:     Fear of current or ex partner: Not on file     Emotionally abused: Not on file     Physically abused: Not on file     Forced  "sexual activity: Not on file   Other Topics Concern     Parent/sibling w/ CABG, MI or angioplasty before 65F 55M? No   Social History Narrative    Caffeine intake/servings daily - 1    Calcium intake/servings daily - 3    Exercise 6 times weekly - describe cardio and weightlifting    Sunscreen used - Yes    Seatbelts used - Yes    Guns stored in the home - Yes    Self Breast Exam - Yes    Pap test up to date -  Yes 1/04    Eye exam up to date -  No    Dental exam up to date -  Yes    DEXA scan up to date -  Not Applicable    Flex Sig/Colonoscopy up to date -  Not Applicable    Mammography up to date -  Not Applicable    Immunizations reviewed and up to date - Yes    Abuse: Current or Past (Physical, Sexual or Emotional) - No    Do you feel safe in your environment - Yes    Do you cope well with stress - Yes    Do you suffer from insomnia - No    Last updated by: Joselyn Massey  1/28/2005                             Patient's past medical, surgical, social, and family histories were reviewed today and no changes are noted.  No family history pertinent to the patient's problem today    REVIEW OF SYSTEMS:  10 point ROS is negative other than symptoms noted above in HPI, Past Medical History or as stated below  Constitutional: NEGATIVE for fever, chills, change in weight  Skin: NEGATIVE for worrisome rashes, moles or lesions  GI/: NEGATIVE for bowel or bladder changes  Neuro: NEGATIVE for weakness, dizziness or paresthesias    OBJECTIVE:  BP (!) 142/78   Ht 1.676 m (5' 6\")   BMI 30.18 kg/m      General: healthy, alert and in no distress  HEENT: no scleral icterus or conjunctival erythema  Skin: no suspicious lesions or rash. No jaundice.  CV: no pedal edema  Resp: normal respiratory effort without conversational dyspnea   Psych: normal mood and affect  Gait: normal steady gait with appropriate coordination and balance  Neuro: Normal light sensory exam of lower extremity  MSK:  RIGHT HIP  Inspection:    No obvious " deformity or asymmetry, level pelvis  Palpation:    Tender about the gluteal muscles, GT. Otherwise all other landmarks are nontender.  Active Range of Motion:     Flexion full, IR full, ER  full  Strength:    Flexion 5/5, adduction 5/5, abduction 5/5  Special Tests:    Positive: TATI pain over lateral hip, Paty's, pain on lateral hip with ipsilateral one leg stance    Negative: Logroll, anterior impingement (FADIR)    Independent visualization of the below image:  Recent Results (from the past 24 hour(s))   XR Pelvis w Hip RT 1 View    Narrative    XR PELVIS AND RIGHT HIP ONE VIEW   12/13/2019 4:04 PM     HISTORY: Right hip pain.    COMPARISON: None.      Impression    IMPRESSION: Joint spaces are well-preserved. No fracture or AVN.    MICHAEL DIXON MD       I  ordered, visualized and reviewed these images with the patient  No sig arthritis         Ashok Griggs MD, [facility]    Again, thank you for allowing me to participate in the care of your patient.        Sincerely,        Ashok Griggs MD

## 2019-12-19 DIAGNOSIS — G43.009 MIGRAINE WITHOUT AURA AND WITHOUT STATUS MIGRAINOSUS, NOT INTRACTABLE: ICD-10-CM

## 2019-12-19 NOTE — TELEPHONE ENCOUNTER
"TOPIRAMATE 25 MG TABLET      Last Written Prescription Date:  19  Last Fill Quantity: 80,   # refills: 1  Last Office Visit: 19  Future Office visit:       Requested Prescriptions   Pending Prescriptions Disp Refills     topiramate (TOPAMAX) 25 MG tablet [Pharmacy Med Name: TOPIRAMATE 25 MG TABLET] 80 tablet 1     Si TAB AT BED FOR 7 DAYS ,2 AT BED FOR 7 DAYS,1 TAB IN AM & 2 IN PM FOR 7 DAYS, 2 TABS 2 TIMES DAILY       Anti-Seizure Meds Protocol  Failed - 2019  8:38 AM        Failed - Review Authorizing provider's last note.      Refer to last progress notes: confirm request is for original authorizing provider (cannot be through other providers).          Failed - Normal ALT or AST on file in past 26 months     Recent Labs   Lab Test 10/25/17  0857   ALT 28     Recent Labs   Lab Test 10/25/17  0857   AST 13             Passed - Recent (12 mo) or future (30 days) visit within the authorizing provider's specialty     Patient has had an office visit with the authorizing provider or a provider within the authorizing providers department within the previous 12 mos or has a future within next 30 days. See \"Patient Info\" tab in inbasket, or \"Choose Columns\" in Meds & Orders section of the refill encounter.              Passed - Normal CBC on file in past 26 months     Recent Labs   Lab Test 10/30/18  1633   WBC 9.1   RBC 4.41   HGB 13.8   HCT 41.5                    Passed - Normal platelet count on file in past 26 months     Recent Labs   Lab Test 10/30/18  1633                  Passed - Medication is active on med list        Passed - No active pregnancy on record        Passed - No positive pregnancy test in last 12 months          "

## 2019-12-23 RX ORDER — TOPIRAMATE 25 MG/1
TABLET, FILM COATED ORAL
Qty: 80 TABLET | Refills: 1 | Status: SHIPPED | OUTPATIENT
Start: 2019-12-23 | End: 2020-01-21

## 2020-01-18 DIAGNOSIS — G43.009 MIGRAINE WITHOUT AURA AND WITHOUT STATUS MIGRAINOSUS, NOT INTRACTABLE: ICD-10-CM

## 2020-01-20 NOTE — TELEPHONE ENCOUNTER
Routing refill request to provider for review/approval because:  Would like Provider to decide the SIG. Thank you.  Asad Corea RN

## 2020-01-21 RX ORDER — TOPIRAMATE 25 MG/1
TABLET, FILM COATED ORAL
Qty: 80 TABLET | Refills: 1 | Status: SHIPPED | OUTPATIENT
Start: 2020-01-21 | End: 2020-03-11

## 2020-01-27 DIAGNOSIS — G43.009 MIGRAINE WITHOUT AURA AND WITHOUT STATUS MIGRAINOSUS, NOT INTRACTABLE: ICD-10-CM

## 2020-01-27 NOTE — TELEPHONE ENCOUNTER
"topiramate (TOPAMAX) 25 MG tablet      Last Written Prescription Date:  1/21/20  Last Fill Quantity: 80,   # refills: 1  Last Office Visit: 8/29/19  Future Office visit:       Requested Prescriptions   Pending Prescriptions Disp Refills     topiramate (TOPAMAX) 25 MG tablet 80 tablet 1       Anti-Seizure Meds Protocol  Failed - 1/27/2020 10:34 AM        Failed - Review Authorizing provider's last note.      Refer to last progress notes: confirm request is for original authorizing provider (cannot be through other providers).          Failed - Normal ALT or AST on file in past 26 months     Recent Labs   Lab Test 10/25/17  0857   ALT 28     Recent Labs   Lab Test 10/25/17  0857   AST 13             Passed - Recent (12 mo) or future (30 days) visit within the authorizing provider's specialty     Patient has had an office visit with the authorizing provider or a provider within the authorizing providers department within the previous 12 mos or has a future within next 30 days. See \"Patient Info\" tab in inbasket, or \"Choose Columns\" in Meds & Orders section of the refill encounter.              Passed - Normal CBC on file in past 26 months     Recent Labs   Lab Test 10/30/18  1633   WBC 9.1   RBC 4.41   HGB 13.8   HCT 41.5                    Passed - Normal platelet count on file in past 26 months     Recent Labs   Lab Test 10/30/18  1633                  Passed - Medication is active on med list        Passed - No active pregnancy on record        Passed - No positive pregnancy test in last 12 months          "

## 2020-01-28 RX ORDER — TOPIRAMATE 25 MG/1
TABLET, FILM COATED ORAL
Qty: 80 TABLET | Refills: 1 | Status: CANCELLED | OUTPATIENT
Start: 2020-01-28

## 2020-01-28 NOTE — TELEPHONE ENCOUNTER
Please call patient. How is she taking this right now? I would like to send in the right prescription amount . This was the tapering up amount. Sujatha Maher M.D.

## 2020-01-28 NOTE — TELEPHONE ENCOUNTER
Routing refill request to provider for review/approval because:  Would like Provider to review and decide SIG.  Thank you.  Asad Corea RN

## 2020-01-28 NOTE — TELEPHONE ENCOUNTER
Message left on patient's answering machine to call the [facility] RN back.  Asad Corea RN

## 2020-02-02 ENCOUNTER — MYC MEDICAL ADVICE (OUTPATIENT)
Dept: FAMILY MEDICINE | Facility: CLINIC | Age: 45
End: 2020-02-02

## 2020-03-11 ENCOUNTER — OFFICE VISIT (OUTPATIENT)
Dept: FAMILY MEDICINE | Facility: CLINIC | Age: 45
End: 2020-03-11
Payer: COMMERCIAL

## 2020-03-11 VITALS
WEIGHT: 190.6 LBS | HEART RATE: 69 BPM | HEIGHT: 66 IN | SYSTOLIC BLOOD PRESSURE: 139 MMHG | TEMPERATURE: 97.6 F | DIASTOLIC BLOOD PRESSURE: 89 MMHG | OXYGEN SATURATION: 97 % | BODY MASS INDEX: 30.63 KG/M2

## 2020-03-11 DIAGNOSIS — Z01.818 PREOP GENERAL PHYSICAL EXAM: Primary | ICD-10-CM

## 2020-03-11 DIAGNOSIS — M79.672 LEFT FOOT PAIN: ICD-10-CM

## 2020-03-11 PROCEDURE — 99214 OFFICE O/P EST MOD 30 MIN: CPT | Performed by: FAMILY MEDICINE

## 2020-03-11 ASSESSMENT — MIFFLIN-ST. JEOR: SCORE: 1531.31

## 2020-03-11 NOTE — PROGRESS NOTES
[facility]  [address] [address]  [phone]  Dept: [phone]    PRE-OP EVALUATION:  Today's date: 3/11/2020    Caitlin Oh (: 1975) presents for pre-operative evaluation assessment as requested by Dr. Alessandro Beltran .  She requires evaluation and anesthesia risk assessment prior to undergoing surgery/procedure for treatment of Toe surgery - left foot 5th digit.    Proposed Surgery/ Procedure: Toe surgery - left foot 5th digit.   Date of Surgery/ Procedure: 3/20/20  Time of Surgery/ Procedure: 7am  Hospital/Surgical Facility: [facility]   Fax number for surgical facility: [phone]  Primary Physician: Sujatha Maher  Type of Anesthesia Anticipated: to be determined    Patient has a Health Care Directive or Living Will:  NO    1. NO - Do you have a history of heart attack, stroke, stent, bypass or surgery on an artery in the head, neck, heart or legs?  2. NO - Do you ever have any pain or discomfort in your chest?  3. NO - Do you have a history of  Heart Failure?  4. NO - Are you troubled by shortness of breath when: walking on the level, up a slight hill or at night?  5. NO - Do you currently have a cold, bronchitis or other respiratory infection?  6. NO - Do you have a cough, shortness of breath or wheezing?  7. NO - Do you sometimes get pains in the calves of your legs when you walk?  8. NO - Do you or anyone in your family have previous history of blood clots?  9. NO - Do you or does anyone in your family have a serious bleeding problem such as prolonged bleeding following surgeries or cuts?  10. NO - Have you ever had problems with anemia or been told to take iron pills?  11. NO - Have you had any abnormal blood loss such as black, tarry or bloody stools, or abnormal vaginal bleeding?  12. NO - Have you ever had a blood transfusion?  13. NO - Have you or any of your relatives ever had problems with anesthesia?  14. NO - Do you have sleep apnea,  excessive snoring or daytime drowsiness?  15. NO - Do you have any prosthetic heart valves?  16. NO - Do you have prosthetic joints?  17. NO - Is there any chance that you may be pregnant?      HPI:     HPI related to upcoming procedure: constant foot pain left 5th digit      See problem list for active medical problems.  Problems all longstanding and stable, except as noted/documented.  See ROS for pertinent symptoms related to these conditions.      MEDICAL HISTORY:     Patient Active Problem List    Diagnosis Date Noted     Cervical high risk HPV (human papillomavirus) test positive 11/04/2015     Priority: Medium     10/28/2015:Pap--NIL, +HR HPV (NOT type 16 or 18). Plan to repeat Pap + HPV cotesting in 1 year. Reminder placed in TRACKING  9/27/16:Pap--NIL, neg HPV. Plan to repeat Pap + HPV cotesting in 3 years (2019).         Menstrual migraine 12/02/2014     Priority: Medium     Menorrhagia 12/02/2014     Priority: Medium     Tension headache, chronic 03/22/2013     Priority: Medium     CARDIOVASCULAR SCREENING; LDL GOAL LESS THAN 160 10/31/2010     Priority: Medium     Esophageal reflux 06/07/2004     Priority: Medium      Past Medical History:   Diagnosis Date     Bilateral bunions      Cervical high risk HPV (human papillomavirus) test positive 11/4/2015     Female infertility 7/10/2008    Undergoing ivf 2008.      Kidney stones 11/30/2010    [facility] 11/24/10      Personal history of gestational diabetes 7/11/2012    diet controlled     Plantar fasciitis     s/p surgery     Past Surgical History:   Procedure Laterality Date     BUNIONECTOMY Left 12/13/2018    Procedure: Left 5th metatarsal corrective osteotomy;  Surgeon: Nick Fuller DPM;  Location: [facility]     BUNIONECTOMY RT/LT Bilateral 11/2009     CYSTOSCOPY, RETROGRADES, INSERT STENT URETER(S), COMBINED  9/19/2013    Procedure: COMBINED CYSTOSCOPY, RETROGRADES, INSERT STENT URETER(S);  Right Ureteral Stent Placement;  Surgeon: JUN Villar,  MD;  Location: [facility]     DILATION AND CURETTAGE, HYSTEROSCOPY, ABLATE ENDOMETRIUM THERMACHOICE, COMBINED N/A 4/15/2015    Procedure: COMBINED DILATION AND CURETTAGE, HYSTEROSCOPY, ABLATE ENDOMETRIUM THERMACHOICE;  Surgeon: Munira Goddard MD;  Location: [facility]     DILATION AND CURETTAGE, OPERATIVE HYSTEROSCOPY WITH MORCELLATOR, COMBINED N/A 11/7/2018    Procedure: COMBINED DILATION AND CURETTAGE, OPERATIVE HYSTEROSCOPY WITH MORCELLATOR;  Surgeon: Munira Goddard MD;  Location: [facility]     EXTRACORPOREAL SHOCK WAVE LITHOTRIPSY (ESWL)  9/18/2013    Procedure: EXTRACORPOREAL SHOCK WAVE LITHOTRIPSY (ESWL);  Right Extracorporeal Shock Wave Lithotripsy;  Surgeon: JUN Villar MD;  Location: [facility]     FOOT SURGERY      for plantar fasciitis     HC TOOTH EXTRACTION W/FORCEP      wisdom teeth     LAPAROSCOPY DIAGNOSTIC (GYN)  2007    infertility     LASER HOLMIUM LITHOTRIPSY URETER(S), INSERT STENT, COMBINED  9/27/2013    Procedure: COMBINED CYSTOSCOPY, URETEROSCOPY, LASER HOLMIUM LITHOTRIPSY URETER(S), INSERT STENT;  Right Ureteroscopic Stone Extraction and Possible Stent Placement;  Surgeon: JUN Villar MD;  Location: [facility]     TONSILLECTOMY  1983     Current Outpatient Medications   Medication Sig Dispense Refill     acetaminophen (TYLENOL) 325 MG tablet Take 2 tablets (650 mg) by mouth every 4 hours as needed for mild pain 50 tablet 0     ibuprofen (ADVIL/MOTRIN) 600 MG tablet Take 1 tablet (600 mg) by mouth every 6 hours as needed for other (mild and/or inflammatory pain) 30 tablet 0     rizatriptan (MAXALT-MLT) 5 MG ODT Take 1-2 tablets (5-10 mg) by mouth at onset of headache for migraine May repeat dose in 2 hours.  Do not exceed 30 mg in 24 hours 18 tablet 1     tiZANidine (ZANAFLEX) 4 MG tablet Take 1.5 tablets (6 mg) by mouth 3 times daily as needed for muscle spasms 135 tablet 11     OTC products: None, except as noted above    Allergies   Allergen Reactions     Penicillins Rash      Latex Allergy:  "NO    Social History     Tobacco Use     Smoking status: Never Smoker     Smokeless tobacco: Never Used   Substance Use Topics     Alcohol use: No     History   Drug Use No     BP Readings from Last 6 Encounters:   03/11/20 139/89   12/13/19 (!) 142/78   08/29/19 122/80   12/13/18 126/76   12/04/18 130/78   11/07/18 132/74       REVIEW OF SYSTEMS:   Constitutional, neuro, ENT, endocrine, pulmonary, cardiac, gastrointestinal, genitourinary, musculoskeletal, integument and psychiatric systems are negative, except as otherwise noted.    EXAM:   /89   Pulse 69   Temp 97.6  F (36.4  C) (Tympanic)   Ht 1.676 m (5' 6\")   Wt 86.5 kg (190 lb 9.6 oz)   SpO2 97%   BMI 30.76 kg/m      GENERAL APPEARANCE: healthy, alert and no distress     EYES: EOMI, PERRL     HENT: ear canals and TM's normal and nose and mouth without ulcers or lesions     NECK: no adenopathy, no asymmetry, masses, or scars and thyroid normal to palpation     RESP: lungs clear to auscultation - no rales, rhonchi or wheezes     CV: regular rates and rhythm, normal S1 S2, no S3 or S4 and no murmur, click or rub     ABDOMEN:  soft, nontender, no HSM or masses and bowel sounds normal     MS: extremities normal- no gross deformities noted, no evidence of inflammation in joints, FROM in all extremities.     SKIN: no suspicious lesions or rashes     NEURO: Normal strength and tone, sensory exam grossly normal, mentation intact and speech normal     PSYCH: mentation appears normal. and affect normal/bright     LYMPHATICS: No cervical adenopathy    DIAGNOSTICS:   No labs or EKG required for low risk surgery (cataract, skin procedure, breast biopsy, etc)    Recent Labs   Lab Test 08/28/19  0851 10/30/18  1633 10/25/17  0857  07/11/12  1008   HGB  --  13.8 13.0   < >  --    PLT  --  355 270   < >  --      --  141   < >  --    POTASSIUM 4.4  --  3.8   < >  --    CR 0.72  --  0.79   < >  --    A1C  --   --   --   --  5.5    < > = values in this interval " not displayed.        IMPRESSION:   Reason for surgery/procedure: left foot pain     The proposed surgical procedure is considered LOW risk.    REVISED CARDIAC RISK INDEX  The patient has the following serious cardiovascular risks for perioperative complications such as (MI, PE, VFib and 3  AV Block):  No serious cardiac risks  INTERPRETATION: 0 risks: Class I (very low risk - 0.4% complication rate)    The patient has the following additional risks for perioperative complications:  No identified additional risks      ICD-10-CM    1. Preop general physical exam  Z01.818    2. Left foot pain  M79.672        RECOMMENDATIONS:         --Patient is on no chronic medications    APPROVAL GIVEN to proceed with proposed procedure, without further diagnostic evaluation       Signed Electronically by: Sujatha Maher MD    Copy of this evaluation report is provided to requesting physician.    [facility] Preop Guidelines    Revised Cardiac Risk Index

## 2020-03-11 NOTE — PATIENT INSTRUCTIONS
Before Your Surgery      Call your surgeon if there is any change in your health. This includes signs of a cold or flu (such as a sore throat, runny nose, cough, rash or fever).    Do not smoke, drink alcohol or take over the counter medicine (unless your surgeon or primary care doctor tells you to) for the 24 hours before and after surgery.    If you take prescribed drugs: Follow your doctor s orders about which medicines to take and which to stop until after surgery.    Eating and drinking prior to surgery: follow the instructions from your surgeon    Take a shower or bath the night before surgery. Use the soap your surgeon gave you to gently clean your skin. If you do not have soap from your surgeon, use your regular soap. Do not shave or scrub the surgery site.  Wear clean pajamas and have clean sheets on your bed.       Stop the ibuprofen 5 days before surgery       We can do the pap at your next physical

## 2020-03-22 ENCOUNTER — HEALTH MAINTENANCE LETTER (OUTPATIENT)
Age: 45
End: 2020-03-22

## 2020-05-08 ENCOUNTER — TELEPHONE (OUTPATIENT)
Dept: FAMILY MEDICINE | Facility: CLINIC | Age: 45
End: 2020-05-08

## 2020-05-08 ENCOUNTER — AMBULATORY - HEALTHEAST (OUTPATIENT)
Dept: SURGERY | Facility: AMBULATORY SURGERY CENTER | Age: 45
End: 2020-05-08

## 2020-05-08 DIAGNOSIS — Z11.59 ENCOUNTER FOR SCREENING FOR OTHER VIRAL DISEASES: ICD-10-CM

## 2020-05-08 NOTE — TELEPHONE ENCOUNTER
Reason for Call:  Other appointment    Detailed comments: Caitlin was notified on 5/7/20 that her surgery is on for May 26, 2020.  She needs another pre op and also a COVID 19 test.  She states that she does need her b/p checked.  Do you place orders for COVID or does it come from her surgeon?  How should we go about her pre op and blood pressure check?  Please review and advise. Thank you..Reny Miranda    Phone Number Patient can be reached at: Home number on file [phone] (home)    Best Time: any time    Can we leave a detailed message on this number? YES    Call taken on 5/8/2020 at 9:41 AM by Reny Miranda

## 2020-05-08 NOTE — TELEPHONE ENCOUNTER
She will need to schedule a pre op in face to face visit ([facility] [place])  The COVID testing should be ordered by her surgeon so if they did not talk to her about this, she should contact them. Typically it will be done 72 hours prior to surgery. If they will not order it, please have her notify us VIRAL Bryant

## 2020-05-12 ENCOUNTER — MYC MEDICAL ADVICE (OUTPATIENT)
Dept: FAMILY MEDICINE | Facility: CLINIC | Age: 45
End: 2020-05-12

## 2020-05-12 NOTE — TELEPHONE ENCOUNTER
[facility].  Faxed to [facility].  Emailed a copy to Caitlin so she can bring a copy with her to the lab..Reny Miranda

## 2020-05-18 ASSESSMENT — MIFFLIN-ST. JEOR: SCORE: 1455.54

## 2020-05-22 ENCOUNTER — ANESTHESIA - HEALTHEAST (OUTPATIENT)
Dept: SURGERY | Facility: AMBULATORY SURGERY CENTER | Age: 45
End: 2020-05-22

## 2020-05-22 ENCOUNTER — OFFICE VISIT (OUTPATIENT)
Dept: FAMILY MEDICINE | Facility: CLINIC | Age: 45
End: 2020-05-22
Payer: COMMERCIAL

## 2020-05-22 VITALS
WEIGHT: 186 LBS | HEIGHT: 66 IN | RESPIRATION RATE: 16 BRPM | BODY MASS INDEX: 29.89 KG/M2 | DIASTOLIC BLOOD PRESSURE: 88 MMHG | SYSTOLIC BLOOD PRESSURE: 120 MMHG | OXYGEN SATURATION: 98 % | HEART RATE: 79 BPM | TEMPERATURE: 98.7 F

## 2020-05-22 DIAGNOSIS — Z01.818 PREOP GENERAL PHYSICAL EXAM: Primary | ICD-10-CM

## 2020-05-22 DIAGNOSIS — M79.675 PAIN OF TOE OF LEFT FOOT: ICD-10-CM

## 2020-05-22 PROCEDURE — 99214 OFFICE O/P EST MOD 30 MIN: CPT | Performed by: NURSE PRACTITIONER

## 2020-05-22 ASSESSMENT — MIFFLIN-ST. JEOR: SCORE: 1510.44

## 2020-05-22 NOTE — PROGRESS NOTES
[facility]  [address] [address]  [phone]  Dept: [phone]    PRE-OP EVALUATION:  Today's date: 2020    Caitlin Oh (: 1975) presents for pre-operative evaluation assessment as requested by Dr. Beltran.  She requires evaluation and anesthesia risk assessment prior to undergoing surgery/procedure for treatment exostosis.    Proposed Surgery/ Procedure: bone smoothing 5th digit and soft tissue release 5th mpj  Date of Surgery/ Procedure: 20  Time of Surgery/ Procedure: 0900  Hospital/Surgical Facility: [facility]   Fax number for surgical facility: [phone]  Primary Physician: Sujatha Maher  Type of Anesthesia Anticipated: to be determined    Patient has a Health Care Directive or Living Will:  NO    1. NO - Do you have a history of heart attack, stroke, stent, bypass or surgery on an artery in the head, neck, heart or legs?  2. NO - Do you ever have any pain or discomfort in your chest?  3. NO - Do you have a history of  Heart Failure?  4. NO - Are you troubled by shortness of breath when: walking on the level, up a slight hill or at night?  5. NO - Do you currently have a cold, bronchitis or other respiratory infection?  6. NO - Do you have a cough, shortness of breath or wheezing?  7. NO - Do you sometimes get pains in the calves of your legs when you walk?  8. NO - Do you or anyone in your family have previous history of blood clots?  9. NO - Do you or does anyone in your family have a serious bleeding problem such as prolonged bleeding following surgeries or cuts?  10. NO - Have you ever had problems with anemia or been told to take iron pills?  11. NO - Have you had any abnormal blood loss such as black, tarry or bloody stools, or abnormal vaginal bleeding?  12. NO - Have you ever had a blood transfusion?  13. NO - Have you or any of your relatives ever had problems with anesthesia?  14. NO - Do you have sleep apnea,  excessive snoring or daytime drowsiness?  15. NO - Do you have any prosthetic heart valves?  16. NO - Do you have prosthetic joints?  17. NO - Is there any chance that you may be pregnant?      HPI:     HPI related to upcoming procedure: Hx of 5th metatarsal fracture in 02/2018. Persistent toe pain despite surgery on the metatarsal.       See problem list for active medical problems.  Problems all longstanding and stable, except as noted/documented.  See ROS for pertinent symptoms related to these conditions.      MEDICAL HISTORY:     Patient Active Problem List    Diagnosis Date Noted     Cervical high risk HPV (human papillomavirus) test positive 11/04/2015     Priority: Medium     10/28/2015:Pap--NIL, +HR HPV (NOT type 16 or 18). Plan to repeat Pap + HPV cotesting in 1 year. Reminder placed in TRACKING  9/27/16:Pap--NIL, neg HPV. Plan to repeat Pap + HPV cotesting in 3 years (2019).         Menstrual migraine 12/02/2014     Priority: Medium     Menorrhagia 12/02/2014     Priority: Medium     Tension headache, chronic 03/22/2013     Priority: Medium     CARDIOVASCULAR SCREENING; LDL GOAL LESS THAN 160 10/31/2010     Priority: Medium      Past Medical History:   Diagnosis Date     Bilateral bunions      Cervical high risk HPV (human papillomavirus) test positive 11/4/2015     Female infertility 7/10/2008    Undergoing ivf 2008.      Kidney stones 11/30/2010    [facility] 11/24/10      Personal history of gestational diabetes 7/11/2012    diet controlled     Plantar fasciitis     s/p surgery     Past Surgical History:   Procedure Laterality Date     BUNIONECTOMY Left 12/13/2018    Procedure: Left 5th metatarsal corrective osteotomy;  Surgeon: Nick Fuller DPM;  Location: [facility]     BUNIONECTOMY RT/LT Bilateral 11/2009     CYSTOSCOPY, RETROGRADES, INSERT STENT URETER(S), COMBINED  9/19/2013    Procedure: COMBINED CYSTOSCOPY, RETROGRADES, INSERT STENT URETER(S);  Right Ureteral Stent Placement;  Surgeon:  JUN Villar MD;  Location: [facility] OR     DILATION AND CURETTAGE, HYSTEROSCOPY, ABLATE ENDOMETRIUM THERMACHOICE, COMBINED N/A 4/15/2015    Procedure: COMBINED DILATION AND CURETTAGE, HYSTEROSCOPY, ABLATE ENDOMETRIUM THERMACHOICE;  Surgeon: Munira Goddard MD;  Location: [facility] OR     DILATION AND CURETTAGE, OPERATIVE HYSTEROSCOPY WITH MORCELLATOR, COMBINED N/A 11/7/2018    Procedure: COMBINED DILATION AND CURETTAGE, OPERATIVE HYSTEROSCOPY WITH MORCELLATOR;  Surgeon: Munira Goddard MD;  Location: [facility] OR     EXTRACORPOREAL SHOCK WAVE LITHOTRIPSY (ESWL)  9/18/2013    Procedure: EXTRACORPOREAL SHOCK WAVE LITHOTRIPSY (ESWL);  Right Extracorporeal Shock Wave Lithotripsy;  Surgeon: JUN Villar MD;  Location: [facility] OR     FOOT SURGERY      for plantar fasciitis     HC TOOTH EXTRACTION W/FORCEP      wisdom teeth     LAPAROSCOPY DIAGNOSTIC (GYN)  2007    infertility     LASER HOLMIUM LITHOTRIPSY URETER(S), INSERT STENT, COMBINED  9/27/2013    Procedure: COMBINED CYSTOSCOPY, URETEROSCOPY, LASER HOLMIUM LITHOTRIPSY URETER(S), INSERT STENT;  Right Ureteroscopic Stone Extraction and Possible Stent Placement;  Surgeon: JUN Villar MD;  Location: [facility] OR     TONSILLECTOMY  1983     Current Outpatient Medications   Medication Sig Dispense Refill     acetaminophen (TYLENOL) 325 MG tablet Take 2 tablets (650 mg) by mouth every 4 hours as needed for mild pain 50 tablet 0     ibuprofen (ADVIL/MOTRIN) 600 MG tablet Take 1 tablet (600 mg) by mouth every 6 hours as needed for other (mild and/or inflammatory pain) 30 tablet 0     rizatriptan (MAXALT-MLT) 5 MG ODT Take 1-2 tablets (5-10 mg) by mouth at onset of headache for migraine May repeat dose in 2 hours.  Do not exceed 30 mg in 24 hours 18 tablet 1     tiZANidine (ZANAFLEX) 4 MG tablet Take 1.5 tablets (6 mg) by mouth 3 times daily as needed for muscle spasms 135 tablet 11     OTC products: NSAIDS    Allergies   Allergen Reactions     Penicillins Rash      Latex Allergy:  "NO    Social History     Tobacco Use     Smoking status: Never Smoker     Smokeless tobacco: Never Used   Substance Use Topics     Alcohol use: No     History   Drug Use No       REVIEW OF SYSTEMS:   CONSTITUTIONAL: NEGATIVE for fever, chills, change in weight  INTEGUMENTARY/SKIN: NEGATIVE for worrisome rashes, moles or lesions  EYES: NEGATIVE for vision changes or irritation  ENT/MOUTH: NEGATIVE for ear, mouth and throat problems  RESP: NEGATIVE for significant cough or SOB  CV: NEGATIVE for chest pain, palpitations or peripheral edema  GI: NEGATIVE for nausea, abdominal pain, heartburn, or change in bowel habits  : NEGATIVE for frequency, dysuria, or hematuria  MUSCULOSKELETAL: NEGATIVE for significant arthralgias or myalgia  NEURO: NEGATIVE for weakness, dizziness or paresthesias  ENDOCRINE: NEGATIVE for temperature intolerance, skin/hair changes  HEME: NEGATIVE for bleeding problems  PSYCHIATRIC: NEGATIVE for changes in mood or affect    EXAM:   /88 (BP Location: Left arm, Patient Position: Sitting, Cuff Size: Adult Regular)   Pulse 79   Temp 98.7  F (37.1  C) (Tympanic)   Resp 16   Ht 1.676 m (5' 6\")   Wt 84.4 kg (186 lb)   SpO2 98%   BMI 30.02 kg/m      GENERAL APPEARANCE: healthy, alert and no distress     EYES: EOMI, PERRL     HENT: ear canals and TM's normal and nose and mouth without ulcers or lesions     NECK: no adenopathy, no asymmetry, masses, or scars and thyroid normal to palpation     RESP: lungs clear to auscultation - no rales, rhonchi or wheezes     CV: regular rates and rhythm, normal S1 S2, no S3 or S4 and no murmur, click or rub     MS: extremities normal- no gross deformities noted, no evidence of inflammation in joints, FROM in all extremities.     SKIN: no suspicious lesions or rashes     NEURO: Normal strength and tone, sensory exam grossly normal, mentation intact and speech normal     PSYCH: mentation appears normal. and affect normal/bright     LYMPHATICS: No cervical " adenopathy    DIAGNOSTICS:   EKG: Not indicated due to non-vascular surgery and low risk of event (age <65 and without cardiac risk factors)    Recent Labs   Lab Test 08/28/19  0851 10/30/18  1633 10/25/17  0857  07/11/12  1008   HGB  --  13.8 13.0   < >  --    PLT  --  355 270   < >  --      --  141   < >  --    POTASSIUM 4.4  --  3.8   < >  --    CR 0.72  --  0.79   < >  --    A1C  --   --   --   --  5.5    < > = values in this interval not displayed.        IMPRESSION:   Reason for surgery/procedure: Persistent left toe pain, 5th toe  Diagnosis/reason for consult: The surgeon is requesting consultation regarding anesthesia and surgical risk for this patient with respect to current and past medical conditions.      The proposed surgical procedure is considered INTERMEDIATE risk.    REVISED CARDIAC RISK INDEX  The patient has the following serious cardiovascular risks for perioperative complications such as (MI, PE, VFib and 3  AV Block):  No serious cardiac risks  INTERPRETATION: 0 risks: Class I (very low risk - 0.4% complication rate)    The patient has the following additional risks for perioperative complications:  No identified additional risks      ICD-10-CM    1. Preop general physical exam  Z01.818    2. Pain of toe of left foot  M79.675        RECOMMENDATIONS:         --Patient is on no chronic medications    APPROVAL GIVEN to proceed with proposed procedure, without further diagnostic evaluation       Signed Electronically by: BLADIMIR Hayes CNP    Copy of this evaluation report is provided to requesting physician.    [facility] Preop Guidelines    Revised Cardiac Risk Index

## 2020-05-24 ENCOUNTER — AMBULATORY - HEALTHEAST (OUTPATIENT)
Dept: FAMILY MEDICINE | Facility: CLINIC | Age: 45
End: 2020-05-24

## 2020-05-24 DIAGNOSIS — Z11.59 ENCOUNTER FOR SCREENING FOR OTHER VIRAL DISEASES: ICD-10-CM

## 2020-05-26 ENCOUNTER — SURGERY - HEALTHEAST (OUTPATIENT)
Dept: SURGERY | Facility: AMBULATORY SURGERY CENTER | Age: 45
End: 2020-05-26

## 2020-05-26 ASSESSMENT — MIFFLIN-ST. JEOR: SCORE: 1478.22

## 2020-06-12 NOTE — MR AVS SNAPSHOT
After Visit Summary   7/19/2018    Caitlin Oh    MRN: [id number]           Patient Information     Date Of Birth          1975        Visit Information        Provider Department      7/19/2018 9:00 AM Dain Vargas DPM [facility]        Today's Diagnoses     Toe pain, left    -  1    Closed fracture of fifth toe of left foot with routine healing          Care Instructions    TOE & METATARSAL FRACTURES  The structure of the foot is complex, consisting of bones, muscles, tendons, and other soft tissues. Of the 26 bones in the foot, 19 are toe bones (phalanges) and metatarsal bones (the long bones in the midfoot). Fractures of the toe and metatarsal bones are common and require evaluation by a specialist. A foot and ankle surgeon should be seen for proper diagnosis and treatment, even if initial treatment has been received in an emergency room.  A fracture is a break in the bone. Fractures can be divided into two categories: traumatic fractures and stress fractures.  TRAUMATIC FRACTURES (also called acute fractures) are caused by a direct blow or impact, such as seriously stubbing your toe. Traumatic fractures can be displaced or non-displaced. If the fracture is displaced, the bone is broken in such a way that it has changed in position (dislocated).  Signs and symptoms of a traumatic fracture include:  You may hear a sound at the time of the break.    Pinpoint pain  (pain at the place of impact) at the time the fracture occurs and perhaps for a few hours later, but often the pain goes away after several hours.   Crooked or abnormal appearance of the toe.   Bruising and swelling the next day.   It is not true that  if you can walk on it, it s not broken.  Evaluation by a foot and ankle surgeon is always recommended.   STRESS FRACTURES are tiny, hairline breaks that are usually caused by repetitive stress. Stress fractures often afflict athletes who, for  example, too rapidly increase their running mileage. They can also be caused by an abnormal foot structure, deformities, or osteoporosis. Improper footwear may also lead to stress fractures. Stress fractures should not be ignored. They require proper medical attention to heal correctly.  Symptoms of stress fractures include:  Pain with or after normal activity   Pain that goes away when resting and then returns when standing or during activity    Pinpoint pain  (pain at the site of the fracture) when touched   Swelling, but no bruising   IMPROPER TREATMENT  Some people say that  the doctor can t do anything for a broken bone in the foot.  This is usually not true. In fact, if a fractured toe or metatarsal bone is not treated correctly, serious complications may develop. For example:  A deformity in the bony architecture which may limit the ability to move the foot or cause difficulty in fitting shoes   Arthritis, which may be caused by a fracture in a joint (the juncture where two bones meet), or may be a result of angular deformities that develop when a displaced fracture is severe or hasn t been properly corrected   Chronic pain and deformity   Non-union, or failure to heal, can lead to subsequent surgery or chronic pain.   PROPER TREATMENT FOR TOES  Fractures of the toe bones are almost always traumatic fractures. Treatment for traumatic fractures depends on the break itself and may include these options:  Rest. Sometimes rest is all that is needed to treat a traumatic fracture of the toe.   Splinting. The toe may be fitted with a splint to keep it in a fixed position.   Rigid or stiff-soled shoe. Wearing a stiff-soled shoe protects the toe and helps keep it properly positioned.    Juanjose taping  the fractured toe to another toe is sometimes appropriate, but in other cases it may be harmful.   Surgery. If the break is badly displaced or if the joint is affected, surgery may be necessary. Surgery often involves the  use of fixation devices, such as pins.   PROPER TREATMENT OF METATARSALS  Breaks in the metatarsal bones may be either stress or traumatic fractures. Certain kinds of fractures of the metatarsal bones present unique challenges.  For example, sometimes a fracture of the first metatarsal bone (behind the big toe) can lead to arthritis. Since the big toe is used so frequently and bears more weight than other toes, arthritis in that area can make it painful to walk, bend, or even stand.  Another type of break, called a Soares fracture, occurs at the base of the fifth metatarsal bone (behind the little toe). It is often misdiagnosed as an ankle sprain, and misdiagnosis can have serious consequences since sprains and fractures require different treatments. Your foot and ankle surgeon is an expert in correctly identifying these conditions as well as other problems of the foot.  Treatment of metatarsal fractures depends on the type and extent of the fracture, and may include:  Rest. Sometimes rest is the only treatment needed to promote healing of a stress or traumatic fracture of a metatarsal bone.   Avoid the offending activity. Because stress fractures result from repetitive stress, it is important to avoid the activity that led to the fracture. Crutches or a wheelchair are sometimes required to offload weight from the foot to give it time to heal.   Immobilization, casting, or rigid shoe. A stiff-soled shoe or other form of immobilization may be used to protect the fractured bone while it is healing.   Surgery. Some traumatic fractures of the metatarsal bones require surgery, especially if the break is badly displaced.   Follow-up care. Your foot and ankle surgeon will provide instructions for care following surgical or non-surgical treatment. Physical therapy, exercises and rehabilitation may be included in a schedule for return to normal activities.             Follow-ups after your visit        Follow-up notes from  "your care team     Return if symptoms worsen or fail to improve.      Your next 10 appointments already scheduled     Aug 14, 2018  8:00 AM CDT   [facility] Suj[facility] Maher MD   [facility]    [address] [facility] [address] [phone]              Who to contact     If you have questions or need follow up information about today's clinic visit or your schedule please contact [facility] directly at [phone].  Normal or non-critical lab and imaging results will be communicated to you by Yodiohart, letter or phone within 4 business days after the clinic has received the results. If you do not hear from us within 7 days, please contact the clinic through Songdropt or phone. If you have a critical or abnormal lab result, we will notify you by phone as soon as possible.  Submit refill requests through [employer] or call your pharmacy and they will forward the refill request to us. Please allow 3 business days for your refill to be completed.          Additional Information About Your Visit        MyChart Information     [employer] gives you secure access to your electronic health record. If you see a primary care provider, you can also send messages to your care team and make appointments. If you have questions, please call your primary care clinic.  If you do not have a primary care provider, please call [phone] and they will assist you.        Care EveryWhere ID     This is your Care EveryWhere ID. This could be used by other organizations to access your [facility] medical records  [id number]        Your Vitals Were     Pulse Height BMI (Body Mass Index)             64 5' 6\" (1.676 m) 31.47 kg/m2          Blood Pressure from Last 3 Encounters:   07/19/18 130/83   06/21/18 (!) 152/96   04/05/18 121/77    Weight from Last 3 Encounters:   07/19/18 195 lb (88.5 kg)   04/05/18 195 lb (88.5 kg)   03/19/18 191 lb (86.6 kg)    "            Primary Care Provider Office Phone # Fax #    Sujatha Maher [phone] [address] [facility] [address]        Equal Access to Services     MARKUS WILKESGISELLE : Hima ra caldera jere Knott, wayudithda luqadaha, qaybta kacyda charleen, miesha jjlouis radha. So [facility] [phone].    ATENCIÓN: Si drula español, tiene a galdamez disposición servicios gratuitos de asistencia lingüística. Llame al [phone].    We comply with applicable federal civil rights laws and [place] laws. We do not discriminate on the basis of race, color, national origin, age, disability, sex, sexual orientation, or gender identity.            Thank you!     Thank you for choosing [facility]  for your care. Our goal is always to provide you with excellent care. Hearing back from our patients is one way we can continue to improve our services. Please take a few minutes to complete the written survey that you may receive in the mail after your visit with us. Thank you!             Your Updated Medication List - Protect others around you: Learn how to safely use, store and throw away your medicines at www.disposemymeds.org.          This list is accurate as of 7/19/18 11:59 PM.  Always use your most recent med list.                   Brand Name Dispense Instructions for use Diagnosis    ADVIL PO      Take by mouth every 6 hours as needed        diclofenac 75 MG EC tablet    VOLTAREN    60 tablet    Take 1 tablet (75 mg) by mouth 2 times daily as needed for moderate pain    Myofascial muscle pain       fluticasone 50 MCG/ACT spray    FLONASE    16 g    Spray 1-2 sprays into both nostrils daily    Post-nasal drainage, Chronic rhinitis       lidocaine-prilocaine cream    EMLA    30 g    Apply topically as needed for moderate pain    Myofascial muscle pain       oseltamivir 75 MG capsule    TAMIFLU    10 capsule    Take 1 capsule (75 mg) by mouth daily    Exposure  to the flu       rizatriptan 5 MG ODT tab    MAXALT-MLT    18 tablet    Take 2 tablets (10 mg) by mouth at onset of headache for migraine May repeat dose in 2 hours.  Do not exceed 30 mg in 24 hours    Headache(784.0)       tiZANidine 4 MG tablet    ZANAFLEX    135 tablet    Take 1.5 tablets (6 mg) by mouth 3 times daily as needed for muscle spasms    Muscle spasm of back       topiramate 50 MG tablet    TOPAMAX    180 tablet    Take 1 tablet (50 mg) by mouth 2 times daily    Menstrual migraine without status migrainosus, not intractable       TYLENOL PO      Take 500 mg by mouth every 6 hours as needed           No Repair - Repaired With Adjacent Surgical Defect Text (Leave Blank If You Do Not Want): After the excision the defect was repaired concurrently with another surgical defect which was in close approximation.

## 2020-08-31 ENCOUNTER — OFFICE VISIT (OUTPATIENT)
Dept: FAMILY MEDICINE | Facility: CLINIC | Age: 45
End: 2020-08-31
Payer: COMMERCIAL

## 2020-08-31 VITALS
HEIGHT: 66 IN | DIASTOLIC BLOOD PRESSURE: 78 MMHG | SYSTOLIC BLOOD PRESSURE: 120 MMHG | TEMPERATURE: 96.9 F | WEIGHT: 192 LBS | BODY MASS INDEX: 30.86 KG/M2 | OXYGEN SATURATION: 98 % | HEART RATE: 71 BPM

## 2020-08-31 DIAGNOSIS — Z12.4 SCREENING FOR CERVICAL CANCER: ICD-10-CM

## 2020-08-31 DIAGNOSIS — G89.29 CHRONIC MIDLINE THORACIC BACK PAIN: ICD-10-CM

## 2020-08-31 DIAGNOSIS — Z12.31 ENCOUNTER FOR SCREENING MAMMOGRAM FOR BREAST CANCER: ICD-10-CM

## 2020-08-31 DIAGNOSIS — G43.829 MENSTRUAL MIGRAINE WITHOUT STATUS MIGRAINOSUS, NOT INTRACTABLE: ICD-10-CM

## 2020-08-31 DIAGNOSIS — M54.6 CHRONIC MIDLINE THORACIC BACK PAIN: ICD-10-CM

## 2020-08-31 DIAGNOSIS — Z00.00 ROUTINE GENERAL MEDICAL EXAMINATION AT A HEALTH CARE FACILITY: Primary | ICD-10-CM

## 2020-08-31 LAB
CHOLEST SERPL-MCNC: 166 MG/DL
HDLC SERPL-MCNC: 55 MG/DL
LDLC SERPL CALC-MCNC: 95 MG/DL
NONHDLC SERPL-MCNC: 111 MG/DL
TRIGL SERPL-MCNC: 79 MG/DL

## 2020-08-31 PROCEDURE — 87624 HPV HI-RISK TYP POOLED RSLT: CPT | Performed by: FAMILY MEDICINE

## 2020-08-31 PROCEDURE — G0145 SCR C/V CYTO,THINLAYER,RESCR: HCPCS | Performed by: FAMILY MEDICINE

## 2020-08-31 PROCEDURE — 99396 PREV VISIT EST AGE 40-64: CPT | Performed by: FAMILY MEDICINE

## 2020-08-31 PROCEDURE — 80061 LIPID PANEL: CPT | Performed by: FAMILY MEDICINE

## 2020-08-31 PROCEDURE — 99213 OFFICE O/P EST LOW 20 MIN: CPT | Mod: 25 | Performed by: FAMILY MEDICINE

## 2020-08-31 PROCEDURE — 36415 COLL VENOUS BLD VENIPUNCTURE: CPT | Performed by: FAMILY MEDICINE

## 2020-08-31 RX ORDER — RIZATRIPTAN BENZOATE 5 MG/1
5-10 TABLET, ORALLY DISINTEGRATING ORAL
Qty: 18 TABLET | Refills: 1 | Status: SHIPPED | OUTPATIENT
Start: 2020-08-31 | End: 2020-11-22

## 2020-08-31 ASSESSMENT — MIFFLIN-ST. JEOR: SCORE: 1537.66

## 2020-08-31 NOTE — PROGRESS NOTES
SUBJECTIVE:   CC: Caitlin Oh is an 45 year old woman who presents for preventive health visit.     Healthy Habits:    Do you get at least three servings of calcium containing foods daily (dairy, green leafy vegetables, etc.)? yes    Amount of exercise or daily activities, outside of work: 5 day(s) per week    Problems taking medications regularly No    Medication side effects: No    Have you had an eye exam in the past two years? yes    Do you see a dentist twice per year? yes    Do you have sleep apnea, excessive snoring or daytime drowsiness?no      Migraine     Since your last clinic visit, how have your headaches changed?  No change    How often are you getting headaches or migraines? 2-3 per week headache, couple migraine per month     Are you able to do normal daily activities when you have a migraine? Yes    Are you taking rescue/relief medications? (Select all that apply) Maxalt    How helpful is your rescue/relief medication?  I get some relief    Are you taking any medications to prevent migraines? (Select all that apply)  No    In the past 4 weeks, how often have you gone to urgent care or the emergency room because of your headaches?  0      Today's PHQ-2 Score:   PHQ-2 ( 1999 [employer]) 8/29/2019 8/13/2018   Q1: Little interest or pleasure in doing things 0 0   Q2: Feeling down, depressed or hopeless 0 0   PHQ-2 Score 0 0   Q1: Little interest or pleasure in doing things Not at all Not at all   Q2: Feeling down, depressed or hopeless Not at all Not at all   PHQ-2 Score 0 0       Abuse: Current or Past(Physical, Sexual or Emotional)- No  Do you feel safe in your environment? Yes        Social History     Tobacco Use     Smoking status: Never Smoker     Smokeless tobacco: Never Used   Substance Use Topics     Alcohol use: No     If you drink alcohol do you typically have >3 drinks per day or >7 drinks per week? No                     Reviewed orders with patient.  Reviewed health maintenance and  updated orders accordingly - Yes  Lab work is in process    Mammogram Screening: Patient under age 50, mutual decision reflected in health maintenance.      Pertinent mammograms are reviewed under the imaging tab.  History of abnormal Pap smear: NO - age 30-65 PAP every 5 years with negative HPV co-testing recommended  PAP / HPV Latest Ref Rng & Units 9/27/2016 10/28/2015 7/11/2012   PAP - NIL NIL NIL   HPV 16 DNA NEG Negative Negative -   HPV 18 DNA NEG Negative Negative -   OTHER HR HPV NEG Negative Positive(A) -     Reviewed and updated as needed this visit by clinical staff  Tobacco  Allergies  Med Hx  Surg Hx  Fam Hx  Soc Hx        Reviewed and updated as needed this visit by Provider        Past Medical History:   Diagnosis Date     Bilateral bunions      Cervical high risk HPV (human papillomavirus) test positive 11/4/2015     Female infertility 7/10/2008    Undergoing ivf 2008.      Kidney stones 11/30/2010    [facility]/24/10      Personal history of gestational diabetes 7/11/2012    diet controlled     Plantar fasciitis     s/p surgery      Past Surgical History:   Procedure Laterality Date     BUNIONECTOMY Left 12/13/2018    Procedure: Left 5th metatarsal corrective osteotomy;  Surgeon: Nick Fuller DPM;  Location: [facility]     BUNIONECTOMY RT/LT Bilateral 11/2009     CYSTOSCOPY, RETROGRADES, INSERT STENT URETER(S), COMBINED  9/19/2013    Procedure: COMBINED CYSTOSCOPY, RETROGRADES, INSERT STENT URETER(S);  Right Ureteral Stent Placement;  Surgeon: JUN Villar MD;  Location: [facility]     DILATION AND CURETTAGE, HYSTEROSCOPY, ABLATE ENDOMETRIUM THERMACHOICE, COMBINED N/A 4/15/2015    Procedure: COMBINED DILATION AND CURETTAGE, HYSTEROSCOPY, ABLATE ENDOMETRIUM THERMACHOICE;  Surgeon: Munira Goddard MD;  Location: [facility]     DILATION AND CURETTAGE, OPERATIVE HYSTEROSCOPY WITH MORCELLATOR, COMBINED N/A 11/7/2018    Procedure: COMBINED DILATION AND CURETTAGE, OPERATIVE HYSTEROSCOPY WITH  "MORCELLATOR;  Surgeon: Munira Goddard MD;  [facility]: [facility]     EXTRACORPOREAL SHOCK WAVE LITHOTRIPSY (ESWL)  9/18/2013    Procedure: EXTRACORPOREAL SHOCK WAVE LITHOTRIPSY (ESWL);  Right Extracorporeal Shock Wave Lithotripsy;  Surgeon: JUN Villar MD;  Location: [facility]     FOOT SURGERY      for plantar fasciitis     HC TOOTH EXTRACTION W/FORCEP      wisdom teeth     LAPAROSCOPY DIAGNOSTIC (GYN)  2007    infertility     LASER HOLMIUM LITHOTRIPSY URETER(S), INSERT STENT, COMBINED  9/27/2013    Procedure: COMBINED CYSTOSCOPY, URETEROSCOPY, LASER HOLMIUM LITHOTRIPSY URETER(S), INSERT STENT;  Right Ureteroscopic Stone Extraction and Possible Stent Placement;  Surgeon: JUN Villar MD;  Location: [facility]     TONSILLECTOMY  1983       ROS:  CONSTITUTIONAL: NEGATIVE for fever, chills, change in weight  INTEGUMENTARU/SKIN: NEGATIVE for worrisome rashes, moles or lesions  EYES: NEGATIVE for vision changes or irritation  ENT: NEGATIVE for ear, mouth and throat problems  RESP: NEGATIVE for significant cough or SOB  BREAST: NEGATIVE for masses, tenderness or discharge  CV: NEGATIVE for chest pain, palpitations or peripheral edema  GI: NEGATIVE for nausea, abdominal pain, heartburn, or change in bowel habits  : NEGATIVE for unusual urinary or vaginal symptoms. Periods are regular.  MUSCULOSKELETAL:thoracic back pain     NEURO: NEGATIVE for weakness, dizziness or paresthesias  PSYCHIATRIC: NEGATIVE for changes in mood or affect    OBJECTIVE:   /78   Pulse 71   Temp 96.9  F (36.1  C) (Tympanic)   Ht 1.676 m (5' 6\")   Wt 87.1 kg (192 lb)   SpO2 98%   BMI 30.99 kg/m    EXAM:  GENERAL: healthy, alert and no distress  EYES: Eyes grossly normal to inspection, PERRL and conjunctivae and sclerae normal  HENT: ear canals and TM's normal, nose and mouth without ulcers or lesions  NECK: no adenopathy, no asymmetry, masses, or scars and thyroid normal to palpation  RESP: lungs clear to auscultation - no rales, rhonchi or " "wheezes  BREAST: normal without masses, tenderness or nipple discharge and no palpable axillary masses or adenopathy  CV: regular rate and rhythm, normal S1 S2, no S3 or S4, no murmur, click or rub, no peripheral edema and peripheral pulses strong  ABDOMEN: soft, nontender, no hepatosplenomegaly, no masses and bowel sounds normal   (female): normal female external genitalia, normal urethral meatus, vaginal mucosa pink, moist, well rugated, and friable cervix with maosaic pattern without masses or discharge  MS: no gross musculoskeletal defects noted, no edema  SKIN: no suspicious lesions or rashes  NEURO: Normal strength and tone, mentation intact and speech normal  PSYCH: mentation appears normal, affect normal/bright    Diagnostic Test Results:  Labs reviewed in Epic  No results found for this or any previous visit (from the past 24 hour(s)).    ASSESSMENT/PLAN:   1. Routine general medical examination at a health care facility    - Lipid panel reflex to direct LDL Fasting    2. Menstrual migraine without status migrainosus, not intractable    - rizatriptan (MAXALT-MLT) 5 MG ODT; Take 1-2 tablets (5-10 mg) by mouth at onset of headache for migraine May repeat dose in 2 hours.  Do not exceed 30 mg in 24 hours  Dispense: 18 tablet; Refill: 1    3. Screening for cervical cancer    - Pap imaged thin layer screen with HPV - recommended age 30 - 65  - HPV High Risk Types DNA Cervical    4. Chronic midline thoracic back pain  Had mri 3 addi now with newere pain/pressure likely due to radiculopathy    - MR Thoracic Spine w Contrast; Future    5. Encounter for screening mammogram for breast cancer    - MA Screen Bilateral w/Shaheed; Future    COUNSELING:   Reviewed preventive health counseling, as reflected in patient instructions    Estimated body mass index is 30.99 kg/m  as calculated from the following:    Height as of this encounter: 1.676 m (5' 6\").    Weight as of this encounter: 87.1 kg (192 lb).    Weight " management plan: Discussed healthy diet and exercise guidelines    She reports that she has never smoked. She has never used smokeless tobacco.      Counseling Resources:  ATP IV Guidelines  Pooled Cohorts Equation Calculator  Breast Cancer Risk Calculator  BRCA-Related Cancer Risk Assessment: FHS-7 Tool  FRAX Risk Assessment  ICSI Preventive Guidelines  Dietary Guidelines for Americans, 2010  USDA's MyPlate  ASA Prophylaxis  Lung CA Screening    Sujatha Maher MD  [facility]

## 2020-09-01 ENCOUNTER — HOSPITAL ENCOUNTER (OUTPATIENT)
Dept: MRI IMAGING | Facility: CLINIC | Age: 45
Discharge: HOME | End: 2020-09-01
Attending: FAMILY MEDICINE | Admitting: FAMILY MEDICINE
Payer: COMMERCIAL

## 2020-09-01 DIAGNOSIS — M54.6 CHRONIC MIDLINE THORACIC BACK PAIN: ICD-10-CM

## 2020-09-01 DIAGNOSIS — G89.29 CHRONIC MIDLINE THORACIC BACK PAIN: ICD-10-CM

## 2020-09-01 PROCEDURE — 72146 MRI CHEST SPINE W/O DYE: CPT

## 2020-09-02 ENCOUNTER — MYC MEDICAL ADVICE (OUTPATIENT)
Dept: FAMILY MEDICINE | Facility: CLINIC | Age: 45
End: 2020-09-02

## 2020-09-02 DIAGNOSIS — G89.29 CHRONIC BILATERAL THORACIC BACK PAIN: Primary | ICD-10-CM

## 2020-09-02 DIAGNOSIS — M54.6 CHRONIC BILATERAL THORACIC BACK PAIN: Primary | ICD-10-CM

## 2020-09-03 ENCOUNTER — MYC MEDICAL ADVICE (OUTPATIENT)
Dept: FAMILY MEDICINE | Facility: CLINIC | Age: 45
End: 2020-09-03

## 2020-09-03 LAB
COPATH REPORT: NORMAL
PAP: NORMAL

## 2020-09-04 ENCOUNTER — TELEPHONE (OUTPATIENT)
Dept: PALLIATIVE MEDICINE | Facility: CLINIC | Age: 45
End: 2020-09-04

## 2020-09-04 ENCOUNTER — AMBULATORY - HEALTHEAST (OUTPATIENT)
Dept: PALLIATIVE MEDICINE | Facility: OTHER | Age: 45
End: 2020-09-04

## 2020-09-04 DIAGNOSIS — G89.29 CHRONIC BILATERAL THORACIC BACK PAIN: ICD-10-CM

## 2020-09-04 DIAGNOSIS — M54.6 CHRONIC BILATERAL THORACIC BACK PAIN: ICD-10-CM

## 2020-09-04 NOTE — TELEPHONE ENCOUNTER

## 2020-09-09 ENCOUNTER — MYC MEDICAL ADVICE (OUTPATIENT)
Dept: FAMILY MEDICINE | Facility: CLINIC | Age: 45
End: 2020-09-09

## 2020-09-09 LAB
FINAL DIAGNOSIS: NORMAL
HPV HR 12 DNA CVX QL NAA+PROBE: NEGATIVE
HPV16 DNA SPEC QL NAA+PROBE: NEGATIVE
HPV18 DNA SPEC QL NAA+PROBE: NEGATIVE
SPECIMEN DESCRIPTION: NORMAL
SPECIMEN SOURCE CVX/VAG CYTO: NORMAL

## 2020-09-21 ENCOUNTER — MYC MEDICAL ADVICE (OUTPATIENT)
Dept: FAMILY MEDICINE | Facility: CLINIC | Age: 45
End: 2020-09-21

## 2020-09-21 DIAGNOSIS — R13.10 DYSPHAGIA, UNSPECIFIED TYPE: Primary | ICD-10-CM

## 2020-09-22 DIAGNOSIS — Z11.59 ENCOUNTER FOR SCREENING FOR OTHER VIRAL DISEASES: Primary | ICD-10-CM

## 2020-09-30 ENCOUNTER — VIRTUAL VISIT (OUTPATIENT)
Dept: PALLIATIVE MEDICINE | Facility: CLINIC | Age: 45
End: 2020-09-30
Attending: FAMILY MEDICINE
Payer: COMMERCIAL

## 2020-09-30 DIAGNOSIS — G89.29 CHRONIC BILATERAL THORACIC BACK PAIN: ICD-10-CM

## 2020-09-30 DIAGNOSIS — M79.2 THORACIC NEURALGIA: ICD-10-CM

## 2020-09-30 DIAGNOSIS — M79.18 MYOFASCIAL MUSCLE PAIN: Primary | ICD-10-CM

## 2020-09-30 DIAGNOSIS — M54.6 CHRONIC BILATERAL THORACIC BACK PAIN: ICD-10-CM

## 2020-09-30 PROCEDURE — 99213 OFFICE O/P EST LOW 20 MIN: CPT | Mod: 95 | Performed by: PAIN MEDICINE

## 2020-09-30 RX ORDER — METHOCARBAMOL 500 MG/1
500 TABLET, FILM COATED ORAL 2 TIMES DAILY PRN
Qty: 60 TABLET | Refills: 1 | Status: SHIPPED | OUTPATIENT
Start: 2020-09-30 | End: 2021-06-30

## 2020-09-30 ASSESSMENT — PAIN SCALES - GENERAL: PAINLEVEL: NO PAIN (0)

## 2020-09-30 NOTE — PROGRESS NOTES
"Caitlin Oh is a 45 year old female who is being evaluated via a billable video visit.      The patient has been notified of following:     \"This video visit will be conducted via a call between you and your physician/provider. We have found that certain health care needs can be provided without the need for an in-person physical exam.  This service lets us provide the care you need with a video conversation.  If a prescription is necessary we can send it directly to your pharmacy.  If lab work is needed we can place an order for that and you can then stop by our lab to have the test done at a later time.    Video visits are billed at different rates depending on your insurance coverage.  Please reach out to your insurance provider with any questions.    If during the course of the call the physician/provider feels a video visit is not appropriate, you will not be charged for this service.\"    Patient has given verbal consent for Video visit? Yes  How would you like to obtain your AVS? MyChart  If you are dropped from the video visit, the video invite should be resent to: Text to cell phone: .[phone]    Will anyone else be joining your video visit? No      Kaitlyn Carlton [facility]              "

## 2020-09-30 NOTE — PROGRESS NOTES
"Caitlin Oh is a 45 year old female who is being evaluated via a billable video visit.      The patient has been notified of following:     \"This video visit will be conducted via a call between you and your physician/provider. We have found that certain health care needs can be provided without the need for an in-person physical exam.  This service lets us provide the care you need with a video conversation.  If a prescription is necessary we can send it directly to your pharmacy.  If lab work is needed we can place an order for that and you can then stop by our lab to have the test done at a later time.    Video visits are billed at different rates depending on your insurance coverage.  Please reach out to your insurance provider with any questions.    If during the course of the call the physician/provider feels a video visit is not appropriate, you will not be charged for this service.\"    Patient has given verbal consent for Video visit? Yes  How would you like to obtain your AVS? MyChart  If you are dropped from the video visit, the video invite should be resent to: Text to cell phone: .[phone]    Will anyone else be joining your video visit? No      Kaitlyn Carlton CMA    Video-Visit Details    Type of service:  Video Visit    Video Start Time: 906  Video End Time: 940    Originating Location (pt. Location): Home    Distant Location (provider location):  [facility]     Platform used for Video Visit: [facility] Consultation    Date of visit: 9/30/2020    Reason for consultation:    Primary Care Provider is Sujatha Maher  Pain medications are being prescribed by n/a.    Please see the [facility] [place] health questionnaire which the patient completed and reviewed with me in detail.    Chief Complaint:    Chief Complaint   Patient presents with     Pain     MME prescribed prior to seeing patient:  Current MME:    Pain history: seen by me in " the past 4/18. Had intercostal blocks left t10-11 6/18 limited benefit   Caitlin Oh is a 45 year old female who first started having problems with pain approx 4.5 yrs ago. Denies any specific inciting event, Did have nephrolithiasis. She does not feel it's associated with nephro.  Current pain is slightly different then prior. She feels like her ribs are out of place. The symptoms are only on the LEft side. The symptoms are most in her lower ribs. The pt does cont to have mid thoracic pain.   She denies any numbness   She denies any tingling   The pt feels something in her left ribs is out of place. The symptoms are reproduced by bending forward.  The symptoms are reproduced by sleeping. The symp by BM. The symptoms are brought on by exercise  The symptoms areworse when lying  The symptoms are worse when she wakes up.   The pt reports some benefit when pushing over the area. She notes some benefit with chiro. She feels like things out of place. She was given some stretches, but has not noticed sig benefit.     The pain is better when she is not putting pressure over that area. she denies any respiratory/pneumonia like symptoms. Neg chest xray. Denies burning. She denies any allodynia. She denies any pain when putting on cloths. She denies any rash or hx of shingles(Hx of chicken pox as a kid)       She feels her symptoms sometimes limit her ability to take deep breaths  Overall the pain limits her ability to sleep,      The pt sometimes feels her resp are limited by her ribs.   Hx of HAs as well     She has tried physical therapy without relief.  She has tried massages without relief.   benefit  With row machine   Takes zanaflex on rare occasions  Current treatments include:  zanaflex-rareoccasions      maxalt' once a month  Previous medication treatments included:  Diclofenac- not sure     Other treatments have included:  Caitlin Oh has been seen at a pain clinic in the past.    PT: carlyn  Chiropractic:  y  Acupuncture: n  TENs Unit: n  Injections:   tpi   Intercostal 6/2018 benefit   Past Medical History:  Past Medical History:   Diagnosis Date     Bilateral bunions      Cervical high risk HPV (human papillomavirus) test positive 11/4/2015     Female infertility 7/10/2008    Undergoing ivf 2008.      Kidney stones 11/30/2010    [facility] 11/24/10      Personal history of gestational diabetes 7/11/2012    diet controlled     Plantar fasciitis     s/p surgery     Past Surgical History:  Past Surgical History:   Procedure Laterality Date     BUNIONECTOMY Left 12/13/2018    Procedure: Left 5th metatarsal corrective osteotomy;  Surgeon: Nick Fuller DPM;  Location: [facility]     BUNIONECTOMY RT/LT Bilateral 11/2009     CYSTOSCOPY, RETROGRADES, INSERT STENT URETER(S), COMBINED  9/19/2013    Procedure: COMBINED CYSTOSCOPY, RETROGRADES, INSERT STENT URETER(S);  Right Ureteral Stent Placement;  Surgeon: JUN Villar MD;  Location: [facility]     DILATION AND CURETTAGE, HYSTEROSCOPY, ABLATE ENDOMETRIUM THERMACHOICE, COMBINED N/A 4/15/2015    Procedure: COMBINED DILATION AND CURETTAGE, HYSTEROSCOPY, ABLATE ENDOMETRIUM THERMACHOICE;  Surgeon: Munira Goddard MD;  Location: [facility]     DILATION AND CURETTAGE, OPERATIVE HYSTEROSCOPY WITH MORCELLATOR, COMBINED N/A 11/7/2018    Procedure: COMBINED DILATION AND CURETTAGE, OPERATIVE HYSTEROSCOPY WITH MORCELLATOR;  Surgeon: Munira Goddard MD;  [facility]: [facility]     EXTRACORPOREAL SHOCK WAVE LITHOTRIPSY (ESWL)  9/18/2013    Procedure: EXTRACORPOREAL SHOCK WAVE LITHOTRIPSY (ESWL);  Right Extracorporeal Shock Wave Lithotripsy;  Surgeon: JUN Villar MD;  Location: [facility]     FOOT SURGERY      for plantar fasciitis     HC TOOTH EXTRACTION W/FORCEP      wisdom teeth     LAPAROSCOPY DIAGNOSTIC (GYN)  2007    infertility     LASER HOLMIUM LITHOTRIPSY URETER(S), INSERT STENT, COMBINED  9/27/2013    Procedure: COMBINED CYSTOSCOPY, URETEROSCOPY, LASER HOLMIUM LITHOTRIPSY URETER(S),  INSERT STENT;  Right Ureteroscopic Stone Extraction and Possible Stent Placement;  Surgeon: JUN Villar MD;  Location: [facility]     TONSILLECTOMY  1983     Medications:  Current Outpatient Medications   Medication Sig Dispense Refill     rizatriptan (MAXALT-MLT) 5 MG ODT Take 1-2 tablets (5-10 mg) by mouth at onset of headache for migraine May repeat dose in 2 hours.  Do not exceed 30 mg in 24 hours 18 tablet 1     tiZANidine (ZANAFLEX) 4 MG tablet Take 1.5 tablets (6 mg) by mouth 3 times daily as needed for muscle spasms 135 tablet 11     Allergies:     Allergies   Allergen Reactions     Penicillins Rash     Social History:    History of chemical dependency treatment: n    Family history:  Family History   Problem Relation Age of Onset     Heart Disease Maternal Grandmother      Breast Cancer Maternal Grandmother         dx'd age 70     Arthritis Mother      Hypertension Mother      Cancer Mother         skin cancer     Other Cancer Mother         Lymphoma     Prostate Cancer Father      Family history of headaches: n    Review of Systems:  Skin: negative  Eyes: negative  Ears/Nose/Throat: negative  Respiratory: No shortness of breath, dyspnea on exertion, cough, or hemoptysis  Cardiovascular: negative  Gastrointestinal: negative  Genitourinary: negative  Musculoskeletal: negative  Neurologic: negative  Psychiatric: negative  Hematologic/Lymphatic/Immunologic: negative  Endocrine: negative    Physical Exam:  There were no vitals filed for this visit.  Exam:  Constitutional: healthy, alert and no distress    Respiratory: Speaking in full sentences no accessory muscles use     Psychiatric: mentation appears normal and affect normal/bright  Musculoskeletal exam:  Gait/Station/Posture: wnl       Motor:  Able to easily overcome gravity       Diagnostic tests:                                                                     IMPRESSION:    1. Mild to moderate degenerative changes throughout the thoracic spine  with  scoliotic curvature.  2. No significant disc herniation. No spinal canal narrowing at any  level.  3. Asymmetric facet hypertrophy on the right at T2-3 and T3-4 and on  the left at T6-7 and T7-8 causing mild to moderate neural foraminal  narrowings.  4. Degenerative changes appear slightly progressed since previous MR  8/25/2017.      Assessment/Plan:  Caitlin Oh is a 45 year old female who presents with the complaints of Left thoracic pain.   Caitlin was seen today for pain.    Diagnoses and all orders for this visit:    Chronic bilateral thoracic back pain  -     PAIN MANAGEMENT REFERRAL         - Further procedures recommended:    - consider thoracic epidural   - Medication Management:    - trial of robaxin do not take zanaflex at the same time  - Physical Therapy:    New referral to chiropracter    - Urine toxicology screen today: non   - Follow up: 1-2 months          Total time spent was 34 minutes    Mark Scott MD  [facility]  This note was created with voice recognition software, and while reviewed for accuracy, typos may remain.

## 2020-09-30 NOTE — PATIENT INSTRUCTIONS
----------------------------------------------------------------  [facility] Number:  [phone]     Call with any questions about your care and for scheduling assistance.     Calls are returned Monday through Friday between 8 AM and 4:30 PM. We usually get back to you within 2 business days depending on the issue/request.    If we are prescribing your medications:    For opioid medication refills, call the clinic or send a Rico message 7 days in advance.  Please include:    Name of requested medication    Name of the pharmacy.    For non-opioid medications, call your pharmacy directly to request a refill. Please allow 3-4 days to be processed.     Per [place] State Law:    All controlled substance prescriptions must be filled within 30 days of being written.      For those controlled substances allowing refills, pickup must occur within 30 days of last fill.      We believe regular attendance is key to your success in our program!      Any time you are unable to keep your appointment we ask that you call us at least 24 hours in advance to cancel.This will allow us to offer the appointment time to another patient.     Multiple missed appointments may lead to dismissal from the clinic.

## 2020-10-12 DIAGNOSIS — Z11.59 ENCOUNTER FOR SCREENING FOR OTHER VIRAL DISEASES: ICD-10-CM

## 2020-10-12 PROCEDURE — U0003 INFECTIOUS AGENT DETECTION BY NUCLEIC ACID (DNA OR RNA); SEVERE ACUTE RESPIRATORY SYNDROME CORONAVIRUS 2 (SARS-COV-2) (CORONAVIRUS DISEASE [COVID-19]), AMPLIFIED PROBE TECHNIQUE, MAKING USE OF HIGH THROUGHPUT TECHNOLOGIES AS DESCRIBED BY CMS-2020-01-R: HCPCS | Performed by: SURGERY

## 2020-10-13 ENCOUNTER — ANESTHESIA EVENT (OUTPATIENT)
Dept: GASTROENTEROLOGY | Facility: CLINIC | Age: 45
End: 2020-10-13
Payer: COMMERCIAL

## 2020-10-13 ENCOUNTER — THERAPY VISIT (OUTPATIENT)
Dept: CHIROPRACTIC MEDICINE | Facility: CLINIC | Age: 45
End: 2020-10-13
Attending: PAIN MEDICINE
Payer: COMMERCIAL

## 2020-10-13 DIAGNOSIS — M99.07 SOMATIC DYSFUNCTION OF UPPER EXTREMITIES: ICD-10-CM

## 2020-10-13 DIAGNOSIS — G89.29 CHRONIC BILATERAL THORACIC BACK PAIN: ICD-10-CM

## 2020-10-13 DIAGNOSIS — M62.838 SPASM OF MUSCLE: ICD-10-CM

## 2020-10-13 DIAGNOSIS — M79.2 THORACIC NEURALGIA: ICD-10-CM

## 2020-10-13 DIAGNOSIS — M99.02 THORACIC SEGMENT DYSFUNCTION: Primary | ICD-10-CM

## 2020-10-13 DIAGNOSIS — M54.6 CHRONIC BILATERAL THORACIC BACK PAIN: ICD-10-CM

## 2020-10-13 LAB
SARS-COV-2 RNA SPEC QL NAA+PROBE: NOT DETECTED
SPECIMEN SOURCE: NORMAL

## 2020-10-13 PROCEDURE — 98940 CHIROPRACT MANJ 1-2 REGIONS: CPT | Mod: AT | Performed by: CHIROPRACTOR

## 2020-10-13 PROCEDURE — 99203 OFFICE O/P NEW LOW 30 MIN: CPT | Performed by: CHIROPRACTOR

## 2020-10-13 PROCEDURE — 98943 CHIROPRACT MANJ XTRSPINL 1/>: CPT | Performed by: CHIROPRACTOR

## 2020-10-13 NOTE — PROGRESS NOTES
Chiropractic Clinic Visit    PCP: Sujatha Maher    Caitlin Oh is a 45 year old female who is seen  in consultation at the request of  Mark Scott M.D. presenting with rib pain/discomfort . Patient reports that the onset was about 4 years ago insidiously. When asked, patient denies:, falling, slipping, bending and reaching or sleeping awkwardly. Caitlin reports having some posterior rib pain about 4 years ago.  She would get it adjusted, which would help but the pain would return.  Lately the pain has move around to the anterior rib region.  She is not having pain, but more of a discomfort.    Injury: none    Location of Pain: lower right rib cage   Duration of Pain: 4 year(s)  Rating of Pain at worst: 4/10  Rating of Pain Currently: 0/10  Symptoms are better with: changing position  Symptoms are worse with: forward crunching motion (like abdominal crunches)  Additional Features:        Health History  as reported by the patient:    How does the patient rate their own health:   Excellent    Current or past medical history:   No red flags identified    Medical allergies  Other: penecillin    Past Traumas/Surgeries  Other:  ablasion and foot surgeries    Family History  The family history includes Arthritis in her mother; Breast Cancer in her maternal grandmother; Cancer in her mother; Heart Disease in her maternal grandmother; Hypertension in her mother; Other Cancer in her mother; Prostate Cancer in her father.    Medications:  Muscle relaxants    Occupation:      Primary job tasks:       Barriers as home/work:   none    Additional health Issues:           Review of Systems  Musculoskeletal: as above  Remainder of review of systems is negative including constitutional, CV, pulmonary, GI, Skin and Neurologic except as noted in HPI or medical history.    Past Medical History:   Diagnosis Date     Bilateral bunions      Cervical high risk HPV (human papillomavirus) test positive 11/4/2015      Female infertility 7/10/2008    Undergoing ivf 2008.      Kidney stones 11/30/2010    [facility] er 11/24/10      Personal history of gestational diabetes 7/11/2012    diet controlled     Plantar fasciitis     s/p surgery     Past Surgical History:   Procedure Laterality Date     BUNIONECTOMY Left 12/13/2018    Procedure: Left 5th metatarsal corrective osteotomy;  Surgeon: Nick Fuller DPM;  Location: [facility]     BUNIONECTOMY RT/LT Bilateral 11/2009     CYSTOSCOPY, RETROGRADES, INSERT STENT URETER(S), COMBINED  9/19/2013    Procedure: COMBINED CYSTOSCOPY, RETROGRADES, INSERT STENT URETER(S);  Right Ureteral Stent Placement;  Surgeon: JUN Villar MD;  Location: [facility]     DILATION AND CURETTAGE, HYSTEROSCOPY, ABLATE ENDOMETRIUM THERMACHOICE, COMBINED N/A 4/15/2015    Procedure: COMBINED DILATION AND CURETTAGE, HYSTEROSCOPY, ABLATE ENDOMETRIUM THERMACHOICE;  Surgeon: Munira Goddard MD;  Location: [facility]     DILATION AND CURETTAGE, OPERATIVE HYSTEROSCOPY WITH MORCELLATOR, COMBINED N/A 11/7/2018    Procedure: COMBINED DILATION AND CURETTAGE, OPERATIVE HYSTEROSCOPY WITH MORCELLATOR;  Surgeon: Munira Goddard MD;  Location: [facility]     EXTRACORPOREAL SHOCK WAVE LITHOTRIPSY (ESWL)  9/18/2013    Procedure: EXTRACORPOREAL SHOCK WAVE LITHOTRIPSY (ESWL);  Right Extracorporeal Shock Wave Lithotripsy;  Surgeon: JNU Villar MD;  Location: [facility]     FOOT SURGERY      for plantar fasciitis     HC TOOTH EXTRACTION W/FORCEP      wisdom teeth     LAPAROSCOPY DIAGNOSTIC (GYN)  2007    infertility     LASER HOLMIUM LITHOTRIPSY URETER(S), INSERT STENT, COMBINED  9/27/2013    Procedure: COMBINED CYSTOSCOPY, URETEROSCOPY, LASER HOLMIUM LITHOTRIPSY URETER(S), INSERT STENT;  Right Ureteroscopic Stone Extraction and Possible Stent Placement;  Surgeon: JUN Villar MD;  Location: [facility]     TONSILLECTOMY  1983       Objective  There were no vitals taken for this visit.    GENERAL APPEARANCE: healthy, alert and no distress    GAIT: NORMAL  SKIN: no suspicious lesions or rashes  NEURO: Normal strength and tone, mentation intact and speech normal  PSYCH:  mentation appears normal and affect normal/bright      Thoracic Spine Exam    Range of Motion:         Full active and passive ROM forward flexion, extension, lateral rotation, lateral flexion.    Inspection:         Decreased kyphotic curvature        Flattening out of thoracic spine        Anterior head position        Forward rounded shoulders    Tender:        T spine paraspinal muscles       Intercostal spaces at T9, T10 on right    Non-Tender:        remainder of thoracic spine area    Sensation:       grossly intact throughout     Special Tests:      Passive neck flexion negative,     Thoracic compression- negative and     Thoracic foraminal closure - Right negative and Left negative    Lymphatics:        no edema noted      Segmental spinal dysfunction/restrictions found at:  T7 , T8 , T9  and Extra-spinal: ribs (tuorqued)      The following soft tissue hypotonicities were observed:   T paraspinals: right, ache, dull pain and stiff, no    Trigger points were found in:  T-spine paraspinal    Muscle spasm found in:  T-spine paraspinal      Radiology:      Assessment:    1. Thoracic segment dysfunction    2. Chronic bilateral thoracic back pain    3. Somatic dysfunction of upper extremities    4. Spasm of muscle    5. Thoracic neuralgia        RX ordered/plan of care  Anticipated outcomes  Possible risks and side effects    After discussing the risk and benefits of care, patient consented to treatment    Patient's condition:  Patient had restrictions pre-manipulation    Treatment effectiveness:  Post manipulation there is better intersegmental movement and Patient claims to feel looser post manipulation    Plan:    Procedures:    Evaluation and Management:  26247 Moderate level exam 30 min    CMT:  68081 Chiropractic manipulative treatment 1-2 regions performed   06720 Chiropractic  manipulative treatment extraspinal dysfunction/restriction  Thoracic: Diversified and Activator, T7, T8, T9, Prone  Extra-spinal: Diversified and Activator, ribs, Prone, Supine    Modalities:  22972: MSTM:  To Lumbar erector spine and T-spine paraspinal  for 5 min    Therapeutic procedures:  None    Response to Treatment  Patient reports no change in symptoms     Prognosis: Good      Treatment plan and goals:  Goals:  SLEEPING: the patient specific goal is to attain his pre-injury status of 8 hours comfortably    Frequency of care  Duration of care is estimated to be 6 weeks, from the initial treatment.  It is estimated that the patient will need a total of 6 visits to resolve this episode.  For the initial therapeutic trial of care, the frequency is recommended at 1 X week, once daily.  A reevaluation would be clinically appropriate in 6 visits, to determine progress and further course of care.    In-Office Treatment  Evaluation  Spinal Chiropractic Manipulative Therapy:    Extra-Spinal Chiropractic Manipulative Therapy:        Recommendations:    Instructions:  ice 20 minutes every other hour as needed    Follow-up:  Return to care in one week     Disclaimer: This note consists of symbols derived from keyboarding, dictation and/or voice recognition software. As a result, there may be errors in the script that have gone undetected. Please consider this when interpreting information found in this chart.

## 2020-10-13 NOTE — ANESTHESIA PREPROCEDURE EVALUATION
Anesthesia Pre-Procedure Evaluation    Patient: Caitlin Oh   MRN: [id number] : 1975          Preoperative Diagnosis: Dysphagia, unspecified type [R13.10]    Procedure(s):  ESOPHAGOGASTRODUODENOSCOPY (EGD)    Past Medical History:   Diagnosis Date     Bilateral bunions      Cervical high risk HPV (human papillomavirus) test positive 2015     Female infertility 7/10/2008    Undergoing ivf .      Kidney stones 2010    [facility] er 11/24/10      Personal history of gestational diabetes 2012    diet controlled     Plantar fasciitis     s/p surgery     Past Surgical History:   Procedure Laterality Date     BUNIONECTOMY Left 2018    Procedure: Left 5th metatarsal corrective osteotomy;  Surgeon: Nick Fuller DPM;  Location: [facility]     BUNIONECTOMY RT/LT Bilateral 2009     CYSTOSCOPY, RETROGRADES, INSERT STENT URETER(S), COMBINED  2013    Procedure: COMBINED CYSTOSCOPY, RETROGRADES, INSERT STENT URETER(S);  Right Ureteral Stent Placement;  Surgeon: JUN Villar MD;  Location: [facility]     DILATION AND CURETTAGE, HYSTEROSCOPY, ABLATE ENDOMETRIUM THERMACHOICE, COMBINED N/A 4/15/2015    Procedure: COMBINED DILATION AND CURETTAGE, HYSTEROSCOPY, ABLATE ENDOMETRIUM THERMACHOICE;  Surgeon: Munira Goddard MD;  Location: [facility]     DILATION AND CURETTAGE, OPERATIVE HYSTEROSCOPY WITH MORCELLATOR, COMBINED N/A 2018    Procedure: COMBINED DILATION AND CURETTAGE, OPERATIVE HYSTEROSCOPY WITH MORCELLATOR;  Surgeon: Munira Goddard MD;  Location: [facility]     EXTRACORPOREAL SHOCK WAVE LITHOTRIPSY (ESWL)  2013    Procedure: EXTRACORPOREAL SHOCK WAVE LITHOTRIPSY (ESWL);  Right Extracorporeal Shock Wave Lithotripsy;  Surgeon: JUN Villar MD;  Location: [facility]     FOOT SURGERY      for plantar fasciitis     HC TOOTH EXTRACTION W/FORCEP      wisdom teeth     LAPAROSCOPY DIAGNOSTIC (GYN)      infertility     LASER HOLMIUM LITHOTRIPSY URETER(S), INSERT STENT, COMBINED   9/27/2013    Procedure: COMBINED CYSTOSCOPY, URETEROSCOPY, LASER HOLMIUM LITHOTRIPSY URETER(S), INSERT STENT;  Right Ureteroscopic Stone Extraction and Possible Stent Placement;  Surgeon: JUN Villar MD;  Location: [facility]     TONSILLECTOMY  1983       Anesthesia Evaluation     . Pt has had prior anesthetic.            ROS/MED HX    ENT/Pulmonary:       Neurologic: Comment: Chronic tension h/a    (+)migraines,     Cardiovascular:         METS/Exercise Tolerance:     Hematologic:         Musculoskeletal:         GI/Hepatic:         Renal/Genitourinary:     (+) Nephrolithiasis ,       Endo:         Psychiatric:         Infectious Disease:         Malignancy:         Other:                          Physical Exam  Normal systems: cardiovascular, pulmonary and dental    Airway   Mallampati: I  TM distance: >3 FB  Neck ROM: full    Dental     Cardiovascular   Rhythm and rate: regular and normal      Pulmonary    breath sounds clear to auscultation            Lab Results   Component Value Date    WBC 9.1 10/30/2018    HGB 13.8 10/30/2018    HCT 41.5 10/30/2018     10/30/2018    CRP 5.7 10/25/2017    SED 10 10/25/2017     08/28/2019    POTASSIUM 4.4 08/28/2019    CHLORIDE 108 08/28/2019    CO2 24 08/28/2019    BUN 14 08/28/2019    CR 0.72 08/28/2019    GLC 97 08/28/2019    FEDE 8.5 08/28/2019    PHOS 3.2 12/27/2013    MAG 2.2 12/27/2013    ALBUMIN 3.8 10/25/2017    PROTTOTAL 7.0 10/25/2017    ALT 28 10/25/2017    AST 13 10/25/2017    ALKPHOS 78 10/25/2017    BILITOTAL 0.2 10/25/2017    TSH 2.13 04/24/2017    HCG Negative 12/13/2018       Preop Vitals  BP Readings from Last 3 Encounters:   08/31/20 120/78   05/22/20 120/88   03/11/20 139/89    Pulse Readings from Last 3 Encounters:   08/31/20 71   05/22/20 79   03/11/20 69      Resp Readings from Last 3 Encounters:   05/22/20 16   12/13/18 16   11/07/18 20    SpO2 Readings from Last 3 Encounters:   08/31/20 98%   05/22/20 98%   03/11/20 97%      Temp Readings  "from Last 1 Encounters:   08/31/20 36.1  C (96.9  F) (Tympanic)    Ht Readings from Last 1 Encounters:   08/31/20 1.676 m (5' 6\")      Wt Readings from Last 1 Encounters:   08/31/20 87.1 kg (192 lb)    Estimated body mass index is 30.99 kg/m  as calculated from the following:    Height as of 8/31/20: 1.676 m (5' 6\").    Weight as of 8/31/20: 87.1 kg (192 lb).       Anesthesia Plan      History & Physical Review  History and physical reviewed and following examination; no interval change.    ASA Status:  2 .    NPO Status:  > 6 hours    Plan for MAC Reason for MAC:  Deep or markedly invasive procedure (G8)           Postoperative Care      Consents  Anesthetic plan, risks, benefits and alternatives discussed with:  Patient..                 BLADIMIR Godoy CRNA  "

## 2020-10-15 ENCOUNTER — HOSPITAL ENCOUNTER (OUTPATIENT)
Facility: CLINIC | Age: 45
Discharge: HOME | End: 2020-10-15
Attending: SURGERY | Admitting: SURGERY
Payer: COMMERCIAL

## 2020-10-15 ENCOUNTER — ANESTHESIA (OUTPATIENT)
Dept: GASTROENTEROLOGY | Facility: CLINIC | Age: 45
End: 2020-10-15
Payer: COMMERCIAL

## 2020-10-15 VITALS
SYSTOLIC BLOOD PRESSURE: 120 MMHG | OXYGEN SATURATION: 100 % | WEIGHT: 180 LBS | HEART RATE: 61 BPM | RESPIRATION RATE: 16 BRPM | DIASTOLIC BLOOD PRESSURE: 83 MMHG | HEIGHT: 66 IN | BODY MASS INDEX: 28.93 KG/M2 | TEMPERATURE: 98.2 F

## 2020-10-15 LAB
HCG UR QL: NEGATIVE
UPPER GI ENDOSCOPY: NORMAL

## 2020-10-15 PROCEDURE — 250N000009 HC RX 250: Performed by: SURGERY

## 2020-10-15 PROCEDURE — 258N000003 HC RX IP 258 OP 636: Performed by: SURGERY

## 2020-10-15 PROCEDURE — 81025 URINE PREGNANCY TEST: CPT | Performed by: SURGERY

## 2020-10-15 PROCEDURE — 250N000011 HC RX IP 250 OP 636: Performed by: NURSE ANESTHETIST, CERTIFIED REGISTERED

## 2020-10-15 PROCEDURE — 370N000001 HC ANESTHESIA TECHNICAL FEE, 1ST 30 MIN: Performed by: SURGERY

## 2020-10-15 PROCEDURE — 43235 EGD DIAGNOSTIC BRUSH WASH: CPT | Performed by: SURGERY

## 2020-10-15 RX ORDER — LIDOCAINE 40 MG/G
CREAM TOPICAL
Status: DISCONTINUED | OUTPATIENT
Start: 2020-10-15 | End: 2020-10-15 | Stop reason: HOSPADM

## 2020-10-15 RX ORDER — ONDANSETRON 2 MG/ML
4 INJECTION INTRAMUSCULAR; INTRAVENOUS
Status: DISCONTINUED | OUTPATIENT
Start: 2020-10-15 | End: 2020-10-15 | Stop reason: HOSPADM

## 2020-10-15 RX ORDER — SODIUM CHLORIDE, SODIUM LACTATE, POTASSIUM CHLORIDE, CALCIUM CHLORIDE 600; 310; 30; 20 MG/100ML; MG/100ML; MG/100ML; MG/100ML
INJECTION, SOLUTION INTRAVENOUS CONTINUOUS
Status: DISCONTINUED | OUTPATIENT
Start: 2020-10-15 | End: 2020-10-15 | Stop reason: HOSPADM

## 2020-10-15 RX ORDER — PROPOFOL 10 MG/ML
INJECTION, EMULSION INTRAVENOUS PRN
Status: DISCONTINUED | OUTPATIENT
Start: 2020-10-15 | End: 2020-10-15

## 2020-10-15 RX ADMIN — PROPOFOL 100 MG: 10 INJECTION, EMULSION INTRAVENOUS at 08:39

## 2020-10-15 RX ADMIN — LIDOCAINE HYDROCHLORIDE 0.1 ML: 10 INJECTION, SOLUTION EPIDURAL; INFILTRATION; INTRACAUDAL; PERINEURAL at 08:28

## 2020-10-15 RX ADMIN — SODIUM CHLORIDE, POTASSIUM CHLORIDE, SODIUM LACTATE AND CALCIUM CHLORIDE: 600; 310; 30; 20 INJECTION, SOLUTION INTRAVENOUS at 08:27

## 2020-10-15 RX ADMIN — PROPOFOL 200 MG: 10 INJECTION, EMULSION INTRAVENOUS at 08:38

## 2020-10-15 ASSESSMENT — MIFFLIN-ST. JEOR: SCORE: 1483.22

## 2020-10-15 NOTE — OP NOTE
EGD - mild edema of the arytenoids. No masses or stenoses.  Widely patent Schatzki ring at LES. Stomach and duodenum normal.

## 2020-10-15 NOTE — H&P
45 year old year old female here for upper endoscopy for dysphagia        Patient Active Problem List   Diagnosis     CARDIOVASCULAR SCREENING; LDL GOAL LESS THAN 160     Tension headache, chronic     Menstrual migraine     Menorrhagia     Cervical high risk HPV (human papillomavirus) test positive       Past Medical History:   Diagnosis Date     Bilateral bunions      Cervical high risk HPV (human papillomavirus) test positive 11/4/2015     Female infertility 7/10/2008    Undergoing ivf 2008.      Kidney stones 11/30/2010    [facility] er 11/24/10      Personal history of gestational diabetes 7/11/2012    diet controlled     Plantar fasciitis     s/p surgery       Past Surgical History:   Procedure Laterality Date     BUNIONECTOMY Left 12/13/2018    Procedure: Left 5th metatarsal corrective osteotomy;  Surgeon: Nick Fuller DPM;  Location: [facility]     BUNIONECTOMY RT/LT Bilateral 11/2009     CYSTOSCOPY, RETROGRADES, INSERT STENT URETER(S), COMBINED  9/19/2013    Procedure: COMBINED CYSTOSCOPY, RETROGRADES, INSERT STENT URETER(S);  Right Ureteral Stent Placement;  Surgeon: JUN Villar MD;  Location: [facility]     DILATION AND CURETTAGE, HYSTEROSCOPY, ABLATE ENDOMETRIUM THERMACHOICE, COMBINED N/A 4/15/2015    Procedure: COMBINED DILATION AND CURETTAGE, HYSTEROSCOPY, ABLATE ENDOMETRIUM THERMACHOICE;  Surgeon: Munira Goddard MD;  Location: [facility]     DILATION AND CURETTAGE, OPERATIVE HYSTEROSCOPY WITH MORCELLATOR, COMBINED N/A 11/7/2018    Procedure: COMBINED DILATION AND CURETTAGE, OPERATIVE HYSTEROSCOPY WITH MORCELLATOR;  Surgeon: Munira Goddard MD;  Location: [facility]     EXTRACORPOREAL SHOCK WAVE LITHOTRIPSY (ESWL)  9/18/2013    Procedure: EXTRACORPOREAL SHOCK WAVE LITHOTRIPSY (ESWL);  Right Extracorporeal Shock Wave Lithotripsy;  Surgeon: JUN Villar MD;  Location: [facility]     FOOT SURGERY      for plantar fasciitis     HC TOOTH EXTRACTION W/FORCEP      wisdom teeth     LAPAROSCOPY DIAGNOSTIC (GYN)  2007     infertility     LASER HOLMIUM LITHOTRIPSY URETER(S), INSERT STENT, COMBINED  9/27/2013    Procedure: COMBINED CYSTOSCOPY, URETEROSCOPY, LASER HOLMIUM LITHOTRIPSY URETER(S), INSERT STENT;  Right Ureteroscopic Stone Extraction and Possible Stent Placement;  Surgeon: JUN Villar MD;  Location: [facility]     TONSILLECTOMY  1983       Family History   Problem Relation Age of Onset     Heart Disease Maternal Grandmother      Breast Cancer Maternal Grandmother         dx'd age 70     Arthritis Mother      Hypertension Mother      Cancer Mother         skin cancer     Other Cancer Mother         Lymphoma     Prostate Cancer Father        No current outpatient medications on file.       Allergies   Allergen Reactions     Penicillins Rash       Pt reports that she has never smoked. She has never used smokeless tobacco. She reports that she does not drink alcohol or use drugs.    Exam:    Awake, Alert OX3  Lungs - CTA bilaterally  CV - RRR, no murmurs, distal pulses intact  Abd - soft, non-distended, non-tender, +BS  Extr - No cyanosis or edema    A/P 45 year old year old female in need of upper endoscopy for dysphagia. Risks, benefits, alternatives, and complications were discussed including the possibility of perforation and the patient agreed to proceed.    Brandon Gardner MD

## 2020-10-15 NOTE — ANESTHESIA CARE TRANSFER NOTE
Patient: Caitlin Oh    Procedure(s):  ESOPHAGOGASTRODUODENOSCOPY (EGD)    Diagnosis: Dysphagia, unspecified type [R13.10]  Diagnosis Additional Information: No value filed.    Anesthesia Type:   MAC     Note:  Airway :Room Air  Patient transferred to:Phase II  Handoff Report: Identifed the Patient, Identified the Reponsible Provider, Reviewed the pertinent medical history, Discussed the surgical course, Reviewed Intra-OP anesthesia mangement and issues during anesthesia, Set expectations for post-procedure period and Allowed opportunity for questions and acknowledgement of understanding      Vitals: (Last set prior to Anesthesia Care Transfer)    CRNA VITALS  10/15/2020 0816 - 10/15/2020 0846      10/15/2020             Pulse:  67    SpO2:  97 %                Electronically Signed By: BLADIMIR Medina CRNA  October 15, 2020  8:46 AM

## 2020-10-15 NOTE — ANESTHESIA POSTPROCEDURE EVALUATION
Patient: Caitlin Oh    Procedure(s):  ESOPHAGOGASTRODUODENOSCOPY (EGD)    Diagnosis:Dysphagia, unspecified type [R13.10]  Diagnosis Additional Information: No value filed.    Anesthesia Type:  MAC    Note:  Anesthesia Post Evaluation    Patient location during evaluation: Bedside  Patient participation: Able to fully participate in evaluation  Level of consciousness: awake and alert  Pain management: adequate  multimodal analgesia used between 6 hours prior to anesthesia start to PACU dischargeAirway patency: patent  Cardiovascular status: acceptable  Respiratory status: acceptable  two or more mitigation strategies used for obstructive sleep apneaHydration status: acceptable  PONV: none     Anesthetic complications: None          Last vitals:  Vitals:    10/15/20 0810   BP: 128/73   Pulse: 70   Resp: 16   Temp: 36.8  C (98.2  F)   SpO2: 99%         Electronically Signed By: BLADIMIR Medina CRNA  October 15, 2020  8:50 AM

## 2020-10-20 ENCOUNTER — THERAPY VISIT (OUTPATIENT)
Dept: CHIROPRACTIC MEDICINE | Facility: CLINIC | Age: 45
End: 2020-10-20
Attending: PAIN MEDICINE
Payer: COMMERCIAL

## 2020-10-20 DIAGNOSIS — M99.03 SEGMENTAL DYSFUNCTION OF LUMBAR REGION: ICD-10-CM

## 2020-10-20 DIAGNOSIS — R07.81 RIB PAIN ON LEFT SIDE: ICD-10-CM

## 2020-10-20 DIAGNOSIS — M62.838 SPASM OF MUSCLE: ICD-10-CM

## 2020-10-20 DIAGNOSIS — M99.02 THORACIC SEGMENT DYSFUNCTION: Primary | ICD-10-CM

## 2020-10-20 PROCEDURE — 98940 CHIROPRACT MANJ 1-2 REGIONS: CPT | Mod: AT | Performed by: CHIROPRACTOR

## 2020-10-20 NOTE — PROGRESS NOTES
Visit #:  2 of 8, based on treatment plan    Subjective:  Caitlin Oh is a 45 year old female who is seen in f/u up for:        Thoracic segment dysfunction  Rib pain on left side  Segmental dysfunction of lumbar region  Spasm of muscle.     Since last visit on 10/13/2020,  Caitlin Oh reports the following changes: Pain immediately after last treatment: 4/10 and their pain level today 4/10.  Caitlin reports that she is feelin about the same.  She did not have the rib pain issue for about 6 days after last visit but that came back yesterday.  During that time she still has some numbness especially when she was sleeping and laying on her left side.    Area of chief complaint:  Thoracic :  Symptoms are graded at 4/10. The quality is described as stiff.  Motion has remained about the same. Patient feels that they are about the same.        Objective:  The following was observed:    P: palpatory tendernessT-spine paraspinal L>>R    A: static palpation demonstrates intersegmental asymmetry , thoracic, lumbar    R: motion palpation notes restricted motion, T6 , T7 , T8 , T12  and L1    T: The following soft tissue hypotonicities were observed:  T paraspinals: left, ache, dull pain and stiff, no      Assessment:    Segmental spinal dysfunction/restrictions found at:  T6  T7  T8  T12  L1    Diagnoses:      1. Thoracic segment dysfunction    2. Rib pain on left side    3. Segmental dysfunction of lumbar region    4. Spasm of muscle        Patient's condition:  Patient had restrictions pre-manipulation    Treatment effectiveness:  Post manipulation there is better intersegmental movement and Patient claims to feel looser post manipulation      Procedures:  CMT:  89342 Chiropractic manipulative treatment 1-2 regions performed   Thoracic: Diversified and Activator, T6, T7, T8, T12, Prone, Side posture  Lumbar: Diversified, L1, Side posture    Modalities:  96072: MSTM:  To T-spine paraspinal  for 5 min    Therapeutic  procedures:  None      Prognosis: Good    Progress towards Goals: Patient is making progress towards the goal     Response to Treatment:   Reduction in symptoms as reported by patient      Recommendations:    Instructions:  ice 20 minutes every other hour as needed    Follow-up:   Return to care in one week

## 2020-10-27 ENCOUNTER — THERAPY VISIT (OUTPATIENT)
Dept: CHIROPRACTIC MEDICINE | Facility: CLINIC | Age: 45
End: 2020-10-27
Payer: COMMERCIAL

## 2020-10-27 DIAGNOSIS — M62.838 SPASM OF MUSCLE: ICD-10-CM

## 2020-10-27 DIAGNOSIS — M99.02 THORACIC SEGMENT DYSFUNCTION: Primary | ICD-10-CM

## 2020-10-27 DIAGNOSIS — R07.81 RIB PAIN: ICD-10-CM

## 2020-10-27 DIAGNOSIS — M99.03 SEGMENTAL DYSFUNCTION OF LUMBAR REGION: ICD-10-CM

## 2020-10-27 PROCEDURE — 98940 CHIROPRACT MANJ 1-2 REGIONS: CPT | Mod: AT | Performed by: CHIROPRACTOR

## 2020-10-27 NOTE — PROGRESS NOTES
Visit #:  3 of 8, based on treatment plan    Subjective:  Caitlin Oh is a 45 year old female who is seen in f/u up for:        Thoracic segment dysfunction  Rib pain  Segmental dysfunction of lumbar region  Spasm of muscle.     Since last visit on 10/20/2020,  Caitlin Oh reports the following changes: Pain immediately after last treatment: 4/10 and their pain level today 4/10.  Caitlin reports that she is feeling a little improved.  She is not having rib pain but is still having some discomfort, especially when laying in bed, though this has improved as well.    Area of chief complaint:  Thoracic :  Symptoms are graded at 4/10. The quality is described as stiff.  Motion has remained about the same. Patient feels that they are about the same.        Objective:  The following was observed:    P: palpatory tendernessT-spine paraspinal L>>R    A: static palpation demonstrates intersegmental asymmetry , thoracic, lumbar    R: motion palpation notes restricted motion, T6 , T7 , T8 , T12  and L1    T: The following soft tissue hypotonicities were observed:  T paraspinals: left, ache, dull pain and stiff, no      Assessment:    Segmental spinal dysfunction/restrictions found at:  T6  T7  T8  T12  L1    Diagnoses:      1. Thoracic segment dysfunction    2. Rib pain    3. Segmental dysfunction of lumbar region    4. Spasm of muscle        Patient's condition:  Patient had restrictions pre-manipulation    Treatment effectiveness:  Post manipulation there is better intersegmental movement and Patient claims to feel looser post manipulation      Procedures:  CMT:  76035 Chiropractic manipulative treatment 1-2 regions performed   Thoracic: Diversified and Activator, T6, T7, T8, T12, Prone, Side posture  Lumbar: Diversified, L1, Side posture    Modalities:  53576: MSTM:  To T-spine paraspinal  for 5 min    Therapeutic procedures:  None      Prognosis: Good    Progress towards Goals: Patient is making progress towards the goal      Response to Treatment:   Reduction in symptoms as reported by patient      Recommendations:    Instructions:  ice 20 minutes every other hour as needed    Follow-up:   Return to care in one week

## 2020-11-02 ENCOUNTER — THERAPY VISIT (OUTPATIENT)
Dept: CHIROPRACTIC MEDICINE | Facility: CLINIC | Age: 45
End: 2020-11-02
Payer: COMMERCIAL

## 2020-11-02 DIAGNOSIS — M99.03 SEGMENTAL DYSFUNCTION OF LUMBAR REGION: ICD-10-CM

## 2020-11-02 DIAGNOSIS — M99.02 THORACIC SEGMENT DYSFUNCTION: Primary | ICD-10-CM

## 2020-11-02 DIAGNOSIS — R07.81 RIB PAIN: ICD-10-CM

## 2020-11-02 DIAGNOSIS — M62.838 SPASM OF MUSCLE: ICD-10-CM

## 2020-11-02 PROCEDURE — 98940 CHIROPRACT MANJ 1-2 REGIONS: CPT | Mod: AT | Performed by: CHIROPRACTOR

## 2020-11-02 NOTE — PROGRESS NOTES
Visit #:  4 of 8, based on treatment plan    Subjective:  Caitlin Oh is a 45 year old female who is seen in f/u up for:        Thoracic segment dysfunction  Rib pain  Segmental dysfunction of lumbar region  Spasm of muscle.     Since last visit on 10/27/2020,  Caitlin Oh reports the following changes: Pain immediately after last treatment: 4/10 and their pain level today 4/10.  Caitlin reports that she was feeling a little improved, then coughed hard and her rib pain returned.  It has been bothering her especially when bending over to tie her shoes of reaching for something.  Laying in bed can be irritating but she feels that this is improving a little.    Area of chief complaint:  Thoracic :  Symptoms are graded at 4/10. The quality is described as stiff.  Motion has remained about the same. Patient feels that they are about the same.        Objective:  The following was observed:    P: palpatory tendernessT-spine paraspinal L>>R    A: static palpation demonstrates intersegmental asymmetry , thoracic, lumbar    R: motion palpation notes restricted motion, T6 , T7 , T8 , T12  and L1    T: The following soft tissue hypotonicities were observed:  T paraspinals: left, ache, dull pain and stiff, no      Assessment:    Segmental spinal dysfunction/restrictions found at:  T6  T7  T8  T12  L1    Diagnoses:      1. Thoracic segment dysfunction    2. Rib pain    3. Segmental dysfunction of lumbar region    4. Spasm of muscle        Patient's condition:  Patient had restrictions pre-manipulation    Treatment effectiveness:  Post manipulation there is better intersegmental movement and Patient claims to feel looser post manipulation      Procedures:  CMT:  92999 Chiropractic manipulative treatment 1-2 regions performed   Thoracic: Diversified and Activator, T6, T7, T8, T12, Prone, Side posture  Lumbar: Diversified, L1, Side posture    Modalities:  57098: MSTM:  To T-spine paraspinal  for 5 min    Therapeutic  procedures:  None      Prognosis: Good    Progress towards Goals: Patient is making progress towards the goal     Response to Treatment:   Reduction in symptoms as reported by patient      Recommendations:    Instructions:  ice 20 minutes every other hour as needed    Follow-up:   Return to care in one week

## 2020-11-10 ENCOUNTER — THERAPY VISIT (OUTPATIENT)
Dept: CHIROPRACTIC MEDICINE | Facility: CLINIC | Age: 45
End: 2020-11-10
Payer: COMMERCIAL

## 2020-11-10 DIAGNOSIS — M99.03 SEGMENTAL DYSFUNCTION OF LUMBAR REGION: ICD-10-CM

## 2020-11-10 DIAGNOSIS — M99.02 THORACIC SEGMENT DYSFUNCTION: Primary | ICD-10-CM

## 2020-11-10 DIAGNOSIS — R07.81 RIB PAIN: ICD-10-CM

## 2020-11-10 DIAGNOSIS — M99.05 SEGMENTAL DYSFUNCTION OF PELVIC REGION: ICD-10-CM

## 2020-11-10 DIAGNOSIS — M62.838 SPASM OF MUSCLE: ICD-10-CM

## 2020-11-10 PROCEDURE — 98941 CHIROPRACT MANJ 3-4 REGIONS: CPT | Mod: AT | Performed by: CHIROPRACTOR

## 2020-11-10 NOTE — PROGRESS NOTES
Visit #:  5 of 8, based on treatment plan    Subjective:  Caitlin Oh is a 45 year old female who is seen in f/u up for:        Thoracic segment dysfunction  Rib pain  Segmental dysfunction of lumbar region  Spasm of muscle  Segmental dysfunction of pelvic region.     Since last visit on 11/2/2020,  Caitlin Oh reports the following changes: Pain immediately after last treatment: 4/10 and their pain level today 4/10.  Caitlin reports that she is having a little more rib pain as well as discomfort in her low back.  She was in full uniform last week and this may have influenced her symptoms.  There was no improvement this past week    Area of chief complaint:  Thoracic :  Symptoms are graded at 4/10. The quality is described as stiff.  Motion has remained about the same. Patient feels that they are about the same.        Objective:  The following was observed:    P: palpatory tendernessT-spine paraspinal L>>R    A: static palpation demonstrates intersegmental asymmetry , thoracic, lumbar    R: motion palpation notes restricted motion, T6 , T7 , T8 , T12  and L1    T: The following soft tissue hypotonicities were observed:  T paraspinals: left, ache, dull pain and stiff, no      Assessment:    Segmental spinal dysfunction/restrictions found at:  T6  T7  T8  T12  L1   RPSIS    Diagnoses:      1. Thoracic segment dysfunction    2. Rib pain    3. Segmental dysfunction of lumbar region    4. Spasm of muscle    5. Segmental dysfunction of pelvic region        Patient's condition:  Patient had restrictions pre-manipulation    Treatment effectiveness:  Post manipulation there is better intersegmental movement and Patient claims to feel looser post manipulation      Procedures:  CMT:  79984 Chiropractic manipulative treatment 1-2 regions performed   Thoracic: Diversified and Activator, T6, T7, T8, T12, prone focused on the right to address thoracic spine rotation  Lumbar: Diversified, L1, Side posture   Pelvis: RPSIS drop  table, prone    Modalities:  57921: MSTM:  To T-spine paraspinal  for 5 min    Therapeutic procedures:  None      Prognosis: Good    Progress towards Goals: Patient is making progress towards the goal     Response to Treatment:   Reduction in symptoms as reported by patient      Recommendations:    Instructions:  ice 20 minutes every other hour as needed    Follow-up:   Return to care in one week

## 2020-11-17 ENCOUNTER — MYC MEDICAL ADVICE (OUTPATIENT)
Dept: PALLIATIVE MEDICINE | Facility: CLINIC | Age: 45
End: 2020-11-17

## 2020-11-17 ENCOUNTER — THERAPY VISIT (OUTPATIENT)
Dept: CHIROPRACTIC MEDICINE | Facility: CLINIC | Age: 45
End: 2020-11-17
Payer: COMMERCIAL

## 2020-11-17 DIAGNOSIS — M54.6 LEFT-SIDED THORACIC BACK PAIN, UNSPECIFIED CHRONICITY: ICD-10-CM

## 2020-11-17 DIAGNOSIS — M79.2 THORACIC NEURALGIA: Primary | ICD-10-CM

## 2020-11-17 DIAGNOSIS — R07.81 RIB PAIN: ICD-10-CM

## 2020-11-17 DIAGNOSIS — M99.02 THORACIC SEGMENT DYSFUNCTION: Primary | ICD-10-CM

## 2020-11-17 DIAGNOSIS — M62.838 SPASM OF MUSCLE: ICD-10-CM

## 2020-11-17 PROCEDURE — 98940 CHIROPRACT MANJ 1-2 REGIONS: CPT | Mod: AT | Performed by: CHIROPRACTOR

## 2020-11-17 NOTE — PROGRESS NOTES
Visit #:  6 of 8, based on treatment plan    Subjective:  Caitlin Oh is a 45 year old female who is seen in f/u up for:        Thoracic segment dysfunction  Rib pain  Left-sided thoracic back pain, unspecified chronicity  Spasm of muscle.     Since last visit on 11/10/2020,  Caitlin Oh reports the following changes: Pain immediately after last treatment: 4/10 and their pain level today 3/10.  Caitlin reports that she is not having anterior rib pain.  She does have a little numbness.  She does have some discomfort in her thoracic region of her back. Overall she is feeling a little improved.    Area of chief complaint:  Thoracic :  Symptoms are graded at 3/10. The quality is described as stiff.  Motion has remained about the same. Patient feels that they are about the same.        Objective:  The following was observed:    P: palpatory tendernessT-spine paraspinal L>>R    A: static palpation demonstrates intersegmental asymmetry , thoracic, lumbar    R: motion palpation notes restricted motion, T6 , T7 , T8 , T12  and L1    T: The following soft tissue hypotonicities were observed:  T paraspinals: left, ache, dull pain and stiff, no      Assessment:    Segmental spinal dysfunction/restrictions found at:  T6  T7  T8  T12  L1   RPSIS    Diagnoses:      1. Thoracic segment dysfunction    2. Rib pain    3. Left-sided thoracic back pain, unspecified chronicity    4. Spasm of muscle        Patient's condition:  Patient had restrictions pre-manipulation    Treatment effectiveness:  Post manipulation there is better intersegmental movement and Patient claims to feel looser post manipulation      Procedures:  CMT:  31424 Chiropractic manipulative treatment 1-2 regions performed   Thoracic: Diversified and Activator, T6, T7, T8, T12, prone focused on the right to address thoracic spine rotation  Lumbar: Diversified, L1, Side posture       Modalities:  72726: MSTM:  To T-spine paraspinal  for 5 min    Therapeutic  procedures:  None      Prognosis: Good    Progress towards Goals: Patient is making progress towards the goal     Response to Treatment:   Reduction in symptoms as reported by patient      Recommendations:    Instructions:  ice 20 minutes every other hour as needed    Follow-up:   Return to care in one week

## 2020-11-18 NOTE — TELEPHONE ENCOUNTER
From: Caitlin Oh      Created: 11/17/2020 3:26 PM        *-*-*This message has not been handled.*-*-*    Hello- I completed my 5th or 6th appointment with Dr Jarrell today.  He has helped get some temporary relief, but I would not say that it is better overall.     I know in our original visit you talked about trying this, the new medication and then trying a thoracic epidural. (I believe that is what it was called)  I would say that the chiropractic has been a little helpful.  I didn't really notice any change with the meds.  I was wondering what the injection would entail and if you think we should schedule that?  I would really like if possible to get it scheduled before the end of the year, since I have a large deductible and I have met that.     Let me know next steps.     Thanks, Heather Garcia from Pt.      Writer called Pt and discussed Thoracic ZENOBIA.  Pt would like to move forward with ZENOBIA.  Unsure of the Dx code you want to use.    Please sign order.    Bart Valdez, EDUARDO  Care Coordinator   [facility]

## 2020-11-20 DIAGNOSIS — G43.829 MENSTRUAL MIGRAINE WITHOUT STATUS MIGRAINOSUS, NOT INTRACTABLE: ICD-10-CM

## 2020-11-22 RX ORDER — RIZATRIPTAN BENZOATE 5 MG/1
TABLET, ORALLY DISINTEGRATING ORAL
Qty: 18 TABLET | Refills: 0 | Status: SHIPPED | OUTPATIENT
Start: 2020-11-22 | End: 2021-02-10

## 2020-11-22 NOTE — TELEPHONE ENCOUNTER
Prescription approved per [facility].  Asad Corea RN

## 2020-11-23 ENCOUNTER — HOSPITAL ENCOUNTER (OUTPATIENT)
Dept: MAMMOGRAPHY | Facility: CLINIC | Age: 45
Discharge: HOME | End: 2020-11-23
Attending: FAMILY MEDICINE | Admitting: FAMILY MEDICINE
Payer: COMMERCIAL

## 2020-11-23 DIAGNOSIS — Z12.31 ENCOUNTER FOR SCREENING MAMMOGRAM FOR BREAST CANCER: ICD-10-CM

## 2020-11-23 PROCEDURE — 77067 SCR MAMMO BI INCL CAD: CPT

## 2020-11-24 ENCOUNTER — THERAPY VISIT (OUTPATIENT)
Dept: CHIROPRACTIC MEDICINE | Facility: CLINIC | Age: 45
End: 2020-11-24
Payer: COMMERCIAL

## 2020-11-24 ENCOUNTER — MYC REFILL (OUTPATIENT)
Dept: FAMILY MEDICINE | Facility: CLINIC | Age: 45
End: 2020-11-24

## 2020-11-24 DIAGNOSIS — G43.829 MENSTRUAL MIGRAINE WITHOUT STATUS MIGRAINOSUS, NOT INTRACTABLE: ICD-10-CM

## 2020-11-24 DIAGNOSIS — R07.81 RIB PAIN: ICD-10-CM

## 2020-11-24 DIAGNOSIS — M62.838 SPASM OF MUSCLE: ICD-10-CM

## 2020-11-24 DIAGNOSIS — M99.02 THORACIC SEGMENT DYSFUNCTION: Primary | ICD-10-CM

## 2020-11-24 PROCEDURE — 98940 CHIROPRACT MANJ 1-2 REGIONS: CPT | Mod: AT | Performed by: CHIROPRACTOR

## 2020-11-24 RX ORDER — RIZATRIPTAN BENZOATE 5 MG/1
TABLET, ORALLY DISINTEGRATING ORAL
Qty: 18 TABLET | Refills: 0 | Status: CANCELLED | OUTPATIENT
Start: 2020-11-24

## 2020-11-24 NOTE — PROGRESS NOTES
Visit #:  7 of 8, based on treatment plan    Subjective:  Caitlin Oh is a 45 year old female who is seen in f/u up for:        Thoracic segment dysfunction  Rib pain  Spasm of muscle.     Since last visit on 11/17/2020,  Caitlin Oh reports the following changes: Pain immediately after last treatment: 4/10 and their pain level today 4/10.  Caitlin reports that she is feeling sore in her ribs again.  The pain at times is wrapping around her rib cage.  She had done a couple of days of training for work and this seemed to flare her ribs pain up again.  She was doing a lot of twisting, lifting, etc.  She will be getting an epidural injection later this week.    Area of chief complaint:  Thoracic :  Symptoms are graded at 3/10. The quality is described as stiff.  Motion has remained about the same. Patient feels that they are about the same.        Objective:  The following was observed:    P: palpatory tendernessT-spine paraspinal L>>R    A: static palpation demonstrates intersegmental asymmetry , thoracic, lumbar    R: motion palpation notes restricted motion, T6 , T7 , T8 , T12  and L1    T: The following soft tissue hypotonicities were observed:  T paraspinals: left, ache, dull pain and stiff, no      Assessment:    Segmental spinal dysfunction/restrictions found at:  T6  T7  T8  T12  L1   RPSIS    Diagnoses:      1. Thoracic segment dysfunction    2. Rib pain    3. Spasm of muscle        Patient's condition:  Patient had restrictions pre-manipulation    Treatment effectiveness:  Post manipulation there is better intersegmental movement and Patient claims to feel looser post manipulation      Procedures:  CMT:  [id number] Chiropractic manipulative treatment 1-2 regions performed   Thoracic: Diversified and Activator, T6, T7, T8, T12, prone focused on the right to address thoracic spine rotation  Lumbar: Diversified, L1, Side posture       Modalities:  90775: MSTM:  To T-spine paraspinal  for 5 min    Therapeutic  procedures:  None      Prognosis: Good    Progress towards Goals: Patient is making progress towards the goal     Response to Treatment:   Reduction in symptoms as reported by patient      Recommendations:    Instructions:  ice 20 minutes every other hour as needed    Follow-up:   Return to care in one week

## 2020-11-27 ENCOUNTER — MYC MEDICAL ADVICE (OUTPATIENT)
Dept: FAMILY MEDICINE | Facility: CLINIC | Age: 45
End: 2020-11-27

## 2020-11-27 ENCOUNTER — RADIOLOGY INJECTION OFFICE VISIT (OUTPATIENT)
Dept: PALLIATIVE MEDICINE | Facility: CLINIC | Age: 45
End: 2020-11-27
Attending: PAIN MEDICINE
Payer: COMMERCIAL

## 2020-11-27 ENCOUNTER — ANCILLARY PROCEDURE (OUTPATIENT)
Dept: RADIOLOGY | Facility: CLINIC | Age: 45
End: 2020-11-27
Attending: PAIN MEDICINE
Payer: COMMERCIAL

## 2020-11-27 VITALS — SYSTOLIC BLOOD PRESSURE: 146 MMHG | DIASTOLIC BLOOD PRESSURE: 82 MMHG | HEART RATE: 73 BPM | OXYGEN SATURATION: 98 %

## 2020-11-27 DIAGNOSIS — M79.2 THORACIC NEURALGIA: Primary | ICD-10-CM

## 2020-11-27 DIAGNOSIS — M54.14 THORACIC RADICULOPATHY: ICD-10-CM

## 2020-11-27 PROCEDURE — 62321 NJX INTERLAMINAR CRV/THRC: CPT | Performed by: PAIN MEDICINE

## 2020-11-27 ASSESSMENT — PAIN SCALES - GENERAL: PAINLEVEL: MILD PAIN (2)

## 2020-11-27 NOTE — PROGRESS NOTES
Pre procedure Diagnosis: Thoracic neuralgia    Post procedure Diagnosis: Same  Procedure performed: T7-8 interlaminar epidural steroid injection   Anesthesia: none  Complications: none  Operators: Mark Scott MD     Indications:   Caitlin Oh is a 45 year old female.  The patient has a history of thoracic pain radiating to her ant chest.  Examination shows neg allodynia no rash.  she has tried conservative treatment including meds/injections/pt.    MRI                                                                IMPRESSION:    1. Mild to moderate degenerative changes throughout the thoracic spine  with scoliotic curvature.  2. No significant disc herniation. No spinal canal narrowing at any  level.  3. Asymmetric facet hypertrophy on the right at T2-3 and T3-4 and on  the left at T6-7 and T7-8 causing mild to moderate neural foraminal  narrowings.  4. Degenerative changes appear slightly progressed since previous MR  8/25/2017.  Options/alternatives, benefits and risks were discussed with the patient including but not limited to bleeding, infection, no pain relief, tissue trauma, exposure to radiation, reaction to medications, spinal cord injury, dural puncture, weakness, numbness and headache.  Questions were answered to her satisfaction and she wishes to proceed. Voluntary informed consent was obtained and signed.     Vitals were reviewed: Yes  Allergies were reviewed:  Yes   Medications were reviewed:  Yes   Pre-procedure pain score: 2/10    Procedure:  The patient's medical history, medications, and allergies were reviewed and reconciled.  After obtaining signed informed consent, the patient was brought into the procedure suite and was placed in a prone position on the procedure table.   A Pause for the Cause was performed.  Patient was prepped and draped in the usual sterile fashion.     The T7-8 interspace was identified with AP fluoroscopy.  A total of 4ml of 1% lidocaine was used to anesthetize  the skin and subcutaneous tissues for a paramedian approach.    A 20gauge 3.5inch Touhy needle was advanced utilizing intermittent AP and Lateral fluoroscopy and air for loss of resistance.  The epidural space was encountered on the first pass without difficulties.  Aspiration for blood and CSF was negative.  Needle position was verified by injecting 1 ml of Omnipaque utilizing real-time fluoroscopy that showed good needle placement and epidural spread without signs of intravascular or intrathecal uptake.  Omnipaque wasted:  9 ml.    Then, after repeated negative aspiration for blood or CSF, a combination of Kenalog 40 mg, Bupivicaine 0.25% 2 ml, diluted with 3 ml of normal saline to a total injectate volume of 6 ml was injected into the epidural space in a slow and incremental fashion and the needle was restyletted and withdrawn.  All injected medications were preservative free.    The injection site was cleaned and a sterile dressing was applied.    The patient tolerated the procedure well without complications and was taken to the recovery room for continued observation.    Images were saved to PACS.    Post-procedure pain score: 2/10  Follow-up includes:   -f/u phone call in one week  -f/u with referring provider     Mark Scott MD  [facility] [place]

## 2020-11-27 NOTE — NURSING NOTE
Discharge Information    IV Discontiued Time:  NA    Amount of Fluid Infused:  NA    Discharge Criteria = When patient returns to baseline or as per MD order    Consciousness:  Pt is fully awake    Circulation:  BP +/- 20% of pre-procedure level    Respiration:  Patient is able to breathe deeply    O2 Sat:  Patient is able to maintain O2 Sat >92% on room air    Activity:  Moves 4 extremities on command    Ambulation:  Patient is able to stand and walk or stand and pivot into wheelchair    Dressing:  Clean/dry or No Dressing    Notes:   Discharge instructions and AVS given to patient    Patient meets criteria for discharge?  YES    Admitted to PCU?  No    Responsible adult present to accompany patient home?  Yes    Signature/Title:    phylicia washburn RN  RN Care Coordinator  [facility]

## 2020-11-27 NOTE — PATIENT INSTRUCTIONS
[facility]   Procedure Discharge Instructions    Today you saw: Dr. Mark Scott      You had an:  Epidural steroid injection   -thoracic    If you experience any shortness of breath call 911 or go to the emergency room.    Be cautious when walking. Numbness and/or weakness in the lower extremities may occur for up to 6-8 hours after the procedure due to effect of the local anesthetic    Do not drive for 6 hours. The effect of the local anesthetic could slow your reflexes.     You may resume your regular activities after 24 hours    Avoid strenuous activity for the first 24 hours    You may shower, however avoid swimming, tub baths or hot tubs for 24 hours following your procedure    You may have a mild to moderate increase in pain for several days following the injection.    It may take up to 14 days for the steroid medication to start working although you may feel the effect as early as a few days after the procedure.       You may use ice packs for 10-15 minutes, 3 to 4 times a day at the injection site for comfort    Do not use heat to painful areas for 6 to 8 hours. This will give the local anesthetic time to wear off and prevent you from accidentally burning your skin.     Unless you have been directed to avoid the use of anti-inflammatory medications (NSAIDS), you may use medications such as ibuprofen, Aleve or Tylenol for pain control if needed.     If you were fasting, you may resume your normal diet and medications after the procedure    If you have diabetes, check your blood sugar more frequently than usual as your blood sugar may be higher than normal for 10-14 days following a steroid injection. Contact your doctor who manages your diabetes if your blood sugar is higher than usual    Possible side effects of steroids that you may experience include flushing, elevated blood pressure, increased appetite, mild headaches and restlessness.  All of these symptoms will get  better with time.    If you experience any of the following, call the Pain Clinic during work hours (Mon-Friday 8-4:30 pm) at [phone] or the Provider Line after hours at [phone]:  -Fever over 100 degree F  -Swelling, bleeding, redness, drainage, warmth at the injection site  -Progressive weakness or numbness in your legs or arms  -Loss of bowel or bladder function  -Unusual headache that is not relieved by Tylenol or other pain reliever  -Unusual new onset of pain that is not improving

## 2020-12-01 ENCOUNTER — THERAPY VISIT (OUTPATIENT)
Dept: CHIROPRACTIC MEDICINE | Facility: CLINIC | Age: 45
End: 2020-12-01
Payer: COMMERCIAL

## 2020-12-01 DIAGNOSIS — M99.03 SEGMENTAL DYSFUNCTION OF LUMBAR REGION: ICD-10-CM

## 2020-12-01 DIAGNOSIS — M99.02 THORACIC SEGMENT DYSFUNCTION: Primary | ICD-10-CM

## 2020-12-01 DIAGNOSIS — R07.81 RIB PAIN: ICD-10-CM

## 2020-12-01 DIAGNOSIS — M62.838 SPASM OF MUSCLE: ICD-10-CM

## 2020-12-01 PROCEDURE — 98940 CHIROPRACT MANJ 1-2 REGIONS: CPT | Mod: AT | Performed by: CHIROPRACTOR

## 2020-12-01 NOTE — PROGRESS NOTES
Visit #:  8 of 8, based on treatment plan    Subjective:  Caitlin Oh is a 45 year old female who is seen in f/u up for:        Thoracic segment dysfunction  Rib pain  Segmental dysfunction of lumbar region  Spasm of muscle.     Since last visit on 11/24/2020,  Caitlin Oh reports the following changes: Pain immediately after last treatment: 4/10 and their pain level today 4/10.  Caitlin reports that she is feeling sore in her ribs again, but not as bad.  She had an epidural injection last week and feels that this is beginning to make some changes.  She is having less numbness and less pain since the injection.      Area of chief complaint:  Thoracic :  Symptoms are graded at 4/10. The quality is described as stiff.  Motion has remained about the same. Patient feels that they are slightly improved       Objective:  The following was observed:    P: palpatory tendernessT-spine paraspinal L>>R    A: static palpation demonstrates intersegmental asymmetry , thoracic, lumbar    R: motion palpation notes restricted motion, T6 , T7 , T8 , T12  and L1    T: The following soft tissue hypotonicities were observed:  T paraspinals: left, ache, dull pain and stiff, no      Assessment:    Segmental spinal dysfunction/restrictions found at:  T6  T7  T8  T12  L1   RPSIS    Diagnoses:      1. Thoracic segment dysfunction    2. Rib pain    3. Segmental dysfunction of lumbar region    4. Spasm of muscle        Patient's condition:  Patient had restrictions pre-manipulation    Treatment effectiveness:  Post manipulation there is better intersegmental movement and Patient claims to feel looser post manipulation      Procedures:  CMT:  64698 Chiropractic manipulative treatment 1-2 regions performed   Thoracic: Diversified and Activator, T6, T7, T8, T12, prone focused on the right to address thoracic spine rotation  Lumbar: Diversified, L1, Side posture       Modalities:  76079: MSTM:  To T-spine paraspinal  for 5 min    Therapeutic  procedures:  None      Prognosis: Good    Progress towards Goals: Patient is making progress towards the goal     Response to Treatment:   Reduction in symptoms as reported by patient      Recommendations:    Instructions:  ice 20 minutes every other hour as needed    Follow-up:   Return to care in one week

## 2020-12-08 ENCOUNTER — THERAPY VISIT (OUTPATIENT)
Dept: CHIROPRACTIC MEDICINE | Facility: CLINIC | Age: 45
End: 2020-12-08
Payer: COMMERCIAL

## 2020-12-08 DIAGNOSIS — M99.03 SEGMENTAL DYSFUNCTION OF LUMBAR REGION: ICD-10-CM

## 2020-12-08 DIAGNOSIS — R07.81 RIB PAIN: ICD-10-CM

## 2020-12-08 DIAGNOSIS — M62.838 SPASM OF MUSCLE: ICD-10-CM

## 2020-12-08 DIAGNOSIS — M99.02 THORACIC SEGMENT DYSFUNCTION: Primary | ICD-10-CM

## 2020-12-08 PROCEDURE — 98940 CHIROPRACT MANJ 1-2 REGIONS: CPT | Mod: AT | Performed by: CHIROPRACTOR

## 2020-12-08 NOTE — PROGRESS NOTES
Visit #:  9    Subjective:  Caitlin Oh is a 45 year old female who is seen in f/u up for:        Thoracic segment dysfunction  Rib pain  Segmental dysfunction of lumbar region  Spasm of muscle.     Since last visit on 12/1/2020,  Caitlin Oh reports the following changes: Pain immediately after last treatment: 4/10 and their pain level today 4/10.  Caitlin reports that she is feeling sore in her ribs again.  She is still having some rib pain while she sleep and in certain positions.  There has not been much change since epidural injection.  She feels better after treatment but that does not seem to last.    Area of chief complaint:  Thoracic :  Symptoms are graded at 4/10. The quality is described as stiff.  Motion has remained about the same. Patient feels that they are slightly improved       Objective:  The following was observed:    P: palpatory tendernessT-spine paraspinal L>>R    A: static palpation demonstrates intersegmental asymmetry , thoracic, lumbar    R: motion palpation notes restricted motion, T6 , T7 , T8 , T12  and L1    T: The following soft tissue hypotonicities were observed:  T paraspinals: left, ache, dull pain and stiff, no      Assessment:    Segmental spinal dysfunction/restrictions found at:  T6  T7  T8  T12  L1   RPSIS    Diagnoses:      1. Thoracic segment dysfunction    2. Rib pain    3. Segmental dysfunction of lumbar region    4. Spasm of muscle        Patient's condition:  Patient had restrictions pre-manipulation    Treatment effectiveness:  Post manipulation there is better intersegmental movement and Patient claims to feel looser post manipulation      Procedures:  CMT:  36768 Chiropractic manipulative treatment 1-2 regions performed   Thoracic: Diversified and Activator, T6, T7, T8, T12, prone focused on the right to address thoracic spine rotation  Lumbar: Diversified, L1, Side posture       Modalities:  83673: MSTM:  To T-spine paraspinal  for 5 min    Therapeutic  procedures:  None      Prognosis: Good    Progress towards Goals: Patient is making progress towards the goal     Response to Treatment:   Reduction in symptoms as reported by patient      Recommendations:    Instructions:  ice 20 minutes every other hour as needed    Follow-up:   Return to care if symptoms persist or worsen.    Suggested strengthening exercises and follow up with GP or pain management.

## 2020-12-22 ENCOUNTER — THERAPY VISIT (OUTPATIENT)
Dept: CHIROPRACTIC MEDICINE | Facility: CLINIC | Age: 45
End: 2020-12-22
Payer: COMMERCIAL

## 2020-12-22 DIAGNOSIS — M54.2 CERVICALGIA: ICD-10-CM

## 2020-12-22 DIAGNOSIS — M99.01 CERVICAL SEGMENT DYSFUNCTION: ICD-10-CM

## 2020-12-22 DIAGNOSIS — R07.81 RIB PAIN: ICD-10-CM

## 2020-12-22 DIAGNOSIS — M99.02 THORACIC SEGMENT DYSFUNCTION: Primary | ICD-10-CM

## 2020-12-22 DIAGNOSIS — M62.838 SPASM OF MUSCLE: ICD-10-CM

## 2020-12-22 PROCEDURE — 98940 CHIROPRACT MANJ 1-2 REGIONS: CPT | Mod: AT | Performed by: CHIROPRACTOR

## 2020-12-22 NOTE — PROGRESS NOTES
Visit #:  10    Subjective:  Caitlin Oh is a 45 year old female who is seen in f/u up for:        Thoracic segment dysfunction  Rib pain  Cervical segment dysfunction  Cervicalgia  Spasm of muscle.     Since last visit on 12/8/2020,  Caitlin Oh reports the following changes: Pain immediately after last treatment: 4/10 and their pain level today 3/10.  Caitlin reports that she is feeling sore in her ribs again. Caitlin has been doing better but feels that a tough rowing workout may have flared up her ribs a little  Since that workout she has been having some little bouts of pain in that ribs area.  She also mentions that her upper traps are a little stiff and sore as well.    Area of chief complaint:  Thoracic :  Symptoms are graded at 3/10. The quality is described as stiff.  Motion has remained about the same. Patient feels that they are slightly improved       Objective:  The following was observed:    P: palpatory tendernessT-spine paraspinal L>>R    A: static palpation demonstrates intersegmental asymmetry , thoracic, lumbar    R: motion palpation notes restricted motion,C6, C7, T1, T2,  T6 , T7 , T8 , T12  and L1    T: The following soft tissue hypotonicities were observed:  T paraspinals: left, ache, dull pain and stiff, no      Assessment:    Segmental spinal dysfunction/restrictions found at:  C6,   C7  T1  T2  T6  T7  T8  T12  L1   RPSIS    Diagnoses:      1. Thoracic segment dysfunction    2. Rib pain    3. Cervical segment dysfunction    4. Cervicalgia    5. Spasm of muscle        Patient's condition:  Patient had restrictions pre-manipulation    Treatment effectiveness:  Post manipulation there is better intersegmental movement and Patient claims to feel looser post manipulation      Procedures:  CMT:  [id number] Chiropractic manipulative treatment 1-2 regions performed   Cervical: Activator C6, C7,   Thoracic: Diversified and Activator,T1, T2,  T6, T7, T8, T12, prone focused on the right to address thoracic  spine rotation      Modalities:  67213: MSTM:  To T-spine paraspinal  for 5 min    Therapeutic procedures:  None      Prognosis: Good    Progress towards Goals: Patient is making progress towards the goal     Response to Treatment:   Reduction in symptoms as reported by patient      Recommendations:    Instructions:  ice 20 minutes every other hour as needed    Follow-up:   Return to care if symptoms persist or worsen.

## 2021-02-02 ENCOUNTER — THERAPY VISIT (OUTPATIENT)
Dept: CHIROPRACTIC MEDICINE | Facility: CLINIC | Age: 46
End: 2021-02-02
Payer: COMMERCIAL

## 2021-02-02 DIAGNOSIS — M99.03 SEGMENTAL DYSFUNCTION OF LUMBAR REGION: ICD-10-CM

## 2021-02-02 DIAGNOSIS — M99.02 THORACIC SEGMENT DYSFUNCTION: Primary | ICD-10-CM

## 2021-02-02 DIAGNOSIS — M62.838 SPASM OF MUSCLE: ICD-10-CM

## 2021-02-02 DIAGNOSIS — R07.81 RIB PAIN: ICD-10-CM

## 2021-02-02 PROCEDURE — 98940 CHIROPRACT MANJ 1-2 REGIONS: CPT | Mod: AT | Performed by: CHIROPRACTOR

## 2021-02-02 NOTE — PROGRESS NOTES
Visit #:  1 in 2021    Subjective:  Caitlin Oh is a 45 year old female who is seen in f/u up for:        Thoracic segment dysfunction  Rib pain  Segmental dysfunction of lumbar region  Spasm of muscle.     Since last visit on 12/22/2020,  Caitlin Oh reports the following changes: Pain immediately after last treatment: 3/10 and their pain level today 1/10.  Caitlin reports that she is feeling better.  She started to have some dull pain in her rib area again.  This comes and goes.  She has been doing well since her last visit until her symptoms began to appear again.    Area of chief complaint:  Thoracic :  Symptoms are graded at 1/10. The quality is described as stiff.  Motion has remained about the same. Patient feels that they are slightly improved       Objective:  The following was observed:    P: palpatory tendernessT-spine paraspinal L>>R    A: static palpation demonstrates intersegmental asymmetry , thoracic, lumbar    R: motion palpation notes restricted motion  T6 , T7 , T8 , T12  and L1    T: The following soft tissue hypotonicities were observed:  T paraspinals: left, ache, dull pain and stiff, no      Assessment:    Segmental spinal dysfunction/restrictions found at:    T6  T7  T8  T12  L1   RPSIS    Diagnoses:      1. Thoracic segment dysfunction    2. Rib pain    3. Segmental dysfunction of lumbar region    4. Spasm of muscle        Patient's condition:  Patient had restrictions pre-manipulation    Treatment effectiveness:  Post manipulation there is better intersegmental movement and Patient claims to feel looser post manipulation      Procedures:  CMT:  47413 Chiropractic manipulative treatment 1-2 regions performed   Thoracic: Diversified and Activator,,  T6, T7, T8, T12, prone focused on the right to address thoracic spine rotation  Lumbar: Diversified, L1, side posture      Modalities:  60995: MSTM:  To T-spine paraspinal  for 5 min    Therapeutic procedures:  None      Prognosis:  Good    Progress towards Goals: Patient is making progress towards the goal     Response to Treatment:   Reduction in symptoms as reported by patient      Recommendations:    Instructions:  ice 20 minutes every other hour as needed    Follow-up:   Return to care if symptoms persist or worsen.

## 2021-02-09 ENCOUNTER — THERAPY VISIT (OUTPATIENT)
Dept: CHIROPRACTIC MEDICINE | Facility: CLINIC | Age: 46
End: 2021-02-09
Payer: COMMERCIAL

## 2021-02-09 DIAGNOSIS — M99.02 THORACIC SEGMENT DYSFUNCTION: Primary | ICD-10-CM

## 2021-02-09 DIAGNOSIS — M62.838 SPASM OF MUSCLE: ICD-10-CM

## 2021-02-09 DIAGNOSIS — R07.81 RIB PAIN: ICD-10-CM

## 2021-02-09 DIAGNOSIS — M99.03 SEGMENTAL DYSFUNCTION OF LUMBAR REGION: ICD-10-CM

## 2021-02-09 PROCEDURE — 98940 CHIROPRACT MANJ 1-2 REGIONS: CPT | Mod: AT | Performed by: CHIROPRACTOR

## 2021-02-09 NOTE — PROGRESS NOTES
Visit #:  2 in 2021    Subjective:  Caitlin Oh is a 45 year old female who is seen in f/u up for:        Thoracic segment dysfunction  Rib pain  Segmental dysfunction of lumbar region  Spasm of muscle.     Since last visit on 2/2/2021,  Caitlin Oh reports the following changes: Pain immediately after last treatment: 1/10 and their pain level today 2/10.  Caitlin reports that she is feeling about the same, maybe a little more stiff.  She has had about 4-5 rib 'folding' episode since last visit but overall is feeling about the same.    Area of chief complaint:  Thoracic :  Symptoms are graded at 2/10. The quality is described as stiff.  Motion has remained about the same. Patient feels that they are slightly improved       Objective:  The following was observed:    P: palpatory tendernessT-spine paraspinal L>>R    A: static palpation demonstrates intersegmental asymmetry , thoracic, lumbar    R: motion palpation notes restricted motion  T6 , T7 , T8 , T12  and L1    T: The following soft tissue hypotonicities were observed:  T paraspinals: left, ache, dull pain and stiff, no      Assessment:    Segmental spinal dysfunction/restrictions found at:    T6  T7  T8  T12  L1   RPSIS    Diagnoses:      1. Thoracic segment dysfunction    2. Rib pain    3. Segmental dysfunction of lumbar region    4. Spasm of muscle        Patient's condition:  Patient had restrictions pre-manipulation    Treatment effectiveness:  Post manipulation there is better intersegmental movement and Patient claims to feel looser post manipulation      Procedures:  CMT:  21049 Chiropractic manipulative treatment 1-2 regions performed   Thoracic: Diversified and Activator,  T6, T7, T8, T12, prone focused on the right to address thoracic spine rotation  Lumbar: Diversified, L1, side posture      Modalities:  86012: MSTM:  To T-spine paraspinal  for 5 min    Therapeutic procedures:  None      Prognosis: Good    Progress towards Goals: Patient is making  progress towards the goal     Response to Treatment:   Reduction in symptoms as reported by patient      Recommendations:    Instructions:  ice 20 minutes every other hour as needed    Follow-up:   Return to care if symptoms persist or worsen.

## 2021-02-10 DIAGNOSIS — G43.829 MENSTRUAL MIGRAINE WITHOUT STATUS MIGRAINOSUS, NOT INTRACTABLE: ICD-10-CM

## 2021-02-10 RX ORDER — RIZATRIPTAN BENZOATE 5 MG/1
TABLET, ORALLY DISINTEGRATING ORAL
Qty: 18 TABLET | Refills: 1 | Status: SHIPPED | OUTPATIENT
Start: 2021-02-10 | End: 2021-09-28

## 2021-02-10 NOTE — TELEPHONE ENCOUNTER
"Requested Prescriptions   Pending Prescriptions Disp Refills     rizatriptan (MAXALT-MLT) 5 MG ODT [Pharmacy Med Name: RIZATRIPTAN 5 MG ODT] 18 tablet 1     Sig: TAKE 1-2 TABS AT ONSET OF HEADACHE/ MIGRAINE-MAY REPEAT DOSE IN 2 HOURS.DONT EXCEED 30 MG IN 24 HRS       Serotonin Agonists Failed - 2/10/2021  2:31 AM        Failed - Blood pressure under 140/90 in past 12 months     BP Readings from Last 3 Encounters:   11/27/20 (!) 146/82   10/15/20 120/83   08/31/20 120/78                 Failed - Serotonin Agonist request needs review.     Please review patient's record. If patient has had 8 or more treatments in the past month, please forward to provider.          Passed - Recent (12 mo) or future (30 days) visit within the authorizing provider's specialty     Patient has had an office visit with the authorizing provider or a provider within the authorizing providers department within the previous 12 mos or has a future within next 30 days. See \"Patient Info\" tab in inbasket, or \"Choose Columns\" in Meds & Orders section of the refill encounter.              Passed - Medication is active on med list        Passed - Patient is age 18 or older        Passed - No active pregnancy on record        Passed - No positive pregnancy test in past 12 months             "

## 2021-04-22 ENCOUNTER — E-VISIT (OUTPATIENT)
Dept: FAMILY MEDICINE | Facility: CLINIC | Age: 46
End: 2021-04-22
Payer: COMMERCIAL

## 2021-04-22 DIAGNOSIS — J01.01 ACUTE RECURRENT MAXILLARY SINUSITIS: Primary | ICD-10-CM

## 2021-04-22 PROCEDURE — 99421 OL DIG E/M SVC 5-10 MIN: CPT | Performed by: FAMILY MEDICINE

## 2021-04-22 RX ORDER — DOXYCYCLINE 100 MG/1
100 CAPSULE ORAL 2 TIMES DAILY
Qty: 20 CAPSULE | Refills: 0 | Status: SHIPPED | OUTPATIENT
Start: 2021-04-22 | End: 2021-05-02

## 2021-04-22 NOTE — PATIENT INSTRUCTIONS
Sinus pressure is primarily a problem of drainage.  You can best help your body clear the sinus secretions and pressure by opening up the natural passageways which are often blocked by viral colds and allergies.      Short courses of a nasal decongestant spray (Afrin or Neosinephrine) are one of the most effective tools in opening sinus drainage passageways.  Their use should be restricted to 3 days though due to the high risk of nasal addiction.  Pseudoephedrine or phenylephrine (Sudafed) is often helpful but it can cause elevations in blood pressure and insomnia.     Sometimes a nasal saline spray will help rinse out the nasal passages and feel good.     Guaifenesin (Robitussin or Mucinex) helps loosen secretions and often help make the mucous more liquid and easier to clear.    For pain and fevers, acetaminophen (Tylenol) is most appropriate.  Ibuprofen (Advil) or naproxen (Aleve) are useful too and last longer but they can cause elevation of blood pressure or stomach problems.    Antihistamines (Benadryl, Dimetapp, etc.) cause sedation, confusion, bowel and urinary abnormalities and are of little use for infectious causes of cough and nasal congestion.  Their use should be reserved for allergic symptoms.  Start allergy medicine  The body needs to be treated well in order to help heal itself.  Rest as needed.  It is ok to reduce food intake if appetite is poor but it is quite important to maintain/increase fluid intake.  Sinus pressure and infections usually go away on their own with appropriate care.

## 2021-05-30 ENCOUNTER — RECORDS - HEALTHEAST (OUTPATIENT)
Dept: ADMINISTRATIVE | Facility: CLINIC | Age: 46
End: 2021-05-30

## 2021-06-04 VITALS — WEIGHT: 180 LBS | HEIGHT: 66 IN | BODY MASS INDEX: 28.93 KG/M2

## 2021-06-08 NOTE — ANESTHESIA PREPROCEDURE EVALUATION
Anesthesia Evaluation      Patient summary reviewed   No history of anesthetic complications     Airway   Mallampati: II  Neck ROM: full   Pulmonary - negative ROS and normal exam                          Cardiovascular - negative ROS and normal exam   Neuro/Psych - negative ROS     Endo/Other - negative ROS      GI/Hepatic/Renal - negative ROS           Dental - normal exam   (+) caps                       Anesthesia Plan  Planned anesthetic: MAC  Versed/fentanyl/propofol  Ketamine PRN  Decadron/zofran    ASA 1   Induction: intravenous   Anesthetic plan and risks discussed with: patient  Anesthesia plan special considerations: antiemetics,   Post-op plan: routine recovery        Results for orders placed or performed in visit on 05/24/20   COVID-19 Virus PCR MRF    Specimen: Nasopharyngeal Swab; Respiratory   Result Value Ref Range    COVID-19 VIRUS SPECIMEN SOURCE Nasopharyngeal     2019-nCOV Not Detected

## 2021-06-08 NOTE — ANESTHESIA POSTPROCEDURE EVALUATION
Patient: Caitlin Oh  Procedure(s):  BONE SMOOTHING 5TH DIGIT AND SOFT TISSUE RELEASE 5TH MPJ (Left)  Anesthesia type: MAC    Patient location: Phase II Recovery  Last vitals:   Vitals Value Taken Time   /60 5/26/2020 10:00 AM   Temp  5/26/2020 10:26 AM   Pulse 56 5/26/2020 10:08 AM   Resp  5/26/2020 10:26 AM   SpO2 99 % 5/26/2020 10:08 AM   Vitals shown include unvalidated device data.  Post vital signs: stable  Level of consciousness: awake and responds to simple questions  Post-anesthesia pain: pain controlled  Post-anesthesia nausea and vomiting: no  Pulmonary: unassisted, return to baseline  Cardiovascular: stable and blood pressure at baseline  Hydration: adequate  Anesthetic events: no    QCDR Measures:  ASA# 11 - Ruth-op Cardiac Arrest: ASA11B - Patient did NOT experience unanticipated cardiac arrest  ASA# 12 - Ruth-op Mortality Rate: ASA12B - Patient did NOT die  ASA# 13 - PACU Re-Intubation Rate: NA - No ETT / LMA used for case  ASA# 10 - Composite Anes Safety: ASA10A - No serious adverse event    Additional Notes:

## 2021-06-08 NOTE — ANESTHESIA CARE TRANSFER NOTE
Last vitals:   Vitals:    05/26/20 0825   BP: 125/76   Pulse: 61   Resp: 16   Temp: 36.6  C (97.8  F)   SpO2: 97%     Patient's level of consciousness is awake  Spontaneous respirations: yes  Maintains airway independently: yes  Dentition unchanged: yes  Oropharynx: oropharynx clear of all foreign objects    QCDR Measures:  ASA# 20 - Surgical Safety Checklist: WHO surgical safety checklist completed prior to induction    PQRS# 430 - Adult PONV Prevention: 4558F - Pt received => 2 anti-emetic agents (different classes) preop & intraop  ASA# 8 - Peds PONV Prevention: NA - Not pediatric patient, not GA or 2 or more risk factors NOT present  PQRS# 424 - Ruth-op Temp Management: NA - MAC anesthesia or case < 60 minutes  PQRS# 426 - PACU Transfer Protocol: - Transfer of care checklist used  ASA# 14 - Acute Post-op Pain: ASA14B - Patient did NOT experience pain >= 7 out of 10

## 2021-06-30 ENCOUNTER — OFFICE VISIT (OUTPATIENT)
Dept: FAMILY MEDICINE | Facility: CLINIC | Age: 46
End: 2021-06-30
Payer: COMMERCIAL

## 2021-06-30 VITALS
SYSTOLIC BLOOD PRESSURE: 120 MMHG | DIASTOLIC BLOOD PRESSURE: 82 MMHG | HEART RATE: 72 BPM | HEIGHT: 66 IN | OXYGEN SATURATION: 98 % | WEIGHT: 192 LBS | BODY MASS INDEX: 30.86 KG/M2 | TEMPERATURE: 98.4 F

## 2021-06-30 DIAGNOSIS — L91.8 SKIN TAG: Primary | ICD-10-CM

## 2021-06-30 PROCEDURE — 99207 PR DROP WITH A PROCEDURE: CPT | Performed by: FAMILY MEDICINE

## 2021-06-30 PROCEDURE — 11200 RMVL SKIN TAGS UP TO&INC 15: CPT | Performed by: FAMILY MEDICINE

## 2021-06-30 ASSESSMENT — MIFFLIN-ST. JEOR: SCORE: 1532.66

## 2021-06-30 NOTE — PROGRESS NOTES
"        Julia Romero is a 46 year old who presents for the following health issues     HPI     Chief Complaint   Patient presents with     Skin Tags     Skin on the inner thigh.        She just noted this in th e last week   It is right in the groin   Not changing   But it gets painful      Review of Systems   Constitutional, HEENT, cardiovascular, pulmonary, gi and gu systems are negative, except as otherwise noted.      Objective    /82   Pulse 72   Temp 98.4  F (36.9  C) (Tympanic)   Ht 1.676 m (5' 6\")   Wt 87.1 kg (192 lb)   SpO2 98%   BMI 30.99 kg/m    Body mass index is 30.99 kg/m .  Physical Exam   GENERAL: healthy, alert and no distress  SKIN: 2mm pedunculated skin tag left groin  This was cleaned with alcohol and removed with sterile scissors patient tolerated this well.        Sujatha Mhaer M.D.          "

## 2021-07-03 NOTE — ADDENDUM NOTE
Addendum Note by Johnathan Nash CRNA at 5/26/2020 12:00 PM     Author: Johnathan Nash CRNA Service: -- Author Type: Nurse Anesthetist    Filed: 5/26/2020 12:00 PM Date of Service: 5/26/2020 12:00 PM Status: Signed    : Johnathan Nash CRNA (Nurse Anesthetist)       Addendum  created 05/26/20 1200 by Johnathan Nash CRNA    Intraprocedure Event edited, Intraprocedure Staff edited

## 2021-09-08 ENCOUNTER — MYC MEDICAL ADVICE (OUTPATIENT)
Dept: PALLIATIVE MEDICINE | Facility: CLINIC | Age: 46
End: 2021-09-08

## 2021-09-08 DIAGNOSIS — M54.14 THORACIC RADICULOPATHY: Primary | ICD-10-CM

## 2021-09-08 DIAGNOSIS — M79.2 THORACIC NEURALGIA: ICD-10-CM

## 2021-09-08 NOTE — TELEPHONE ENCOUNTER
Per patient myChart message:  From: Heather Oh      Created: 9/8/2021 12:05 PM      I don't have a tremendous amount of pain, I just have discomfort.  I would say that it was 75% better up until this last month.  I feel that it has worn off to some degree because it bothers me daily.  Will you check with insurance before scheduling the appointment?      How often can this be done in a lifetime.  I'm just curious.     Thanks and let me know, Heather Bach prepped for Dr. Scott to review.    Sara, RN-BSN  [facility]

## 2021-09-08 NOTE — TELEPHONE ENCOUNTER
From: Heather Oh      Created: 9/8/2021 10:32 AM        *-*-*This message was handled on 9/8/2021 11:34 AM by BART VALDEZ*-*-*    Hello- I was wondering if I should make an appointment about getting another injection like you did back in November of last year?  The pain and discomfort seem to be back over the past month and I believe you told me this was a a procedure that could be repeated.  Let me know if I would need an office visit to discuss (or virtual meeting) or if we could just set up a procedure date.     I was also wondering if you had any suggestions as to similar chiropractic care that Dr Jarrell used to provide.  Now that he is gone it would be nice to know if there is someone else that could perform a similar adjustment.     ThanksHeather          From: Bart Valdez RN      Created: 9/8/2021 11:34 AM        Caitlin     You had a T7-8 interlaminar epidural in November, Dr. Scott can likely reorder this without an office visit first, insurance will need to know      With the injection in November, what percentage of pain relief did you get and for how long did you get that pain relief.     Thanks     Bart Valdez RN  Patient Care Supervisor   [facility]         Will await response from Pt    Bart Valdez RN  Patient Care Supervisor   [facility]

## 2021-09-09 NOTE — TELEPHONE ENCOUNTER
Screening Questions for Radiology Injections:    Injection to be done at which interventional clinic site? [facility] [place] - [place]    If [facility] location, tell patient that this procedure requires a COVID-19 lab test be done within 4 days of the procedure. Would you still like to move forward with scheduling the procedure?  Not Applicable   If YES, let patient know that someone will call them to schedule the COVID-19 test and that they will only receive a call back if the result is positive. Route to nursing to enter order.     Instruct patient to arrive as directed prior to the scheduled appointment time:    [place] minutes before      [place]: 30 minutes before; if IV needed 1 hour before     Procedure ordered by Tyler    Procedure ordered? repeat T7-8 interlaminar ZENOBIA. 2021 injection provided 75% relief for 10 months      Transforaminal Cervical ZENOBIA -  [place] does NOT have providers that do these- [facility] and [facility] do have providers that do    As a reminder, receiving steroids can decrease your body's ability to fight infection.   Would you still like to move forward with scheduling the injection?  Yes    What insurance would patient like us to bill for this procedure? [employer]      Worker's comp or MVA (motor vehicle accident) -Any injection DO NOT SCHEDULE and route to Kavitha Stewart.      [employer] insurance - For SI joint injections, DO NOT SCHEDULE and route Kavitha Stewart.       ALL BCBS, [employer] and [employer]-Route to Kavitha for review DO NOT SCHEDULE      IF SCHEDULING IN [place] AND NEEDS A PA, IT IS OKAY TO SCHEDULE. [place] HANDLES THEIR OWN PA'S AFTER THE PATIENT IS SCHEDULED. PLEASE SCHEDULE AT LEAST 1 WEEK OUT SO A PA CAN BE OBTAINED.    Any chance of pregnancy? NO   If YES, do NOT schedule and route to RN pool    Is an  needed? No     Patient has a drive home? (mandatory) YES: INFORMED    Is patient taking any blood thinners (i.e. plavix,  coumadin, jantoven, warfarin, heparin, pradaxa or dabigatran, etc)? No   If hold needed, do NOT schedule, route to RN pool     Is patient taking any aspirin products (includes Excedrin and Fiorinal)? No     If more than 325mg/day, OK to schedule; Instruct pt to decrease to less than 325 mg for 7 days AND route to RN pool    For CERVICAL procedures, hold all aspirin products for 6 days.     Tell pt that if aspirin product is not held for 6 days, the procedure WILL BE cancelled.      Does the patient have a bleeding or clotting disorder? No     If YES, okay to schedule AND route to RN nurse pool    For any patients with platelet count <100, must be forwarded to provider    Any allergies to contrast dye, iodine, shellfish, or numbing and steroid medications? No    If YES, add allergy information to appointment notes AND route to the RN pool     If ZENOBIA and Contrast Dye / Iodine Allergy? DO NOT SCHEDULE, route to RN pool    Allergies: Penicillins     Is patient diabetic?  No  If YES, instruct them to bring their glucometer.    Does patient have an active infection or treated for one within the past week? No     Is patient currently taking any antibiotics?  No     For patients on chronic, preventative, or prophylactic antibiotics, procedures may be scheduled.     For patients on antibiotics for active or recent infection:antibiotic course must have been completed for 4 days    Is patient currently taking any steroid medications? (i.e. Prednisone, Medrol)  No     For patients on steroid medications, course must have been completed for 4 days    Is patient actively being treated for cancer or immunocompromised? No  If YES, do NOT schedule and route to RN pool     Are you able to get on and off an exam table with minimal or no assistance? Yes  If NO, do NOT schedule and route to RN pool    Are you able to roll over and lay on your stomach with minimal or no assistance? Yes  If NO, do NOT schedule and route to RN pool     Has  the patient had a flu shot or any other vaccinations within 7 days before or after the procedure.  No     Have you recently had a COVID vaccine or have plans to get it in the near future? No    If yes, explain that for the vaccine to work best they need to:       wait 1 week before and 1 week after getting Vaccine #1    wait 1 week before and 2 weeks after getting Vaccine #2    If patient has concerns about the timing, send to RN pool     Does patient have an MRI/CT?  YES: 2020  Check Procedure Scheduling Grid to see if required.      Was the MRI done within the last 3 years?  Yes    If yes, where was the MRI done i.e.[facility], [facility], [place], [facility] etc? [facility]      If no, do not schedule and route to RN pool    If MRI was not done at [place], [facility] or [facility] do NOT schedule and route to RN pool.      If pt has an imaging disc, the injection MAY be scheduled but pt has to bring disc to appt.     If they show up without the disc the injection cannot be done    Procedure Specific Instructions:      If celiac plexus block, informed patient NPO for 6 hours and that it is okay to take medications with sips of water, especially blood pressure medications  Not Applicable         If this is for a cervical procedure, informed patient that aspirin needs to be held for 6 days.   Not Applicable      If IV needed:    Do not schedule procedures requiring IV placement in the first appointment of the day or first appointment after lunch. Do NOT schedule at 0745, 0815 or 1245.     Instructed pt to arrive 30 minutes early for IV start if required. (Check Procedure Scheduling Grid)  Not Applicable    Reminders:      If you are started on any steroids or antibiotics between now and your appointment, you must contact us because the procedure may need to be cancelled.  Yes      For all procedures except radiofrequency ablations (RFAs) and spinal cord stimulator (SCS) trials, informed patient:    IV sedation is  not provided for this procedure.  If you feel that an oral anti-anxiety medication is needed, you can discuss this further with your referring provider or primary care provider.  The Pain Clinic provider will discuss specifics of what the procedure includes at your appointment.  Most procedures last 10-20 minutes.  We use numbing medications to help with any discomfort during the procedure.  Not Applicable      For patients 85 or older we recommend having an adult stay w/ them for the remainder of the day.       Does the patient have any questions?  NO  Rashida Palomo  [facility]

## 2021-09-24 ENCOUNTER — RADIOLOGY INJECTION OFFICE VISIT (OUTPATIENT)
Dept: PALLIATIVE MEDICINE | Facility: CLINIC | Age: 46
End: 2021-09-24
Payer: COMMERCIAL

## 2021-09-24 VITALS
RESPIRATION RATE: 16 BRPM | SYSTOLIC BLOOD PRESSURE: 150 MMHG | HEART RATE: 64 BPM | DIASTOLIC BLOOD PRESSURE: 86 MMHG | OXYGEN SATURATION: 98 %

## 2021-09-24 DIAGNOSIS — M54.14 THORACIC RADICULOPATHY: ICD-10-CM

## 2021-09-24 PROCEDURE — 62321 NJX INTERLAMINAR CRV/THRC: CPT | Performed by: PAIN MEDICINE

## 2021-09-24 RX ORDER — TRIAMCINOLONE ACETONIDE 40 MG/ML
40 INJECTION, SUSPENSION INTRA-ARTICULAR; INTRAMUSCULAR ONCE
Status: COMPLETED | OUTPATIENT
Start: 2021-09-24 | End: 2021-09-24

## 2021-09-24 RX ADMIN — TRIAMCINOLONE ACETONIDE 40 MG: 40 INJECTION, SUSPENSION INTRA-ARTICULAR; INTRAMUSCULAR at 13:51

## 2021-09-24 ASSESSMENT — PAIN SCALES - GENERAL: PAINLEVEL: NO PAIN (0)

## 2021-09-24 NOTE — PROGRESS NOTES
Pre procedure Diagnosis: Thoracic neuralgia    Post procedure Diagnosis: Same  Procedure performed: T7-8 interlaminar epidural steroid injection   Anesthesia: none  Complications: none  Operators: Mark Scott MD     Indications:   Caitlin Oh is a 45 year old female.  The patient has a history of thoracic pain radiating to her ant chest.  Examination shows neg allodynia no rash.  she has tried conservative treatment including meds/injections/pt.    MRI                                                                IMPRESSION:    1. Mild to moderate degenerative changes throughout the thoracic spine  with scoliotic curvature.  2. No significant disc herniation. No spinal canal narrowing at any  level.  3. Asymmetric facet hypertrophy on the right at T2-3 and T3-4 and on  the left at T6-7 and T7-8 causing mild to moderate neural foraminal  narrowings.  4. Degenerative changes appear slightly progressed since previous MR  8/25/2017.  Options/alternatives, benefits and risks were discussed with the patient including but not limited to bleeding, infection, no pain relief, tissue trauma, exposure to radiation, reaction to medications, spinal cord injury, dural puncture, weakness, numbness and headache.  Questions were answered to her satisfaction and she wishes to proceed. Voluntary informed consent was obtained and signed.     Vitals were reviewed: Yes  Allergies were reviewed:  Yes   Medications were reviewed:  Yes   Pre-procedure pain score: 0/10    Procedure:  The patient's medical history, medications, and allergies were reviewed and reconciled.  After obtaining signed informed consent, the patient was brought into the procedure suite and was placed in a prone position on the procedure table.   A Pause for the Cause was performed.  Patient was prepped and draped in the usual sterile fashion.     The T7-8 interspace was identified with AP fluoroscopy.  A total of 4ml of 1% lidocaine was used to anesthetize  the skin and subcutaneous tissues for a paramedian approach.    A 20gauge 3.5inch Touhy needle was advanced utilizing intermittent AP and Lateral fluoroscopy and air for loss of resistance.  The epidural space was encountered on the first pass without difficulties.  Aspiration for blood and CSF was negative.  Needle position was verified by injecting 1 ml of Omnipaque utilizing real-time fluoroscopy that showed good needle placement and epidural spread without signs of intravascular or intrathecal uptake.  Omnipaque wasted:  9 ml.    Then, after repeated negative aspiration for blood or CSF, a combination of Kenalog 40 mg, Bupivicaine 0.25% 2 ml, diluted with 3 ml of normal saline to a total injectate volume of 6 ml was injected into the epidural space in a slow and incremental fashion and the needle was restyletted and withdrawn.  All injected medications were preservative free.    The injection site was cleaned and a sterile dressing was applied.    The patient tolerated the procedure well without complications and was taken to the recovery room for continued observation.    Images were saved to PACS.    Post-procedure pain score: 0/10  Follow-up includes:   -f/u phone call in one week  -f/u with referring provider     Mark Scott MD  [facility] [place]

## 2021-09-24 NOTE — PATIENT INSTRUCTIONS
[facility]   Procedure Discharge Instructions    Today you saw:  Dr. Mark Scott    You had an:  Thoracic Epidural steroid injection       Medications used:  Lidocaine   Bupivacaine   Dexamethasone Omnipaque        If you were holding your blood thinning medication, please restart taking it: N/A    Be cautious Numbness and/or weakness may occur for up to 6-8 hours after the procedure due to effect of the local anesthetic    Do not drive for 6 hours. The effect of the local anesthetic could slow your reflexes.     You may resume your regular activities after 24 hours    Avoid strenuous activity for the first 24 hours    You may shower, however avoid swimming, tub baths or hot tubs for 24 hours following your procedure    You may have a mild to moderate increase in pain for several days following the injection.    It may take up to 14 days for the steroid medication to start working although you may feel the effect as early as a few days after the procedure.       You may use ice packs for 10-15 minutes, 3 to 4 times a day at the injection site for comfort    Do not use heat to painful areas for 6 to 8 hours. This will give the local anesthetic time to wear off and prevent you from accidentally burning your skin.     Unless you have been directed to avoid the use of anti-inflammatory medications (NSAIDS), you may use medications such as ibuprofen, Aleve or Tylenol for pain control if needed.     Possible side effects of steroids that you may experience include flushing, elevated blood pressure, increased appetite, mild headaches and restlessness.  All of these symptoms will get better with time.    If you experience any of the following, call the Pain Clinic during work hours (Mon-Friday 8-4:30 pm) at [phone] or the Provider Line after hours at [phone]:  -Fever over 100 degree F  -Swelling, bleeding, redness, drainage, warmth at the injection site  -Progressive weakness or  numbness  -Unusual new onset of pain that is not improving

## 2021-09-24 NOTE — NURSING NOTE
Discharge Information    IV Discontiued Time:  NA    Amount of Fluid Infused:  NA    Discharge Criteria = When patient returns to baseline or as per MD order    Consciousness:  Pt is fully awake    Circulation:  BP +/- 20% of pre-procedure level    Respiration:  Patient is able to breathe deeply    O2 Sat:  Patient is able to maintain O2 Sat >92% on room air    Activity:  Moves 4 extremities on command    Ambulation:  Patient is able to stand and walk or stand and pivot into wheelchair    Dressing:  Clean/dry or No Dressing    Notes:   Discharge instructions and AVS given to patient    Patient meets criteria for discharge?  YES    Admitted to PCU?  No    Responsible adult present to accompany patient home?  Yes    Signature/Title:    Bart Valdez RN  RN Care Coordinator  [facility]

## 2021-09-24 NOTE — NURSING NOTE
Pre-procedure Intake    Have you been fasting? NA    If yes, for how long?     Are you taking a prescribed blood thinner such as coumadin, Plavix, Xarelto?    No    If yes, when did you take your last dose?     Do you take aspirin?  No    If cervical procedure, have you held aspirin for 6 days?   NA    Do you have any allergies to contrast dye, iodine, steroid and/or numbing medications?  NO    Are you currently taking antibiotics or have an active infection?  NO    Have you had a fever/elevated temperature within the past week? NO    Are you currently taking oral steroids? NO    Do you have a ? Yes       Are you pregnant or breastfeeding?  NO    Have you received the COVID-19 vaccine? Yes       If yes, was it your 1st, 2nd or only dose needed? None     Date of most recent vaccine: 2/21/2021    Notify provider and RNs if systolic BP >170, diastolic BP >100, P >100 or O2 sats < 90%

## 2021-09-28 ENCOUNTER — OFFICE VISIT (OUTPATIENT)
Dept: FAMILY MEDICINE | Facility: CLINIC | Age: 46
End: 2021-09-28
Payer: COMMERCIAL

## 2021-09-28 VITALS
HEIGHT: 66 IN | BODY MASS INDEX: 30.86 KG/M2 | OXYGEN SATURATION: 98 % | WEIGHT: 192 LBS | SYSTOLIC BLOOD PRESSURE: 134 MMHG | DIASTOLIC BLOOD PRESSURE: 84 MMHG | TEMPERATURE: 98.1 F | HEART RATE: 61 BPM

## 2021-09-28 DIAGNOSIS — G43.829 MENSTRUAL MIGRAINE WITHOUT STATUS MIGRAINOSUS, NOT INTRACTABLE: ICD-10-CM

## 2021-09-28 DIAGNOSIS — G25.81 RESTLESS LEG SYNDROME: ICD-10-CM

## 2021-09-28 DIAGNOSIS — Z12.11 SCREENING FOR COLON CANCER: ICD-10-CM

## 2021-09-28 DIAGNOSIS — Z11.4 SCREENING FOR HIV (HUMAN IMMUNODEFICIENCY VIRUS): ICD-10-CM

## 2021-09-28 DIAGNOSIS — Z11.59 NEED FOR HEPATITIS C SCREENING TEST: ICD-10-CM

## 2021-09-28 DIAGNOSIS — R12 HEARTBURN: ICD-10-CM

## 2021-09-28 DIAGNOSIS — Z12.31 ENCOUNTER FOR SCREENING MAMMOGRAM FOR MALIGNANT NEOPLASM OF BREAST: Primary | ICD-10-CM

## 2021-09-28 DIAGNOSIS — Z00.00 ROUTINE GENERAL MEDICAL EXAMINATION AT A HEALTH CARE FACILITY: ICD-10-CM

## 2021-09-28 DIAGNOSIS — Z13.220 SCREENING FOR LIPID DISORDERS: ICD-10-CM

## 2021-09-28 DIAGNOSIS — M79.672 LEFT FOOT PAIN: ICD-10-CM

## 2021-09-28 LAB
ANION GAP SERPL CALCULATED.3IONS-SCNC: 4 MMOL/L (ref 3–14)
BUN SERPL-MCNC: 14 MG/DL (ref 7–30)
CALCIUM SERPL-MCNC: 8.4 MG/DL (ref 8.5–10.1)
CHLORIDE BLD-SCNC: 109 MMOL/L (ref 94–109)
CHOLEST SERPL-MCNC: 151 MG/DL
CO2 SERPL-SCNC: 27 MMOL/L (ref 20–32)
CREAT SERPL-MCNC: 0.81 MG/DL (ref 0.52–1.04)
FASTING STATUS PATIENT QL REPORTED: YES
GFR SERPL CREATININE-BSD FRML MDRD: 88 ML/MIN/1.73M2
GLUCOSE BLD-MCNC: 93 MG/DL (ref 70–99)
HCV AB SERPL QL IA: NONREACTIVE
HDLC SERPL-MCNC: 67 MG/DL
HIV 1+2 AB+HIV1 P24 AG SERPL QL IA: NONREACTIVE
LDLC SERPL CALC-MCNC: 67 MG/DL
NONHDLC SERPL-MCNC: 84 MG/DL
POTASSIUM BLD-SCNC: 3.8 MMOL/L (ref 3.4–5.3)
SODIUM SERPL-SCNC: 140 MMOL/L (ref 133–144)
TRIGL SERPL-MCNC: 83 MG/DL

## 2021-09-28 PROCEDURE — 87389 HIV-1 AG W/HIV-1&-2 AB AG IA: CPT | Performed by: FAMILY MEDICINE

## 2021-09-28 PROCEDURE — 80048 BASIC METABOLIC PNL TOTAL CA: CPT | Performed by: FAMILY MEDICINE

## 2021-09-28 PROCEDURE — 80061 LIPID PANEL: CPT | Performed by: FAMILY MEDICINE

## 2021-09-28 PROCEDURE — 36415 COLL VENOUS BLD VENIPUNCTURE: CPT | Performed by: FAMILY MEDICINE

## 2021-09-28 PROCEDURE — 99213 OFFICE O/P EST LOW 20 MIN: CPT | Mod: 25 | Performed by: FAMILY MEDICINE

## 2021-09-28 PROCEDURE — 86803 HEPATITIS C AB TEST: CPT | Performed by: FAMILY MEDICINE

## 2021-09-28 PROCEDURE — 82728 ASSAY OF FERRITIN: CPT | Performed by: FAMILY MEDICINE

## 2021-09-28 PROCEDURE — 99396 PREV VISIT EST AGE 40-64: CPT | Performed by: FAMILY MEDICINE

## 2021-09-28 RX ORDER — RIZATRIPTAN BENZOATE 5 MG/1
TABLET, ORALLY DISINTEGRATING ORAL
Qty: 18 TABLET | Refills: 1 | Status: SHIPPED | OUTPATIENT
Start: 2021-09-28 | End: 2022-02-21

## 2021-09-28 ASSESSMENT — ENCOUNTER SYMPTOMS
EYE PAIN: 0
ARTHRALGIAS: 0
CONSTIPATION: 0
FEVER: 0
CHILLS: 0
ABDOMINAL PAIN: 0
DYSURIA: 0
BREAST MASS: 0
NAUSEA: 0
NERVOUS/ANXIOUS: 0
PARESTHESIAS: 0
SHORTNESS OF BREATH: 0
WEAKNESS: 0
HEADACHES: 0
HEMATOCHEZIA: 0
DIZZINESS: 0
DIARRHEA: 0
HEARTBURN: 1
MYALGIAS: 1
HEMATURIA: 0
FREQUENCY: 0
SORE THROAT: 0
JOINT SWELLING: 0
COUGH: 0
PALPITATIONS: 0

## 2021-09-28 ASSESSMENT — MIFFLIN-ST. JEOR: SCORE: 1532.66

## 2021-09-28 NOTE — PATIENT INSTRUCTIONS
Preventive Health Recommendations  Female Ages 40 to 49    Yearly exam:     See your health care provider every year in order to  1. Review health changes.   2. Discuss preventive care.    3. Review your medicines if your doctor prescribed any.      Get a Pap test every three years (unless you have an abnormal result and your provider advises testing more often).      If you get Pap tests with HPV test, you only need to test every 5 years, unless you have an abnormal result. You do not need a Pap test if your uterus was removed (hysterectomy) and you have not had cancer.      You should be tested each year for STDs (sexually transmitted diseases), if you're at risk.     Ask your doctor if you should have a mammogram.      Have a colonoscopy (test for colon cancer) if someone in your family has had colon cancer or polyps before age 50.       Have a cholesterol test every 5 years.       Have a diabetes test (fasting glucose) after age 45. If you are at risk for diabetes, you should have this test every 3 years.    Shots: Get a flu shot each year. Get a tetanus shot every 10 years.     Nutrition:     Eat at least 5 servings of fruits and vegetables each day.    Eat whole-grain bread, whole-wheat pasta and brown rice instead of white grains and rice.    Get adequate Calcium and Vitamin D.      Lifestyle    Exercise at least 150 minutes a week (an average of 30 minutes a day, 5 days a week). This will help you control your weight and prevent disease.    Limit alcohol to one drink per day.    No smoking.     Wear sunscreen to prevent skin cancer.    See your dentist every six months for an exam and cleaning.      Try some pepcid complete for the heartburn

## 2021-09-28 NOTE — PROGRESS NOTES
SUBJECTIVE:   CC: Caitlin Oh is an 46 year old woman who presents for preventive health visit.     Patient has been advised of split billing requirements and indicates understanding: Yes  Healthy Habits:     Getting at least 3 servings of Calcium per day:  Yes    Bi-annual eye exam:  Yes    Dental care twice a year:  Yes    Sleep apnea or symptoms of sleep apnea:  None    Diet:  Regular (no restrictions)    Frequency of exercise:  6-7 days/week    Duration of exercise:  30-45 minutes    Taking medications regularly:  No    Medication side effects:  None    PHQ-2 Total Score: 0    Additional concerns today:  Yes          Today's PHQ-2 Score:   PHQ-2 ( 1999 Pf[employer]) 9/28/2021   Q1: Little interest or pleasure in doing things 0   Q2: Feeling down, depressed or hopeless 0   PHQ-2 Score 0   Q1: Little interest or pleasure in doing things Not at all   Q2: Feeling down, depressed or hopeless Not at all   PHQ-2 Score 0       Abuse: Current or Past (Physical, Sexual or Emotional) - No  Do you feel safe in your environment? Yes      Social History     Tobacco Use     Smoking status: Never Smoker     Smokeless tobacco: Never Used   Substance Use Topics     Alcohol use: No     If you drink alcohol do you typically have >3 drinks per day or >7 drinks per week? No    Alcohol Use 9/28/2021   Prescreen: >3 drinks/day or >7 drinks/week? No   Prescreen: >3 drinks/day or >7 drinks/week? -   No flowsheet data found.    Reviewed orders with patient.  Reviewed health maintenance and updated orders accordingly - Yes  Lab work is in process    Breast Cancer Screening:  Any new diagnosis of family breast, ovarian, or bowel cancer? No    FHS-7:   Breast CA Risk Assessment (FHS-7) 9/28/2021   Did any of your first-degree relatives have breast or ovarian cancer? No   Did any of your relatives have bilateral breast cancer? No   Did any man in your family have breast cancer? No   Did any woman in your family have breast and ovarian cancer?  Yes   Did any woman in your family have breast cancer before age 50 y? No   Do you have 2 or more relatives with breast and/or ovarian cancer? No   Do you have 2 or more relatives with breast and/or bowel cancer? No       Mammogram Screening: Recommended annual mammography  Pertinent mammograms are reviewed under the imaging tab.    History of abnormal Pap smear: NO - age 30-65 PAP every 5 years with negative HPV co-testing recommended  PAP / HPV Latest Ref Rng & Units 8/31/2020 9/27/2016 10/28/2015   PAP (Historical) - NIL NIL NIL   HPV16 NEG:Negative Negative Negative Negative   HPV18 NEG:Negative Negative Negative Negative   HRHPV NEG:Negative Negative Negative Positive(A)     Reviewed and updated as needed this visit by clinical staff  Tobacco  Allergies  Meds   Med Hx  Surg Hx  Fam Hx  Soc Hx        Reviewed and updated as needed this visit by Provider                Past Medical History:   Diagnosis Date     Bilateral bunions      Cervical high risk HPV (human papillomavirus) test positive 11/4/2015     Female infertility 7/10/2008    Undergoing ivf 2008.      Kidney stones 11/30/2010    [facility] 11/24/10      Personal history of gestational diabetes 7/11/2012    diet controlled     Plantar fasciitis     s/p surgery        Review of Systems   Constitutional: Negative for chills and fever.   HENT: Negative for congestion, ear pain, hearing loss and sore throat.    Eyes: Negative for pain and visual disturbance.   Respiratory: Negative for cough and shortness of breath.    Cardiovascular: Negative for chest pain, palpitations and peripheral edema.   Gastrointestinal: Positive for heartburn. Negative for abdominal pain, constipation, diarrhea, hematochezia and nausea.   Breasts:  Positive for tenderness. Negative for breast mass and discharge.   Genitourinary: Negative for dysuria, frequency, genital sores, hematuria, pelvic pain, urgency, vaginal bleeding and vaginal discharge.   Musculoskeletal:  "Positive for myalgias. Negative for arthralgias and joint swelling.   Skin: Negative for rash.   Neurological: Negative for dizziness, weakness, headaches and paresthesias.   Psychiatric/Behavioral: Negative for mood changes. The patient is not nervous/anxious.        Has had some gerd has had in the past on the left side she is having some left sided breast tenderness started working out more with weights       OBJECTIVE:   /84   Pulse 61   Temp 98.1  F (36.7  C) (Tympanic)   Ht 1.676 m (5' 6\")   Wt 87.1 kg (192 lb)   SpO2 98%   BMI 30.99 kg/m    Physical Exam  GENERAL: healthy, alert and no distress  EYES: Eyes grossly normal to inspection, PERRL and conjunctivae and sclerae normal  HENT: ear canals and TM's normal, nose and mouth without ulcers or lesions  NECK: no adenopathy, no asymmetry, masses, or scars and thyroid normal to palpation  RESP: lungs clear to auscultation - no rales, rhonchi or wheezes  BREAST: normal without masses, tenderness or nipple discharge and no palpable axillary masses or adenopathy  CV: regular rate and rhythm, normal S1 S2, no S3 or S4, no murmur, click or rub, no peripheral edema and peripheral pulses strong  ABDOMEN: soft, nontender, no hepatosplenomegaly, no masses and bowel sounds normal  MS: no gross musculoskeletal defects noted, no edema  MS: LLE exam shows normal strength and muscle mass and scarrs from procedure   SKIN: no suspicious lesions or rashes  NEURO: Normal strength and tone, mentation intact and speech normal  PSYCH: mentation appears normal, affect normal/bright    Diagnostic Test Results:  Labs reviewed in Epic  No results found for this or any previous visit (from the past 24 hour(s)).    ASSESSMENT/PLAN:   (Z12.31) Encounter for screening mammogram for malignant neoplasm of breast  (primary encounter diagnosis)  Comment:   Plan: *MA Screening Digital Bilateral            (Z00.00) Routine general medical examination at a health [facility] " "facility  Comment:   Plan: GLUCOSE            (Z13.220) Screening for lipid disorders  Comment:   Plan: Lipid panel reflex to direct LDL Fasting            (Z11.4) Screening for HIV (human immunodeficiency virus)  Comment:   Plan: HIV Antigen Antibody Combo            (Z11.59) Need for hepatitis C screening test  Comment:   Plan: Hepatitis C Screen Reflex to HCV RNA Quant and         Genotype        Low risk     (G43.829) Menstrual migraine without status migrainosus, not intractable  Comment:   Plan: rizatriptan (MAXALT-MLT) 5 MG ODT        Controlled ok     (Z12.11) Screening for colon cancer  Comment: *  Plan: Adult Gastro Ref - Procedure Only            (G25.81) Restless leg syndrome  Comment:   Plan: Basic metabolic panel  (Ca, Cl, CO2, Creat,         Gluc, K, Na, BUN), Ferritin        Will check labs suggested to stretch calves before bed     (M79.672) Left foot pain  Comment:   Plan: [facility] PT and Hand Referral        Post surgery ppain is better but range of motion is not there     (R12) Heartburn  Comment:   Plan: try pepcid complete     Patient has been advised of split billing requirements and indicates understanding: Yes  COUNSELING:  Reviewed preventive health counseling, as reflected in patient instructions    Estimated body mass index is 30.99 kg/m  as calculated from the following:    Height as of this encounter: 1.676 m (5' 6\").    Weight as of this encounter: 87.1 kg (192 lb).    Weight management plan: Discussed healthy diet and exercise guidelines    She reports that she has never smoked. She has never used smokeless tobacco.      Counseling Resources:  ATP IV Guidelines  Pooled Cohorts Equation Calculator  Breast Cancer Risk Calculator  BRCA-Related Cancer Risk Assessment: FHS-7 Tool  FRAX Risk Assessment  ICSI Preventive Guidelines  Dietary Guidelines for Americans, 2010  USDA's MyPlate  ASA Prophylaxis  Lung CA Screening    Sujatha Maher MD  [facility]  "

## 2021-09-29 LAB — FERRITIN SERPL-MCNC: 81 NG/ML (ref 8–252)

## 2021-10-06 ENCOUNTER — TELEPHONE (OUTPATIENT)
Dept: FAMILY MEDICINE | Facility: CLINIC | Age: 46
End: 2021-10-06
Payer: COMMERCIAL

## 2021-10-07 ENCOUNTER — THERAPY VISIT (OUTPATIENT)
Dept: PHYSICAL THERAPY | Facility: CLINIC | Age: 46
End: 2021-10-07
Attending: FAMILY MEDICINE
Payer: COMMERCIAL

## 2021-10-07 DIAGNOSIS — N62 LARGE BREASTS: ICD-10-CM

## 2021-10-07 DIAGNOSIS — M79.672 LEFT FOOT PAIN: ICD-10-CM

## 2021-10-07 DIAGNOSIS — G89.29 CHRONIC BILATERAL THORACIC BACK PAIN: ICD-10-CM

## 2021-10-07 DIAGNOSIS — M54.6 ACUTE BILATERAL THORACIC BACK PAIN: ICD-10-CM

## 2021-10-07 DIAGNOSIS — M54.6 CHRONIC BILATERAL THORACIC BACK PAIN: ICD-10-CM

## 2021-10-07 PROCEDURE — 97110 THERAPEUTIC EXERCISES: CPT | Mod: GP | Performed by: PHYSICAL THERAPIST

## 2021-10-07 PROCEDURE — 97161 PT EVAL LOW COMPLEX 20 MIN: CPT | Mod: GP | Performed by: PHYSICAL THERAPIST

## 2021-10-07 PROCEDURE — 97140 MANUAL THERAPY 1/> REGIONS: CPT | Mod: GP | Performed by: PHYSICAL THERAPIST

## 2021-10-07 ASSESSMENT — ACTIVITIES OF DAILY LIVING (ADL)
HIGHEST_POTENTIAL_ADL_SCORE:: 84
ACTIVITIES_OF_DAILY_LIVING_MEASURE_SCORE(%):: 80.95
TOTAL_ADL_ITEM_SCORE:: 68

## 2021-10-07 NOTE — PROGRESS NOTES
Physical Therapy Initial Evaluation  Subjective:  The history is provided by the patient.   Patient Health History  Caitlin Oh being seen for foot pain and upper back pain.       Problem occurred: foot pain related to surgery in May 2020, upper back pain unknown   Pain is reported as 4/10 on pain scale.  General health as reported by patient is excellent.  Pertinent medical history includes: migraines/headaches.   Red flags:  None as reported by patient.  Medical allergies: other. Other medical allergies details: penicillin.   Surgeries include:  Orthopedic surgery. Other surgery history details: foot surgeries.    Current medications:  None.    Current occupation is .   Primary job tasks include:  Computer work, lifting/carrying, driving and prolonged standing.                  Therapist Generated HPI Evaluation  Problem details: Surgery May 2020. Has had multiple surgeries on the foot in the past. She broke her toe a few years ago and was still in a pain a year later. She was told that it didn't heal correctly, and had surgery to correct this. She saw PT following the surgery which did help but the foot feels very stiff still. She reports that it also feels achey, especially after lots of walking or standing. The pain is on the lateral foot along the 5 metatarsal and sometimes wrapping underneath the lateral foot..         Type of problem:  Left foot.    This is a chronic condition.  Condition occurred with:  Other reason.    Patient reports pain:  Lateral (5 met and digit).  Pain is described as aching and is intermittent.  Pain radiates to:  No radiation. Pain is worse in the P.M..  Since onset symptoms are unchanged.  Associated symptoms:  Loss of motion/stiffness. Symptoms are exacerbated by walking, weight bearing and standing  and relieved by nothing.    Previous treatment includes surgery. There was moderate improvement following previous treatment.  Restrictions due to condition include:   Working in normal job without restrictions.  Barriers include:  None as reported by patient.    Therapist Generated HPI Evaluation  Problem details: Pt is interested in breast reduction related to neck and upper back pain. She has headaches related to this as well up to several times a week. Pt reports feeling knots in B upper traps that travel up and cause the headaches. She does have to wear a bullet proof vest at work which makes this worse.      She has seen PT and chiropractic for this before. She keeps up with strengthening and also does weight lifting exercises.   She gets massages for this as well..         Type of problem:  Thoracic (cervical).    This is a chronic condition.  Condition occurred with:  Other reason.  Where condition occurred: for unknown reasons.  Patient reports pain:  Upper thoracic spine (cervical spine).  Pain is described as aching and sharp and is intermittent.  Pain radiates to:  No radiation. Pain is the same all the time.  Since onset symptoms are unchanged.  Associated symptoms:  Loss of motion/stiffness. Symptoms are exacerbated by sitting (working at the computer, wearing a uniform for work)  and relieved by activity/movement, heat, ice and other (massage).    Previous treatment includes physical therapy and chiropractic. There was moderate improvement following previous treatment.  Restrictions due to condition include:  Working in normal job without restrictions.  Barriers include:  None as reported by patient.                        Objective:  Standing Alignment:    Cervical/Thoracic:  Forward head  Shoulder/UE:  Rounded shoulders                        Ankle/Foot Evaluation  ROM:  AROM is normal.PROM is normal.            PALPATION: Palpation of ankle: Significant restriction in mobility and scar tissue present at incisional site. Pt reports tenderness just proximal to the 5th MTP. Significant restriction at lateral aspect of the plantar surface of the foot.  Left ankle  tenderness present at:  incisional    EDEMA: normal          MOBILITY TESTING: Mobility testing ankle: slightly hypomobile in 5th ray.                        Cervical/Thoracic Evaluation  Cervical AROM: normal         Headaches: cervical and migraine          Cervical Palpation:  : mild to moderate tightness noted in B upper traps. Overuses upper traps.      Functional Tests:  Core strength and proprioception spine wnl: Pt demonstrates overuse of B upper trapezius with functional activities and UE movements. Decreased activation of lower stabilizing muscles.                                                  General     ROS    Assessment/Plan:    Patient is a 46 year old female with cervical, thoracic and left side ankle complaints.    Patient has the following significant findings with corresponding treatment plan.                Diagnosis 1:  Upper back pain and foot pain. Due to multiple body parts, did not assess thoracic mobility, will complete next visit.  Pain -  manual therapy, self management, education and home program  Decreased ROM/flexibility - manual therapy, therapeutic exercise, therapeutic activity and home program  Decreased joint mobility - manual therapy, therapeutic exercise, therapeutic activity and home program  Decreased strength - therapeutic exercise, therapeutic activities and home program  Impaired posture - neuro re-education, therapeutic activities and home program    Therapy Evaluation Codes:   1) History comprised of:   Personal factors that impact the plan of care:      None.    Comorbidity factors that impact the plan of care are:      None.     Medications impacting care: None.  2) Examination of Body Systems comprised of:   Body structures and functions that impact the plan of care:      Ankle, Cervical spine and Thoracic Spine.   Activity limitations that impact the plan of care are:      Standing, Walking and Working.  3) Clinical presentation characteristics  are:   Stable/Uncomplicated.  4) Decision-Making    Low complexity using standardized patient assessment instrument and/or measureable assessment of functional outcome.  Cumulative Therapy Evaluation is: Low complexity.    Previous and current functional limitations:  (See Goal Flow Sheet for this information)    Short term and Long term goals: (See Goal Flow Sheet for this information)     Communication ability:  Patient appears to be able to clearly communicate and understand verbal and written communication and follow directions correctly.  Treatment Explanation - The following has been discussed with the patient:   RX ordered/plan of care  Anticipated outcomes  Possible risks and side effects  This patient would benefit from PT intervention to resume normal activities.   Rehab potential is good.    Frequency:  1 X week, once daily  Duration:  for 8 weeks  Discharge Plan:  Achieve all LTG.  Independent in home treatment program.  Reach maximal therapeutic benefit.    Please refer to the daily flowsheet for treatment today, total treatment time and time spent performing 1:1 timed codes.

## 2021-10-13 ENCOUNTER — THERAPY VISIT (OUTPATIENT)
Dept: PHYSICAL THERAPY | Facility: CLINIC | Age: 46
End: 2021-10-13
Attending: FAMILY MEDICINE
Payer: COMMERCIAL

## 2021-10-13 DIAGNOSIS — M54.6 ACUTE BILATERAL THORACIC BACK PAIN: ICD-10-CM

## 2021-10-13 DIAGNOSIS — M79.672 LEFT FOOT PAIN: ICD-10-CM

## 2021-10-13 PROCEDURE — 97140 MANUAL THERAPY 1/> REGIONS: CPT | Mod: GP | Performed by: PHYSICAL THERAPIST

## 2021-10-13 PROCEDURE — 97110 THERAPEUTIC EXERCISES: CPT | Mod: GP | Performed by: PHYSICAL THERAPIST

## 2021-10-13 PROCEDURE — 97112 NEUROMUSCULAR REEDUCATION: CPT | Mod: GP | Performed by: PHYSICAL THERAPIST

## 2021-10-17 DIAGNOSIS — Z11.59 ENCOUNTER FOR SCREENING FOR OTHER VIRAL DISEASES: ICD-10-CM

## 2021-10-18 ENCOUNTER — THERAPY VISIT (OUTPATIENT)
Dept: PHYSICAL THERAPY | Facility: CLINIC | Age: 46
End: 2021-10-18
Payer: COMMERCIAL

## 2021-10-18 DIAGNOSIS — M54.6 ACUTE BILATERAL THORACIC BACK PAIN: ICD-10-CM

## 2021-10-18 DIAGNOSIS — M79.672 LEFT FOOT PAIN: ICD-10-CM

## 2021-10-18 PROCEDURE — 97140 MANUAL THERAPY 1/> REGIONS: CPT | Mod: GP | Performed by: PHYSICAL THERAPIST

## 2021-10-18 PROCEDURE — 97110 THERAPEUTIC EXERCISES: CPT | Mod: GP | Performed by: PHYSICAL THERAPIST

## 2021-10-24 ENCOUNTER — HEALTH MAINTENANCE LETTER (OUTPATIENT)
Age: 46
End: 2021-10-24

## 2021-10-25 ENCOUNTER — HOSPITAL ENCOUNTER (OUTPATIENT)
Dept: MAMMOGRAPHY | Facility: CLINIC | Age: 46
Discharge: HOME | End: 2021-10-25
Attending: FAMILY MEDICINE | Admitting: FAMILY MEDICINE
Payer: COMMERCIAL

## 2021-10-25 DIAGNOSIS — Z12.31 ENCOUNTER FOR SCREENING MAMMOGRAM FOR MALIGNANT NEOPLASM OF BREAST: ICD-10-CM

## 2021-10-25 PROCEDURE — 77063 BREAST TOMOSYNTHESIS BI: CPT

## 2021-10-26 ENCOUNTER — MYC MEDICAL ADVICE (OUTPATIENT)
Dept: FAMILY MEDICINE | Facility: CLINIC | Age: 46
End: 2021-10-26

## 2021-10-26 DIAGNOSIS — N62 LARGE BREASTS: ICD-10-CM

## 2021-10-26 DIAGNOSIS — M54.6 CHRONIC BILATERAL THORACIC BACK PAIN: Primary | ICD-10-CM

## 2021-10-26 DIAGNOSIS — G89.29 CHRONIC BILATERAL THORACIC BACK PAIN: Primary | ICD-10-CM

## 2021-11-04 ENCOUNTER — THERAPY VISIT (OUTPATIENT)
Dept: PHYSICAL THERAPY | Facility: CLINIC | Age: 46
End: 2021-11-04
Payer: COMMERCIAL

## 2021-11-04 DIAGNOSIS — M79.672 LEFT FOOT PAIN: ICD-10-CM

## 2021-11-04 DIAGNOSIS — M54.6 ACUTE BILATERAL THORACIC BACK PAIN: ICD-10-CM

## 2021-11-04 PROCEDURE — 97140 MANUAL THERAPY 1/> REGIONS: CPT | Mod: GP | Performed by: PHYSICAL THERAPIST

## 2021-11-04 PROCEDURE — 97110 THERAPEUTIC EXERCISES: CPT | Mod: GP | Performed by: PHYSICAL THERAPIST

## 2021-11-09 ENCOUNTER — THERAPY VISIT (OUTPATIENT)
Dept: PHYSICAL THERAPY | Facility: CLINIC | Age: 46
End: 2021-11-09
Payer: COMMERCIAL

## 2021-11-09 DIAGNOSIS — M54.6 BILATERAL THORACIC BACK PAIN: ICD-10-CM

## 2021-11-09 DIAGNOSIS — M79.672 LEFT FOOT PAIN: Primary | ICD-10-CM

## 2021-11-09 PROCEDURE — 97140 MANUAL THERAPY 1/> REGIONS: CPT | Mod: GP | Performed by: PHYSICAL THERAPIST

## 2021-11-09 PROCEDURE — 97110 THERAPEUTIC EXERCISES: CPT | Mod: GP | Performed by: PHYSICAL THERAPIST

## 2021-11-10 ENCOUNTER — TELEPHONE (OUTPATIENT)
Dept: SURGERY | Facility: CLINIC | Age: 46
End: 2021-11-10
Payer: COMMERCIAL

## 2021-11-10 NOTE — TELEPHONE ENCOUNTER
PREVISIT INFORMATION                                                    Caitlin Oh scheduled for future visit at [facility] specialty clinics.    Patient is scheduled to see   on 11/12  Reason for visit: Consult breast reduction   Referring provider   Has patient seen previous specialist? Yes.  N  Medical Records:  Available in chart.  Patient was previously seen at a [facility] or [facility] facility.    REVIEW                                                      New patient packet mailed to patient: No  Medication reconciliation complete: No      Current Outpatient Medications   Medication Sig Dispense Refill     rizatriptan (MAXALT-MLT) 5 MG ODT TAKE 1-2 TABS AT ONSET OF HEADACHE/ MIGRAINE-MAY REPEAT DOSE IN 2 HOURS.DONT EXCEED 30 MG IN 24 HRS 18 tablet 1       Allergies: Penicillins        PLAN/FOLLOW-UP NEEDED                                                      Previsit review complete.  Patient will see provider at future scheduled appointment.     Patient Reminders Given:  Please, make sure you bring an updated list of your medications.   If you are having a procedure, please, present 15 minutes early.  If you need to cancel or reschedule,please call [phone].    Hina Andrews RN

## 2021-11-12 ENCOUNTER — ANESTHESIA EVENT (OUTPATIENT)
Dept: GASTROENTEROLOGY | Facility: CLINIC | Age: 46
End: 2021-11-12
Payer: COMMERCIAL

## 2021-11-12 ENCOUNTER — OFFICE VISIT (OUTPATIENT)
Dept: SURGERY | Facility: CLINIC | Age: 46
End: 2021-11-12
Attending: FAMILY MEDICINE
Payer: COMMERCIAL

## 2021-11-12 VITALS — BODY MASS INDEX: 30.22 KG/M2 | WEIGHT: 188 LBS | HEIGHT: 66 IN

## 2021-11-12 DIAGNOSIS — G89.29 CHRONIC BILATERAL THORACIC BACK PAIN: ICD-10-CM

## 2021-11-12 DIAGNOSIS — M54.6 CHRONIC BILATERAL THORACIC BACK PAIN: ICD-10-CM

## 2021-11-12 DIAGNOSIS — N62 LARGE BREASTS: Primary | ICD-10-CM

## 2021-11-12 PROCEDURE — 99203 OFFICE O/P NEW LOW 30 MIN: CPT | Performed by: STUDENT IN AN ORGANIZED HEALTH CARE EDUCATION/TRAINING PROGRAM

## 2021-11-12 ASSESSMENT — MIFFLIN-ST. JEOR: SCORE: 1514.51

## 2021-11-12 NOTE — NURSING NOTE
Per Dr. Marin's request, bilateral breast pictures to be taken. Pictures taken facing forward with arms down to the sides, lifted out to the sides, breast lifted, then 45 and 90 degree angle on both the left and right sides.    Luba Valenzuela LPN

## 2021-11-12 NOTE — LETTER
"    11/12/2021         RE: Caitlin Oh  [address] [place] [address]        Dear Colleague,    Thank you for referring your patient, Caitlin Oh, to the [facility]. Please see a copy of my visit note below.    PRS    HPI: 46-year-old female presenting with years of bilateral symptomatic macromastia.  She states she has had neck pain and upper back pain for years.  She has been undergoing physical therapy during this timeframe.  She has intermittent rashes along her inframammary folds, which have been historically treated with various creams including at times antifungal.  She has always had a difficult time finding an appropriate bra and complains of bra strap grooving.  She has been weight stable at 185 or so pounds for many years.  She has a recent mammogram which is negative.  She has no plans to lose or gain significant amounts of weight.  She would like to proceed with a breast reduction, especially after discussing its potential benefit with her primary care provider regarding her symptoms.    ROS: Negative, see HPI  Past medical history: None, nondiabetic.  Headaches.   Past surgical history: No surgeries to the breast or chest  Medications: Triptan  Allergies: Penicillins  Social history: Non-smoker, denies any tobacco use history  Family history: No bleeding or clotting problems, issues with anesthesia    Examination:  Ht 1.676 m (5' 6\")   Wt 85.3 kg (188 lb)   BMI 30.34 kg/m    Nonlabored breathing  Not distressed  Bilateral grade 3 ptotic breasts with reasonable symmetry and inframammary folds at the same level  No masses, lumps, nipple discharge, skin changes or axillary lymphadenopathy  Breast measurements:  Sternal notch nipple distance: 30 cm on the left, 20.5 cm on the right  Nipple inframammary distance: 12 cm on the left, 11 cm on the right  Breast base width: 16 cm on the left, 16 cm on the right  Nipple midline distance: 11 cm on the left, 12 cm on the " right  Areolar diameter: 7 cm on the left, 5 cm on the right    MMG 10/25/2021: BI-RADS 1, negative    Schnur scale:  BSA 1.97  628 grams removed per side    A/P: 46-year-old female presenting with bilateral symptomatic macromastia    -Patient is an excellent candidate for bilateral reduction mammoplasty.  Discussed the risks, benefits and alternatives.  The risks of surgery include but are not limited to: Infection, bleeding, hematoma, pain, poor scarring, wound healing complications including at the T point, need for revision surgery for aesthetic concerns, asymmetry, loss of nipple areolar sensation or pigmentation, need for nipple areolar skin grafting.  Despite all these risks, and after having all of her questions answered, patient consents to bilateral reduction mammoplasty and would like to proceed with scheduling and preauthorization.  -Preoperative photography and photo consent obtained today  -We will place case request  -A total of 30 minutes was devoted to review of chart, direct face-to-face patient counseling and documentation during this encounter    Shamir Marin MD, PhD        Again, thank you for allowing me to participate in the care of your patient.        Sincerely,        Shamir Marin MD

## 2021-11-12 NOTE — ANESTHESIA PREPROCEDURE EVALUATION
Anesthesia Pre-Procedure Evaluation    Patient: Caitlin Oh   MRN: [id number] : 1975        Preoperative Diagnosis: Screening for colon cancer [Z12.11]    Procedure : Procedure(s):  COLONOSCOPY          Past Medical History:   Diagnosis Date     Bilateral bunions      Cervical high risk HPV (human papillomavirus) test positive 2015     Female infertility 7/10/2008    Undergoing ivf .      Kidney stones 2010    [facility] er 11/24/10      Personal history of gestational diabetes 2012    diet controlled     Plantar fasciitis     s/p surgery      Past Surgical History:   Procedure Laterality Date     ABDOMEN SURGERY       BUNIONECTOMY Left 2018    Procedure: Left 5th metatarsal corrective osteotomy;  Surgeon: Nick Fuller DPM;  Location: [facility]     BUNIONECTOMY Left 2020    Procedure: BONE SMOOTHING 5TH DIGIT AND SOFT TISSUE RELEASE 5TH MPJ;  Surgeon: Alessandro Beltran DPM;  Location: [facility];  Service: Podiatry     BUNIONECTOMY RT/LT Bilateral 2009     CYSTOSCOPY, RETROGRADES, INSERT STENT URETER(S), COMBINED  2013    Procedure: COMBINED CYSTOSCOPY, RETROGRADES, INSERT STENT URETER(S);  Right Ureteral Stent Placement;  Surgeon: JUN Villar MD;  Location: [facility]     DILATION AND CURETTAGE, HYSTEROSCOPY, ABLATE ENDOMETRIUM THERMACHOICE, COMBINED N/A 4/15/2015    Procedure: COMBINED DILATION AND CURETTAGE, HYSTEROSCOPY, ABLATE ENDOMETRIUM THERMACHOICE;  Surgeon: Munira Goddard MD;  Location: [facility]     DILATION AND CURETTAGE, OPERATIVE HYSTEROSCOPY WITH MORCELLATOR, COMBINED N/A 2018    Procedure: COMBINED DILATION AND CURETTAGE, OPERATIVE HYSTEROSCOPY WITH MORCELLATOR;  Surgeon: Munira Goddard MD;  Location: [facility]     ESOPHAGOSCOPY, GASTROSCOPY, DUODENOSCOPY (EGD), COMBINED N/A 10/15/2020    Procedure: ESOPHAGOGASTRODUODENOSCOPY (EGD);  Surgeon: Brandon Gardner MD;  Location: [facility]     EXTRACORPOREAL SHOCK WAVE LITHOTRIPSY (ESWL)  2013     Procedure: EXTRACORPOREAL SHOCK WAVE LITHOTRIPSY (ESWL);  Right Extracorporeal Shock Wave Lithotripsy;  Surgeon: JUN Villar MD;  Location: [facility]     FOOT SURGERY      for plantar fasciitis     FOOT SURGERY       HC TOOTH EXTRACTION W/FORCEP      wisdom teeth     LAPAROSCOPY DIAGNOSTIC (GYN)  2007    infertility     LASER HOLMIUM LITHOTRIPSY URETER(S), INSERT STENT, COMBINED  9/27/2013    Procedure: COMBINED CYSTOSCOPY, URETEROSCOPY, LASER HOLMIUM LITHOTRIPSY URETER(S), INSERT STENT;  Right Ureteroscopic Stone Extraction and Possible Stent Placement;  Surgeon: JUN Villar MD;  Location: [facility]     OTHER SURGICAL HISTORY      uterine ablation     PELVIC LAPAROSCOPY       TONSILLECTOMY  1983     TONSILLECTOMY        Allergies   Allergen Reactions     Penicillins Rash      Social History     Tobacco Use     Smoking status: Never Smoker     Smokeless tobacco: Never Used   Substance Use Topics     Alcohol use: No      Wt Readings from Last 1 Encounters:   09/28/21 87.1 kg (192 lb)        Anesthesia Evaluation   Pt has had prior anesthetic. Type: General, MAC and Regional.    No history of anesthetic complications       ROS/MED HX  ENT/Pulmonary:  - neg pulmonary ROS     Neurologic:     (+) migraines,     Cardiovascular:  - neg cardiovascular ROS     METS/Exercise Tolerance:     Hematologic:  - neg hematologic  ROS     Musculoskeletal: Comment: Lumbago   - neg musculoskeletal ROS     GI/Hepatic:  - neg GI/hepatic ROS     Renal/Genitourinary:     (+) Nephrolithiasis ,     Endo:     (+) Obesity,     Psychiatric/Substance Use:  - neg psychiatric ROS     Infectious Disease:  - neg infectious disease ROS     Malignancy:  - neg malignancy ROS     Other:  - neg other ROS          Physical Exam    Airway  airway exam normal           Respiratory Devices and Support         Dental  no notable dental history   Comment: Upper and lower invisiline in place        Cardiovascular   cardiovascular exam normal          Pulmonary    pulmonary exam normal                OUTSIDE LABS:  CBC:   Lab Results   Component Value Date    WBC 9.1 10/30/2018    WBC 8.4 10/25/2017    HGB 13.8 10/30/2018    HGB 13.0 10/25/2017    HCT 41.5 10/30/2018    HCT 38.2 10/25/2017     10/30/2018     10/25/2017     BMP:   Lab Results   Component Value Date     09/28/2021     08/28/2019    POTASSIUM 3.8 09/28/2021    POTASSIUM 4.4 08/28/2019    CHLORIDE 109 09/28/2021    CHLORIDE 108 08/28/2019    CO2 27 09/28/2021    CO2 24 08/28/2019    BUN 14 09/28/2021    BUN 14 08/28/2019    CR 0.81 09/28/2021    CR 0.72 08/28/2019    GLC 93 09/28/2021    GLC 97 08/28/2019     COAGS: No results found for: PTT, INR, FIBR  POC:   Lab Results   Component Value Date    HCG Negative 10/15/2020     HEPATIC:   Lab Results   Component Value Date    ALBUMIN 3.8 10/25/2017    PROTTOTAL 7.0 10/25/2017    ALT 28 10/25/2017    AST 13 10/25/2017    ALKPHOS 78 10/25/2017    BILITOTAL 0.2 10/25/2017     OTHER:   Lab Results   Component Value Date    A1C 5.5 07/11/2012    FEDE 8.4 (L) 09/28/2021    PHOS 3.2 12/27/2013    MAG 2.2 12/27/2013    TSH 2.13 04/24/2017    CRP 5.7 10/25/2017    SED 10 10/25/2017       Anesthesia Plan    ASA Status:  2   NPO Status:  NPO Appropriate    Anesthesia Type: General.     - Airway: Native airway      Maintenance: Balanced.        Consents    Anesthesia Plan(s) and associated risks, benefits, and realistic alternatives discussed. Questions answered and patient/representative(s) expressed understanding.    - Discussed:     - Discussed with:  Patient         Postoperative Care            Comments:                BLADIMIR Jacobo CRNA

## 2021-11-12 NOTE — PROGRESS NOTES
"PRS    HPI: 46-year-old female presenting with years of bilateral symptomatic macromastia.  She states she has had neck pain and upper back pain for years.  She has been undergoing physical therapy during this timeframe.  She has intermittent rashes along her inframammary folds, which have been historically treated with various creams including at times antifungal.  She has always had a difficult time finding an appropriate bra and complains of bra strap grooving.  She has been weight stable at 185 or so pounds for many years.  She has a recent mammogram which is negative.  She has no plans to lose or gain significant amounts of weight.  She would like to proceed with a breast reduction, especially after discussing its potential benefit with her primary care provider regarding her symptoms.    ROS: Negative, see HPI  Past medical history: None, nondiabetic.  Headaches.   Past surgical history: No surgeries to the breast or chest  Medications: Triptan  Allergies: Penicillins  Social history: Non-smoker, denies any tobacco use history  Family history: No bleeding or clotting problems, issues with anesthesia    Examination:  Ht 1.676 m (5' 6\")   Wt 85.3 kg (188 lb)   BMI 30.34 kg/m    Nonlabored breathing  Not distressed  Bilateral grade 3 ptotic breasts with reasonable symmetry and inframammary folds at the same level  No masses, lumps, nipple discharge, skin changes or axillary lymphadenopathy  Breast measurements:  Sternal notch nipple distance: 30 cm on the left, 20.5 cm on the right  Nipple inframammary distance: 12 cm on the left, 11 cm on the right  Breast base width: 16 cm on the left, 16 cm on the right  Nipple midline distance: 11 cm on the left, 12 cm on the right  Areolar diameter: 7 cm on the left, 5 cm on the right    MMG 10/25/2021: BI-RADS 1, negative    Schnur scale:  BSA 1.97  628 grams removed per side    A/P: 46-year-old female presenting with bilateral symptomatic macromastia    -Patient is an " excellent candidate for bilateral reduction mammoplasty.  Discussed the risks, benefits and alternatives.  The risks of surgery include but are not limited to: Infection, bleeding, hematoma, pain, poor scarring, wound healing complications including at the T point, need for revision surgery for aesthetic concerns, asymmetry, loss of nipple areolar sensation or pigmentation, need for nipple areolar skin grafting.  Despite all these risks, and after having all of her questions answered, patient consents to bilateral reduction mammoplasty and would like to proceed with scheduling and preauthorization.  -Preoperative photography and photo consent obtained today  -We will place case request  -A total of 30 minutes was devoted to review of chart, direct face-to-face patient counseling and documentation during this encounter    Shamir Marin MD, PhD

## 2021-11-12 NOTE — NURSING NOTE
"Caitlin Oh's goals for this visit include:   Chief Complaint   Patient presents with     Consult     breast reduction       She requests these members of her care team be copied on today's visit information:     PCP: Sujatha Maher    Referring Provider:  Sujatha Maher MD  [id number] [place] [address]    Ht 1.676 m (5' 6\")   Wt 85.3 kg (188 lb)   BMI 30.34 kg/m      Do you need any medication refills at today's visit? no    "

## 2021-11-16 ENCOUNTER — THERAPY VISIT (OUTPATIENT)
Dept: PHYSICAL THERAPY | Facility: CLINIC | Age: 46
End: 2021-11-16
Payer: COMMERCIAL

## 2021-11-16 DIAGNOSIS — M54.6 ACUTE BILATERAL THORACIC BACK PAIN: ICD-10-CM

## 2021-11-16 DIAGNOSIS — M79.672 LEFT FOOT PAIN: ICD-10-CM

## 2021-11-16 PROCEDURE — 97140 MANUAL THERAPY 1/> REGIONS: CPT | Mod: GP | Performed by: PHYSICAL THERAPIST

## 2021-11-16 PROCEDURE — 97110 THERAPEUTIC EXERCISES: CPT | Mod: GP | Performed by: PHYSICAL THERAPIST

## 2021-11-17 ENCOUNTER — LAB (OUTPATIENT)
Dept: LAB | Facility: CLINIC | Age: 46
End: 2021-11-17
Attending: SURGERY
Payer: COMMERCIAL

## 2021-11-17 DIAGNOSIS — Z11.59 ENCOUNTER FOR SCREENING FOR OTHER VIRAL DISEASES: ICD-10-CM

## 2021-11-17 PROCEDURE — U0005 INFEC AGEN DETEC AMPLI PROBE: HCPCS

## 2021-11-17 PROCEDURE — U0003 INFECTIOUS AGENT DETECTION BY NUCLEIC ACID (DNA OR RNA); SEVERE ACUTE RESPIRATORY SYNDROME CORONAVIRUS 2 (SARS-COV-2) (CORONAVIRUS DISEASE [COVID-19]), AMPLIFIED PROBE TECHNIQUE, MAKING USE OF HIGH THROUGHPUT TECHNOLOGIES AS DESCRIBED BY CMS-2020-01-R: HCPCS

## 2021-11-18 LAB — SARS-COV-2 RNA RESP QL NAA+PROBE: NEGATIVE

## 2021-11-19 ENCOUNTER — HOSPITAL ENCOUNTER (OUTPATIENT)
Facility: CLINIC | Age: 46
Discharge: HOME | End: 2021-11-19
Attending: SURGERY | Admitting: SURGERY
Payer: COMMERCIAL

## 2021-11-19 ENCOUNTER — ANESTHESIA (OUTPATIENT)
Dept: GASTROENTEROLOGY | Facility: CLINIC | Age: 46
End: 2021-11-19
Payer: COMMERCIAL

## 2021-11-19 VITALS
HEART RATE: 78 BPM | SYSTOLIC BLOOD PRESSURE: 99 MMHG | BODY MASS INDEX: 30.34 KG/M2 | HEIGHT: 66 IN | OXYGEN SATURATION: 98 % | RESPIRATION RATE: 14 BRPM | DIASTOLIC BLOOD PRESSURE: 90 MMHG | TEMPERATURE: 98 F

## 2021-11-19 LAB — COLONOSCOPY: NORMAL

## 2021-11-19 PROCEDURE — 250N000009 HC RX 250: Performed by: SURGERY

## 2021-11-19 PROCEDURE — 45378 DIAGNOSTIC COLONOSCOPY: CPT | Performed by: SURGERY

## 2021-11-19 PROCEDURE — 250N000011 HC RX IP 250 OP 636: Performed by: NURSE ANESTHETIST, CERTIFIED REGISTERED

## 2021-11-19 PROCEDURE — 250N000009 HC RX 250: Performed by: NURSE ANESTHETIST, CERTIFIED REGISTERED

## 2021-11-19 PROCEDURE — G0121 COLON CA SCRN NOT HI RSK IND: HCPCS | Performed by: SURGERY

## 2021-11-19 PROCEDURE — 370N000017 HC ANESTHESIA TECHNICAL FEE, PER MIN: Performed by: SURGERY

## 2021-11-19 PROCEDURE — 258N000003 HC RX IP 258 OP 636: Performed by: SURGERY

## 2021-11-19 RX ORDER — PROPOFOL 10 MG/ML
INJECTION, EMULSION INTRAVENOUS PRN
Status: DISCONTINUED | OUTPATIENT
Start: 2021-11-19 | End: 2021-11-19

## 2021-11-19 RX ORDER — NALOXONE HYDROCHLORIDE 0.4 MG/ML
0.2 INJECTION, SOLUTION INTRAMUSCULAR; INTRAVENOUS; SUBCUTANEOUS
Status: DISCONTINUED | OUTPATIENT
Start: 2021-11-19 | End: 2021-11-19 | Stop reason: HOSPADM

## 2021-11-19 RX ORDER — LIDOCAINE HYDROCHLORIDE 10 MG/ML
INJECTION, SOLUTION INFILTRATION; PERINEURAL PRN
Status: DISCONTINUED | OUTPATIENT
Start: 2021-11-19 | End: 2021-11-19

## 2021-11-19 RX ORDER — ONDANSETRON 2 MG/ML
4 INJECTION INTRAMUSCULAR; INTRAVENOUS
Status: DISCONTINUED | OUTPATIENT
Start: 2021-11-19 | End: 2021-11-19 | Stop reason: HOSPADM

## 2021-11-19 RX ORDER — SODIUM CHLORIDE, SODIUM LACTATE, POTASSIUM CHLORIDE, CALCIUM CHLORIDE 600; 310; 30; 20 MG/100ML; MG/100ML; MG/100ML; MG/100ML
INJECTION, SOLUTION INTRAVENOUS CONTINUOUS
Status: DISCONTINUED | OUTPATIENT
Start: 2021-11-19 | End: 2021-11-19 | Stop reason: HOSPADM

## 2021-11-19 RX ORDER — PROPOFOL 10 MG/ML
INJECTION, EMULSION INTRAVENOUS CONTINUOUS PRN
Status: DISCONTINUED | OUTPATIENT
Start: 2021-11-19 | End: 2021-11-19

## 2021-11-19 RX ORDER — GLYCOPYRROLATE 0.2 MG/ML
INJECTION, SOLUTION INTRAMUSCULAR; INTRAVENOUS PRN
Status: DISCONTINUED | OUTPATIENT
Start: 2021-11-19 | End: 2021-11-19

## 2021-11-19 RX ORDER — NALOXONE HYDROCHLORIDE 0.4 MG/ML
0.4 INJECTION, SOLUTION INTRAMUSCULAR; INTRAVENOUS; SUBCUTANEOUS
Status: DISCONTINUED | OUTPATIENT
Start: 2021-11-19 | End: 2021-11-19 | Stop reason: HOSPADM

## 2021-11-19 RX ORDER — FLUMAZENIL 0.1 MG/ML
0.2 INJECTION, SOLUTION INTRAVENOUS
Status: DISCONTINUED | OUTPATIENT
Start: 2021-11-19 | End: 2021-11-19 | Stop reason: HOSPADM

## 2021-11-19 RX ORDER — LIDOCAINE 40 MG/G
CREAM TOPICAL
Status: DISCONTINUED | OUTPATIENT
Start: 2021-11-19 | End: 2021-11-19 | Stop reason: HOSPADM

## 2021-11-19 RX ADMIN — SODIUM CHLORIDE, POTASSIUM CHLORIDE, SODIUM LACTATE AND CALCIUM CHLORIDE: 600; 310; 30; 20 INJECTION, SOLUTION INTRAVENOUS at 07:42

## 2021-11-19 RX ADMIN — PROPOFOL 200 MCG/KG/MIN: 10 INJECTION, EMULSION INTRAVENOUS at 09:00

## 2021-11-19 RX ADMIN — PROPOFOL 40 MG: 10 INJECTION, EMULSION INTRAVENOUS at 09:05

## 2021-11-19 RX ADMIN — LIDOCAINE HYDROCHLORIDE 0.3 ML: 10 INJECTION, SOLUTION EPIDURAL; INFILTRATION; INTRACAUDAL; PERINEURAL at 07:42

## 2021-11-19 RX ADMIN — LIDOCAINE HYDROCHLORIDE 50 MG: 10 INJECTION, SOLUTION INFILTRATION; PERINEURAL at 09:01

## 2021-11-19 RX ADMIN — GLYCOPYRROLATE 0.1 MG: 0.2 INJECTION, SOLUTION INTRAMUSCULAR; INTRAVENOUS at 08:59

## 2021-11-19 RX ADMIN — GLYCOPYRROLATE 0.1 MG: 0.2 INJECTION, SOLUTION INTRAMUSCULAR; INTRAVENOUS at 09:01

## 2021-11-19 RX ADMIN — PROPOFOL 50 MG: 10 INJECTION, EMULSION INTRAVENOUS at 09:01

## 2021-11-19 NOTE — OP NOTE
Pt meets criteria for discharge, no issues, pt. Discharged ambulatory with  at [address], RN on 11/19/2021 at 10:12 AM

## 2021-11-19 NOTE — H&P
46 year old year old female here for colonoscopy for screening. This is patient's first colonoscopy.  Patient denies blood in stool or change in stool caliber.  There is no known family history of colon cancer or polyps.    Patient Active Problem List   Diagnosis     CARDIOVASCULAR SCREENING; LDL GOAL LESS THAN 160     Tension headache, chronic     Menstrual migraine     Menorrhagia     Cervical high risk HPV (human papillomavirus) test positive     Left foot pain     Bilateral thoracic back pain       Past Medical History:   Diagnosis Date     Bilateral bunions      Cervical high risk HPV (human papillomavirus) test positive 11/4/2015     Female infertility 7/10/2008    Undergoing ivf 2008.      Kidney stones 11/30/2010    [facility] er 11/24/10      Personal history of gestational diabetes 7/11/2012    diet controlled     Plantar fasciitis     s/p surgery       Past Surgical History:   Procedure Laterality Date     ABDOMEN SURGERY       BUNIONECTOMY Left 12/13/2018    Procedure: Left 5th metatarsal corrective osteotomy;  Surgeon: Nick Fuller DPM;  Location: [facility]     BUNIONECTOMY Left 5/26/2020    Procedure: BONE SMOOTHING 5TH DIGIT AND SOFT TISSUE RELEASE 5TH MPJ;  Surgeon: Alessandro Beltran DPM;  Location: [facility];  Service: Podiatry     BUNIONECTOMY RT/LT Bilateral 11/2009     CYSTOSCOPY, RETROGRADES, INSERT STENT URETER(S), COMBINED  9/19/2013    Procedure: COMBINED CYSTOSCOPY, RETROGRADES, INSERT STENT URETER(S);  Right Ureteral Stent Placement;  Surgeon: JUN Villar MD;  Location: [facility]     DILATION AND CURETTAGE, HYSTEROSCOPY, ABLATE ENDOMETRIUM THERMACHOICE, COMBINED N/A 4/15/2015    Procedure: COMBINED DILATION AND CURETTAGE, HYSTEROSCOPY, ABLATE ENDOMETRIUM THERMACHOICE;  Surgeon: Munira Goddard MD;  Location: [facility]     DILATION AND CURETTAGE, OPERATIVE HYSTEROSCOPY WITH MORCELLATOR, COMBINED N/A 11/7/2018    Procedure: COMBINED DILATION AND CURETTAGE, OPERATIVE HYSTEROSCOPY WITH  "MORCELLATOR;  Surgeon: Munira Goddard MD;  Location: [facility]     ESOPHAGOSCOPY, GASTROSCOPY, DUODENOSCOPY (EGD), COMBINED N/A 10/15/2020    Procedure: ESOPHAGOGASTRODUODENOSCOPY (EGD);  Surgeon: Brandon Gardner MD;  Location: [facility]     EXTRACORPOREAL SHOCK WAVE LITHOTRIPSY (ESWL)  9/18/2013    Procedure: EXTRACORPOREAL SHOCK WAVE LITHOTRIPSY (ESWL);  Right Extracorporeal Shock Wave Lithotripsy;  Surgeon: JUN Villar MD;  Location: [facility]     FOOT SURGERY      for plantar fasciitis     FOOT SURGERY       HC TOOTH EXTRACTION W/FORCEP      wisdom teeth     LAPAROSCOPY DIAGNOSTIC (GYN)  2007    infertility     LASER HOLMIUM LITHOTRIPSY URETER(S), INSERT STENT, COMBINED  9/27/2013    Procedure: COMBINED CYSTOSCOPY, URETEROSCOPY, LASER HOLMIUM LITHOTRIPSY URETER(S), INSERT STENT;  Right Ureteroscopic Stone Extraction and Possible Stent Placement;  Surgeon: JUN Villar MD;  Location: [facility]     OTHER SURGICAL HISTORY      uterine ablation     PELVIC LAPAROSCOPY       TONSILLECTOMY  1983     TONSILLECTOMY         Family History   Problem Relation Age of Onset     Heart Disease Maternal Grandmother      Breast Cancer Maternal Grandmother         dx'd age 70     Arthritis Mother      Hypertension Mother      Cancer Mother         skin cancer     Other Cancer Mother         Lymphoma     Prostate Cancer Father        No current outpatient medications on file.       Allergies   Allergen Reactions     Penicillins Rash       Pt reports that she has never smoked. She has never used smokeless tobacco. She reports that she does not drink alcohol and does not use drugs.    Exam:  BP (P) 133/85 (BP Location: Right arm)   Pulse (P) 78   Temp (P) 98  F (36.7  C) (Oral)   Resp (P) 16   Ht (P) 1.676 m (5' 6\")   SpO2 (P) 97%   BMI (P) 30.34 kg/m      Awake, Alert OX3  Lungs - CTA bilaterally  CV - RRR, no murmurs, distal pulses intact  Abd - soft, non-distended, non-tender, +BS  Extr - No cyanosis or edema    A/P 46 year old " year old female in need of colonoscopy for screening. Risks, benefits, alternatives, and complications were discussed including the possibility of perforation, bleeding, missed lesion and the patient agreed to proceed    [place] DO on 11/19/2021 at 8:14 AM

## 2021-11-19 NOTE — ANESTHESIA POSTPROCEDURE EVALUATION
Patient: Caitlin hO    Procedure: Procedure(s):  COLONOSCOPY       Diagnosis:Screening for colon cancer [Z12.11]  Diagnosis Additional Information: No value filed.    Anesthesia Type:  General    Note:  Disposition: Outpatient   Postop Pain Control: Uneventful            Sign Out: Well controlled pain   PONV: No   Neuro/Psych: Uneventful            Sign Out: Acceptable/Baseline neuro status   Airway/Respiratory: Uneventful            Sign Out: AIRWAY IN SITU/Resp. Support   CV/Hemodynamics: Uneventful            Sign Out: Acceptable CV status; No obvious hypovolemia; No obvious fluid overload   Other NRE: NONE   DID A NON-ROUTINE EVENT OCCUR? No           Last vitals:  Vitals Value Taken Time   /77 11/19/21 0935   Temp     Pulse 63 11/19/21 0935   Resp     SpO2 98 % 11/19/21 0937   Vitals shown include unvalidated device data.    Electronically Signed By: BLADIMIR Jensen CRNA  November 19, 2021  9:39 AM

## 2021-11-19 NOTE — ANESTHESIA CARE TRANSFER NOTE
Patient: Caitlin Oh    Procedure: Procedure(s):  COLONOSCOPY       Diagnosis: Screening for colon cancer [Z12.11]  Diagnosis Additional Information: No value filed.    Anesthesia Type:   General     Note:    Oropharynx: oropharynx clear of all foreign objects and spontaneously breathing  Level of Consciousness: awake  Oxygen Supplementation: room air    Independent Airway: airway patency satisfactory and stable  Dentition: dentition unchanged  Vital Signs Stable: post-procedure vital signs reviewed and stable  Report to RN Given: handoff report given  Patient transferred to: Phase II    Handoff Report: Identifed the Patient, Identified the Reponsible Provider, Reviewed the pertinent medical history, Discussed the surgical course, Reviewed Intra-OP anesthesia mangement and issues during anesthesia, Set expectations for post-procedure period and Allowed opportunity for questions and acknowledgement of understanding      Vitals:  Vitals Value Taken Time   BP     Temp     Pulse     Resp     SpO2         Electronically Signed By: BLADIMIR Jensen CRNA  November 19, 2021  9:18 AM

## 2021-11-22 ENCOUNTER — THERAPY VISIT (OUTPATIENT)
Dept: PHYSICAL THERAPY | Facility: CLINIC | Age: 46
End: 2021-11-22
Payer: COMMERCIAL

## 2021-11-22 DIAGNOSIS — M79.672 LEFT FOOT PAIN: Primary | ICD-10-CM

## 2021-11-22 DIAGNOSIS — M54.6 BILATERAL THORACIC BACK PAIN: ICD-10-CM

## 2021-11-22 PROCEDURE — 97110 THERAPEUTIC EXERCISES: CPT | Mod: GP | Performed by: PHYSICAL THERAPIST

## 2021-11-22 PROCEDURE — 97140 MANUAL THERAPY 1/> REGIONS: CPT | Mod: GP | Performed by: PHYSICAL THERAPIST

## 2021-11-23 ENCOUNTER — MYC MEDICAL ADVICE (OUTPATIENT)
Dept: SURGERY | Facility: CLINIC | Age: 46
End: 2021-11-23
Payer: COMMERCIAL

## 2021-12-03 ENCOUNTER — TELEPHONE (OUTPATIENT)
Dept: SURGERY | Facility: CLINIC | Age: 46
End: 2021-12-03
Payer: COMMERCIAL

## 2021-12-03 PROBLEM — N62 LARGE BREASTS: Status: ACTIVE | Noted: 2021-12-03

## 2021-12-03 NOTE — TELEPHONE ENCOUNTER
Received call from patient asking if her surgery has been approved with Dr. Marin for breast reduction surgery. I told her that I'm not sure as I don't handle the authorizations, but that she does have an order placed for the surgery. I told her that I am happy to hold a surgery date for her while we wait for the PA. She accepted and has the surgery scheduled for 1-26-22.      I confirmed that her insurance is staying the same for next year, so can we please start the PA for a BILATERAL REDUCTION MAMMAPLASTY with Dr. Marin.    Jen Powers, Surgery Scheduling Coordinator

## 2021-12-03 NOTE — TELEPHONE ENCOUNTER
Subjective:     Lee Ann Webber is a 28 y.o. female who presents with chronic back pain.    HPI:   Seen in f/u for chronic back pain.  She saw GI yesterday.  He confirmed that she prob has IBS.  She has treated herself with mg++.  He recommended colonoscopy but she is going to think about that.  No FH of colon cancer.    She has a hx of chronic LBP.  Having chiro appt weekly for several years.  Had chronic back abn in lumbar and cervical.  She also has other joint pain but doesn't always notice it d/t the back pain being bad.  She woul dlike ibuprofen presc 800 mg for her back pain.  Reviewed lab with pt.  Her GFR, ESR, FOLATE, B12, D, TSH, T4, LP, A1C, CMP, CBC is wnl  HELENA is + at 1:80.  D/t her pain we will check CCP and RA factor.   She has seen allergist.  She is not allergic to anthing.         Patient Active Problem List    Diagnosis Date Noted   • Altered taste 08/21/2019   • Weight gain finding 08/21/2019   • Food allergy 08/21/2019   • Bowel habit changes 08/21/2019   • Hematochezia 08/21/2019   • Vitamin D deficiency        Current medicines (including changes today)  Current Outpatient Medications   Medication Sig Dispense Refill   • ibuprofen (MOTRIN) 800 MG Tab Take 1 Tab by mouth every 8 hours as needed. 20 Tab 3   • Levonorgestrel (MIRENA IU) by Intrauterine route.       No current facility-administered medications for this visit.        Allergies   Allergen Reactions   • Nkda [No Known Drug Allergy]        ROS  Constitutional: Negative. Negative for fever, chills, weight loss, malaise/fatigue and diaphoresis.   HENT: Negative. Negative for hearing loss, ear pain, nosebleeds, congestion, sore throat, neck pain, tinnitus and ear discharge.   Respiratory: Negative. Negative for cough, hemoptysis, sputum production, shortness of breath, wheezing and stridor.   Cardiovascular: Negative. Negative for chest pain, palpitations, orthopnea, claudication, leg swelling and PND.   Gastrointestinal: Denies  PB DOS: 1/26/2021  Type of Procedure: BRM  CPT Codes: 30975-64  ICD10 Codes: N62  Surgeon/Ordering provider: Shamir Marin MD, [id number].      Pre-cert/Authorization completed:  Approved with appeal  Payer: [employer]  Date Checked: 12/3/2021  Spoke to [employer] PA list  Ref. # pending/ Auth # [id number]  Valid Dates: 10/20/2021-2/28/2022      the surgeon has been updated to Dr. Shamir Sharpe and facility is [facility].         "nausea, vomiting, diarrhea, constipation, heartburn, melena or hematochezia.  Genitourinary: Denies dysuria, hematuria, urinary incontinence, frequency or urgency.        Objective:     /62 (BP Location: Right arm, Patient Position: Sitting)   Pulse 67   Temp 36.3 °C (97.4 °F) (Temporal)   Ht 1.702 m (5' 7\")   Wt 84.4 kg (186 lb)   SpO2 99%  Body mass index is 29.13 kg/m².    Physical Exam:  Vitals reviewed.  Constitutional: Oriented to person, place, and time. appears well-developed and well-nourished. No distress.   Cardiovascular: Normal rate, regular rhythm, normal heart sounds and intact distal pulses. Exam reveals no gallop and no friction rub. No murmur heard. No carotid bruits.   Pulmonary/Chest: Effort normal and breath sounds normal. No stridor. No respiratory distress. no wheezes or rales. exhibits no tenderness.   Musculoskeletal: Normal range of motion. exhibits no edema. grace pedal pulses 2+.  Lymphadenopathy: No cervical or supraclavicular adenopathy.   Neurological: Alert and oriented to person, place, and time. exhibits normal muscle tone.  Skin: Skin is warm and dry. No diaphoresis.   Psychiatric: Normal mood and affect. Behavior is normal.      Assessment and Plan:     The following treatment plan was discussed:    1. Positive HELENA (antinuclear antibody)  CCP ANTIBODY    RHEUMATOID ARTHRITIS FACTOR    do CCP and RA factor.  f/u w/pt w/results.  repeat if poss RA/pain flare.  she will call for lab slip   2. Chronic bilateral low back pain with sciatica, sciatica laterality unspecified  ibuprofen (MOTRIN) 800 MG Tab    ibuprofen prn for pain.  f/u yearly call for lab slip   3. Other irritable bowel syndrome      stable now with otc supplements         Followup: Return in about 1 year (around 9/18/2020).  "

## 2021-12-13 ENCOUNTER — THERAPY VISIT (OUTPATIENT)
Dept: PHYSICAL THERAPY | Facility: CLINIC | Age: 46
End: 2021-12-13
Payer: COMMERCIAL

## 2021-12-13 ENCOUNTER — MYC MEDICAL ADVICE (OUTPATIENT)
Dept: SURGERY | Facility: CLINIC | Age: 46
End: 2021-12-13

## 2021-12-13 DIAGNOSIS — M79.672 LEFT FOOT PAIN: ICD-10-CM

## 2021-12-13 DIAGNOSIS — M54.6 ACUTE BILATERAL THORACIC BACK PAIN: ICD-10-CM

## 2021-12-13 PROCEDURE — 97110 THERAPEUTIC EXERCISES: CPT | Mod: GP | Performed by: PHYSICAL THERAPIST

## 2021-12-13 PROCEDURE — 97140 MANUAL THERAPY 1/> REGIONS: CPT | Mod: GP | Performed by: PHYSICAL THERAPIST

## 2021-12-13 ASSESSMENT — ACTIVITIES OF DAILY LIVING (ADL)
ACTIVITIES_OF_DAILY_LIVING_MEASURE_SCORE(%):: 80.95
TOTAL_ADL_ITEM_SCORE:: 68
HIGHEST_POTENTIAL_ADL_SCORE:: 84

## 2021-12-13 NOTE — PROGRESS NOTES
Subjective:  HPI  Physical Exam                    Objective:  System    Physical Exam    General     ROS    Assessment/Plan:    PROGRESS  REPORT    Progress reporting period is from 10/7/21 to 12/13/21.       SUBJECTIVE  Subjective changes noted by patient:  Pt reports her foot is better and has days that are pretty good and really doesn't have any pain but then other days, she's still in a lot of pain. Patient reports she was on her feet a lot last week. Had 15 hour days which seemed to bother her. Back was doing OK but was out walking the dog and slipped a couple times and tweaked her back a bit. Did get into the chiro who told her she wasn't moving much. Feeling a little better now. Did hear from the MD's office and they are still appealing the surgery. Does have a date set for the end of Jan but isn't sure if it will be happening or not.     Current pain level is 0/10-6/10 at it's worst.     Previous pain level was  4/10  .   Changes in function:  Yes (See Goal flowsheet attached for changes in current functional level)  Adverse reaction to treatment or activity: None    OBJECTIVE  Changes noted in objective findings:  Yes, DF: 12, PF: 60, Inver: 40, Ever: 30. Ankle strength: 5/5 throughout without pain. Pt does have increased tension throughout the soft tissue of the foot today including between the metatarsels and into the medial arch of the foot as well. Poor PA mobility noted throughout thoracic spine iasha on the L. Appears to have some L lower rib posterior rotation as well which patient notes is very painful today and feels very tight. Pt overall has good scapular stability and is engaging scapular stabilizers appropriately without overuse of upper traps.         ASSESSMENT/PLAN  Updated problem list and treatment plan: Diagnosis 1:  Upper back pain and foot pain  Pain -  manual therapy, self management, education and home program  Decreased ROM/flexibility - manual therapy, therapeutic exercise,  therapeutic activity and home program  Decreased joint mobility - manual therapy, therapeutic exercise, therapeutic activity and home program  Decreased strength - therapeutic exercise, therapeutic activities and home program  Impaired muscle performance - neuro re-education and home program  Decreased function - therapeutic activities and home program  Diagnosis 2:  Thoracic back pain   Pain -  manual therapy, self management, education, directional preference exercise and home program  Decreased ROM/flexibility - manual therapy, therapeutic exercise, therapeutic activity and home program  Decreased joint mobility - manual therapy, therapeutic exercise, therapeutic activity and home program  Decreased strength - therapeutic exercise, therapeutic activities and home program  Impaired muscle performance - neuro re-education and home program  Decreased function - therapeutic activities and home program  STG/LTGs have been met or progress has been made towards goals:  Yes, however minimal progress has been made overall in both foot and mid back  Assessment of Progress: The patient's progress has plateaued.  Self Management Plans:  Patient is independent in a home treatment program.  Patient is independent in self management of symptoms.    Caitlin continues to require the following intervention to meet STG and LTG's:  PT intervention is no longer required to meet STG/LTG.    Recommendations:  Patient to try things on her own for a while. Foot pain is slightly better overall however continues to have significant pain and increasing tension with prolonged being on her feet. Patient has been compliant with HEP to address strength and balance deficits. Strength and balance don't seem to be an issue at this time. Question some underlying arthritis in her foot. Could consider following up with MD for further recommendations. In terms of Mid back pain, again, pain is relatively unchanged. Continues to have poor mobility  throughout thoracic spine. Has been seeing chiro as well who pt reports is having a hard time adjusting as well. Has been working on movement and strengthening and improvement noted in scapular stability without improvement in pain. Recommend patient follow up with MD regarding continued pain here as well.    Please refer to the daily flowsheet for treatment today, total treatment time and time spent performing 1:1 timed codes.

## 2021-12-22 ENCOUNTER — IMMUNIZATION (OUTPATIENT)
Dept: FAMILY MEDICINE | Facility: CLINIC | Age: 46
End: 2021-12-22
Payer: COMMERCIAL

## 2021-12-22 DIAGNOSIS — Z23 NEED FOR PROPHYLACTIC VACCINATION AND INOCULATION AGAINST INFLUENZA: Primary | ICD-10-CM

## 2021-12-22 PROCEDURE — 90471 IMMUNIZATION ADMIN: CPT

## 2021-12-22 PROCEDURE — 90686 IIV4 VACC NO PRSV 0.5 ML IM: CPT

## 2021-12-29 DIAGNOSIS — Z11.59 ENCOUNTER FOR SCREENING FOR OTHER VIRAL DISEASES: ICD-10-CM

## 2022-01-13 ENCOUNTER — THERAPY VISIT (OUTPATIENT)
Dept: PHYSICAL THERAPY | Facility: CLINIC | Age: 47
End: 2022-01-13
Payer: COMMERCIAL

## 2022-01-13 DIAGNOSIS — M54.6 ACUTE BILATERAL THORACIC BACK PAIN: ICD-10-CM

## 2022-01-13 DIAGNOSIS — M79.672 LEFT FOOT PAIN: ICD-10-CM

## 2022-01-13 PROCEDURE — 97140 MANUAL THERAPY 1/> REGIONS: CPT | Mod: GP | Performed by: PHYSICAL THERAPIST

## 2022-01-13 NOTE — PROGRESS NOTES
Subjective:  HPI  Physical Exam                    Objective:  System    Physical Exam    General     ROS    Assessment/Plan:    DISCHARGE  REPORT    Progress reporting period is from 12/13/21 to 1/13/22.       SUBJECTIVE  Subjective changes noted by patient:  Patient has not been seen in a month. Notes that she felt really good in her back for 2-3 weeks following our last appt but then over the past 1-2 weeks, things have progressively worsened since then. In terms of the foot, has really been up and down with the discomfort.        Changes in function:  Yes (See Goal flowsheet attached for changes in current functional level)  Adverse reaction to treatment or activity: None    OBJECTIVE  Changes noted in objective findings:  Yes, Pt continues to have restrictions in PA mobility in thoracic spine aisha on the L. Appears to have a few elevated and posterior ribs in lower thoracic spine as well. Pt notes pain centrally with spring testing throughout. Tightness also remains in foot throughout. Today, had more pronounced tension in medial foot and along longitudinal arch. Good mobility noted in 5th ray. ROM WNL throughout however pt continues to note some pain with ambulation.         ASSESSMENT/PLAN  Updated problem list and treatment plan: Diagnosis 1:  Foot pain. Pain continues to eb and flow despite continued work with exercises. Unsure if she has some underlying arthritis. Patient feels a little like this may be the best it's going to get. Recommended patient follow up with MD if pain continues or worsens.  Pain -  manual therapy, self management, education and home program  Decreased ROM/flexibility - manual therapy, therapeutic exercise, therapeutic activity and home program  Decreased joint mobility - manual therapy, therapeutic exercise, therapeutic activity and home program  Decreased strength - therapeutic exercise, therapeutic activities and home program  Impaired balance - neuro re-education, therapeutic  activities and home program  Decreased proprioception - neuro re-education, therapeutic activities and home program  Impaired muscle performance - neuro re-education and home program  Decreased function - therapeutic activities and home program  Diagnosis 2:  Thoracic pain. Again pain remains on and off. Pt will be having breast reduction at the end of the month which will hopefully help with continued chronic thoracic pain.   Pain -  manual therapy, self management, education and home program  Decreased ROM/flexibility - manual therapy, therapeutic exercise, therapeutic activity and home program  Decreased strength - therapeutic exercise, therapeutic activities and home program  Impaired muscle performance - neuro re-education and home program  Decreased function - therapeutic activities and home program  Impaired posture - neuro re-education, therapeutic activities and home program  STG/LTGs have been met or progress has been made towards goals:  Yes, but limited  Assessment of Progress: The patient's progress has plateaued.  Self Management Plans:  Patient is independent in a home treatment program.  Patient is independent in self management of symptoms.    Caitlin continues to require the following intervention to meet STG and LTG's:  PT intervention is no longer required to meet STG/LTG.    Recommendations:  This patient is ready to be discharged from therapy and continue their home treatment program.  Pt will now be having surgery. To follow up post op if needed and to follow up with foot MD PRN.    Please refer to the daily flowsheet for treatment today, total treatment time and time spent performing 1:1 timed codes.

## 2022-01-19 ENCOUNTER — OFFICE VISIT (OUTPATIENT)
Dept: FAMILY MEDICINE | Facility: CLINIC | Age: 47
End: 2022-01-19
Payer: COMMERCIAL

## 2022-01-19 VITALS
HEIGHT: 66 IN | WEIGHT: 189 LBS | BODY MASS INDEX: 30.37 KG/M2 | DIASTOLIC BLOOD PRESSURE: 76 MMHG | HEART RATE: 59 BPM | TEMPERATURE: 97.7 F | OXYGEN SATURATION: 99 % | RESPIRATION RATE: 16 BRPM | SYSTOLIC BLOOD PRESSURE: 124 MMHG

## 2022-01-19 DIAGNOSIS — N62 LARGE BREASTS: ICD-10-CM

## 2022-01-19 DIAGNOSIS — M54.6 CHRONIC BILATERAL THORACIC BACK PAIN: ICD-10-CM

## 2022-01-19 DIAGNOSIS — Z01.818 PREOP GENERAL PHYSICAL EXAM: Primary | ICD-10-CM

## 2022-01-19 DIAGNOSIS — G89.29 CHRONIC BILATERAL THORACIC BACK PAIN: ICD-10-CM

## 2022-01-19 PROCEDURE — 99214 OFFICE O/P EST MOD 30 MIN: CPT | Performed by: FAMILY MEDICINE

## 2022-01-19 ASSESSMENT — MIFFLIN-ST. JEOR: SCORE: 1511.11

## 2022-01-19 NOTE — PATIENT INSTRUCTIONS
Do not take Ibuprofen, Aspirin or Naproxen from now until after procedure.  If you need to take something for pain, take Acetaminophen 325 mg orally 1-2 tabs every 4-6 hrs as needed for pain    Preparing for Your Surgery  Getting started  A nurse will call you to review your health history and instructions. They will give you an arrival time based on your scheduled surgery time. Please be ready to share:    Your doctor's clinic name and phone number    Your medical, surgical and anesthesia history    A list of allergies and sensitivities    A list of medicines, including herbal treatments and over-the-counter drugs    Whether the patient has a legal guardian (ask how to send us the papers in advance)  Please tell us if you're pregnant--or if there's any chance you might be pregnant. Some surgeries may injure a fetus (unborn baby), so they require a pregnancy test. Surgeries that are safe for a fetus don't always need a test, and you can choose whether to have one.   If you have a child who's having surgery, please ask for a copy of Preparing for Your Child's Surgery.    Preparing for surgery    Within 30 days of surgery: Have a pre-op exam (sometimes called an H&P, or History and Physical). This can be done at a clinic or pre-operative center.  ? If you're having a , you may not need this exam. Talk to your care team.    At your pre-op exam, talk to your care team about all medicines you take. If you need to stop any medicines before surgery, ask when to start taking them again.  ? We do this for your safety. Many medicines can make you bleed too much during surgery. Some change how well surgery (anesthesia) drugs work.    Call your insurance company to let them know you're having surgery. (If you don't have insurance, call [phone].)    Call your clinic if there's any change in your health. This includes signs of a cold or flu (sore throat, runny nose, cough, rash, fever). It also includes a scrape or  scratch near the surgery site.    If you have questions on the day of surgery, call your hospital or surgery center.  COVID testing  You may need to be tested for COVID-19 before having surgery. If so, your surgical team will give you instructions for scheduling this test, separate from your preoperative history and physical.  Eating and drinking guidelines  For your safety: Unless your surgeon tells you otherwise, follow the guidelines below.    Eat and drink as usual until 8 hours before surgery. After that, no food or milk.    Drink clear liquids until 2 hours before surgery. These are liquids you can see through, like water, Gatorade and Propel Water. You may also have black coffee and tea (no cream or milk).    Nothing by mouth within 2 hours of surgery. This includes gum, candy and breath mints.    If you drink alcohol: Stop drinking it the night before surgery.    If your care team tells you to take medicine on the morning of surgery, it's okay to take it with a sip of water.  Preventing infection    Shower or bathe the night before and morning of your surgery. Follow the instructions your clinic gave you. (If no instructions, use regular soap.)    Don't shave or clip hair near your surgery site. We'll remove the hair if needed.    Don't smoke or vape the morning of surgery. You may chew nicotine gum up to 2 hours before surgery. A nicotine patch is okay.  ? Note: Some surgeries require you to completely quit smoking and nicotine. Check with your surgeon.    Your care team will make every effort to keep you safe from infection. We will:  ? Clean our hands often with soap and water (or an alcohol-based hand rub).  ? Clean the skin at your surgery site with a special soap that kills germs.  ? Give you a special gown to keep you warm. (Cold raises the risk of infection.)  ? Wear special hair covers, masks, gowns and gloves during surgery.  ? Give antibiotic medicine, if prescribed. Not all surgeries need  antibiotics.  What to bring on the day of surgery    Photo ID and insurance card    Copy of your health care directive, if you have one    Glasses and hearing aides (bring cases)  ? You can't wear contacts during surgery    Inhaler and eye drops, if you use them (tell us about these when you arrive)    CPAP machine or breathing device, if you use them    A few personal items, if spending the night    If you have . . .  ? A pacemaker, ICD (cardiac defibrillator) or other implant: Bring the ID card.  ? An implanted stimulator: Bring the remote control.  ? A legal guardian: Bring a copy of the certified (court-stamped) guardianship papers.  Please remove any jewelry, including body piercings. Leave jewelry and other valuables at home.  If you're going home the day of surgery    You must have a responsible adult drive you home. They should stay with you overnight as well.    If you don't have someone to stay with you, and you aren't safe to go home alone, we may keep you overnight. Insurance often won't pay for this.  After surgery  If it's hard to control your pain or you need more pain medicine, please call your surgeon's office.  Questions?   If you have any questions for your care team, list them here: _________________________________________________________________________________________________________________________________________________________________________ ____________________________________ ____________________________________ ____________________________________  For informational purposes only. Not to replace the advice of your health care provider. Copyright   2003, 2019 [facility]. All rights reserved. Clinically reviewed by April Hunt MD. [employer] [id number] - REV 07/21.

## 2022-01-19 NOTE — PROGRESS NOTES
[facility]  [address] [address]  Phone: [phone]  Primary Provider: Sujatha Maher  Pre-op Performing Provider: [facility] STILL      PREOPERATIVE EVALUATION:  Today's date: 1/19/2022    Caitlin Oh is a 46 year old female who presents for a preoperative evaluation.    Surgical Information:  Surgery/Procedure: bilateral reduction mammaplasty  Surgery Location: [facility]  Surgeon: Dr. Wang  Surgery Date: 1/26/22  Time of Surgery: 7:00  Where patient plans to recover: At home with family  Fax number for surgical facility: Note does not need to be faxed, will be available electronically in Epic.    Type of Anesthesia Anticipated: General    Assessment & Plan     The proposed surgical procedure is considered INTERMEDIATE risk.    Preop general physical exam    Large breasts    Chronic bilateral thoracic back pain             Risks and Recommendations:  The patient has the following additional risks and recommendations for perioperative complications:   - No identified additional risk factors other than previously addressed    Medication Instructions:  Patient is on no chronic medications    RECOMMENDATION:  APPROVAL GIVEN to proceed with proposed procedure, without further diagnostic evaluation.    Subjective     HPI related to upcoming procedure: Patient has chronic thoracic back pain, thought to be aggravated by large breasts.. Hence, planned surgery. Reviewed above with patient.    Preop Questions 1/16/2022   1. Have you ever had a heart attack or stroke? No   2. Have you ever had surgery on your heart or blood vessels, such as a stent placement, a coronary artery bypass, or surgery on an artery in your head, neck, heart, or legs? No   3. Do you have chest pain with activity? No   4. Do you have a history of  heart failure? No   5. Do you currently have a cold, bronchitis or symptoms of other infection? No   6. Do you have a cough,  shortness of breath, or wheezing? No   7. Do you or anyone in your family have previous history of blood clots? No   8. Do you or does anyone in your family have a serious bleeding problem such as prolonged bleeding following surgeries or cuts? No   9. Have you ever had problems with anemia or been told to take iron pills? No   10. Have you had any abnormal blood loss such as black, tarry or bloody stools, or abnormal vaginal bleeding? No   11. Have you ever had a blood transfusion? No   12. Are you willing to have a blood transfusion if it is medically needed before, during, or after your surgery? Yes   13. Have you or any of your relatives ever had problems with anesthesia? No   14. Do you have sleep apnea, excessive snoring or daytime drowsiness? No   15. Do you have any artifical heart valves or other implanted medical devices like a pacemaker, defibrillator, or continuous glucose monitor? No   16. Do you have artificial joints? No   17. Are you allergic to latex? No   18. Is there any chance that you may be pregnant? No       Health Care Directive:  Patient does not have a Health Care Directive or Living Will: Discussed advance care planning with patient; information given to patient to review.    Preoperative Review of :   reviewed - no record of controlled substances prescribed.      Status of Chronic Conditions:  See problem list for active medical problems.  Problems all longstanding and stable, except as noted/documented.  See ROS for pertinent symptoms related to these conditions.      Review of Systems  CONSTITUTIONAL: NEGATIVE for fever, chills, change in weight  INTEGUMENTARY/SKIN: NEGATIVE for worrisome rashes, moles or lesions  EYES: NEGATIVE for vision changes or irritation  ENT/MOUTH: NEGATIVE for ear, mouth and throat problems  RESP: NEGATIVE for significant cough or SOB  CV: NEGATIVE for chest pain, palpitations or peripheral edema  GI: NEGATIVE for nausea, abdominal pain, heartburn, or  change in bowel habits  : NEGATIVE for frequency, dysuria, or hematuria  MUSCULOSKELETAL: NEGATIVE for significant arthralgias or myalgia  NEURO: NEGATIVE for weakness, dizziness or paresthesias  ENDOCRINE: NEGATIVE for temperature intolerance, skin/hair changes  HEME: NEGATIVE for bleeding problems  PSYCHIATRIC: NEGATIVE for changes in mood or affect    Patient Active Problem List    Diagnosis Date Noted     Large breasts 12/03/2021     Priority: Medium     Added automatically from request for surgery [id number]       Cervical high risk HPV (human papillomavirus) test positive 11/04/2015     Priority: Medium     10/28/2015:Pap--NIL, +HR HPV (NOT type 16 or 18). Plan to repeat Pap + HPV cotesting in 1 year. Reminder placed in TRACKING  9/27/16:Pap--NIL, neg HPV. Plan to repeat Pap + HPV cotesting in 3 years (2019).  8/31/20 NIL pap, Neg HPV. Plan cotest in 3 years.          Menstrual migraine 12/02/2014     Priority: Medium     Menorrhagia 12/02/2014     Priority: Medium     Tension headache, chronic 03/22/2013     Priority: Medium     CARDIOVASCULAR SCREENING; LDL GOAL LESS THAN 160 10/31/2010     Priority: Medium      Past Medical History:   Diagnosis Date     Bilateral bunions      Cervical high risk HPV (human papillomavirus) test positive 11/4/2015     Female infertility 7/10/2008    Undergoing ivf 2008.      Kidney stones 11/30/2010    [facility] 11/24/10      Personal history of gestational diabetes 7/11/2012    diet controlled     Plantar fasciitis     s/p surgery     Past Surgical History:   Procedure Laterality Date     ABDOMEN SURGERY       BUNIONECTOMY Left 12/13/2018    Procedure: Left 5th metatarsal corrective osteotomy;  Surgeon: Nick Fuller DPM;  [facility]     BUNIONECTOMY Left 5/26/2020    Procedure: BONE SMOOTHING 5TH DIGIT AND SOFT TISSUE RELEASE 5TH MPJ;  Surgeon: Alessandro Beltran DPM;  Location: [facility];  Service: Podiatry     BUNIONECTOMY RT/LT Bilateral 11/2009      COLONOSCOPY N/A 11/19/2021    Procedure: COLONOSCOPY;  Surgeon: Anderson Park DO;  Location: [facility]     CYSTOSCOPY, RETROGRADES, INSERT STENT URETER(S), COMBINED  9/19/2013    Procedure: COMBINED CYSTOSCOPY, RETROGRADES, INSERT STENT URETER(S);  Right Ureteral Stent Placement;  Surgeon: JUN Villar MD;  Location: [facility]     DILATION AND CURETTAGE, HYSTEROSCOPY, ABLATE ENDOMETRIUM THERMACHOICE, COMBINED N/A 4/15/2015    Procedure: COMBINED DILATION AND CURETTAGE, HYSTEROSCOPY, ABLATE ENDOMETRIUM THERMACHOICE;  Surgeon: Munira Goddard MD;  Location: [facility]     DILATION AND CURETTAGE, OPERATIVE HYSTEROSCOPY WITH MORCELLATOR, COMBINED N/A 11/7/2018    Procedure: COMBINED DILATION AND CURETTAGE, OPERATIVE HYSTEROSCOPY WITH MORCELLATOR;  Surgeon: Munira Goddard MD;  Location: [facility]     ESOPHAGOSCOPY, GASTROSCOPY, DUODENOSCOPY (EGD), COMBINED N/A 10/15/2020    Procedure: ESOPHAGOGASTRODUODENOSCOPY (EGD);  Surgeon: Brandon Gardner MD;  Location: [facility]     EXTRACORPOREAL SHOCK WAVE LITHOTRIPSY (ESWL)  9/18/2013    Procedure: EXTRACORPOREAL SHOCK WAVE LITHOTRIPSY (ESWL);  Right Extracorporeal Shock Wave Lithotripsy;  Surgeon: JUN Villar MD;  Location: [facility]     FOOT SURGERY      for plantar fasciitis     FOOT SURGERY       HC TOOTH EXTRACTION W/FORCEP      wisdom teeth     LAPAROSCOPY DIAGNOSTIC (GYN)  2007    infertility     LASER HOLMIUM LITHOTRIPSY URETER(S), INSERT STENT, COMBINED  9/27/2013    Procedure: COMBINED CYSTOSCOPY, URETEROSCOPY, LASER HOLMIUM LITHOTRIPSY URETER(S), INSERT STENT;  Right Ureteroscopic Stone Extraction and Possible Stent Placement;  Surgeon: JUN Villar MD;  Location: [facility]     OTHER SURGICAL HISTORY      uterine ablation     PELVIC LAPAROSCOPY       TONSILLECTOMY  1983     TONSILLECTOMY       Current Outpatient Medications   Medication Sig Dispense Refill     rizatriptan (MAXALT-MLT) 5 MG ODT TAKE 1-2 TABS AT ONSET OF HEADACHE/ MIGRAINE-MAY REPEAT DOSE IN 2 HOURS.DONT  "EXCEED 30 MG IN 24 HRS 18 tablet 1       Allergies   Allergen Reactions     Penicillins Rash        Social History     Tobacco Use     Smoking status: Never Smoker     Smokeless tobacco: Never Used   Substance Use Topics     Alcohol use: No     Family History   Problem Relation Age of Onset     Heart Disease Maternal Grandmother      Breast Cancer Maternal Grandmother         dx'd age 70     Arthritis Mother      Hypertension Mother      Cancer Mother         skin cancer     Other Cancer Mother         Lymphoma     Prostate Cancer Father      History   Drug Use No         Objective     BP (!) 140/80 (BP Location: Left arm, Patient Position: Chair, Cuff Size: Adult Large)   Pulse 59   Temp 97.7  F (36.5  C) (Tympanic)   Resp 16   Ht 1.664 m (5' 5.5\")   Wt 85.7 kg (189 lb)   SpO2 99%   BMI 30.97 kg/m      Physical Exam  GENERAL APPEARANCE: obese, alert and no distress; ambulatory w/o assist  EYES: pink conj, no icterus, PERRL, EOMI  HENT: ear canals and TM's normal, nose and mouth without ulcers or lesions, oropharynx clear and oral mucous membranes moist  NECK: no adenopathy, no asymmetry, masses, or scars and thyroid normal to palpation  RESP: lungs clear to auscultation - no rales, rhonchi or wheezes  CV: regular rates and rhythm, normal S1 S2, no S3 or S4, no murmur, click or rub, no peripheral edema and peripheral pulses strong  ABDOMEN: soft, nontender, no hepatosplenomegaly, no masses and bowel sounds normal  MS: no musculoskeletal defects are noted and gait is age appropriate without ataxia  SKIN: good turgor, no rash/jaundice/ecchymosis  NEURO: Normal strength and tone, sensory exam grossly normal, mentation intact and speech normal    Recent Labs   Lab Test 09/28/21  0926      POTASSIUM 3.8   CR 0.81        Diagnostics:  No labs were ordered during this visit.   No EKG required, no history of coronary heart disease, significant arrhythmia, peripheral arterial disease or other structural heart " disease.    Revised Cardiac Risk Index (RCRI):  The patient has the following serious cardiovascular risks for perioperative complications:   - No serious cardiac risks = 0 points     RCRI Interpretation: 0 points: Class I (very low risk - 0.4% complication rate)           Signed Electronically by: Lane Clinton MD  Copy of this evaluation report is provided to requesting physician.

## 2022-01-19 NOTE — H&P (VIEW-ONLY)
[facility]  [address] [address]  Phone: [phone]  Primary Provider: Sujatha Maher  Pre-op Performing Provider: ROC STILL      PREOPERATIVE EVALUATION:  Today's date: 1/19/2022    Caitlin Oh is a 46 year old female who presents for a preoperative evaluation.    Surgical Information:  Surgery/Procedure: bilateral reduction mammaplasty  Surgery Location: [facility]  Surgeon: Dr. Wang  Surgery Date: 1/26/22  Time of Surgery: 7:00  Where patient plans to recover: At home with family  Fax number for surgical facility: Note does not need to be faxed, will be available electronically in Epic.    Type of Anesthesia Anticipated: General    Assessment & Plan     The proposed surgical procedure is considered INTERMEDIATE risk.    Preop general physical exam    Large breasts    Chronic bilateral thoracic back pain             Risks and Recommendations:  The patient has the following additional risks and recommendations for perioperative complications:   - No identified additional risk factors other than previously addressed    Medication Instructions:  Patient is on no chronic medications    RECOMMENDATION:  APPROVAL GIVEN to proceed with proposed procedure, without further diagnostic evaluation.    Subjective     HPI related to upcoming procedure: Patient has chronic thoracic back pain, thought to be aggravated by large breasts.. Hence, planned surgery. Reviewed above with patient.    Preop Questions 1/16/2022   1. Have you ever had a heart attack or stroke? No   2. Have you ever had surgery on your heart or blood vessels, such as a stent placement, a coronary artery bypass, or surgery on an artery in your head, neck, heart, or legs? No   3. Do you have chest pain with activity? No   4. Do you have a history of  heart failure? No   5. Do you currently have a cold, bronchitis or symptoms of other infection? No   6. Do you have a cough,  shortness of breath, or wheezing? No   7. Do you or anyone in your family have previous history of blood clots? No   8. Do you or does anyone in your family have a serious bleeding problem such as prolonged bleeding following surgeries or cuts? No   9. Have you ever had problems with anemia or been told to take iron pills? No   10. Have you had any abnormal blood loss such as black, tarry or bloody stools, or abnormal vaginal bleeding? No   11. Have you ever had a blood transfusion? No   12. Are you willing to have a blood transfusion if it is medically needed before, during, or after your surgery? Yes   13. Have you or any of your relatives ever had problems with anesthesia? No   14. Do you have sleep apnea, excessive snoring or daytime drowsiness? No   15. Do you have any artifical heart valves or other implanted medical devices like a pacemaker, defibrillator, or continuous glucose monitor? No   16. Do you have artificial joints? No   17. Are you allergic to latex? No   18. Is there any chance that you may be pregnant? No       Health Care Directive:  Patient does not have a Health Care Directive or Living Will: Discussed advance care planning with patient; information given to patient to review.    Preoperative Review of :   reviewed - no record of controlled substances prescribed.      Status of Chronic Conditions:  See problem list for active medical problems.  Problems all longstanding and stable, except as noted/documented.  See ROS for pertinent symptoms related to these conditions.      Review of Systems  CONSTITUTIONAL: NEGATIVE for fever, chills, change in weight  INTEGUMENTARY/SKIN: NEGATIVE for worrisome rashes, moles or lesions  EYES: NEGATIVE for vision changes or irritation  ENT/MOUTH: NEGATIVE for ear, mouth and throat problems  RESP: NEGATIVE for significant cough or SOB  CV: NEGATIVE for chest pain, palpitations or peripheral edema  GI: NEGATIVE for nausea, abdominal pain, heartburn, or  change in bowel habits  : NEGATIVE for frequency, dysuria, or hematuria  MUSCULOSKELETAL: NEGATIVE for significant arthralgias or myalgia  NEURO: NEGATIVE for weakness, dizziness or paresthesias  ENDOCRINE: NEGATIVE for temperature intolerance, skin/hair changes  HEME: NEGATIVE for bleeding problems  PSYCHIATRIC: NEGATIVE for changes in mood or affect    Patient Active Problem List    Diagnosis Date Noted     Large breasts 12/03/2021     Priority: Medium     Added automatically from request for surgery [id number]       Cervical high risk HPV (human papillomavirus) test positive 11/04/2015     Priority: Medium     10/28/2015:Pap--NIL, +HR HPV (NOT type 16 or 18). Plan to repeat Pap + HPV cotesting in 1 year. Reminder placed in TRACKING  9/27/16:Pap--NIL, neg HPV. Plan to repeat Pap + HPV cotesting in 3 years (2019).  8/31/20 NIL pap, Neg HPV. Plan cotest in 3 years.          Menstrual migraine 12/02/2014     Priority: Medium     Menorrhagia 12/02/2014     Priority: Medium     Tension headache, chronic 03/22/2013     Priority: Medium     CARDIOVASCULAR SCREENING; LDL GOAL LESS THAN 160 10/31/2010     Priority: Medium      Past Medical History:   Diagnosis Date     Bilateral bunions      Cervical high risk HPV (human papillomavirus) test positive 11/4/2015     Female infertility 7/10/2008    Undergoing ivf 2008.      Kidney stones 11/30/2010    [facility]/24/10      Personal history of gestational diabetes 7/11/2012    diet controlled     Plantar fasciitis     s/p surgery     Past Surgical History:   Procedure Laterality Date     ABDOMEN SURGERY       BUNIONECTOMY Left 12/13/2018    Procedure: Left 5th metatarsal corrective osteotomy;  Surgeon: Nick Fuller DPM;  Location: [facility]     BUNIONECTOMY Left 5/26/2020    Procedure: BONE SMOOTHING 5TH DIGIT AND SOFT TISSUE RELEASE 5TH MPJ;  Surgeon: Alessandro Beltran DPM;  Location: [facility];  Service: Podiatry     BUNIONECTOMY RT/LT Bilateral 11/2009      COLONOSCOPY N/A 11/19/2021    Procedure: COLONOSCOPY;  Surgeon: Anderson Park DO;  Location: [facility]     CYSTOSCOPY, RETROGRADES, INSERT STENT URETER(S), COMBINED  9/19/2013    Procedure: COMBINED CYSTOSCOPY, RETROGRADES, INSERT STENT URETER(S);  Right Ureteral Stent Placement;  Surgeon: JUN Villar MD;  Location: [facility]     DILATION AND CURETTAGE, HYSTEROSCOPY, ABLATE ENDOMETRIUM THERMACHOICE, COMBINED N/A 4/15/2015    Procedure: COMBINED DILATION AND CURETTAGE, HYSTEROSCOPY, ABLATE ENDOMETRIUM THERMACHOICE;  Surgeon: Munira Goddard MD;  Location: [facility]     DILATION AND CURETTAGE, OPERATIVE HYSTEROSCOPY WITH MORCELLATOR, COMBINED N/A 11/7/2018    Procedure: COMBINED DILATION AND CURETTAGE, OPERATIVE HYSTEROSCOPY WITH MORCELLATOR;  Surgeon: Munira Goddard MD;  Location: [facility]     ESOPHAGOSCOPY, GASTROSCOPY, DUODENOSCOPY (EGD), COMBINED N/A 10/15/2020    Procedure: ESOPHAGOGASTRODUODENOSCOPY (EGD);  Surgeon: Brandon Gardner MD;  Location: [facility]     EXTRACORPOREAL SHOCK WAVE LITHOTRIPSY (ESWL)  9/18/2013    Procedure: EXTRACORPOREAL SHOCK WAVE LITHOTRIPSY (ESWL);  Right Extracorporeal Shock Wave Lithotripsy;  Surgeon: JUN Villar MD;  Location: [facility]     FOOT SURGERY      for plantar fasciitis     FOOT SURGERY       HC TOOTH EXTRACTION W/FORCEP      wisdom teeth     LAPAROSCOPY DIAGNOSTIC (GYN)  2007    infertility     LASER HOLMIUM LITHOTRIPSY URETER(S), INSERT STENT, COMBINED  9/27/2013    Procedure: COMBINED CYSTOSCOPY, URETEROSCOPY, LASER HOLMIUM LITHOTRIPSY URETER(S), INSERT STENT;  Right Ureteroscopic Stone Extraction and Possible Stent Placement;  Surgeon: JUN Villar MD;  Location: [facility]     OTHER SURGICAL HISTORY      uterine ablation     PELVIC LAPAROSCOPY       TONSILLECTOMY  1983     TONSILLECTOMY       Current Outpatient Medications   Medication Sig Dispense Refill     rizatriptan (MAXALT-MLT) 5 MG ODT TAKE 1-2 TABS AT ONSET OF HEADACHE/ MIGRAINE-MAY REPEAT DOSE IN 2 HOURS.DONT  "EXCEED 30 MG IN 24 HRS 18 tablet 1       Allergies   Allergen Reactions     Penicillins Rash        Social History     Tobacco Use     Smoking status: Never Smoker     Smokeless tobacco: Never Used   Substance Use Topics     Alcohol use: No     Family History   Problem Relation Age of Onset     Heart Disease Maternal Grandmother      Breast Cancer Maternal Grandmother         dx'd age 70     Arthritis Mother      Hypertension Mother      Cancer Mother         skin cancer     Other Cancer Mother         Lymphoma     Prostate Cancer Father      History   Drug Use No         Objective     BP (!) 140/80 (BP Location: Left arm, Patient Position: Chair, Cuff Size: Adult Large)   Pulse 59   Temp 97.7  F (36.5  C) (Tympanic)   Resp 16   Ht 1.664 m (5' 5.5\")   Wt 85.7 kg (189 lb)   SpO2 99%   BMI 30.97 kg/m      Physical Exam  GENERAL APPEARANCE: obese, alert and no distress; ambulatory w/o assist  EYES: pink conj, no icterus, PERRL, EOMI  HENT: ear canals and TM's normal, nose and mouth without ulcers or lesions, oropharynx clear and oral mucous membranes moist  NECK: no adenopathy, no asymmetry, masses, or scars and thyroid normal to palpation  RESP: lungs clear to auscultation - no rales, rhonchi or wheezes  CV: regular rates and rhythm, normal S1 S2, no S3 or S4, no murmur, click or rub, no peripheral edema and peripheral pulses strong  ABDOMEN: soft, nontender, no hepatosplenomegaly, no masses and bowel sounds normal  MS: no musculoskeletal defects are noted and gait is age appropriate without ataxia  SKIN: good turgor, no rash/jaundice/ecchymosis  NEURO: Normal strength and tone, sensory exam grossly normal, mentation intact and speech normal    Recent Labs   Lab Test 09/28/21  0926      POTASSIUM 3.8   CR 0.81        Diagnostics:  No labs were ordered during this visit.   No EKG required, no history of coronary heart disease, significant arrhythmia, peripheral arterial disease or other structural heart " disease.    Revised Cardiac Risk Index (RCRI):  The patient has the following serious cardiovascular risks for perioperative complications:   - No serious cardiac risks = 0 points     RCRI Interpretation: 0 points: Class I (very low risk - 0.4% complication rate)           Signed Electronically by: Lane Clinton MD  Copy of this evaluation report is provided to requesting physician.

## 2022-01-21 ENCOUNTER — OFFICE VISIT (OUTPATIENT)
Dept: SURGERY | Facility: CLINIC | Age: 47
End: 2022-01-21
Payer: COMMERCIAL

## 2022-01-21 DIAGNOSIS — N62 LARGE BREASTS: Primary | ICD-10-CM

## 2022-01-21 PROCEDURE — 99212 OFFICE O/P EST SF 10 MIN: CPT | Performed by: STUDENT IN AN ORGANIZED HEALTH CARE EDUCATION/TRAINING PROGRAM

## 2022-01-21 NOTE — NURSING NOTE
Caitlin Oh's goals for this visit include:   Chief Complaint   Patient presents with     RECHECK     Discuss bilateral reduction scheduled for 1/26/22       She requests these members of her care team be copied on today's visit information:     PCP: Sujatha Maher    Referring Provider:  No referring provider defined for this encounter.    There were no vitals taken for this visit.    Do you need any medication refills at today's visit? Ekaterina Clarke on 1/21/2022 at 11:02 AM

## 2022-01-21 NOTE — LETTER
1/21/2022         RE: Caitlin Oh  [address] [place] [address]        Dear Colleague,    Thank you for referring your patient, Caitlin Oh, to the [facility]. Please see a copy of my visit note below.    PRS    Patient returns preoperatively with questions prior to surgery next week. She wanted to discuss the possibility of a circumvertical reduction.  Discussed that it may be possible to do a circumvertical incision for the reduction, but her nipple inframammary distance is approaching a length that will make it difficult to ensure removal of inferior pole vertical skin excess medially.  My recommendation is to plan for inverted T incisions.  Explained some of the techniques utilized to reduce the risk of T point wound problems, including gently curving of the lower incision at the T point, progressive detensioning of the medial and lateral skin flap inset, and limiting delamination of the skin from the underlying subdermal plexus when handling the tips of the medial and lateral skin flaps.  We also discussed postoperative recovery and return to activities at length.  Patient is excited and has all of her questions answered.  Preoperative photography has already been done.    Shamir Marin MD, PhD      Again, thank you for allowing me to participate in the care of your patient.        Sincerely,        Shamir Marin MD

## 2022-01-21 NOTE — PROGRESS NOTES
PRS    Patient returns preoperatively with questions prior to surgery next week. She wanted to discuss the possibility of a circumvertical reduction.  Discussed that it may be possible to do a circumvertical incision for the reduction, but her nipple inframammary distance is approaching a length that will make it difficult to ensure removal of inferior pole vertical skin excess medially.  My recommendation is to plan for inverted T incisions.  Explained some of the techniques utilized to reduce the risk of T point wound problems, including gently curving of the lower incision at the T point, progressive detensioning of the medial and lateral skin flap inset, and limiting delamination of the skin from the underlying subdermal plexus when handling the tips of the medial and lateral skin flaps.  We also discussed postoperative recovery and return to activities at length.  Patient is excited and has all of her questions answered.  Preoperative photography has already been done.    Shamir Marin MD, PhD

## 2022-01-24 ENCOUNTER — LAB (OUTPATIENT)
Dept: LAB | Facility: CLINIC | Age: 47
End: 2022-01-24
Attending: STUDENT IN AN ORGANIZED HEALTH CARE EDUCATION/TRAINING PROGRAM
Payer: COMMERCIAL

## 2022-01-24 DIAGNOSIS — Z11.59 ENCOUNTER FOR SCREENING FOR OTHER VIRAL DISEASES: ICD-10-CM

## 2022-01-24 PROCEDURE — U0003 INFECTIOUS AGENT DETECTION BY NUCLEIC ACID (DNA OR RNA); SEVERE ACUTE RESPIRATORY SYNDROME CORONAVIRUS 2 (SARS-COV-2) (CORONAVIRUS DISEASE [COVID-19]), AMPLIFIED PROBE TECHNIQUE, MAKING USE OF HIGH THROUGHPUT TECHNOLOGIES AS DESCRIBED BY CMS-2020-01-R: HCPCS

## 2022-01-24 PROCEDURE — U0005 INFEC AGEN DETEC AMPLI PROBE: HCPCS

## 2022-01-25 ENCOUNTER — ANESTHESIA EVENT (OUTPATIENT)
Dept: SURGERY | Facility: AMBULATORY SURGERY CENTER | Age: 47
End: 2022-01-25
Payer: COMMERCIAL

## 2022-01-25 LAB — SARS-COV-2 RNA RESP QL NAA+PROBE: NEGATIVE

## 2022-01-25 NOTE — ANESTHESIA PREPROCEDURE EVALUATION
Anesthesia Pre-Procedure Evaluation    Patient: Caitlin Oh   MRN: [id number] : 1975        Preoperative Diagnosis: Large breasts [N62]    Procedure : Procedure(s):  BILATERAL REDUCTION MAMMAPLASTY          Past Medical History:   Diagnosis Date     Bilateral bunions      Cervical high risk HPV (human papillomavirus) test positive 2015     Female infertility 7/10/2008    Undergoing ivf .      Kidney stones 2010    [facility] er 11/24/10      Personal history of gestational diabetes 2012    diet controlled     Plantar fasciitis     s/p surgery      Past Surgical History:   Procedure Laterality Date     ABDOMEN SURGERY       BUNIONECTOMY Left 2018    Procedure: Left 5th metatarsal corrective osteotomy;  Surgeon: Nick Fuller DPM;  Location: [facility]     BUNIONECTOMY Left 2020    Procedure: BONE SMOOTHING 5TH DIGIT AND SOFT TISSUE RELEASE 5TH MPJ;  Surgeon: Alessandro Beltran DPM;  Location: [facility];  Service: Podiatry     BUNIONECTOMY RT/LT Bilateral 2009     COLONOSCOPY N/A 2021    Procedure: COLONOSCOPY;  Surgeon: Anderson Park DO;  Location: [facility]     CYSTOSCOPY, RETROGRADES, INSERT STENT URETER(S), COMBINED  2013    Procedure: COMBINED CYSTOSCOPY, RETROGRADES, INSERT STENT URETER(S);  Right Ureteral Stent Placement;  Surgeon: JUN Villar MD;  Location: [facility]     DILATION AND CURETTAGE, HYSTEROSCOPY, ABLATE ENDOMETRIUM THERMACHOICE, COMBINED N/A 4/15/2015    Procedure: COMBINED DILATION AND CURETTAGE, HYSTEROSCOPY, ABLATE ENDOMETRIUM THERMACHOICE;  Surgeon: Munira Goddard MD;  Location: [facility]     DILATION AND CURETTAGE, OPERATIVE HYSTEROSCOPY WITH MORCELLATOR, COMBINED N/A 2018    Procedure: COMBINED DILATION AND CURETTAGE, OPERATIVE HYSTEROSCOPY WITH MORCELLATOR;  Surgeon: Munira Goddard MD;  Location: [facility]     ESOPHAGOSCOPY, GASTROSCOPY, DUODENOSCOPY (EGD), COMBINED N/A 10/15/2020    Procedure: ESOPHAGOGASTRODUODENOSCOPY  (EGD);  Surgeon: Brandon Gardner MD;  Location: [facility]     EXTRACORPOREAL SHOCK WAVE LITHOTRIPSY (ESWL)  9/18/2013    Procedure: EXTRACORPOREAL SHOCK WAVE LITHOTRIPSY (ESWL);  Right Extracorporeal Shock Wave Lithotripsy;  Surgeon: JUN Villar MD;  Location: [facility]     FOOT SURGERY      for plantar fasciitis     FOOT SURGERY       HC TOOTH EXTRACTION W/FORCEP      wisdom teeth     LAPAROSCOPY DIAGNOSTIC (GYN)  2007    infertility     LASER HOLMIUM LITHOTRIPSY URETER(S), INSERT STENT, COMBINED  9/27/2013    Procedure: COMBINED CYSTOSCOPY, URETEROSCOPY, LASER HOLMIUM LITHOTRIPSY URETER(S), INSERT STENT;  Right Ureteroscopic Stone Extraction and Possible Stent Placement;  Surgeon: JUN Villar MD;  Location: [facility]     OTHER SURGICAL HISTORY      uterine ablation     PELVIC LAPAROSCOPY       TONSILLECTOMY  1983     TONSILLECTOMY        Allergies   Allergen Reactions     Penicillins Rash      Social History     Tobacco Use     Smoking status: Never Smoker     Smokeless tobacco: Never Used   Substance Use Topics     Alcohol use: No      Wt Readings from Last 1 Encounters:   01/19/22 85.7 kg (189 lb)        Anesthesia Evaluation   Pt has had prior anesthetic. Type: General.    No history of anesthetic complications       ROS/MED HX  ENT/Pulmonary:  - neg pulmonary ROS     Neurologic:  - neg neurologic ROS     Cardiovascular:  - neg cardiovascular ROS     METS/Exercise Tolerance:     Hematologic:  - neg hematologic  ROS     Musculoskeletal:  - neg musculoskeletal ROS     GI/Hepatic:  - neg GI/hepatic ROS     Renal/Genitourinary:     (+) renal disease, Nephrolithiasis ,     Endo:  - neg endo ROS     Psychiatric/Substance Use:  - neg psychiatric ROS     Infectious Disease:  - neg infectious disease ROS     Malignancy:  - neg malignancy ROS     Other:  - neg other ROS          Physical Exam    Airway  airway exam normal           Respiratory Devices and Support         Dental  no notable dental history          Cardiovascular   cardiovascular exam normal          Pulmonary   pulmonary exam normal                OUTSIDE LABS:  CBC:   Lab Results   Component Value Date    WBC 9.1 10/30/2018    WBC 8.4 10/25/2017    HGB 13.8 10/30/2018    HGB 13.0 10/25/2017    HCT 41.5 10/30/2018    HCT 38.2 10/25/2017     10/30/2018     10/25/2017     BMP:   Lab Results   Component Value Date     09/28/2021     08/28/2019    POTASSIUM 3.8 09/28/2021    POTASSIUM 4.4 08/28/2019    CHLORIDE 109 09/28/2021    CHLORIDE 108 08/28/2019    CO2 27 09/28/2021    CO2 24 08/28/2019    BUN 14 09/28/2021    BUN 14 08/28/2019    CR 0.81 09/28/2021    CR 0.72 08/28/2019    GLC 93 09/28/2021    GLC 97 08/28/2019     COAGS: No results found for: PTT, INR, FIBR  POC:   Lab Results   Component Value Date    HCG Negative 10/15/2020     HEPATIC:   Lab Results   Component Value Date    ALBUMIN 3.8 10/25/2017    PROTTOTAL 7.0 10/25/2017    ALT 28 10/25/2017    AST 13 10/25/2017    ALKPHOS 78 10/25/2017    BILITOTAL 0.2 10/25/2017     OTHER:   Lab Results   Component Value Date    A1C 5.5 07/11/2012    FEDE 8.4 (L) 09/28/2021    PHOS 3.2 12/27/2013    MAG 2.2 12/27/2013    TSH 2.13 04/24/2017    CRP 5.7 10/25/2017    SED 10 10/25/2017       Anesthesia Plan    ASA Status:  1   NPO Status:  NPO Appropriate    Anesthesia Type: General.     - Airway: LMA   Induction: Intravenous.   Maintenance: TIVA.        Consents    Anesthesia Plan(s) and associated risks, benefits, and realistic alternatives discussed. Questions answered and patient/representative(s) expressed understanding.    - Discussed:     - Discussed with:  Patient      - Extended Intubation/Ventilatory Support Discussed: No.      - Patient is DNR/DNI Status: No    Use of blood products discussed: No .     Postoperative Care    Pain management: IV analgesics, Oral pain medications, Multi-modal analgesia.   PONV prophylaxis: Ondansetron (or other 5HT-3), Dexamethasone or  Solumedrol, Background Propofol Infusion     Comments:                Brandon Myles MD

## 2022-01-26 ENCOUNTER — ANESTHESIA (OUTPATIENT)
Dept: SURGERY | Facility: AMBULATORY SURGERY CENTER | Age: 47
End: 2022-01-26
Payer: COMMERCIAL

## 2022-01-26 ENCOUNTER — HOSPITAL ENCOUNTER (OUTPATIENT)
Facility: AMBULATORY SURGERY CENTER | Age: 47
End: 2022-01-26
Attending: STUDENT IN AN ORGANIZED HEALTH CARE EDUCATION/TRAINING PROGRAM | Admitting: STUDENT IN AN ORGANIZED HEALTH CARE EDUCATION/TRAINING PROGRAM
Payer: COMMERCIAL

## 2022-01-26 VITALS
BODY MASS INDEX: 30.97 KG/M2 | RESPIRATION RATE: 14 BRPM | TEMPERATURE: 98.2 F | OXYGEN SATURATION: 98 % | DIASTOLIC BLOOD PRESSURE: 68 MMHG | SYSTOLIC BLOOD PRESSURE: 119 MMHG | WEIGHT: 189 LBS

## 2022-01-26 DIAGNOSIS — N62 LARGE BREASTS: ICD-10-CM

## 2022-01-26 PROCEDURE — G8918 PT W/O PREOP ORDER IV AB PRO: HCPCS

## 2022-01-26 PROCEDURE — 19318 BREAST REDUCTION: CPT | Mod: RT

## 2022-01-26 PROCEDURE — 19318 BREAST REDUCTION: CPT | Mod: 50 | Performed by: STUDENT IN AN ORGANIZED HEALTH CARE EDUCATION/TRAINING PROGRAM

## 2022-01-26 PROCEDURE — G8907 PT DOC NO EVENTS ON DISCHARG: HCPCS

## 2022-01-26 PROCEDURE — 88305 TISSUE EXAM BY PATHOLOGIST: CPT | Performed by: PATHOLOGY

## 2022-01-26 RX ORDER — ONDANSETRON 2 MG/ML
4 INJECTION INTRAMUSCULAR; INTRAVENOUS EVERY 30 MIN PRN
Status: DISCONTINUED | OUTPATIENT
Start: 2022-01-26 | End: 2022-01-27 | Stop reason: HOSPADM

## 2022-01-26 RX ORDER — ONDANSETRON 2 MG/ML
INJECTION INTRAMUSCULAR; INTRAVENOUS PRN
Status: DISCONTINUED | OUTPATIENT
Start: 2022-01-26 | End: 2022-01-26

## 2022-01-26 RX ORDER — CLINDAMYCIN PHOSPHATE 900 MG/50ML
900 INJECTION, SOLUTION INTRAVENOUS
Status: COMPLETED | OUTPATIENT
Start: 2022-01-26 | End: 2022-01-26

## 2022-01-26 RX ORDER — SODIUM CHLORIDE, SODIUM LACTATE, POTASSIUM CHLORIDE, CALCIUM CHLORIDE 600; 310; 30; 20 MG/100ML; MG/100ML; MG/100ML; MG/100ML
INJECTION, SOLUTION INTRAVENOUS CONTINUOUS
Status: DISCONTINUED | OUTPATIENT
Start: 2022-01-26 | End: 2022-01-27 | Stop reason: HOSPADM

## 2022-01-26 RX ORDER — METOPROLOL TARTRATE 1 MG/ML
1-2 INJECTION, SOLUTION INTRAVENOUS EVERY 5 MIN PRN
Status: DISCONTINUED | OUTPATIENT
Start: 2022-01-26 | End: 2022-01-27 | Stop reason: HOSPADM

## 2022-01-26 RX ORDER — FENTANYL CITRATE 50 UG/ML
INJECTION, SOLUTION INTRAMUSCULAR; INTRAVENOUS PRN
Status: DISCONTINUED | OUTPATIENT
Start: 2022-01-26 | End: 2022-01-26

## 2022-01-26 RX ORDER — ACETAMINOPHEN 325 MG/1
975 TABLET ORAL ONCE
Status: COMPLETED | OUTPATIENT
Start: 2022-01-26 | End: 2022-01-26

## 2022-01-26 RX ORDER — OXYCODONE HYDROCHLORIDE 5 MG/1
5 TABLET ORAL EVERY 6 HOURS PRN
Qty: 12 TABLET | Refills: 0 | Status: SHIPPED | OUTPATIENT
Start: 2022-01-26 | End: 2022-01-29

## 2022-01-26 RX ORDER — METHOCARBAMOL 500 MG/1
500 TABLET, FILM COATED ORAL 4 TIMES DAILY
Qty: 12 TABLET | Refills: 0 | Status: SHIPPED | OUTPATIENT
Start: 2022-01-26 | End: 2022-08-04

## 2022-01-26 RX ORDER — FENTANYL CITRATE 50 UG/ML
25 INJECTION, SOLUTION INTRAMUSCULAR; INTRAVENOUS
Status: DISCONTINUED | OUTPATIENT
Start: 2022-01-26 | End: 2022-01-27 | Stop reason: HOSPADM

## 2022-01-26 RX ORDER — PROPOFOL 10 MG/ML
INJECTION, EMULSION INTRAVENOUS PRN
Status: DISCONTINUED | OUTPATIENT
Start: 2022-01-26 | End: 2022-01-26

## 2022-01-26 RX ORDER — LIDOCAINE HYDROCHLORIDE 20 MG/ML
INJECTION, SOLUTION INFILTRATION; PERINEURAL PRN
Status: DISCONTINUED | OUTPATIENT
Start: 2022-01-26 | End: 2022-01-26

## 2022-01-26 RX ORDER — BUPIVACAINE HYDROCHLORIDE 2.5 MG/ML
INJECTION, SOLUTION EPIDURAL; INFILTRATION; INTRACAUDAL PRN
Status: DISCONTINUED | OUTPATIENT
Start: 2022-01-26 | End: 2022-01-26 | Stop reason: HOSPADM

## 2022-01-26 RX ORDER — FENTANYL CITRATE 50 UG/ML
25 INJECTION, SOLUTION INTRAMUSCULAR; INTRAVENOUS EVERY 5 MIN PRN
Status: DISCONTINUED | OUTPATIENT
Start: 2022-01-26 | End: 2022-01-27 | Stop reason: HOSPADM

## 2022-01-26 RX ORDER — CLINDAMYCIN HCL 300 MG
300 CAPSULE ORAL 4 TIMES DAILY
Qty: 12 CAPSULE | Refills: 0 | Status: SHIPPED | OUTPATIENT
Start: 2022-01-26 | End: 2022-08-04

## 2022-01-26 RX ORDER — MEPERIDINE HYDROCHLORIDE 25 MG/ML
12.5 INJECTION INTRAMUSCULAR; INTRAVENOUS; SUBCUTANEOUS
Status: DISCONTINUED | OUTPATIENT
Start: 2022-01-26 | End: 2022-01-27 | Stop reason: HOSPADM

## 2022-01-26 RX ORDER — ONDANSETRON 4 MG/1
4 TABLET, ORALLY DISINTEGRATING ORAL EVERY 30 MIN PRN
Status: DISCONTINUED | OUTPATIENT
Start: 2022-01-26 | End: 2022-01-27 | Stop reason: HOSPADM

## 2022-01-26 RX ORDER — CLINDAMYCIN PHOSPHATE 900 MG/50ML
900 INJECTION, SOLUTION INTRAVENOUS SEE ADMIN INSTRUCTIONS
Status: DISCONTINUED | OUTPATIENT
Start: 2022-01-26 | End: 2022-01-27 | Stop reason: HOSPADM

## 2022-01-26 RX ORDER — OXYCODONE HYDROCHLORIDE 5 MG/1
5 TABLET ORAL EVERY 4 HOURS PRN
Status: DISCONTINUED | OUTPATIENT
Start: 2022-01-26 | End: 2022-01-27 | Stop reason: HOSPADM

## 2022-01-26 RX ORDER — DEXAMETHASONE SODIUM PHOSPHATE 4 MG/ML
INJECTION, SOLUTION INTRA-ARTICULAR; INTRALESIONAL; INTRAMUSCULAR; INTRAVENOUS; SOFT TISSUE PRN
Status: DISCONTINUED | OUTPATIENT
Start: 2022-01-26 | End: 2022-01-26

## 2022-01-26 RX ORDER — PROPOFOL 10 MG/ML
INJECTION, EMULSION INTRAVENOUS CONTINUOUS PRN
Status: DISCONTINUED | OUTPATIENT
Start: 2022-01-26 | End: 2022-01-26

## 2022-01-26 RX ORDER — LIDOCAINE 40 MG/G
CREAM TOPICAL
Status: DISCONTINUED | OUTPATIENT
Start: 2022-01-26 | End: 2022-01-27 | Stop reason: HOSPADM

## 2022-01-26 RX ORDER — DEXMEDETOMIDINE HYDROCHLORIDE 4 UG/ML
INJECTION, SOLUTION INTRAVENOUS PRN
Status: DISCONTINUED | OUTPATIENT
Start: 2022-01-26 | End: 2022-01-26

## 2022-01-26 RX ADMIN — FENTANYL CITRATE 25 MCG: 50 INJECTION, SOLUTION INTRAMUSCULAR; INTRAVENOUS at 06:55

## 2022-01-26 RX ADMIN — PROPOFOL 200 MG: 10 INJECTION, EMULSION INTRAVENOUS at 06:55

## 2022-01-26 RX ADMIN — LIDOCAINE HYDROCHLORIDE 60 MG: 20 INJECTION, SOLUTION INFILTRATION; PERINEURAL at 06:55

## 2022-01-26 RX ADMIN — DEXMEDETOMIDINE HYDROCHLORIDE 10 MCG: 4 INJECTION, SOLUTION INTRAVENOUS at 09:00

## 2022-01-26 RX ADMIN — ACETAMINOPHEN 975 MG: 325 TABLET ORAL at 06:29

## 2022-01-26 RX ADMIN — SODIUM CHLORIDE, SODIUM LACTATE, POTASSIUM CHLORIDE, CALCIUM CHLORIDE: 600; 310; 30; 20 INJECTION, SOLUTION INTRAVENOUS at 06:30

## 2022-01-26 RX ADMIN — OXYCODONE HYDROCHLORIDE 5 MG: 5 TABLET ORAL at 11:40

## 2022-01-26 RX ADMIN — FENTANYL CITRATE 50 MCG: 50 INJECTION, SOLUTION INTRAMUSCULAR; INTRAVENOUS at 07:12

## 2022-01-26 RX ADMIN — FENTANYL CITRATE 25 MCG: 50 INJECTION, SOLUTION INTRAMUSCULAR; INTRAVENOUS at 07:05

## 2022-01-26 RX ADMIN — PROPOFOL 200 MCG/KG/MIN: 10 INJECTION, EMULSION INTRAVENOUS at 06:55

## 2022-01-26 RX ADMIN — ONDANSETRON 4 MG: 2 INJECTION INTRAMUSCULAR; INTRAVENOUS at 10:23

## 2022-01-26 RX ADMIN — DEXMEDETOMIDINE HYDROCHLORIDE 20 MCG: 4 INJECTION, SOLUTION INTRAVENOUS at 07:20

## 2022-01-26 RX ADMIN — Medication 0.5 MG: at 07:23

## 2022-01-26 RX ADMIN — Medication 0.5 MG: at 07:56

## 2022-01-26 RX ADMIN — CLINDAMYCIN PHOSPHATE 900 MG: 900 INJECTION, SOLUTION INTRAVENOUS at 06:50

## 2022-01-26 RX ADMIN — DEXMEDETOMIDINE HYDROCHLORIDE 10 MCG: 4 INJECTION, SOLUTION INTRAVENOUS at 10:00

## 2022-01-26 RX ADMIN — DEXAMETHASONE SODIUM PHOSPHATE 8 MG: 4 INJECTION, SOLUTION INTRA-ARTICULAR; INTRALESIONAL; INTRAMUSCULAR; INTRAVENOUS; SOFT TISSUE at 06:58

## 2022-01-26 RX ADMIN — DEXAMETHASONE SODIUM PHOSPHATE 4 MG: 4 INJECTION, SOLUTION INTRA-ARTICULAR; INTRALESIONAL; INTRAMUSCULAR; INTRAVENOUS; SOFT TISSUE at 10:23

## 2022-01-26 NOTE — DISCHARGE INSTRUCTIONS
May take Tylenol (acetaminophen) at 12:30pm today.    Plastic Surgery Discharge Instructions    Patient has been treated for bilateral symptomatic macromastia with Dr. Marin on 1/26/2022.     Care: Please leave the ACE bandage or surgical bra in place at all times. In 48 hours, you can shower but remove the ACE gently, let the water run over the area, and gently pad dry. Once out of the shower, please apply the surgical bra. Please wear a surgical bra at all times when upright. You can remove the surgical bra as needed particularly when just resting.     Medications: You can take Ibuprofen 400-800 mg and Tylenol 650 mg for pain relief. Please take each every 6 hours, and for optimal pain relief - please stagger the medications so that you are taking one or the other every 3 hours. If you have been prescribed additional pain medications or muscle relaxants, please take as instructed and as needed. If you are taking additional pain medications, please do not exceed 4000 mg of Tylenol daily from all sources. Also, if you are taking narcotic medications, please do not operate heavy machinery or drive. If you have been prescribed antibiotics, please also take as instructed and for the first few days after surgery.     Diet: Start with clear liquids and slow down if nauseated. Advance the diet as tolerated.    Weight-bearing: You can use your arms. No heavy lifting. No strenuous activities. For 2 weeks, do not elevate your heart rate above 100 beats per minute and no lifting greater than 3-5 pounds. After your first clinic visit, we will discuss advancing your activity level to include cardio workout and more lifting.     ER Instructions: Please call the office and/or consider return to the ER if you experience worsening pain not relieved by medications, increased swelling, redness or high fevers >101F or if there are unexpected problems like shortness of breath.    Post-op follow-up: Clinic in 2 weeks with   Tomas at the [facility]   Orders & Instructions for Your Child    For 24 to 48 hours after surgery:    1. Your child should get plenty of rest.  Avoid strenuous play.  Offer reading, coloring and other light activities.   2. Your child may go back to a regular diet.  Offer light meals at first.   3. If your child has nausea (feels sick to the stomach) or vomiting (throws up):  Offer clear liquids such as apple juice, flat soda pop, Jell-O, Popsicles, Gatorade and clear soups.  Be sure your child drinks enough fluids.  Move to a normal diet as your child is able.   4. Your child may feel dizzy or sleepy.  He or she should avoid activities that required balance (riding a bike or skateboard, climbing stairs, skating).  5. A slight fever is normal.  Call the doctor if the fever is over 100 F (37.7 C) (taken under the tongue) or lasts longer than 24 hours.  6. Your child may have a dry mouth, sore throat, muscle aches or nightmares.  These should go away within 24 hours.  7. A responsible adult must stay with the child.  All caregivers should get a copy of these instructions.  Do not make important or legal decisions.     Call your doctor for any of the followin.  Signs of infection (fever, growing tenderness at the surgery site, a large amount of drainage or bleeding, severe pain, foul-smelling drainage, redness, swelling).    2. It has been over 8 to 10 hours since surgery and your child is still not able to urinate (pass water) or is complaining about not being able to urinate.          3. Signs of Covid-19 infection (temperature over 100 degrees, shortness of breath, cough, loss of taste/smell, generalized body aches, persistent headache, chills,             sore throat, nausea/vomiting/diarrhea)

## 2022-01-26 NOTE — LETTER
[facility] [place]  [address] [place] [address]  [phone]          January 26, 2022    RE:  Caitlin Oh                                                                                                                                                       [address] [facility]  [place] [address]            To whom it may concern:    Caitlin Oh is under my professional care for symptomatic macromastia and is now status post breast reduction surgery. Please excuse her from work for the next 1-2 weeks. Please excuse her from full duty and she may return to work in a light duty capacity for the next 6 weeks. She will be seen in my clinic in 2 weeks and we may adjust her restrictions accordingly.           Sincere regards,        Shamir Marin MD, PhD  Attending Plastic Surgeon

## 2022-01-26 NOTE — ANESTHESIA PROCEDURE NOTES
Airway       Patient location during procedure: OR  Staff -        Performed By: CRNAIndications and Patient Condition       Indications for airway management: kian-procedural       Induction type:intravenous       Mask difficulty assessment: 1 - vent by mask    Final Airway Details       Final airway type: supraglottic airway    Supraglottic Airway Details        Type: LMA       Brand: LMA Unique       LMA size: 4    Post intubation assessment        Placement verified by: capnometry, equal breath sounds and chest rise        Number of attempts at approach: 1       Number of other approaches attempted: 0       Ease of procedure: easy       Dentition: Intact

## 2022-01-26 NOTE — TELEPHONE ENCOUNTER
"RN spoke with the pt and pt voiced concerns about bleeding that she can see on her bandages. S/P bilateral breast reduction today with . Bleeding is not seeping outside the dressings per pt. Pt states \"I don't think its anything to worry about I just wanted to make sure I shouldn't be doing something\". RN notified pt that some bleeding/drainage can occur post surgery and if the bleeding has stopped pt can just continue to monitor. If bleeding continues or seeps outside the dressing pt advised to call the clinic back and speak to an after hours nurse who can connect her with a plastics resident on call. Pt voiced understanding and thanked RN for the call. No further issues or concerns expressed per pt..Hina Andrews RN    "

## 2022-01-26 NOTE — ANESTHESIA POSTPROCEDURE EVALUATION
Patient: Caitlin Oh    Procedure: Procedure(s):  BILATERAL REDUCTION MAMMAPLASTY       Diagnosis:Large breasts [N62]  Diagnosis Additional Information: No value filed.    Anesthesia Type:  General    Note:  Disposition: Outpatient   Postop Pain Control: Uneventful            Sign Out: Well controlled pain   PONV: No   Neuro/Psych: Uneventful            Sign Out: Acceptable/Baseline neuro status   Airway/Respiratory: Uneventful            Sign Out: Acceptable/Baseline resp. status   CV/Hemodynamics: Uneventful            Sign Out: Acceptable CV status; No obvious hypovolemia; No obvious fluid overload   Other NRE: NONE   DID A NON-ROUTINE EVENT OCCUR? No           Last vitals:  Vitals Value Taken Time   /67 01/26/22 1130   Temp 98.2  F (36.8  C) 01/26/22 1109   Pulse     Resp 14 01/26/22 1130   SpO2 99 % 01/26/22 1120       Electronically Signed By: Karthik Donahue MD  January 26, 2022  12:34 PM

## 2022-01-26 NOTE — OP NOTE
PLASTIC SURGERY OPERATIVE REPORT     Date of Surgery: 1/26/2022  Surgical Service: Plastic Surgery     Preoperative Diagnosis: Bilateral symptomatic macromastia     Postoperative Diagnosis: Same as preoperative diagnoses     Procedures Performed: Bilateral reduction mammaplasty     Attending:  FARHAN Marin  Assistant: None     Complications: None apparent  Specimens: L breast tissue (611 grams), R breast tissue (650 grams)  Implants: None  Estimated blood loss: 50 mL  Wound classification: Clean  Anesthesia: General     Indications for Procedure: 46-year-old female presenting with years of bilateral symptomatic macromastia.  She states she has had neck pain and upper back pain for years.  She has been undergoing physical therapy during this timeframe.  She has intermittent rashes along her inframammary folds, which have been historically treated with various creams including at times antifungal.  She has always had a difficult time finding an appropriate bra and complains of bra strap grooving.  She has been weight stable at 185 or so pounds for many years.  She has a recent mammogram which is negative.  She has no plans to lose or gain significant amounts of weight.  She would like to proceed with a breast reduction, especially after discussing its potential benefit with her primary care provider regarding her symptoms.     Intraoperative findings: A bilateral inverted T inferior pedicle reduction was planned. Right breast slightly larger. Reasonable symmetry at end of case. Both nipple-areola well-perfused following inset.      Description of Procedure: Patient was seen in the preoperative holding area. Consent was verified.  The bilateral breasts were marked.  All additional questions were answered.  The risks of the surgery were reiterated, including (but not limited to): infection, bleeding, pain, poor scarring, hematoma requiring OR evacuation, wound healing issues particularly at the T-point, loss of nipple  sensation, loss of nipple pigmentation, loss of nipple entirely or need for free nipple grafting in the operating room, need for revision surgery, staging the reduction operation. Following discussion of all these risks, the patient again agreed to proceed with surgery.      Patient was then transferred to the operating room and placed supine on the operating table.  All pressure points were padded.  Sequential compression devices were placed on bilateral lower extremities and verified to be operational.  IV antibiotic prophylaxis was given.  Preinduction timeout was performed.  General anesthesia was induced. The breasts were prepped and draped in usual sterile fashion.      The right breast reduction was done, then the left breast was done to match the right. First, a 42 mm cookie cutter was used to xena the new areola, and the inferior pedicle was designed with an 8 cm base. Next, a preoperative markings, the perimeter of the inferior pedicle and the new areolar circumference was scored with a #10 blade. The skin was de-epithelialized from the inferior pedicle. The resection markings were then made through dermis. The medial and lateral skin flaps were raised to be at least 2-cm thick and even. Then, the resection was started medially using monopolar electrocautery, leaving some breast tissue over the chest wall and maintaining thicker skin flaps. The resection was then carried over lateral around the inferior pedicle, with care taken to not undermine the pedicle or to incise the pectoralis major fascia. Next, the resection was then taken laterally and the resection was completed. Next, irrigation was used and copious hemostasis was done with monopolar electrocautery. The pedicle was then loosely secured centered to the chest wall with 2-0 Vicryl suture. Next, the lateral aspect of the incision was secured to the chest wall with 3-pointed 0 PDS suture involving the superficial fascial system. Next, the horizontal  and vertical limbs were closed with 0 PDS suture in the SFS, several 2-0 Vicryl and mostly 3-0 Monocryl deep dermal suture, and 4-0 Monocryl running intracuticular suture. The top of the vertical incision was not completely closed in lieu of areolar placement once both sides were reduced. The left breast reduction was then started using the same technique as on the right. Once done, the symmetry in volume was checked. This was done before additional tissue was removed from the left side since that breast was smaller. After checking for reasonable symmetry, additional left breast tissue was removed. Once both breast horizontal and vertical limbs were closed, the patient was sat upright and the new nipple-areola were marked, centered on the breast mounds and the nipple-midline, sternal notch-new nipple and nipple-inframammary distance measurements were confirmed to be symmetric. The patient was then laid back supine and the additional new areolar skin resections were done with a #15 blade. Hemostasis was again obtained. The nipple-areola were inset using 4-0 Monocryl deep dermal and running 5-0 Monocryl intracuticular suture. All incisions were then dressed with skin adhesive and steri-strips for the circumareolar incision. The breasts were dressed with fluff gauze and a 6-inch ACE bandage was placed not too tightly. Patient was then transferred to the post-anesthesia care unit      Postoperative plan: Clinic in 2 weeks. No strenuous activities for 2 weeks, including no heart rate elevation above 100 bpm.      Shamir Marin MD, PhD

## 2022-01-26 NOTE — ANESTHESIA CARE TRANSFER NOTE
Patient: Caitlin Oh    Procedure: Procedure(s):  BILATERAL REDUCTION MAMMAPLASTY       Diagnosis: Large breasts [N62]  Diagnosis Additional Information: No value filed.    Anesthesia Type:   General     Note:    Oropharynx: oropharynx clear of all foreign objects  Level of Consciousness: awake  Oxygen Supplementation: nasal cannula    Independent Airway: airway patency satisfactory and stable  Dentition: dentition unchanged  Vital Signs Stable: post-procedure vital signs reviewed and stable  Report to RN Given: handoff report given  Patient transferred to: PACU    Handoff Report: Identifed the Patient, Identified the Reponsible Provider, Reviewed the pertinent medical history, Discussed the surgical course, Reviewed Intra-OP anesthesia mangement and issues during anesthesia, Set expectations for post-procedure period and Allowed opportunity for questions and acknowledgement of understanding      Vitals:  Vitals Value Taken Time   BP     Temp     Pulse     Resp     SpO2         Electronically Signed By: BLADIMIR Xiong CRNA  January 26, 2022  11:12 AM

## 2022-01-26 NOTE — ANESTHESIA POSTPROCEDURE EVALUATION
Patient: Caitlin Oh    Procedure: Procedure(s):  BILATERAL REDUCTION MAMMAPLASTY       Diagnosis:Large breasts [N62]  Diagnosis Additional Information: No value filed.    Anesthesia Type:  General    Note:  Disposition: Outpatient   Postop Pain Control: Uneventful            Sign Out: Well controlled pain   PONV: No   Neuro/Psych: Uneventful            Sign Out: Acceptable/Baseline neuro status   Airway/Respiratory: Uneventful            Sign Out: Acceptable/Baseline resp. status   CV/Hemodynamics: Uneventful            Sign Out: Acceptable CV status; No obvious hypovolemia; No obvious fluid overload   Other NRE: NONE   DID A NON-ROUTINE EVENT OCCUR? No           Last vitals:  Vitals Value Taken Time   /67 01/26/22 1130   Temp 98.2  F (36.8  C) 01/26/22 1109   Pulse     Resp 14 01/26/22 1130   SpO2 99 % 01/26/22 1120       Electronically Signed By: Karthik Donahue MD  January 26, 2022  12:30 PM

## 2022-01-26 NOTE — H&P
PRS    HPI: 46-year-old female presenting with years of bilateral symptomatic macromastia.  She states she has had neck pain and upper back pain for years.  She has been undergoing physical therapy during this timeframe.  She has intermittent rashes along her inframammary folds, which have been historically treated with various creams including at times antifungal.  She has always had a difficult time finding an appropriate bra and complains of bra strap grooving.  She has been weight stable at 185 or so pounds for many years.  She has a recent mammogram which is negative.  She has no plans to lose or gain significant amounts of weight.  She would like to proceed with a breast reduction, especially after discussing its potential benefit with her primary care provider regarding her symptoms. No changes in history or exam.      ROS: Negative, see HPI  Past medical history: None, nondiabetic.  Headaches.   Past surgical history: No surgeries to the breast or chest  Medications: Triptan  Allergies: Penicillins  Social history: Non-smoker, denies any tobacco use history  Family history: No bleeding or clotting problems, issues with anesthesia     Examination:  BP (!) 147/86   Temp 98.5  F (36.9  C) (Temporal)   Resp 16   Wt 85.7 kg (189 lb)   SpO2 95%   BMI 30.97 kg/m    Nonlabored breathing  Not distressed  Bilateral grade 3 ptotic breasts with reasonable symmetry and inframammary folds at the same level, right maybe slightly larger  No masses, lumps, nipple discharge, skin changes or axillary lymphadenopathy  Breast measurements:  Sternal notch nipple distance: 30 cm on the left, 30.5 cm on the right  Nipple inframammary distance: 12 cm on the left, 11 cm on the right  Breast base width: 16 cm on the left, 16 cm on the right  Nipple midline distance: 11 cm on the left, 12 cm on the right  Areolar diameter: 7 cm on the left, 5 cm on the right     MMG 10/25/2021: BI-RADS 1, negative     Schnur scale:  BSA 1.97  628  grams removed per side     A/P: 46-year-old female presenting with bilateral symptomatic macromastia     -OR today for BILATERAL breast reduction  -Patient is an excellent candidate for bilateral reduction mammoplasty.  Discussed the risks, benefits and alternatives.  The risks of surgery include but are not limited to: Infection, bleeding, hematoma, pain, poor scarring, wound healing complications including at the T point, need for revision surgery for aesthetic concerns, asymmetry, loss of nipple areolar sensation or pigmentation, need for nipple areolar skin grafting.  Despite all these risks, and after having all of her questions answered, patient consents again to bilateral reduction mammoplasty and would like to proceed today.  -Discharge from PACU once criteria met  -Clinic in 2 weeks     Shamir Marin MD, PhD

## 2022-01-26 NOTE — BRIEF OP NOTE
[facility] Brief Operative Note    Pre-operative diagnosis: Bilateral symptomatic macromastia   Post-operative diagnosis Same as preop   Procedure: Procedure(s):  BILATERAL REDUCTION MAMMAPLASTY   Surgeon(s): Surgeon(s) and Role:     * Shamir Marin MD - Primary   Estimated blood loss: 50 mL    Specimens: ID Type Source Tests Collected by Time Destination   1 : Right breast-650 grams Tissue Breast, Right SURGICAL PATHOLOGY EXAM Shamir Marin MD 1/26/2022  8:12 AM    2 : Left Breast- 611 grams Tissue Breast, Left SURGICAL PATHOLOGY EXAM Shamir Marin MD 1/26/2022  9:39 AM       Findings: Right breast larger. Nipple-areola well-perfused at inset.

## 2022-02-05 LAB
PATH REPORT.COMMENTS IMP SPEC: NORMAL
PATH REPORT.COMMENTS IMP SPEC: NORMAL
PATH REPORT.FINAL DX SPEC: NORMAL
PATH REPORT.GROSS SPEC: NORMAL
PATH REPORT.MICROSCOPIC SPEC OTHER STN: NORMAL
PATH REPORT.RELEVANT HX SPEC: NORMAL
PHOTO IMAGE: NORMAL

## 2022-02-10 ENCOUNTER — MYC MEDICAL ADVICE (OUTPATIENT)
Dept: SURGERY | Facility: CLINIC | Age: 47
End: 2022-02-10
Payer: COMMERCIAL

## 2022-02-10 NOTE — TELEPHONE ENCOUNTER
FMLA forms completed to reflect return to work date of 02/08/2022. Emailed to pt per pt's preference. Successful transmission confirmed..Hina Andrews RN

## 2022-02-11 ENCOUNTER — OFFICE VISIT (OUTPATIENT)
Dept: SURGERY | Facility: CLINIC | Age: 47
End: 2022-02-11
Payer: COMMERCIAL

## 2022-02-11 DIAGNOSIS — N62 LARGE BREASTS: Primary | ICD-10-CM

## 2022-02-11 PROCEDURE — 99024 POSTOP FOLLOW-UP VISIT: CPT | Performed by: STUDENT IN AN ORGANIZED HEALTH CARE EDUCATION/TRAINING PROGRAM

## 2022-02-11 ASSESSMENT — PAIN SCALES - GENERAL: PAINLEVEL: NO PAIN (1)

## 2022-02-11 NOTE — NURSING NOTE
Caitlin Oh's chief complaint for this visit includes:  Chief Complaint   Patient presents with     Surgical Followup     pruritic rash on bilateral flanks - 2 weeks S/p Bilateral reduction mammoplastly DOS: 01/26/2022     PCP: Sujatha Maher    Referring Provider:  No referring provider defined for this encounter.    There were no vitals taken for this visit.  No Pain (1)     Do you need any medication refills at today's visit? No    Silke Chavez CMA

## 2022-02-11 NOTE — PROGRESS NOTES
PRS    Doing well.  No pain.  Sometimes will feel tugging on her lateral chest when she extends her arms, but otherwise very comfortable.  No fevers or chills.  Happy with result so far.    On exam, all incisions intact with viable bilateral nipple areola.  Excellent symmetry with no dogears and similar volume.    A/P: 47-year-old female 2 weeks status post bilateral reduction mammoplasty, healing expectantly    -Discussed activity restrictions.  Patient can start light cardio, but encourage patient to always wear surgical bra when upright and especially when the breasts are moving.  Patient can begin lifting more progressively with each week, now somewhere around 10 to 15 pounds.   -Discussed initiating scar care with Bio-oil once the Steri-Strips and skin adhesive have fallen off  -Work note written  -Photography done  -Return to clinic in 4 weeks for reevaluation and likely clearance for all activity    Shamir Marin MD, PhD

## 2022-02-11 NOTE — LETTER
2/11/2022         RE: Caitlin Oh  [address] [place] [address]        Dear Colleague,    Thank you for referring your patient, Caitlin Oh, to the [facility]. Please see a copy of my visit note below.    PRS    Doing well.  No pain.  Sometimes will feel tugging on her lateral chest when she extends her arms, but otherwise very comfortable.  No fevers or chills.  Happy with result so far.    On exam, all incisions intact with viable bilateral nipple areola.  Excellent symmetry with no dogears and similar volume.    A/P: 47-year-old female 2 weeks status post bilateral reduction mammoplasty, healing expectantly    -Discussed activity restrictions.  Patient can start light cardio, but encourage patient to always wear surgical bra when upright and especially when the breasts are moving.  Patient can begin lifting more progressively with each week, now somewhere around 10 to 15 pounds.   -Discussed initiating scar care with Bio-oil once the Steri-Strips and skin adhesive have fallen off  -Work note written  -Photography done  -Return to clinic in 4 weeks for reevaluation and likely clearance for all activity    Shamir Marin MD, PhD      Again, thank you for allowing me to participate in the care of your patient.        Sincerely,        Shamir Marin MD

## 2022-02-11 NOTE — LETTER
[facility]  [address] [address] [place] [id number]  [phone]          February 11, 2022    RE:  Caitlin Oh                                                                                                                                                       [address] [facility] [id number]            To whom it may concern:    Caitlin Oh can return to work in a light duty capacity. She cannot return to work in a full capacity for an additional 4 weeks. She will be seen in my clinic again in 4 weeks for re-evaluation and likely clearance for all activities at that time.           Shamir Marin MD, PhD  Attending Plastic Surgeon

## 2022-02-20 DIAGNOSIS — G43.829 MENSTRUAL MIGRAINE WITHOUT STATUS MIGRAINOSUS, NOT INTRACTABLE: ICD-10-CM

## 2022-02-21 RX ORDER — RIZATRIPTAN BENZOATE 5 MG/1
TABLET, ORALLY DISINTEGRATING ORAL
Qty: 18 TABLET | Refills: 0 | Status: SHIPPED | OUTPATIENT
Start: 2022-02-21 | End: 2022-03-23

## 2022-03-18 ENCOUNTER — OFFICE VISIT (OUTPATIENT)
Dept: SURGERY | Facility: CLINIC | Age: 47
End: 2022-03-18
Payer: COMMERCIAL

## 2022-03-18 DIAGNOSIS — N62 LARGE BREASTS: Primary | ICD-10-CM

## 2022-03-18 PROCEDURE — 99024 POSTOP FOLLOW-UP VISIT: CPT | Performed by: STUDENT IN AN ORGANIZED HEALTH CARE EDUCATION/TRAINING PROGRAM

## 2022-03-18 NOTE — PROGRESS NOTES
PRS    No issues.  Very happy with result.  Has significant reduction in macromastia related symptoms including tension of both trapezius muscles and associated headaches.  She has been working, but had a decreased activity capacity.    On exam, all incisions are well-healed with very reasonable symmetry of both breasts as well as nipple areola.  No wounds.  No hypertrophic scarring.    Path: Benign breast tissue with no atypia or malignancy    A/P: 47-year-old female 7 weeks status post bilateral reduction mammaplasty    -Patient can return to work with no activity restrictions  -Discussed continued scar management with silicone-based ointment.  Explained that the scar is remodel over period of many months and up to 1 year.  If patient were to get sun or UV exposed, my recommendation is to use sunscreen or combination silicone-based ointment with SPF such as Biocorneum  -Photography done  -Letter for work written  -Return to clinic as needed    Shamir Marin MD, PhD

## 2022-03-18 NOTE — LETTER
[facility]  [address] [place] [id number]  [phone]          March 18, 2022    RE:  Ciatlin Oh                                                                                                                                                       [address] [facility] [id number]            To whom it may concern:    Caitlin Oh is under my professional care following a breast reduction.    She can return to work with no restrictions.           Shamir Marin MD, PhD  Staff Plastic Surgeon

## 2022-03-18 NOTE — NURSING NOTE
Caitlin Oh's goals for this visit include:   Chief Complaint   Patient presents with     RECHECK     1 month recheck, breast reduction       She requests these members of her care team be copied on today's visit information: no    PCP: Sujatha Maher    Referring Provider:  No referring provider defined for this encounter.    There were no vitals taken for this visit.    Do you need any medication refills at today's visit? No    Luba Valenzuela LPN

## 2022-03-18 NOTE — LETTER
3/18/2022         RE: Caitlin Oh  [address] [place] [address]        Dear Colleague,    Thank you for referring your patient, Caitlin Oh, to the [facility]. Please see a copy of my visit note below.    PRS    No issues.  Very happy with result.  Has significant reduction in macromastia related symptoms including tension of both trapezius muscles and associated headaches.  She has been working, but had a decreased activity capacity.    On exam, all incisions are well-healed with very reasonable symmetry of both breasts as well as nipple areola.  No wounds.  No hypertrophic scarring.    Path: Benign breast tissue with no atypia or malignancy    A/P: 47-year-old female 7 weeks status post bilateral reduction mammaplasty    -Patient can return to work with no activity restrictions  -Discussed continued scar management with silicone-based ointment.  Explained that the scar is remodel over period of many months and up to 1 year.  If patient were to get sun or UV exposed, my recommendation is to use sunscreen or combination silicone-based ointment with SPF such as Biocorneum  -Photography done  -Letter for work written  -Return to clinic as needed    Shamir Marin MD, PhD      Again, thank you for allowing me to participate in the care of your patient.        Sincerely,        Shamir Marin MD

## 2022-03-23 DIAGNOSIS — G43.829 MENSTRUAL MIGRAINE WITHOUT STATUS MIGRAINOSUS, NOT INTRACTABLE: ICD-10-CM

## 2022-03-23 RX ORDER — RIZATRIPTAN BENZOATE 5 MG/1
TABLET, ORALLY DISINTEGRATING ORAL
Qty: 18 TABLET | Refills: 0 | Status: SHIPPED | OUTPATIENT
Start: 2022-03-23 | End: 2022-12-30

## 2022-03-23 NOTE — TELEPHONE ENCOUNTER
Routing refill request to provider for review/approval because:  PCP to determine refill     Toni Ellis RN

## 2022-07-24 ENCOUNTER — MYC MEDICAL ADVICE (OUTPATIENT)
Dept: FAMILY MEDICINE | Facility: CLINIC | Age: 47
End: 2022-07-24

## 2022-07-24 DIAGNOSIS — G43.829 MENSTRUAL MIGRAINE WITHOUT STATUS MIGRAINOSUS, NOT INTRACTABLE: Primary | ICD-10-CM

## 2022-07-24 DIAGNOSIS — Z51.81 MEDICATION MONITORING ENCOUNTER: ICD-10-CM

## 2022-08-03 ASSESSMENT — ENCOUNTER SYMPTOMS
CONSTIPATION: 0
ARTHRALGIAS: 0
HEMATURIA: 0
MYALGIAS: 0
SHORTNESS OF BREATH: 0
NERVOUS/ANXIOUS: 0
ABDOMINAL PAIN: 0
HEADACHES: 0
COUGH: 0
FREQUENCY: 0
EYE PAIN: 0
HEARTBURN: 0
PARESTHESIAS: 0
DIZZINESS: 0
DYSURIA: 0
WEAKNESS: 0
FEVER: 0
PALPITATIONS: 0
JOINT SWELLING: 0
DIARRHEA: 0
BREAST MASS: 0
NAUSEA: 0
HEMATOCHEZIA: 0
SORE THROAT: 0
CHILLS: 0

## 2022-08-04 ENCOUNTER — OFFICE VISIT (OUTPATIENT)
Dept: FAMILY MEDICINE | Facility: CLINIC | Age: 47
End: 2022-08-04
Payer: COMMERCIAL

## 2022-08-04 VITALS
SYSTOLIC BLOOD PRESSURE: 118 MMHG | OXYGEN SATURATION: 99 % | TEMPERATURE: 98.1 F | HEART RATE: 59 BPM | WEIGHT: 173 LBS | BODY MASS INDEX: 27.8 KG/M2 | HEIGHT: 66 IN | DIASTOLIC BLOOD PRESSURE: 66 MMHG

## 2022-08-04 DIAGNOSIS — G89.29 CHRONIC BILATERAL THORACIC BACK PAIN: ICD-10-CM

## 2022-08-04 DIAGNOSIS — M54.6 CHRONIC BILATERAL THORACIC BACK PAIN: ICD-10-CM

## 2022-08-04 DIAGNOSIS — G43.829 MENSTRUAL MIGRAINE WITHOUT STATUS MIGRAINOSUS, NOT INTRACTABLE: ICD-10-CM

## 2022-08-04 DIAGNOSIS — Z51.81 MEDICATION MONITORING ENCOUNTER: ICD-10-CM

## 2022-08-04 DIAGNOSIS — Z00.00 ROUTINE GENERAL MEDICAL EXAMINATION AT A HEALTH CARE FACILITY: Primary | ICD-10-CM

## 2022-08-04 LAB
ANION GAP SERPL CALCULATED.3IONS-SCNC: 5 MMOL/L (ref 3–14)
BUN SERPL-MCNC: 12 MG/DL (ref 7–30)
CALCIUM SERPL-MCNC: 8.7 MG/DL (ref 8.5–10.1)
CHLORIDE BLD-SCNC: 108 MMOL/L (ref 94–109)
CO2 SERPL-SCNC: 28 MMOL/L (ref 20–32)
CREAT SERPL-MCNC: 0.67 MG/DL (ref 0.52–1.04)
GFR SERPL CREATININE-BSD FRML MDRD: >90 ML/MIN/1.73M2
GLUCOSE BLD-MCNC: 88 MG/DL (ref 70–99)
POTASSIUM BLD-SCNC: 3.9 MMOL/L (ref 3.4–5.3)
SODIUM SERPL-SCNC: 141 MMOL/L (ref 133–144)

## 2022-08-04 PROCEDURE — 80048 BASIC METABOLIC PNL TOTAL CA: CPT | Performed by: FAMILY MEDICINE

## 2022-08-04 PROCEDURE — 99396 PREV VISIT EST AGE 40-64: CPT | Performed by: FAMILY MEDICINE

## 2022-08-04 PROCEDURE — 36415 COLL VENOUS BLD VENIPUNCTURE: CPT | Performed by: FAMILY MEDICINE

## 2022-08-04 PROCEDURE — 99213 OFFICE O/P EST LOW 20 MIN: CPT | Mod: 25 | Performed by: FAMILY MEDICINE

## 2022-08-04 RX ORDER — TIZANIDINE 2 MG/1
2 TABLET ORAL 3 TIMES DAILY PRN
Qty: 30 TABLET | Refills: 3 | Status: SHIPPED | OUTPATIENT
Start: 2022-08-04 | End: 2023-12-13

## 2022-08-04 ASSESSMENT — ENCOUNTER SYMPTOMS
DYSURIA: 0
NERVOUS/ANXIOUS: 0
PARESTHESIAS: 0
SORE THROAT: 0
HEMATURIA: 0
EYE PAIN: 0
HEARTBURN: 0
HEMATOCHEZIA: 0
FEVER: 0
COUGH: 0
DIARRHEA: 0
ARTHRALGIAS: 0
FREQUENCY: 0
DIZZINESS: 0
BREAST MASS: 0
WEAKNESS: 0
SHORTNESS OF BREATH: 0
NAUSEA: 0
MYALGIAS: 0
PALPITATIONS: 0
HEADACHES: 0
ABDOMINAL PAIN: 0
CONSTIPATION: 0
JOINT SWELLING: 0
CHILLS: 0

## 2022-08-04 NOTE — PROGRESS NOTES
SUBJECTIVE:   CC: Caitlin Oh is an 47 year old woman who presents for preventive health visit.     Patient has been advised of split billing requirements and indicates understanding: Yes  Healthy Habits:     Getting at least 3 servings of Calcium per day:  Yes    Bi-annual eye exam:  Yes    Dental care twice a year:  Yes    Sleep apnea or symptoms of sleep apnea:  None    Diet:  Low fat/cholesterol    Frequency of exercise:  6-7 days/week    Duration of exercise:  45-60 minutes    Taking medications regularly:  Not Applicable    PHQ-2 Total Score: 0    Additional concerns today:  Yes    *Discuss refill of Tizanidine   *change in stool with recent diet change.   She has lost some weight and she had the breast reduction   She has been very regular she has been eating a lot of fruits and she will have some firmer stool   She will have 4 loose stools and then have a few days             Today's PHQ-2 Score:   PHQ-2 ( 1999 [employer]) 8/3/2022   Q1: Little interest or pleasure in doing things 0   Q2: Feeling down, depressed or hopeless 0   PHQ-2 Score 0   PHQ-2 Total Score (12-17 Years)- Positive if 3 or more points; Administer PHQ-A if positive -   Q1: Little interest or pleasure in doing things Not at all   Q2: Feeling down, depressed or hopeless Not at all   PHQ-2 Score 0       Abuse: Current or Past (Physical, Sexual or Emotional) - No  Do you feel safe in your environment? Yes    Have you ever done Advance Care Planning? (For example, a Health Directive, POLST, or a discussion with a medical provider or your loved ones about your wishes): Yes, patient states has an Advance Care Planning document and will bring a copy to the clinic.    Social History     Tobacco Use     Smoking status: Never Smoker     Smokeless tobacco: Never Used   Substance Use Topics     Alcohol use: No     If you drink alcohol do you typically have >3 drinks per day or >7 drinks per week? No    No flowsheet data found.    Reviewed orders with  patient.  Reviewed health maintenance and updated orders accordingly - Yes      Breast Cancer Screening:    Breast CA Risk Assessment (FHS-7) 9/28/2021 10/25/2021   Do you have a family history of breast, colon, or ovarian cancer? Yes No / Unknown       click delete button to remove this line now  Mammogram Screening: Recommended annual mammography  Pertinent mammograms are reviewed under the imaging tab.    History of abnormal Pap smear: NO - age 30-65 PAP every 5 years with negative HPV co-testing recommended  PAP / HPV Latest Ref Rng & Units 8/31/2020 9/27/2016 10/28/2015   PAP (Historical) - NIL NIL NIL   HPV16 NEG:Negative Negative Negative Negative   HPV18 NEG:Negative Negative Negative Negative   HRHPV NEG:Negative Negative Negative Positive(A)     Reviewed and updated as needed this visit by clinical staff    Allergies                 Reviewed and updated as needed this visit by Provider                   Past Medical History:   Diagnosis Date     Bilateral bunions      Cervical high risk HPV (human papillomavirus) test positive 11/4/2015     Female infertility 7/10/2008    Undergoing ivf 2008.      Kidney stones 11/30/2010    [facility] er 11/24/10      Personal history of gestational diabetes 7/11/2012    diet controlled     Plantar fasciitis     s/p surgery        Review of Systems   Constitutional: Negative for chills and fever.   HENT: Negative for congestion, ear pain, hearing loss and sore throat.    Eyes: Negative for pain and visual disturbance.   Respiratory: Negative for cough and shortness of breath.    Cardiovascular: Negative for chest pain, palpitations and peripheral edema.   Gastrointestinal: Negative for abdominal pain, constipation, diarrhea, heartburn, hematochezia and nausea.   Breasts:  Negative for tenderness, breast mass and discharge.   Genitourinary: Negative for dysuria, frequency, genital sores, hematuria, pelvic pain, urgency, vaginal bleeding and vaginal discharge.  "  Musculoskeletal: Negative for arthralgias, joint swelling and myalgias.   Skin: Negative for rash.   Neurological: Negative for dizziness, weakness, headaches and paresthesias.   Psychiatric/Behavioral: Negative for mood changes. The patient is not nervous/anxious.            OBJECTIVE:   /66   Pulse 59   Temp 98.1  F (36.7  C) (Tympanic)   Ht 1.664 m (5' 5.5\")   Wt 78.5 kg (173 lb)   SpO2 99%   BMI 28.35 kg/m    Physical Exam  GENERAL: healthy, alert and no distress  EYES: Eyes grossly normal to inspection, PERRL and conjunctivae and sclerae normal  HENT: ear canals and TM's normal, nose and mouth without ulcers or lesions  NECK: no adenopathy, no asymmetry, masses, or scars and thyroid normal to palpation  RESP: lungs clear to auscultation - no rales, rhonchi or wheezes  BREAST: normal without masses, tenderness or nipple discharge, no palpable axillary masses or adenopathy and well healed reduction scars  CV: regular rate and rhythm, normal S1 S2, no S3 or S4, no murmur, click or rub, no peripheral edema and peripheral pulses strong  ABDOMEN: soft, nontender, no hepatosplenomegaly, no masses and bowel sounds normal  MS: no gross musculoskeletal defects noted, no edema  SKIN: no suspicious lesions or rashes  NEURO: Normal strength and tone, mentation intact and speech normal  PSYCH: mentation appears normal, affect normal/bright    Diagnostic Test Results:  Labs reviewed in Epic  Results for orders placed or performed in visit on 08/04/22   Basic metabolic panel  (Ca, Cl, CO2, Creat, Gluc, K, Na, BUN)     Status: Normal   Result Value Ref Range    Sodium 141 133 - 144 mmol/L    Potassium 3.9 3.4 - 5.3 mmol/L    Chloride 108 94 - 109 mmol/L    Carbon Dioxide (CO2) 28 20 - 32 mmol/L    Anion Gap 5 3 - 14 mmol/L    Urea Nitrogen 12 7 - 30 mg/dL    Creatinine 0.67 0.52 - 1.04 mg/dL    Calcium 8.7 8.5 - 10.1 mg/dL    Glucose 88 70 - 99 mg/dL    GFR Estimate >90 >60 mL/min/1.73m2       ASSESSMENT/PLAN: " "  (Z00.00) Routine general medical examination at a health care facility  (primary encounter diagnosis)  Comment:Plan:     (M54.6,  G89.29) Chronic bilateral thoracic back pain  Comment: Plan: tiZANidine (ZANAFLEX) 2 MG tablet        Uses a lot less now but still have pain intermittentlu     (G43.829) Menstrual migraine without status migrainosus, not intractable  Comment:   Plan: greatly reduced after breast reduction     (Z51.81) Medication monitoring encounter  Comment:   Plan: labs already ordered         COUNSELING:  Reviewed preventive health counseling, as reflected in patient instructions    Estimated body mass index is 30.97 kg/m  as calculated from the following:    Height as of 1/19/22: 1.664 m (5' 5.5\").    Weight as of 1/26/22: 85.7 kg (189 lb).    Weight management plan: Discussed healthy diet and exercise guidelines    She reports that she has never smoked. She has never used smokeless tobacco.      Counseling Resources:  ATP IV Guidelines  Pooled Cohorts Equation Calculator  Breast Cancer Risk Calculator  BRCA-Related Cancer Risk Assessment: FHS-7 Tool  FRAX Risk Assessment  ICSI Preventive Guidelines  Dietary Guidelines for Americans, 2010  [employer] My[employer]  ASA Prophylaxis  Lung CA Screening    Sujatha Maher MD  [facility]  "

## 2022-10-16 ENCOUNTER — HEALTH MAINTENANCE LETTER (OUTPATIENT)
Age: 47
End: 2022-10-16

## 2022-10-26 ENCOUNTER — HOSPITAL ENCOUNTER (OUTPATIENT)
Dept: MAMMOGRAPHY | Facility: CLINIC | Age: 47
Discharge: HOME | End: 2022-10-26
Attending: FAMILY MEDICINE | Admitting: FAMILY MEDICINE
Payer: COMMERCIAL

## 2022-10-26 DIAGNOSIS — Z12.31 VISIT FOR SCREENING MAMMOGRAM: ICD-10-CM

## 2022-10-26 PROCEDURE — 77067 SCR MAMMO BI INCL CAD: CPT

## 2022-12-20 ENCOUNTER — TRANSFERRED RECORDS (OUTPATIENT)
Dept: HEALTH INFORMATION MANAGEMENT | Facility: CLINIC | Age: 47
End: 2022-12-20

## 2022-12-28 DIAGNOSIS — G43.829 MENSTRUAL MIGRAINE WITHOUT STATUS MIGRAINOSUS, NOT INTRACTABLE: ICD-10-CM

## 2022-12-28 NOTE — TELEPHONE ENCOUNTER
"Requested Prescriptions   Pending Prescriptions Disp Refills     rizatriptan (MAXALT-MLT) 5 MG ODT [Pharmacy Med Name: RIZATRIPTAN 5 MG ODT] 18 tablet 0     Sig: DISSOLVE 1-2 TABS IN MOUTH AT ONSET OF HEADACHE/ MIGRAINE-MAY REPEAT IN 2 HRS-MAX 30 MG IN 24 HRS       Serotonin Agonists Failed - 12/28/2022  9:35 AM        Failed - Serotonin Agonist request needs review.     Please review patient's record. If patient has had 8 or more treatments in the past month, please forward to provider.          Passed - Blood pressure under 140/90 in past 12 months     BP Readings from Last 3 Encounters:   08/04/22 118/66   01/26/22 119/68   01/19/22 124/76                 Passed - Recent (12 mo) or future (30 days) visit within the authorizing provider's specialty     Patient has had an office visit with the authorizing provider or a provider within the authorizing providers department within the previous 12 mos or has a future within next 30 days. See \"Patient Info\" tab in inbasket, or \"Choose Columns\" in Meds & Orders section of the refill encounter.              Passed - Medication is active on med list        Passed - Patient is age 18 or older        Passed - No active pregnancy on record        Passed - No positive pregnancy test in past 12 months             "

## 2022-12-30 RX ORDER — RIZATRIPTAN BENZOATE 5 MG/1
TABLET, ORALLY DISINTEGRATING ORAL
Qty: 18 TABLET | Refills: 0 | Status: SHIPPED | OUTPATIENT
Start: 2022-12-30 | End: 2023-12-13

## 2023-02-14 ENCOUNTER — MYC MEDICAL ADVICE (OUTPATIENT)
Dept: FAMILY MEDICINE | Facility: CLINIC | Age: 48
End: 2023-02-14

## 2023-02-14 ENCOUNTER — E-VISIT (OUTPATIENT)
Dept: FAMILY MEDICINE | Facility: CLINIC | Age: 48
End: 2023-02-14
Payer: COMMERCIAL

## 2023-02-14 DIAGNOSIS — Z82.69: ICD-10-CM

## 2023-02-14 DIAGNOSIS — Z82.61 FAMILY HISTORY OF RHEUMATOID ARTHRITIS: ICD-10-CM

## 2023-02-14 DIAGNOSIS — B96.89 ACUTE BACTERIAL SINUSITIS: Primary | ICD-10-CM

## 2023-02-14 DIAGNOSIS — J01.90 ACUTE BACTERIAL SINUSITIS: Primary | ICD-10-CM

## 2023-02-14 DIAGNOSIS — Z51.81 ENCOUNTER FOR THERAPEUTIC DRUG MONITORING: ICD-10-CM

## 2023-02-14 DIAGNOSIS — M25.50 POLYARTHRALGIA: Primary | ICD-10-CM

## 2023-02-14 DIAGNOSIS — M25.50 POLYARTHRALGIA: ICD-10-CM

## 2023-02-14 PROCEDURE — 99421 OL DIG E/M SVC 5-10 MIN: CPT | Performed by: FAMILY MEDICINE

## 2023-02-14 RX ORDER — DOXYCYCLINE HYCLATE 100 MG
100 TABLET ORAL 2 TIMES DAILY
Qty: 14 TABLET | Refills: 0 | Status: SHIPPED | OUTPATIENT
Start: 2023-02-14 | End: 2023-02-21

## 2023-02-14 NOTE — PATIENT INSTRUCTIONS
Sinusitis (Antibiotic Treatment)    The sinuses are air-filled spaces within the bones of the face. They connect to the inside of the nose. Sinusitis is an inflammation of the tissue that lines the sinuses. Sinusitis can occur during a cold. It can also happen due to allergies to pollens and other particles in the air. Sinusitis can cause symptoms of sinus congestion and a feeling of fullness. A sinus infection causes fever, headache, and facial pain. There is often green or yellow fluid draining from the nose or into the back of the throat (post-nasal drip). You have been given antibiotics to treat this condition.   Home care    Take the full course of antibiotics as instructed. Don't stop taking them, even when you feel better.    Drink plenty of water, hot tea, and other liquids as directed by the healthcare provider. This may help thin nasal mucus. It also may help your sinuses drain fluids.    Heat may help soothe painful areas of your face. Use a towel soaked in hot water. Or,  the shower and direct the warm spray onto your face. Using a vaporizer along with a menthol rub at night may also help soothe symptoms.     An expectorant with guaifenesin may help thin nasal mucus and help your sinuses drain fluids. Talk with your provider or pharmacists before taking an over-the-counter (OTC) medicine if you have any questions about it or its side effects..    You can use an OTC decongestant, unless a similar medicine was prescribed to you. Nasal sprays work the fastest. Use one that contains phenylephrine or oxymetazoline. First blow your nose gently. Then use the spray. Don't use these medicines more often than directed on the label. If you do, your symptoms may get worse. You may also take pills that contain pseudoephedrine. Don t use products that combine multiple medicines. This is because side effects may be increased. Read labels. You can also ask the pharmacist for help. (People with high blood  pressure should not use decongestants. They can raise blood pressure.) Talk with your provider or pharmacist if you have any questions about the medicine..    OTC antihistamines may help if allergies contributed to your sinusitis. Talk with your provider or pharmacist if you have any questions about the medicine..    Don't use nasal rinses or irrigation during an acute sinus infection, unless your healthcare provider tells you to. Rinsing may spread the infection to other areas in your sinuses.    Use acetaminophen or ibuprofen to control pain, unless another pain medicine was prescribed to you. If you have chronic liver or kidney disease or ever had a stomach ulcer, talk with your healthcare provider before using these medicines. Never give aspirin to anyone under age 18 who is ill with a fever. It may cause severe liver damage.    Don't smoke. This can make symptoms worse.    Follow-up care  Follow up with your healthcare provider, or as advised.   When to seek medical advice  Call your healthcare provider if any of these occur:     Facial pain or headache that gets worse    Stiff neck    Unusual drowsiness or confusion    Swelling of your forehead or eyelids    Symptoms don't go away in 10 days    Vision problems, such as blurred or double vision    Fever of 100.4 F (38 C) or higher, or as directed by your healthcare provider  Call 911  Call 911 if any of these occur:     Seizure    Trouble breathing    Feeling dizzy or faint    Fingernails, skin or lips look blue, purple , or gray  Prevention  Here are steps you can take to help prevent an infection:     Keep good hand washing habits.    Don t have close contact with people who have sore throats, colds, or other upper respiratory infections.    Don t smoke, and stay away from secondhand smoke.    Stay up to date with of your vaccines.  [employer] last reviewed this educational content on 12/1/2019 2000-2021 [employer]. All rights reserved. This  information is not intended as a substitute for professional medical care. Always follow your healthcare professional's instructions.        Dear Caitlin Oh    After reviewing your responses, I've been able to diagnose you with Acute bacterial sinusitis.      Based on your responses and diagnosis, I have prescribed   Orders Placed This Encounter     doxycycline hyclate (VIBRA-TABS) 100 MG tablet    to treat your symptoms. I have sent this to your pharmacy.?     It is also important to stay well hydrated, get lots of rest and take over-the-counter decongestants,?tylenol?or ibuprofen if you?are able to?take those medications per your primary care provider to help relieve discomfort.?     It is important that you take?all of?your prescribed medication even if your symptoms are improving after a few doses.? Taking?all of?your medicine helps prevent the symptoms from returning.?     If your symptoms worsen, you develop severe headache, vomiting, high fever (>102), or are not improving in 7 days, please contact your primary care provider for an appointment or visit any of our convenient Walk-in Care or Urgent Care Centers to be seen which can be found on our website?here.?     Thanks again for choosing?us?as your health care partner,?   ?  Sujatha Maher MD?

## 2023-02-22 ENCOUNTER — LAB (OUTPATIENT)
Dept: LAB | Facility: CLINIC | Age: 48
End: 2023-02-22
Payer: COMMERCIAL

## 2023-02-22 DIAGNOSIS — Z82.69: ICD-10-CM

## 2023-02-22 DIAGNOSIS — Z51.81 ENCOUNTER FOR THERAPEUTIC DRUG MONITORING: ICD-10-CM

## 2023-02-22 DIAGNOSIS — M25.50 POLYARTHRALGIA: ICD-10-CM

## 2023-02-22 DIAGNOSIS — Z82.61 FAMILY HISTORY OF RHEUMATOID ARTHRITIS: ICD-10-CM

## 2023-02-22 LAB
BASOPHILS # BLD AUTO: 0 10E3/UL (ref 0–0.2)
BASOPHILS NFR BLD AUTO: 0 %
CREAT SERPL-MCNC: 0.79 MG/DL (ref 0.51–0.95)
CRP SERPL-MCNC: <3 MG/L
EOSINOPHIL # BLD AUTO: 0.1 10E3/UL (ref 0–0.7)
EOSINOPHIL NFR BLD AUTO: 1 %
ERYTHROCYTE [DISTWIDTH] IN BLOOD BY AUTOMATED COUNT: 12.8 % (ref 10–15)
ERYTHROCYTE [SEDIMENTATION RATE] IN BLOOD BY WESTERGREN METHOD: 10 MM/HR (ref 0–20)
GFR SERPL CREATININE-BSD FRML MDRD: >90 ML/MIN/1.73M2
HCT VFR BLD AUTO: 38.3 % (ref 35–47)
HGB BLD-MCNC: 12.4 G/DL (ref 11.7–15.7)
LYMPHOCYTES # BLD AUTO: 2.1 10E3/UL (ref 0.8–5.3)
LYMPHOCYTES NFR BLD AUTO: 22 %
MCH RBC QN AUTO: 30.3 PG (ref 26.5–33)
MCHC RBC AUTO-ENTMCNC: 32.4 G/DL (ref 31.5–36.5)
MCV RBC AUTO: 94 FL (ref 78–100)
MONOCYTES # BLD AUTO: 0.9 10E3/UL (ref 0–1.3)
MONOCYTES NFR BLD AUTO: 9 %
NEUTROPHILS # BLD AUTO: 6.7 10E3/UL (ref 1.6–8.3)
NEUTROPHILS NFR BLD AUTO: 68 %
PLATELET # BLD AUTO: 274 10E3/UL (ref 150–450)
RBC # BLD AUTO: 4.09 10E6/UL (ref 3.8–5.2)
WBC # BLD AUTO: 9.8 10E3/UL (ref 4–11)

## 2023-02-22 PROCEDURE — 86431 RHEUMATOID FACTOR QUANT: CPT

## 2023-02-22 PROCEDURE — 86200 CCP ANTIBODY: CPT

## 2023-02-22 PROCEDURE — 82565 ASSAY OF CREATININE: CPT

## 2023-02-22 PROCEDURE — 85652 RBC SED RATE AUTOMATED: CPT

## 2023-02-22 PROCEDURE — 86140 C-REACTIVE PROTEIN: CPT

## 2023-02-22 PROCEDURE — 86039 ANTINUCLEAR ANTIBODIES (ANA): CPT

## 2023-02-22 PROCEDURE — 86038 ANTINUCLEAR ANTIBODIES: CPT

## 2023-02-22 PROCEDURE — 36415 COLL VENOUS BLD VENIPUNCTURE: CPT

## 2023-02-22 PROCEDURE — 85025 COMPLETE CBC W/AUTO DIFF WBC: CPT

## 2023-02-23 LAB
ANA PAT SER IF-IMP: ABNORMAL
ANA SER QL IF: POSITIVE
ANA TITR SER IF: ABNORMAL {TITER}
CCP AB SER IA-ACNC: 1.3 U/ML
RHEUMATOID FACT SER NEPH-ACNC: <7 IU/ML

## 2023-02-28 ENCOUNTER — OFFICE VISIT (OUTPATIENT)
Dept: FAMILY MEDICINE | Facility: CLINIC | Age: 48
End: 2023-02-28
Payer: COMMERCIAL

## 2023-02-28 VITALS
SYSTOLIC BLOOD PRESSURE: 114 MMHG | TEMPERATURE: 97.2 F | BODY MASS INDEX: 27.64 KG/M2 | OXYGEN SATURATION: 98 % | HEART RATE: 72 BPM | WEIGHT: 172 LBS | DIASTOLIC BLOOD PRESSURE: 72 MMHG | HEIGHT: 66 IN

## 2023-02-28 DIAGNOSIS — Z13.220 SCREENING FOR HYPERLIPIDEMIA: Primary | ICD-10-CM

## 2023-02-28 DIAGNOSIS — M25.551 HIP PAIN, RIGHT: ICD-10-CM

## 2023-02-28 DIAGNOSIS — M25.50 POLYARTHRALGIA: ICD-10-CM

## 2023-02-28 PROCEDURE — 99213 OFFICE O/P EST LOW 20 MIN: CPT | Performed by: FAMILY MEDICINE

## 2023-02-28 NOTE — PROGRESS NOTES
"  Assessment & Plan     Screening for hyperlipidemia  Not needed   - Lipid panel reflex to direct LDL Non-fasting; Future    Polyarthralgia  Osteo     Hip pain, right  If needed follow up there for injection   - Orthopedic  Referral; Future    Jaya less positive and she did have a big work up when she was having iritis  That was all neg except jaya 1:160 with homogenous and nucleolar. Now just 1:80 and only homogenous pattern     BMI:   Estimated body mass index is 28.19 kg/m  as calculated from the following:    Height as of this encounter: 1.664 m (5' 5.5\").    Weight as of this encounter: 78 kg (172 lb).       See Patient Instructions    No follow-ups on file.    Sujatha Maher MD  [facility] DENA Romero is a 48 year old, presenting for the following health issues:  Joint Pain      History of Present Illness       Reason for visit:  Joint pain  Symptom onset:  More than a month  Symptom intensity:  Mild  Symptom progression:  Staying the same  Had these symptoms before:  Yes  Has tried/received treatment for these symptoms:  No  What makes it worse:  Cold  What makes it better:  Warmth    She eats 2-3 servings of fruits and vegetables daily.She consumes 0 sweetened beverage(s) daily.She exercises with enough effort to increase her heart rate 30 to 60 minutes per day.  She exercises with enough effort to increase her heart rate 6 days per week.   She is taking medications regularly.     The hands hurt she has right hip in the greater trochanter area the more she uses it the better          Review of Systems   Constitutional, HEENT, cardiovascular, pulmonary, gi and gu systems are negative, except as otherwise noted.      Objective    /72 (BP Location: Right arm, Patient Position: Sitting, Cuff Size: Adult Regular)   Pulse 72   Temp 97.2  F (36.2  C) (Tympanic)   Ht 1.664 m (5' 5.5\")   Wt 78 kg (172 lb)   SpO2 98%   BMI 28.19 kg/m    Body mass index is 28.19 " kg/m .  Physical Exam   GENERAL: healthy, alert and no distress  MS: heberdens nodes started on fingers and mild tenderness over the right greater trochanter   NEURO: Normal strength and tone, mentation intact and speech normal    Lab on 02/22/2023   Component Date Value Ref Range Status     Rheumatoid Factor 02/22/2023 <7  <12 IU/mL Final     ZACHARY interpretation 02/22/2023 Positive (A)  Negative Final      Cytoplasmic pattern observed. The ZACHARY test is intended to detect antibodies directed against the cell nucleus. However, antibodies against other cellular structures may also be detected. These are often non-specific, although some clinically significant antibodies directed against cytoplasmic components may be observed. Follow-up testing may be considered if clinically indicated.    Negative:              <1:40  Borderline Positive:   1:40 - 1:80  Positive:              >1:80     ZACHARY pattern 1 02/22/2023 Homogeneous   Final     ZACHARY titer 1 02/22/2023 1:80   Final     CRP Inflammation 02/22/2023 <3.00  <5.00 mg/L Final     Erythrocyte Sedimentation Rate 02/22/2023 10  0 - 20 mm/hr Final     Cyclic Citrullinated Peptide Antib* 02/22/2023 1.3  <7.0 U/mL Final    Negative     Creatinine 02/22/2023 0.79  0.51 - 0.95 mg/dL Final     GFR Estimate 02/22/2023 >90  >60 mL/min/1.73m2 Final    eGFR calculated using 2021 CKD-EPI equation.     WBC Count 02/22/2023 9.8  4.0 - 11.0 10e3/uL Final     RBC Count 02/22/2023 4.09  3.80 - 5.20 10e6/uL Final     Hemoglobin 02/22/2023 12.4  11.7 - 15.7 g/dL Final     Hematocrit 02/22/2023 38.3  35.0 - 47.0 % Final     MCV 02/22/2023 94  78 - 100 fL Final     MCH 02/22/2023 30.3  26.5 - 33.0 pg Final     MCHC 02/22/2023 32.4  31.5 - 36.5 g/dL Final     RDW 02/22/2023 12.8  10.0 - 15.0 % Final     Platelet Count 02/22/2023 274  150 - 450 10e3/uL Final     % Neutrophils 02/22/2023 68  % Final     % Lymphocytes 02/22/2023 22  % Final     % Monocytes 02/22/2023 9  % Final     % Eosinophils  02/22/2023 1  % Final     % Basophils 02/22/2023 0  % Final     Absolute Neutrophils 02/22/2023 6.7  1.6 - 8.3 10e3/uL Final     Absolute Lymphocytes 02/22/2023 2.1  0.8 - 5.3 10e3/uL Final     Absolute Monocytes 02/22/2023 0.9  0.0 - 1.3 10e3/uL Final     Absolute Eosinophils 02/22/2023 0.1  0.0 - 0.7 10e3/uL Final     Absolute Basophils 02/22/2023 0.0  0.0 - 0.2 10e3/uL Final       Sujatha Maher M.D.

## 2023-07-05 ENCOUNTER — PATIENT OUTREACH (OUTPATIENT)
Dept: CARE COORDINATION | Facility: CLINIC | Age: 48
End: 2023-07-05
Payer: COMMERCIAL

## 2023-07-27 ENCOUNTER — TRANSFERRED RECORDS (OUTPATIENT)
Dept: HEALTH INFORMATION MANAGEMENT | Facility: CLINIC | Age: 48
End: 2023-07-27
Payer: COMMERCIAL

## 2023-08-06 ASSESSMENT — ENCOUNTER SYMPTOMS
HEARTBURN: 0
DYSURIA: 0
COUGH: 0
JOINT SWELLING: 0
WEAKNESS: 0
SORE THROAT: 0
ABDOMINAL PAIN: 0
DIARRHEA: 0
HEADACHES: 1
DIZZINESS: 0
EYE PAIN: 0
FREQUENCY: 0
PARESTHESIAS: 0
FEVER: 0
HEMATOCHEZIA: 0
SHORTNESS OF BREATH: 0
ARTHRALGIAS: 1
BREAST MASS: 0
NAUSEA: 0
CHILLS: 0
MYALGIAS: 0
PALPITATIONS: 0
NERVOUS/ANXIOUS: 0
CONSTIPATION: 0

## 2023-08-07 ENCOUNTER — OFFICE VISIT (OUTPATIENT)
Dept: FAMILY MEDICINE | Facility: CLINIC | Age: 48
End: 2023-08-07
Payer: COMMERCIAL

## 2023-08-07 VITALS
WEIGHT: 179 LBS | HEIGHT: 65 IN | SYSTOLIC BLOOD PRESSURE: 132 MMHG | TEMPERATURE: 97.8 F | BODY MASS INDEX: 29.82 KG/M2 | OXYGEN SATURATION: 99 % | HEART RATE: 75 BPM | DIASTOLIC BLOOD PRESSURE: 78 MMHG

## 2023-08-07 DIAGNOSIS — Z00.00 ROUTINE GENERAL MEDICAL EXAMINATION AT A HEALTH CARE FACILITY: Primary | ICD-10-CM

## 2023-08-07 DIAGNOSIS — N95.1 SYMPTOMATIC MENOPAUSAL OR FEMALE CLIMACTERIC STATES: ICD-10-CM

## 2023-08-07 DIAGNOSIS — G43.009 MIGRAINE WITHOUT AURA AND WITHOUT STATUS MIGRAINOSUS, NOT INTRACTABLE: ICD-10-CM

## 2023-08-07 DIAGNOSIS — Z13.220 SCREENING FOR HYPERLIPIDEMIA: ICD-10-CM

## 2023-08-07 LAB
ALBUMIN SERPL BCG-MCNC: 4.6 G/DL (ref 3.5–5.2)
ALP SERPL-CCNC: 80 U/L (ref 35–104)
ALT SERPL W P-5'-P-CCNC: 36 U/L (ref 0–50)
ANION GAP SERPL CALCULATED.3IONS-SCNC: 10 MMOL/L (ref 7–15)
AST SERPL W P-5'-P-CCNC: 38 U/L (ref 0–45)
BILIRUB SERPL-MCNC: 0.4 MG/DL
BUN SERPL-MCNC: 11.9 MG/DL (ref 6–20)
CALCIUM SERPL-MCNC: 9.3 MG/DL (ref 8.6–10)
CHLORIDE SERPL-SCNC: 104 MMOL/L (ref 98–107)
CHOLEST SERPL-MCNC: 177 MG/DL
CREAT SERPL-MCNC: 0.8 MG/DL (ref 0.51–0.95)
DEPRECATED HCO3 PLAS-SCNC: 27 MMOL/L (ref 22–29)
ERYTHROCYTE [DISTWIDTH] IN BLOOD BY AUTOMATED COUNT: 12.4 % (ref 10–15)
GFR SERPL CREATININE-BSD FRML MDRD: >90 ML/MIN/1.73M2
GLUCOSE SERPL-MCNC: 100 MG/DL (ref 70–99)
HCT VFR BLD AUTO: 40.5 % (ref 35–47)
HDLC SERPL-MCNC: 67 MG/DL
HGB BLD-MCNC: 13.5 G/DL (ref 11.7–15.7)
LDLC SERPL CALC-MCNC: 86 MG/DL
MCH RBC QN AUTO: 30.3 PG (ref 26.5–33)
MCHC RBC AUTO-ENTMCNC: 33.3 G/DL (ref 31.5–36.5)
MCV RBC AUTO: 91 FL (ref 78–100)
NONHDLC SERPL-MCNC: 110 MG/DL
PLATELET # BLD AUTO: 255 10E3/UL (ref 150–450)
POTASSIUM SERPL-SCNC: 4.6 MMOL/L (ref 3.4–5.3)
PROT SERPL-MCNC: 7.2 G/DL (ref 6.4–8.3)
RBC # BLD AUTO: 4.45 10E6/UL (ref 3.8–5.2)
SODIUM SERPL-SCNC: 141 MMOL/L (ref 136–145)
TRIGL SERPL-MCNC: 122 MG/DL
WBC # BLD AUTO: 6.1 10E3/UL (ref 4–11)

## 2023-08-07 PROCEDURE — 80053 COMPREHEN METABOLIC PANEL: CPT | Performed by: FAMILY MEDICINE

## 2023-08-07 PROCEDURE — 99396 PREV VISIT EST AGE 40-64: CPT | Mod: 25 | Performed by: FAMILY MEDICINE

## 2023-08-07 PROCEDURE — 99213 OFFICE O/P EST LOW 20 MIN: CPT | Mod: 25 | Performed by: FAMILY MEDICINE

## 2023-08-07 PROCEDURE — 85027 COMPLETE CBC AUTOMATED: CPT | Performed by: FAMILY MEDICINE

## 2023-08-07 PROCEDURE — 36415 COLL VENOUS BLD VENIPUNCTURE: CPT | Performed by: FAMILY MEDICINE

## 2023-08-07 PROCEDURE — 80061 LIPID PANEL: CPT | Performed by: FAMILY MEDICINE

## 2023-08-07 RX ORDER — TOPIRAMATE 25 MG/1
TABLET, FILM COATED ORAL
Qty: 80 TABLET | Refills: 1 | Status: SHIPPED | OUTPATIENT
Start: 2023-08-07 | End: 2023-09-07

## 2023-08-07 RX ORDER — GABAPENTIN 100 MG/1
CAPSULE ORAL
Qty: 90 CAPSULE | Refills: 3 | Status: SHIPPED | OUTPATIENT
Start: 2023-08-07 | End: 2024-04-03

## 2023-08-07 ASSESSMENT — ENCOUNTER SYMPTOMS
HEADACHES: 1
WEAKNESS: 0
ARTHRALGIAS: 1
ABDOMINAL PAIN: 0
HEARTBURN: 0
PALPITATIONS: 0
SORE THROAT: 0
HEMATOCHEZIA: 0
DYSURIA: 0
EYE PAIN: 0
DIARRHEA: 0
BREAST MASS: 0
MYALGIAS: 0
CONSTIPATION: 0
NERVOUS/ANXIOUS: 0
JOINT SWELLING: 0
CHILLS: 0
FREQUENCY: 0
COUGH: 0
NAUSEA: 0
FEVER: 0
SHORTNESS OF BREATH: 0
DIZZINESS: 0
PARESTHESIAS: 0

## 2023-08-07 NOTE — PROGRESS NOTES
SUBJECTIVE:   CC: Heather is an 48 year old who presents for preventive health visit.       2023     8:18 AM   Additional Questions   Roomed by Ria AMADOR CMA       Healthy Habits:     Getting at least 3 servings of Calcium per day:  Yes    Bi-annual eye exam:  Yes    Dental care twice a year:  Yes    Sleep apnea or symptoms of sleep apnea:  None    Diet:  Regular (no restrictions)    Frequency of exercise:  4-5 days/week    Duration of exercise:  30-45 minutes    Taking medications regularly:  Yes    Medication side effects:  None    Additional concerns today:  Yes        She is under some stress having headaches are back she is having hot flashes at night she has a few at night  interrupts her sleep       Social History     Tobacco Use    Smoking status: Never    Smokeless tobacco: Never   Substance Use Topics    Alcohol use: No           2023    10:14 PM   Alcohol Use   Prescreen: >3 drinks/day or >7 drinks/week? No          No data to display              Reviewed orders with patient.  Reviewed health maintenance and updated orders accordingly - Yes  Lab work is in process    Breast Cancer Screenin/28/2021     8:32 AM 10/25/2021     3:25 PM   Breast CA Risk Assessment (FHS-7)   Do you have a family history of breast, colon, or ovarian cancer? Yes No / Unknown           Pertinent mammograms are reviewed under the imaging tab.    History of abnormal Pap smear: NO - age 30-65 PAP every 5 years with negative HPV co-testing recommended      Latest Ref Rng & Units 2020     9:28 AM 2020     9:00 AM 2016    10:04 AM   PAP / HPV   PAP (Historical)  NIL      HPV 16 DNA NEG^Negative  Negative  Negative    HPV 18 DNA NEG^Negative  Negative  Negative    Other HR HPV NEG^Negative  Negative  Negative      Reviewed and updated as needed this visit by clinical staff   Tobacco  Allergies  Meds  Problems  Med Hx  Surg Hx  Fam Hx          Reviewed and updated as needed this visit by  "Provider                 Past Medical History:   Diagnosis Date    Bilateral bunions     Cervical high risk HPV (human papillomavirus) test positive 11/04/2015    Female infertility 07/10/2008    Undergoing ivf 2008.     Kidney stones 11/30/2010    [facility] er 11/24/10     Personal history of gestational diabetes 07/11/2012    diet controlled    Plantar fasciitis     s/p surgery        Review of Systems   Constitutional:  Negative for chills and fever.   HENT:  Negative for congestion, ear pain, hearing loss and sore throat.    Eyes:  Negative for pain and visual disturbance.   Respiratory:  Negative for cough and shortness of breath.    Cardiovascular:  Negative for chest pain, palpitations and peripheral edema.   Gastrointestinal:  Negative for abdominal pain, constipation, diarrhea, heartburn, hematochezia and nausea.   Breasts:  Negative for tenderness, breast mass and discharge.   Genitourinary:  Negative for dysuria, frequency, genital sores, pelvic pain, urgency, vaginal bleeding and vaginal discharge.   Musculoskeletal:  Positive for arthralgias. Negative for joint swelling and myalgias.   Skin:  Negative for rash.   Neurological:  Positive for headaches. Negative for dizziness, weakness and paresthesias.   Psychiatric/Behavioral:  Negative for mood changes. The patient is not nervous/anxious.      As above      OBJECTIVE:   /78   Pulse 75   Temp 97.8  F (36.6  C) (Tympanic)   Ht 1.657 m (5' 5.25\")   Wt 81.2 kg (179 lb)   SpO2 99%   BMI 29.56 kg/m    Physical Exam  GENERAL: healthy, alert and no distress  EYES: Eyes grossly normal to inspection, PERRL and conjunctivae and sclerae normal  HENT: ear canals and TM's normal, nose and mouth without ulcers or lesions  NECK: no adenopathy, no asymmetry, masses, or scars and thyroid normal to palpation  RESP: lungs clear to auscultation - no rales, rhonchi or wheezes  BREAST: normal without masses, tenderness or nipple discharge and no palpable axillary " masses or adenopathy  CV: regular rate and rhythm, normal S1 S2, no S3 or S4, no murmur, click or rub, no peripheral edema and peripheral pulses strong  ABDOMEN: soft, nontender, no hepatosplenomegaly, no masses and bowel sounds normal  MS: no gross musculoskeletal defects noted, no edema  SKIN: no suspicious lesions or rashes  SKIN: no suspicious lesions or rashes and some sun damage.  NEURO: Normal strength and tone, mentation intact and speech normal  PSYCH: mentation appears normal, affect normal/bright    Diagnostic Test Results:  Labs reviewed in Epic  Results for orders placed or performed in visit on 08/07/23 (from the past 24 hour(s))   CBC with platelets   Result Value Ref Range    WBC Count 6.1 4.0 - 11.0 10e3/uL    RBC Count 4.45 3.80 - 5.20 10e6/uL    Hemoglobin 13.5 11.7 - 15.7 g/dL    Hematocrit 40.5 35.0 - 47.0 %    MCV 91 78 - 100 fL    MCH 30.3 26.5 - 33.0 pg    MCHC 33.3 31.5 - 36.5 g/dL    RDW 12.4 10.0 - 15.0 %    Platelet Count 255 150 - 450 10e3/uL       ASSESSMENT/PLAN:   (Z00.00) Routine general medical examination at a health care facility  (primary encounter diagnosis)  Comment:   Plan:     (G43.009) Migraine without aura and without status migrainosus, not intractable  Comment:   Plan: topiramate (TOPAMAX) 25 MG tablet,         Comprehensive metabolic panel (BMP + Alb, Alk         Phos, ALT, AST, Total. Bili, TP), CBC with         platelets        She has done well on Topamax in the past she does have a history of kidney stones we discussed the risks and benefits.  The plan would be for her to to take the Topamax for several months and then taper off again.  If this is not helping with her headaches consider a course of prednisone    (N95.1) Symptomatic menopausal or female climacteric states  Comment:   Plan: gabapentin (NEURONTIN) 100 MG capsule,         Comprehensive metabolic panel (BMP + Alb, Alk         Phos, ALT, AST, Total. Bili, TP), CBC with         platelets        The hot  flashes bother her much at night so we will try some gabapentin at nighttime    (Z13.220) Screening for hyperlipidemia  Comment:   Plan:         COUNSELING:  Reviewed preventive health counseling, as reflected in patient instructions        She reports that she has never smoked. She has never used smokeless tobacco.          Sujatha Maher MD  [facility]

## 2023-08-21 ENCOUNTER — TRANSFERRED RECORDS (OUTPATIENT)
Dept: HEALTH INFORMATION MANAGEMENT | Facility: CLINIC | Age: 48
End: 2023-08-21
Payer: COMMERCIAL

## 2023-08-31 DIAGNOSIS — G43.009 MIGRAINE WITHOUT AURA AND WITHOUT STATUS MIGRAINOSUS, NOT INTRACTABLE: ICD-10-CM

## 2023-09-07 RX ORDER — TOPIRAMATE 25 MG/1
TABLET, FILM COATED ORAL
Qty: 360 TABLET | Refills: 1 | Status: SHIPPED | OUTPATIENT
Start: 2023-09-07 | End: 2024-07-16

## 2023-09-26 ENCOUNTER — PATIENT OUTREACH (OUTPATIENT)
Dept: CARE COORDINATION | Facility: CLINIC | Age: 48
End: 2023-09-26
Payer: COMMERCIAL

## 2023-10-05 ENCOUNTER — OFFICE VISIT (OUTPATIENT)
Dept: FAMILY MEDICINE | Facility: CLINIC | Age: 48
End: 2023-10-05
Payer: COMMERCIAL

## 2023-10-05 VITALS
SYSTOLIC BLOOD PRESSURE: 108 MMHG | OXYGEN SATURATION: 98 % | WEIGHT: 179 LBS | DIASTOLIC BLOOD PRESSURE: 78 MMHG | BODY MASS INDEX: 29.56 KG/M2 | HEART RATE: 69 BPM | TEMPERATURE: 97.7 F

## 2023-10-05 DIAGNOSIS — M77.11 LATERAL EPICONDYLITIS OF RIGHT ELBOW: ICD-10-CM

## 2023-10-05 DIAGNOSIS — Z01.818 PREOP GENERAL PHYSICAL EXAM: Primary | ICD-10-CM

## 2023-10-05 DIAGNOSIS — S73.191S TEAR OF RIGHT ACETABULAR LABRUM, SEQUELA: ICD-10-CM

## 2023-10-05 PROCEDURE — 99213 OFFICE O/P EST LOW 20 MIN: CPT | Performed by: FAMILY MEDICINE

## 2023-10-05 NOTE — COMMUNITY RESOURCES LIST (ENGLISH)
10/05/2023   [facility]  N/A  For questions about this resource list or additional care needs, please contact your primary care clinic or care manager.  Phone: [phone]   Email: N/A   Address: [address] [place] [address]   Hours: N/A        Financial Stability       Utility payment assistance  1  [employer] Hel[employer] Distance: 10.23 miles      In-Person, Phone/Virtual   [address] [place] [address]  Language: English  Hours: Mon - Sun Appt. Only  Fees: Free   Phone: (726) 605-8652 Email: bill@[employer] Website: http://www.communityTubispinghand.org     2  [facility] [employer] (ACCAP) - Energy Assistance Program Distance: 19.52 miles      In-Person, Phone/Virtual   [address] [address]  Language: English  Hours: Mon - Fri 8:00 AM - 4:30 PM  Fees: Free   Phone: (929) 837-2434 Email: accap@accap.org Website: http://www.accap.org          Important Numbers & Websites       Emergency Services   911  [facility] Services   [facility]  Poison Control   (302) 694-4758  Suicide Prevention Lifeline   (133) 613-1553 (TALK)  Child Abuse Hotline   (316) 694-5002 (4-A-Child)  Sexual Assault Hotline   (769) 653-4306 (HOPE)  National Runaway Safeline   (822) 787-4006 (RUNAWAY)  All-Options Talkline   (215) 173-1158  Substance Abuse Referral   (690) 462-8994 (HELP)

## 2023-10-05 NOTE — PROGRESS NOTES
[facility]  [address] [facility] [address]  Phone: [phone]  Primary Provider: Sujatha Bernal  Pre-op Performing Provider: SUJATHA BERNAL      PREOPERATIVE EVALUATION:  Today's date: 10/5/2023    Heather is a 48 year old female who presents for a preoperative evaluation.    Surgical Information:  Surgery/Procedure: Right Hip Surgery   Surgery Location: [facility]  Surgeon: Dr. Jiang  Surgery Date: 10/13/23  Time of Surgery: TBD  Where patient plans to recover: At home with family  Fax number for surgical facility: [phone]    Assessment & Plan     The proposed surgical procedure is considered INTERMEDIATE risk.    Preop general physical exam      Tear of right acetabular labrum, sequela              - No identified additional risk factors other than previously addressed    Antiplatelet or Anticoagulation Medication Instructions:   - Patient is on no antiplatelet or anticoagulation medications.    Additional Medication Instructions:  Patient is to take all scheduled medications on the day of surgery EXCEPT for modifications listed below:   - triptans, migraine abortives: HOLD on day of surgery    RECOMMENDATION:  APPROVAL GIVEN to proceed with proposed procedure, without further diagnostic evaluation.      Subjective       HPI related to upcoming procedure: labral tear right hip        10/5/2023     8:07 AM   Preop Questions   1. Have you ever had a heart attack or stroke? No   2. Have you ever had surgery on your heart or blood vessels, such as a stent placement, a coronary artery bypass, or surgery on an artery in your head, neck, heart, or legs? No   3. Do you have chest pain with activity? No   4. Do you have a history of  heart failure? No   5. Do you currently have a cold, bronchitis or symptoms of other infection? No   6. Do you have a cough, shortness of breath, or wheezing? No   7. Do you or anyone in your family have previous history of blood  clots? No   8. Do you or does anyone in your family have a serious bleeding problem such as prolonged bleeding following surgeries or cuts? No   9. Have you ever had problems with anemia or been told to take iron pills? No   10. Have you had any abnormal blood loss such as black, tarry or bloody stools, or abnormal vaginal bleeding? No   11. Have you ever had a blood transfusion? No   12. Are you willing to have a blood transfusion if it is medically needed before, during, or after your surgery? Yes   13. Have you or any of your relatives ever had problems with anesthesia? No   14. Do you have sleep apnea, excessive snoring or daytime drowsiness? No   15. Do you have any artifical heart valves or other implanted medical devices like a pacemaker, defibrillator, or continuous glucose monitor? No   16. Do you have artificial joints? No   17. Are you allergic to latex? No   18. Is there any chance that you may be pregnant? No       Health Care Directive:  Patient does not have a Health Care Directive or Living Will: Discussed advance care planning with patient; information given to patient to review.    Preoperative Review of :   reviewed - no record of controlled substances prescribed.  :[id number]}    Status of Chronic Conditions:  See problem list for active medical problems.  Problems all longstanding and stable, except as noted/documented.  See ROS for pertinent symptoms related to these conditions.    Review of Systems  Constitutional, neuro, ENT, endocrine, pulmonary, cardiac, gastrointestinal, genitourinary, musculoskeletal, integument and psychiatric systems are negative, except as otherwise noted.    Patient Active Problem List    Diagnosis Date Noted    Large breasts 12/03/2021     Priority: Medium     Added automatically from request for surgery [id number]      Cervical high risk HPV (human papillomavirus) test positive 11/04/2015     Priority: Medium     10/28/2015:Pap--NIL, +HR HPV (NOT type 16 or 18). Plan  to repeat Pap + HPV cotesting in 1 year. Reminder placed in TRACKING  9/27/16:Pap--NIL, neg HPV. Plan to repeat Pap + HPV cotesting in 3 years (2019).  8/31/20 NIL pap, Neg HPV. Plan cotest in 3 years.         Menstrual migraine 12/02/2014     Priority: Medium    Menorrhagia 12/02/2014     Priority: Medium    Tension headache, chronic 03/22/2013     Priority: Medium    CARDIOVASCULAR SCREENING; LDL GOAL LESS THAN 160 10/31/2010     Priority: Medium      Past Medical History:   Diagnosis Date    Bilateral bunions     Cervical high risk HPV (human papillomavirus) test positive 11/04/2015    Female infertility 07/10/2008    Undergoing ivf 2008.     Kidney stones 11/30/2010    [facility] 11/24/10     Personal history of gestational diabetes 07/11/2012    diet controlled    Plantar fasciitis     s/p surgery     Past Surgical History:   Procedure Laterality Date    ABDOMEN SURGERY      BUNIONECTOMY Left 12/13/2018    Procedure: Left 5th metatarsal corrective osteotomy;  Surgeon: Nick Fuller DPM;  Location: [facility]    BUNIONECTOMY Left 05/26/2020    Procedure: BONE SMOOTHING 5TH DIGIT AND SOFT TISSUE RELEASE 5TH MPJ;  Surgeon: Alessandro Beltran DPM;  Location: [facility];  Service: Podiatry    BUNIONECTOMY RT/LT Bilateral 11/2009    COLONOSCOPY N/A 11/19/2021    Procedure: COLONOSCOPY;  Surgeon: Anderson Park DO;  Location: [facility]    CYSTOSCOPY, RETROGRADES, INSERT STENT URETER(S), COMBINED  09/19/2013    Procedure: COMBINED CYSTOSCOPY, RETROGRADES, INSERT STENT URETER(S);  Right Ureteral Stent Placement;  Surgeon: JUN Villar MD;  Location: [facility]    DILATION AND CURETTAGE, HYSTEROSCOPY, ABLATE ENDOMETRIUM THERMACHOICE, COMBINED N/A 04/15/2015    Procedure: COMBINED DILATION AND CURETTAGE, HYSTEROSCOPY, ABLATE ENDOMETRIUM THERMACHOICE;  Surgeon: Munira Goddard MD;  Location: [facility]    DILATION AND CURETTAGE, OPERATIVE HYSTEROSCOPY WITH MORCELLATOR, COMBINED N/A 11/07/2018    Procedure:  COMBINED DILATION AND CURETTAGE, OPERATIVE HYSTEROSCOPY WITH MORCELLATOR;  Surgeon: Munira Goddard MD;  Location: [facility]    ESOPHAGOSCOPY, GASTROSCOPY, DUODENOSCOPY (EGD), COMBINED N/A 10/15/2020    Procedure: ESOPHAGOGASTRODUODENOSCOPY (EGD);  Surgeon: Brandon Gardner MD;  Location: [facility]    EXTRACORPOREAL SHOCK WAVE LITHOTRIPSY (ESWL)  09/18/2013    Procedure: EXTRACORPOREAL SHOCK WAVE LITHOTRIPSY (ESWL);  Right Extracorporeal Shock Wave Lithotripsy;  Surgeon: JUN Villar MD;  Location: [facility]    FOOT SURGERY      for plantar fasciitis    FOOT SURGERY      HC TOOTH EXTRACTION W/FORCEP      wisdom teeth    LAPAROSCOPY DIAGNOSTIC (GYN)  2007    infertility    LASER HOLMIUM LITHOTRIPSY URETER(S), INSERT STENT, COMBINED  09/27/2013    Procedure: COMBINED CYSTOSCOPY, URETEROSCOPY, LASER HOLMIUM LITHOTRIPSY URETER(S), INSERT STENT;  Right Ureteroscopic Stone Extraction and Possible Stent Placement;  Surgeon: JUN Villar MD;  Location: [facility]    MAMMOPLASTY REDUCTION BILATERAL Bilateral 01/26/2022    Procedure: BILATERAL REDUCTION MAMMAPLASTY;  Surgeon: Shamir Marin MD;  Location:  OR    OTHER SURGICAL HISTORY      uterine ablation    PELVIC LAPAROSCOPY      TONSILLECTOMY  1983    TONSILLECTOMY       Current Outpatient Medications   Medication Sig Dispense Refill    rizatriptan (MAXALT-MLT) 5 MG ODT DISSOLVE 1-2 TABS IN MOUTH AT ONSET OF HEADACHE/ MIGRAINE-MAY REPEAT IN 2 HRS-MAX 30 MG IN 24 HRS 18 tablet 0    tiZANidine (ZANAFLEX) 2 MG tablet Take 1 tablet (2 mg) by mouth 3 times daily as needed for muscle spasms 30 tablet 3    topiramate (TOPAMAX) 25 MG tablet 2 tabs 2 times per day 360 tablet 1    gabapentin (NEURONTIN) 100 MG capsule Take 1 capsule at bedtime you can increase by 1 cap every few nights to max of 3 caps 90 capsule 3       Allergies   Allergen Reactions    Penicillins Rash        Social History     Tobacco Use    Smoking status: Never    Smokeless tobacco: Never   Substance Use Topics     Alcohol use: No       History   Drug Use No         Objective     /78 (BP Location: Right arm, Patient Position: Sitting, Cuff Size: Adult Regular)   Pulse 69   Temp 97.7  F (36.5  C) (Tympanic)   Wt 81.2 kg (179 lb)   SpO2 98%   BMI 29.56 kg/m      Physical Exam    GENERAL APPEARANCE: healthy, alert and no distress     EYES: EOMI, PERRL     HENT: ear canals and TM's normal and nose and mouth without ulcers or lesions     NECK: no adenopathy, no asymmetry, masses, or scars and thyroid normal to palpation     RESP: lungs clear to auscultation - no rales, rhonchi or wheezes     CV: regular rates and rhythm, normal S1 S2, no S3 or S4 and no murmur, click or rub     ABDOMEN:  soft, nontender, no HSM or masses and bowel sounds normal     MS: extremities normal- no gross deformities noted, no evidence of inflammation in joints, FROM in all extremities.     SKIN: no suspicious lesions or rashes     NEURO: Normal strength and tone, sensory exam grossly normal, mentation intact and speech normal     PSYCH: mentation appears normal. and affect normal/bright     LYMPHATICS: No cervical adenopathy    Recent Labs   Lab Test 08/07/23  0937 02/22/23  1540 08/04/22  1525   HGB 13.5 12.4  --     274  --      --  141   POTASSIUM 4.6  --  3.9   CR 0.80 0.79 0.67        Diagnostics:  No labs were ordered during this visit.   No EKG required, no history of coronary heart disease, significant arrhythmia, peripheral arterial disease or other structural heart disease.    Revised Cardiac Risk Index (RCRI):  The patient has the following serious cardiovascular risks for perioperative complications:   - No serious cardiac risks = 0 points     RCRI Interpretation: 0 points: Class I (very low risk - 0.4% complication rate)         Signed Electronically by: Sujatha Maher MD  Copy of this evaluation report is provided to requesting physician.

## 2023-10-05 NOTE — PATIENT INSTRUCTIONS
Preparing for Your Surgery  Getting started  A nurse will call you to review your health history and instructions. They will give you an arrival time based on your scheduled surgery time. Please be ready to share:  Your doctor's clinic name and phone number  Your medical, surgical, and anesthesia history  A list of allergies and sensitivities  A list of medicines, including herbal treatments and over-the-counter drugs  Whether the patient has a legal guardian (ask how to send us the papers in advance)  Please tell us if you're pregnant--or if there's any chance you might be pregnant. Some surgeries may injure a fetus (unborn baby), so they require a pregnancy test. Surgeries that are safe for a fetus don't always need a test, and you can choose whether to have one.   If you have a child who's having surgery, please ask for a copy of Preparing for Your Child's Surgery.    Preparing for surgery  Within 10 to 30 days of surgery: Have a pre-op exam (sometimes called an H&P, or History and Physical). This can be done at a clinic or pre-operative center.  If you're having a , you may not need this exam. Talk to your care team.  At your pre-op exam, talk to your care team about all medicines you take. If you need to stop any medicines before surgery, ask when to start taking them again.  We do this for your safety. Many medicines can make you bleed too much during surgery. Some change how well surgery (anesthesia) drugs work.  Call your insurance company to let them know you're having surgery. (If you don't have insurance, call [phone].)  Call your clinic if there's any change in your health. This includes signs of a cold or flu (sore throat, runny nose, cough, rash, fever). It also includes a scrape or scratch near the surgery site.  If you have questions on the day of surgery, call your hospital or surgery center.  Eating and drinking guidelines  For your safety: Unless your surgeon tells you otherwise,  follow the guidelines below.  Eat and drink as usual until 8 hours before you arrive for surgery. After that, no food or milk.  Drink clear liquids until 2 hours before you arrive. These are liquids you can see through, like water, Gatorade, and Propel Water. They also include plain black coffee and tea (no cream or milk), candy, and breath mints. You can spit out gum when you arrive.  If you drink alcohol: Stop drinking it the night before surgery.  If your care team tells you to take medicine on the morning of surgery, it's okay to take it with a sip of water.  Preventing infection  Shower or bathe the night before and morning of your surgery. Follow the instructions your clinic gave you. (If no instructions, use regular soap.)  Don't shave or clip hair near your surgery site. We'll remove the hair if needed.  Don't smoke or vape the morning of surgery. You may chew nicotine gum up to 2 hours before surgery. A nicotine patch is okay.  Note: Some surgeries require you to completely quit smoking and nicotine. Check with your surgeon.  Your care team will make every effort to keep you safe from infection. We will:  Clean our hands often with soap and water (or an alcohol-based hand rub).  Clean the skin at your surgery site with a special soap that kills germs.  Give you a special gown to keep you warm. (Cold raises the risk of infection.)  Wear special hair covers, masks, gowns and gloves during surgery.  Give antibiotic medicine, if prescribed. Not all surgeries need antibiotics.  What to bring on the day of surgery  Photo ID and insurance card  Copy of your health care directive, if you have one  Glasses and hearing aids (bring cases)  You can't wear contacts during surgery  Inhaler and eye drops, if you use them (tell us about these when you arrive)  CPAP machine or breathing device, if you use them  A few personal items, if spending the night  If you have . . .  A pacemaker, ICD (cardiac defibrillator) or other  implant: Bring the ID card.  An implanted stimulator: Bring the remote control.  A legal guardian: Bring a copy of the certified (court-stamped) guardianship papers.  Please remove any jewelry, including body piercings. Leave jewelry and other valuables at home.  If you're going home the day of surgery  You must have a responsible adult drive you home. They should stay with you overnight as well.  If you don't have someone to stay with you, and you aren't safe to go home alone, we may keep you overnight. Insurance often won't pay for this.  After surgery  If it's hard to control your pain or you need more pain medicine, please call your surgeon's office.  Questions?   If you have any questions for your care team, list them here: _________________________________________________________________________________________________________________________________________________________________________ ____________________________________ ____________________________________ ____________________________________  For informational purposes only. Not to replace the advice of your health care provider. Copyright   2003, 2019 [employer]. All rights reserved. Clinically reviewed by April Hunt MD. [employer] 443196 - REV 12/22.    How to Take Your Medication Before Surgery  - no maxalt hold topamax and tizanidine

## 2023-10-09 ENCOUNTER — TELEPHONE (OUTPATIENT)
Dept: FAMILY MEDICINE | Facility: CLINIC | Age: 48
End: 2023-10-09
Payer: COMMERCIAL

## 2023-10-09 NOTE — TELEPHONE ENCOUNTER
Patient called with fax number to [facility] in [place] ,order faxed.        Debora Salomon on 10/9/2023 at 4:26 PM

## 2023-10-09 NOTE — TELEPHONE ENCOUNTER
I put that in last week during the visit. She needs to call [facility] for this. [facility] [place] Sujatha Maher M.D.

## 2023-10-09 NOTE — TELEPHONE ENCOUNTER
Order/Referral Request    Who is requesting: patient    Orders being requested: Order: Occupational Therapy Referral     Reason service is needed/diagnosis: Associated Diagnoses    Lateral epicondylitis of right elbow [M77.11]          When are orders needed by: asap hopefully    Has this been discussed with Provider: Yes    Does patient have a preference on a Group/Provider/Facility? [facility]    Does patient have an appointment scheduled?: No    Where to send orders: Fax [phone]    Could we send this information to you in [facility] or would you prefer to receive a phone call?:   Patient would prefer a phone call   Okay to leave a detailed message?: Yes at Cell number on file:    Telephone Information:   Mobile [phone]

## 2023-10-23 ENCOUNTER — TRANSFERRED RECORDS (OUTPATIENT)
Dept: HEALTH INFORMATION MANAGEMENT | Facility: CLINIC | Age: 48
End: 2023-10-23
Payer: COMMERCIAL

## 2023-11-07 ENCOUNTER — HOSPITAL ENCOUNTER (OUTPATIENT)
Dept: MAMMOGRAPHY | Facility: CLINIC | Age: 48
Discharge: HOME | End: 2023-11-07
Attending: FAMILY MEDICINE | Admitting: FAMILY MEDICINE
Payer: COMMERCIAL

## 2023-11-07 DIAGNOSIS — Z12.31 VISIT FOR SCREENING MAMMOGRAM: ICD-10-CM

## 2023-11-07 PROCEDURE — 77067 SCR MAMMO BI INCL CAD: CPT

## 2023-11-09 ENCOUNTER — ANCILLARY PROCEDURE (OUTPATIENT)
Dept: MAMMOGRAPHY | Facility: CLINIC | Age: 48
End: 2023-11-09
Attending: FAMILY MEDICINE
Payer: COMMERCIAL

## 2023-11-09 DIAGNOSIS — R92.8 ABNORMAL MAMMOGRAM: ICD-10-CM

## 2023-11-09 PROCEDURE — 77065 DX MAMMO INCL CAD UNI: CPT | Mod: RT

## 2023-11-22 ENCOUNTER — VIRTUAL VISIT (OUTPATIENT)
Dept: FAMILY MEDICINE | Facility: CLINIC | Age: 48
End: 2023-11-22
Payer: COMMERCIAL

## 2023-11-22 DIAGNOSIS — G44.229 CHRONIC TENSION-TYPE HEADACHE, NOT INTRACTABLE: Primary | ICD-10-CM

## 2023-11-22 PROBLEM — N62 LARGE BREASTS: Status: RESOLVED | Noted: 2021-12-03 | Resolved: 2023-11-22

## 2023-11-22 PROCEDURE — 99213 OFFICE O/P EST LOW 20 MIN: CPT | Mod: VID | Performed by: FAMILY MEDICINE

## 2023-11-22 RX ORDER — TOPIRAMATE 25 MG/1
75 TABLET, FILM COATED ORAL 2 TIMES DAILY
Qty: 180 TABLET | Refills: 4 | Status: SHIPPED | OUTPATIENT
Start: 2023-11-22 | End: 2024-04-03 | Stop reason: DRUGHIGH

## 2023-11-22 NOTE — PROGRESS NOTES
"Heather is a 48 year old who is being evaluated via a billable video visit.      How would you like to obtain your AVS? MyChart  If the video visit is dropped, the invitation should be resent by: Send to e-mail at: [email or web address]  Will anyone else be joining your video visit? No          Assessment & Plan     Chronic tension-type headache, not intractable  Will increase slightly if any sign of kidney stones she will need to stop this   - topiramate (TOPAMAX) 25 MG tablet; Take 3 tablets (75 mg) by mouth 2 times daily           BMI:   Estimated body mass index is 29.56 kg/m  as calculated from the following:    Height as of 8/7/23: 1.657 m (5' 5.25\").    Weight as of 10/5/23: 81.2 kg (179 lb).       If not impprovng rtc otherwise recheck 6 months     Sujatha Maher MD  [facility]    Subjective   Heather is a 48 year old, presenting for the following health issues:  Recheck Medication    HPI     My Chart Note: 11/20/23  1:31 PM  Hey- I wanted to check in about my topomax. It was working great. I was having no headaches and was feeling very relaxed. After my surgery I was taking naproxen twice daily for 3 weeks to help the bone heal.      Now that I am done taking all of those extra meds I feel like the tompomax is no longer working like it had been. I have been experiencing a headache for over a week (sort of like in the past) and don t want to keep taking ibuprofen or Tylenol. I thought maybe at first it was rebound from all the naproxen but I have been off that for a few weeks now.    Do you have any suggestions?   Thanks- Heather     Her hip repair went well she is having a headache most days   She has had worsening of the headache since she stopped taking the naprosyn .   The topamax had been doing well         Review of Systems   Constitutional, HEENT, cardiovascular, pulmonary, gi and gu systems are negative, except as otherwise noted.      Objective           Vitals:  No vitals were " obtained today due to virtual visit.    Physical Exam   GENERAL: Healthy, alert and no distress  EYES: Eyes grossly normal to inspection.  No discharge or erythema, or obvious scleral/conjunctival abnormalities.  RESP: No audible wheeze, cough, or visible cyanosis.  No visible retractions or increased work of breathing.    SKIN: Visible skin clear. No significant rash, abnormal pigmentation or lesions.  NEURO: Cranial nerves grossly intact.  Mentation and speech appropriate for age.  PSYCH: Mentation appears normal, affect normal/bright, judgement and insight intact, normal speech and appearance well-groomed.    No results found for any visits on 11/22/23.            Video-Visit Details    Type of service:  Video Visit     Originating Location (pt. Location): Home    Distant Location (provider location):  On-site  Platform used for Video Visit: Misha Maher M.D.

## 2023-12-06 ENCOUNTER — NURSE TRIAGE (OUTPATIENT)
Dept: FAMILY MEDICINE | Facility: CLINIC | Age: 48
End: 2023-12-06
Payer: COMMERCIAL

## 2023-12-06 ENCOUNTER — MYC MEDICAL ADVICE (OUTPATIENT)
Dept: FAMILY MEDICINE | Facility: CLINIC | Age: 48
End: 2023-12-06

## 2023-12-06 ENCOUNTER — APPOINTMENT (OUTPATIENT)
Dept: ULTRASOUND IMAGING | Facility: CLINIC | Age: 48
End: 2023-12-06
Attending: EMERGENCY MEDICINE
Payer: COMMERCIAL

## 2023-12-06 ENCOUNTER — HOSPITAL ENCOUNTER (EMERGENCY)
Facility: CLINIC | Age: 48
Discharge: HOME | End: 2023-12-06
Attending: EMERGENCY MEDICINE | Admitting: EMERGENCY MEDICINE
Payer: COMMERCIAL

## 2023-12-06 ENCOUNTER — MYC MEDICAL ADVICE (OUTPATIENT)
Dept: FAMILY MEDICINE | Facility: CLINIC | Age: 48
End: 2023-12-06
Payer: COMMERCIAL

## 2023-12-06 ENCOUNTER — APPOINTMENT (OUTPATIENT)
Dept: CT IMAGING | Facility: CLINIC | Age: 48
End: 2023-12-06
Attending: EMERGENCY MEDICINE
Payer: COMMERCIAL

## 2023-12-06 VITALS
SYSTOLIC BLOOD PRESSURE: 135 MMHG | WEIGHT: 175 LBS | HEART RATE: 76 BPM | DIASTOLIC BLOOD PRESSURE: 87 MMHG | RESPIRATION RATE: 18 BRPM | TEMPERATURE: 97.7 F | BODY MASS INDEX: 28.12 KG/M2 | HEIGHT: 66 IN | OXYGEN SATURATION: 98 %

## 2023-12-06 DIAGNOSIS — N39.0 URINARY TRACT INFECTION IN FEMALE: ICD-10-CM

## 2023-12-06 DIAGNOSIS — R10.9 RIGHT FLANK PAIN: ICD-10-CM

## 2023-12-06 DIAGNOSIS — M79.89 CALCIFICATION OF SOFT TISSUE: ICD-10-CM

## 2023-12-06 LAB
ALBUMIN UR-MCNC: NEGATIVE MG/DL
ANION GAP SERPL CALCULATED.3IONS-SCNC: 10 MMOL/L (ref 7–15)
APPEARANCE UR: ABNORMAL
BASOPHILS # BLD AUTO: 0 10E3/UL (ref 0–0.2)
BASOPHILS NFR BLD AUTO: 0 %
BILIRUB UR QL STRIP: NEGATIVE
BUN SERPL-MCNC: 15.5 MG/DL (ref 6–20)
CALCIUM SERPL-MCNC: 8.7 MG/DL (ref 8.6–10)
CHLORIDE SERPL-SCNC: 109 MMOL/L (ref 98–107)
COLOR UR AUTO: YELLOW
CREAT SERPL-MCNC: 0.71 MG/DL (ref 0.51–0.95)
DEPRECATED HCO3 PLAS-SCNC: 21 MMOL/L (ref 22–29)
EGFRCR SERPLBLD CKD-EPI 2021: >90 ML/MIN/1.73M2
EOSINOPHIL # BLD AUTO: 0.1 10E3/UL (ref 0–0.7)
EOSINOPHIL NFR BLD AUTO: 1 %
ERYTHROCYTE [DISTWIDTH] IN BLOOD BY AUTOMATED COUNT: 12.8 % (ref 10–15)
GLUCOSE SERPL-MCNC: 104 MG/DL (ref 70–99)
GLUCOSE UR STRIP-MCNC: NEGATIVE MG/DL
HCG SERPL QL: NEGATIVE
HCT VFR BLD AUTO: 37.3 % (ref 35–47)
HGB BLD-MCNC: 12.5 G/DL (ref 11.7–15.7)
HGB UR QL STRIP: ABNORMAL
IMM GRANULOCYTES # BLD: 0 10E3/UL
IMM GRANULOCYTES NFR BLD: 0 %
KETONES UR STRIP-MCNC: NEGATIVE MG/DL
LEUKOCYTE ESTERASE UR QL STRIP: ABNORMAL
LYMPHOCYTES # BLD AUTO: 1.7 10E3/UL (ref 0.8–5.3)
LYMPHOCYTES NFR BLD AUTO: 25 %
MCH RBC QN AUTO: 31 PG (ref 26.5–33)
MCHC RBC AUTO-ENTMCNC: 33.5 G/DL (ref 31.5–36.5)
MCV RBC AUTO: 93 FL (ref 78–100)
MONOCYTES # BLD AUTO: 0.7 10E3/UL (ref 0–1.3)
MONOCYTES NFR BLD AUTO: 10 %
MUCOUS THREADS #/AREA URNS LPF: PRESENT /LPF
NEUTROPHILS # BLD AUTO: 4.4 10E3/UL (ref 1.6–8.3)
NEUTROPHILS NFR BLD AUTO: 64 %
NITRATE UR QL: NEGATIVE
NRBC # BLD AUTO: 0 10E3/UL
NRBC BLD AUTO-RTO: 0 /100
PH UR STRIP: 5 [PH] (ref 5–7)
PLATELET # BLD AUTO: 233 10E3/UL (ref 150–450)
POTASSIUM SERPL-SCNC: 3.7 MMOL/L (ref 3.4–5.3)
RBC # BLD AUTO: 4.03 10E6/UL (ref 3.8–5.2)
RBC URINE: 9 /HPF
SODIUM SERPL-SCNC: 140 MMOL/L (ref 135–145)
SP GR UR STRIP: 1.02 (ref 1–1.03)
SQUAMOUS EPITHELIAL: 2 /HPF
UROBILINOGEN UR STRIP-MCNC: NORMAL MG/DL
WBC # BLD AUTO: 6.9 10E3/UL (ref 4–11)
WBC URINE: 17 /HPF

## 2023-12-06 PROCEDURE — 76856 US EXAM PELVIC COMPLETE: CPT

## 2023-12-06 PROCEDURE — 99284 EMERGENCY DEPT VISIT MOD MDM: CPT | Mod: 25 | Performed by: EMERGENCY MEDICINE

## 2023-12-06 PROCEDURE — 82310 ASSAY OF CALCIUM: CPT | Performed by: EMERGENCY MEDICINE

## 2023-12-06 PROCEDURE — 81001 URINALYSIS AUTO W/SCOPE: CPT | Performed by: EMERGENCY MEDICINE

## 2023-12-06 PROCEDURE — 84703 CHORIONIC GONADOTROPIN ASSAY: CPT | Performed by: EMERGENCY MEDICINE

## 2023-12-06 PROCEDURE — 76705 ECHO EXAM OF ABDOMEN: CPT

## 2023-12-06 PROCEDURE — 36415 COLL VENOUS BLD VENIPUNCTURE: CPT | Performed by: EMERGENCY MEDICINE

## 2023-12-06 PROCEDURE — 99284 EMERGENCY DEPT VISIT MOD MDM: CPT | Performed by: EMERGENCY MEDICINE

## 2023-12-06 PROCEDURE — 85025 COMPLETE CBC W/AUTO DIFF WBC: CPT | Performed by: EMERGENCY MEDICINE

## 2023-12-06 PROCEDURE — 87086 URINE CULTURE/COLONY COUNT: CPT | Performed by: EMERGENCY MEDICINE

## 2023-12-06 PROCEDURE — 74176 CT ABD & PELVIS W/O CONTRAST: CPT

## 2023-12-06 RX ORDER — SULFAMETHOXAZOLE/TRIMETHOPRIM 800-160 MG
1 TABLET ORAL 2 TIMES DAILY
Qty: 14 TABLET | Refills: 0 | Status: SHIPPED | OUTPATIENT
Start: 2023-12-06 | End: 2024-04-03

## 2023-12-06 ASSESSMENT — ACTIVITIES OF DAILY LIVING (ADL)
ADLS_ACUITY_SCORE: 33
ADLS_ACUITY_SCORE: 35

## 2023-12-06 NOTE — TELEPHONE ENCOUNTER
"Reason for Disposition   SEVERE pain (e.g., excruciating, scale 8-10) and present > 1 hour    Additional Information   Negative: Passed out (i.e., lost consciousness, collapsed and was not responding)   Negative: Shock suspected (e.g., cold/pale/clammy skin, too weak to stand, low BP, rapid pulse)   Negative: Sounds like a life-threatening emergency to the triager   Negative: Followed a major injury to the back (e.g., MVA, fall > 10 feet or 3 meters, penetrating injury, etc.)   Negative: Upper, mid or lower back pain that occurs mainly in the midline    Answer Assessment - Initial Assessment Questions  1. LOCATION: \"Where does it hurt?\" (e.g., left, right)      Right sided flank pain  2. ONSET: \"When did the pain start?\"      Today is day 3  3. SEVERITY: \"How bad is the pain?\" (e.g., Scale 1-10; mild, moderate, or severe)    - MILD (1-3): doesn't interfere with normal activities     - MODERATE (4-7): interferes with normal activities or awakens from sleep     - SEVERE (8-10): excruciating pain and patient unable to do normal activities (stays in bed)        Severe.  Pt has been in bed.  Pt says that pain is \"excruciating\" at times.  4. PATTERN: \"Does the pain come and go, or is it constant?\"       constant  5. CAUSE: \"What do you think is causing the pain?\"      Pt wonders if she has a kidney stone?  She had one 8 or 9 years ago.  6. OTHER SYMPTOMS:  \"Do you have any other symptoms?\" (e.g., fever, abdomen pain, vomiting, leg weakness, burning with urination, blood in urine)      Urinary urgency and frequency.  Incidentally, pt has Covid too with onset of symptoms 5 days ago so pt thought symptoms were Covid related.    Pt is very clear that the right sided flank pain is located above her hip bone on the right side.  Pt denies fever or chills but says she has been taking a lot of Advil due to Covid.  Denies visible blood in urine.  7. PREGNANCY:  \"Is there any chance you are pregnant?\" \"When was your last menstrual " "period?\"    Protocols used: Flank Pain-A-OH    "

## 2023-12-06 NOTE — ED TRIAGE NOTES
Pt here with right side lower back/flank pain. Pt is concerned for possible kidney stone. Pt reports that she is also COVID positive.   Pt has been having intermittent urinary symptoms since Monday.      Triage Assessment (Adult)       Row Name 12/06/23 1142          Triage Assessment    Airway WDL WDL        Respiratory WDL    Respiratory WDL WDL        Skin Circulation/Temperature WDL    Skin Circulation/Temperature WDL WDL        Cardiac WDL    Cardiac WDL WDL        Peripheral/Neurovascular WDL    Peripheral Neurovascular WDL WDL        Cognitive/Neuro/Behavioral WDL    Cognitive/Neuro/Behavioral WDL WDL

## 2023-12-06 NOTE — DISCHARGE INSTRUCTIONS
Return if symptoms worsen or new symptoms develop.  Follow-up with primary care physician next available.  Drink plenty of fluids.  If increased fever chills worsening abdominal pain decreased urine output or other symptoms present please return for further evaluation and care.  Take Cipro as directed.

## 2023-12-07 LAB — BACTERIA UR CULT: ABNORMAL

## 2023-12-07 NOTE — RESULT ENCOUNTER NOTE
[facility] Emergency Dept discharge antibiotic (if prescribed): Sulfamethoxazole-Trimethoprim (Bactrim DS, Septra DS) 800-160 mg PO tablet,  1 tablet by mouth 2 times daily for 14 days.   Date of Rx (if applicable):  12/6/23  No changes in treatment per [facility] ED Lab Result Urine culture protocol.

## 2023-12-08 NOTE — ED PROVIDER NOTES
History     Chief Complaint   Patient presents with    Back Pain    Flank Pain    COVID +     HPI  Caitlin Oh is a 48 year old female with a past medical history significant for migraines and previous kidney stone presents emergency department complaining of right flank and right-sided abdominal pain.  Patient states she tested positive for COVID roughly 4 to 5 days ago.  She states actual fevers and congestion again adamant that she has developed some right-sided back/flank pain and right mid abdominal pain.  She has been having some mild dysuria and frequency over the last day.  Has not had any fevers sweats or chills has mild cough which is improving denies any shortness of breath or abdominal pain has not had any focal numbness weakness any extremity or other symptoms.    Allergies:  Allergies   Allergen Reactions    Penicillins Rash       Problem List:    Patient Active Problem List    Diagnosis Date Noted    Cervical high risk HPV (human papillomavirus) test positive 11/04/2015     Priority: Medium     10/28/2015:Pap--NIL, +HR HPV (NOT type 16 or 18). Plan to repeat Pap + HPV cotesting in 1 year. Reminder placed in TRACKING  9/27/16:Pap--NIL, neg HPV. Plan to repeat Pap + HPV cotesting in 3 years (2019).  8/31/20 NIL pap, Neg HPV. Plan cotest in 3 years.         Menstrual migraine 12/02/2014     Priority: Medium    Tension headache, chronic 03/22/2013     Priority: Medium    CARDIOVASCULAR SCREENING; LDL GOAL LESS THAN 160 10/31/2010     Priority: Medium        Past Medical History:    Past Medical History:   Diagnosis Date    Bilateral bunions     Cervical high risk HPV (human papillomavirus) test positive 11/04/2015    Female infertility 07/10/2008    Kidney stones 11/30/2010    Personal history of gestational diabetes 07/11/2012    Plantar fasciitis        Past Surgical History:    Past Surgical History:   Procedure Laterality Date    ABDOMEN SURGERY      BUNIONECTOMY Left 12/13/2018    Procedure:  Left 5th metatarsal corrective osteotomy;  Surgeon: Nick Fuller DPM;  Location: [facility]    BUNIONECTOMY Left 05/26/2020    Procedure: BONE SMOOTHING 5TH DIGIT AND SOFT TISSUE RELEASE 5TH MPJ;  Surgeon: Alessandro Beltran DPM;  Location: [facility];  Service: Podiatry    BUNIONECTOMY RT/LT Bilateral 11/2009    COLONOSCOPY N/A 11/19/2021    Procedure: COLONOSCOPY;  Surgeon: Anderson Park DO;  Location: [facility]    CYSTOSCOPY, RETROGRADES, INSERT STENT URETER(S), COMBINED  09/19/2013    Procedure: COMBINED CYSTOSCOPY, RETROGRADES, INSERT STENT URETER(S);  Right Ureteral Stent Placement;  Surgeon: JUN Villar MD;  Location: [facility]    DILATION AND CURETTAGE, HYSTEROSCOPY, ABLATE ENDOMETRIUM THERMACHOICE, COMBINED N/A 04/15/2015    Procedure: COMBINED DILATION AND CURETTAGE, HYSTEROSCOPY, ABLATE ENDOMETRIUM THERMACHOICE;  Surgeon: Munira Goddard MD;  Location: [facility]    DILATION AND CURETTAGE, OPERATIVE HYSTEROSCOPY WITH MORCELLATOR, COMBINED N/A 11/07/2018    Procedure: COMBINED DILATION AND CURETTAGE, OPERATIVE HYSTEROSCOPY WITH MORCELLATOR;  Surgeon: Munira Goddard MD;  Location: [facility]    ESOPHAGOSCOPY, GASTROSCOPY, DUODENOSCOPY (EGD), COMBINED N/A 10/15/2020    Procedure: ESOPHAGOGASTRODUODENOSCOPY (EGD);  Surgeon: Brandon Gardner MD;  Location: [facility]    EXTRACORPOREAL SHOCK WAVE LITHOTRIPSY (ESWL)  09/18/2013    Procedure: EXTRACORPOREAL SHOCK WAVE LITHOTRIPSY (ESWL);  Right Extracorporeal Shock Wave Lithotripsy;  Surgeon: JUN Villar MD;  Location: [facility]    FOOT SURGERY      for plantar fasciitis    FOOT SURGERY      HC TOOTH EXTRACTION W/FORCEP      wisdom teeth    LAPAROSCOPY DIAGNOSTIC (GYN)  2007    infertility    LASER HOLMIUM LITHOTRIPSY URETER(S), INSERT STENT, COMBINED  09/27/2013    Procedure: COMBINED CYSTOSCOPY, URETEROSCOPY, LASER HOLMIUM LITHOTRIPSY URETER(S), INSERT STENT;  Right Ureteroscopic Stone Extraction and Possible Stent Placement;  Surgeon: JUN Villar MD;   "Location: [facility]    MAMMOPLASTY REDUCTION BILATERAL Bilateral 01/26/2022    Procedure: BILATERAL REDUCTION MAMMAPLASTY;  Surgeon: Shamir Marin MD;  Location: [facility]    OTHER SURGICAL HISTORY      uterine ablation    PELVIC LAPAROSCOPY      TONSILLECTOMY  1983    TONSILLECTOMY         Family History:    Family History   Problem Relation Age of Onset    Heart Disease Maternal Grandmother     Breast Cancer Maternal Grandmother         dx'd age 70    Arthritis Mother     Hypertension Mother     Cancer Mother         skin cancer    Other Cancer Mother         Lymphoma    Prostate Cancer Father        Social History:  Marital Status:   [2]  Social History     Tobacco Use    Smoking status: Never    Smokeless tobacco: Never   Vaping Use    Vaping Use: Never used   Substance Use Topics    Alcohol use: No    Drug use: No        Medications:    sulfamethoxazole-trimethoprim (BACTRIM DS) 800-160 MG tablet  gabapentin (NEURONTIN) 100 MG capsule  rizatriptan (MAXALT-MLT) 5 MG ODT  tiZANidine (ZANAFLEX) 2 MG tablet  topiramate (TOPAMAX) 25 MG tablet  topiramate (TOPAMAX) 25 MG tablet          Review of Systems  As per HPI.  Physical Exam   BP: 135/87  Pulse: 76  Temp: 97.7  F (36.5  C)  Resp: 18  Height: 167.6 cm (5' 6\")  Weight: 79.4 kg (175 lb)  SpO2: 98 %      Physical Exam  Vitals and nursing note reviewed.   Constitutional:       General: She is not in acute distress.     Appearance: Normal appearance. She is not ill-appearing, toxic-appearing or diaphoretic.   HENT:      Head: Normocephalic and atraumatic.      Nose: Nose normal.   Eyes:      Conjunctiva/sclera: Conjunctivae normal.   Cardiovascular:      Rate and Rhythm: Normal rate and regular rhythm.      Pulses: Normal pulses.      Heart sounds: Normal heart sounds. No murmur heard.  Pulmonary:      Effort: Pulmonary effort is normal.      Breath sounds: Normal breath sounds. No stridor. No wheezing or rhonchi.   Abdominal:      General: Abdomen is flat. " Bowel sounds are normal.      Palpations: Abdomen is soft.      Comments: Tenderness to palpation of the right flank region and right middle lower quadrant of the abdomen.  There is no guarding or rebound  present no masses or hernia palpated.  Bowel sounds are positive.   Musculoskeletal:         General: No tenderness. Normal range of motion.      Cervical back: Normal range of motion and neck supple.      Right lower leg: No edema.      Left lower leg: No edema.   Skin:     General: Skin is warm and dry.      Capillary Refill: Capillary refill takes less than 2 seconds.      Findings: No rash.   Neurological:      General: No focal deficit present.      Mental Status: She is alert and oriented to person, place, and time.      Sensory: No sensory deficit.      Motor: No weakness.      Coordination: Coordination normal.   Psychiatric:         Mood and Affect: Mood normal.         ED Course              ED Course as of 12/08/23 1452   Fri Dec 08, 2023   1446 Culture(!): 10,000-50,000 CFU/mL Proteus mirabilis     Procedures              Critical Care time:  none               Labs Ordered and Resulted from Time of ED Arrival to Time of ED Departure   UA MACROSCOPIC WITH REFLEX TO MICRO AND CULTURE - Abnormal       Result Value    Color Urine Yellow      Appearance Urine Slightly Cloudy (*)     Glucose Urine Negative      Bilirubin Urine Negative      Ketones Urine Negative      Specific Gravity Urine 1.020      Blood Urine Small (*)     pH Urine 5.0      Protein Albumin Urine Negative      Urobilinogen Urine Normal      Nitrite Urine Negative      Leukocyte Esterase Urine Trace (*)     Mucus Urine Present (*)     RBC Urine 9 (*)     WBC Urine 17 (*)     Squamous Epithelials Urine 2 (*)    BASIC METABOLIC PANEL - Abnormal    Sodium 140      Potassium 3.7      Chloride 109 (*)     Carbon Dioxide (CO2) 21 (*)     Anion Gap 10      Urea Nitrogen 15.5      Creatinine 0.71      GFR Estimate >90      Calcium 8.7       Glucose 104 (*)    HCG QUALITATIVE PREGNANCY - Normal    hCG Serum Qualitative Negative     CBC WITH PLATELETS AND DIFFERENTIAL    WBC Count 6.9      RBC Count 4.03      Hemoglobin 12.5      Hematocrit 37.3      MCV 93      MCH 31.0      MCHC 33.5      RDW 12.8      Platelet Count 233      % Neutrophils 64      % Lymphocytes 25      % Monocytes 10      % Eosinophils 1      % Basophils 0      % Immature Granulocytes 0      NRBCs per 100 WBC 0      Absolute Neutrophils 4.4      Absolute Lymphocytes 1.7      Absolute Monocytes 0.7      Absolute Eosinophils 0.1      Absolute Basophils 0.0      Absolute Immature Granulocytes 0.0      Absolute NRBCs 0.0          Medications - No data to display  Results for orders placed or performed during the hospital encounter of 12/06/23   Abd/pelvis CT - no contrast - Stone Protocol    Narrative    CT ABDOMEN AND PELVIS WITHOUT CONTRAST 12/6/2023 2:12 PM    CLINICAL HISTORY: Right flank pain; History of stones.     TECHNIQUE: CT scan of the abdomen and pelvis was performed without IV  contrast. Multiplanar reformats were obtained. Dose reduction  techniques were used.    CONTRAST: None.    COMPARISON: CT abdomen and pelvis 4/22/2015.    FINDINGS:   LOWER CHEST: Unremarkable.    HEPATOBILIARY: No significant mass or bile duct dilatation. No  calcified gallstones.     PANCREAS: No significant mass, duct dilatation, or inflammatory  change.    SPLEEN: Since 2015, new anterior cystic lesion measuring 6 x 4.9 cm  with inferior margin heterogeneity.    ADRENAL GLANDS: No significant nodules.    KIDNEYS: Mild right pelviectasis with transition near the  ureteropelvic junction. No stone or convincing obstructing lesion.  Nonobstructing right midpole calculus. Left kidney and urinary bladder  are unremarkable.    BOWEL: No obstruction or inflammatory change.    VASCULATURE: Nonaneurysmal abdominal aorta.    LYMPH NODE AND PERITONEUM: No enlarged lymph node. No free fluid.    MUSCULOSKELETAL:  No aggressive osseous lesion.     OTHER: Indeterminate calcific density structure in the fundal  endometrium measuring 2.3 cm. Small amount of fluid within the left  adnexal/ovarian cyst (2.7 cm).       Impression    IMPRESSION:   1.  Mild right renal pelvocaliectasis with transition near the  ureteropelvic junction. No stone or convincing obstructing lesion.  2.  Nonobstructing right midpole intrarenal calculus.  3.  Since 2015, new anterior splenic cystic lesion (6 cm). Given mild  inferior margin heterogeneity, consider short-term follow-up splenic  ultrasound.  4.  Indeterminate fundal endometrial calcific density structure,  atypical for an implantable device. Consider clinical correlation  and/or follow-up pelvic ultrasound.     FRED MOREIRA MD         SYSTEM ID:  [id number]   US Abdomen Limited    Narrative    US ABDOMEN LIMITED 12/6/2023 3:50 PM    CLINICAL HISTORY: Splenic lesion noted on CT.  TECHNIQUE: Limited abdominal ultrasound.  COMPARISON: CT of the abdomen and pelvis performed earlier today.    FINDINGS:  SPLEEN: The spleen measures 11.4 cm in length. There is a simple  appearing cyst in the spleen anteriorly, measuring 6.2 x 4.4 x 5.8 cm.  No other focal splenic lesions are seen.    No other sonographic abnormalities are identified in the visualized  portions of the left upper quadrant. No ascites is identified.      Impression    IMPRESSION:  A simple cyst in the spleen measures 6.2 cm.    SALMA HIRSCH MD         SYSTEM ID:  [id number]   US Pelvis Complete without Transvaginal    Narrative    US PELVIC TRANSABDOMINAL 12/6/2023 4:07 PM    CLINICAL HISTORY: Endometrial mass/calcification  TECHNIQUE: Transabdominal scans were performed.     COMPARISON: CT earlier today    FINDINGS:    UTERUS: 6.4 x 4.3 x 3.2 cm. Anteverted uterus.    ENDOMETRIUM: Calcification in the endometrial canal at the fundus and  mid body measuring 2.6 x 0.6 x 2.1 cm corresponding to calcifications  seen on the CT.  This may be related to history of prior ablation.  Below the calcification, there is a cystic collection measuring 1.3 x  1.2 x 1.9 cm.    RIGHT OVARY: 1.7 x 1.6 x 1.5 cm. Normal.    LEFT OVARY: 2.5 x 2.9 x 2.7 cm. Left ovarian follicle measuring 2.5 x  1.4 x 1.7 cm.    No significant free fluid.      Impression    IMPRESSION:  1.  Linear dense calcification in the endometrial canal at the uterine  fundus and mid body, likely related to history of prior ablation.  Small cystic space in the endometrial canal below the calcification.  2.  Left ovarian 2.5 cm dominant physiologic follicle.      DEWAYNE JACOBS MD         SYSTEM ID:  [id number]      Assessments & Plan (with Medical Decision Making) records were reviewed.  Past medical history medications and allergies were reviewed.  Virtual visit on 11/22/2023 was reviewed.  Labs were obtained.  I independently reviewed and interpreted labs.  Patient CBC without significant abnormality.  Basic metabolic panel with a chloride of 109 bicarb 21.  Pregnancy test was negative.  Urinalysis with trace leukocyte Estrace 17 WBCs.  Due to patient's right flank pain and right-sided abdominal pain a CT stone protocol was ordered.  I independently reviewed this imaging study.  Radiologist finding of mild right renal pelvocaliectasis with transition to the ureteropelvic junction no stone or convincing obstructive lesion present there is also splenic cystic lesion present recommended ultrasound spleen and also indeterminant follow-up endometrial calcification with recommendation for follow-up pelvic ultrasound.  Findings discussed with patient in detail she appears to have possible UTI but also has the above findings.  I discussed outpatient versus inpatient ultrasound and is decided that his ultrasounds at this time.  This clinical ultrasound revealed a simple appearing cyst in the spleen roughly 6.2 x 4.4 x 5.8 no other abnormality noted.  The pelvic ultrasound revealed a linear dense  calcification in the endometrial canal consistent with prior ablation there is also a left ovarian 2.5 cm dominant physiologic follicle.  Findings discussed in detail with patient.  Patient may have recently passed a stone but does have a UTI and going to treat her for this with Bactrim.  Will await culture results.  Patient should return if symptoms worsen or new symptoms develop including fevers decreased abdominal pain decreased urine output or other concerns.  She is agreement this plan.     I have reviewed the nursing notes.    I have reviewed the findings, diagnosis, plan and need for follow up with the patient.           Discharge Medication List as of 12/6/2023  4:55 PM        START taking these medications    Details   sulfamethoxazole-trimethoprim (BACTRIM DS) 800-160 MG tablet Take 1 tablet by mouth 2 times daily, Disp-14 tablet, R-0, E-Prescribe             Final diagnoses:   Right flank pain   Calcification of soft tissue - Endometrial   Urinary tract infection in female       12/6/2023   [facility] EMERGENCY DEPT       Flaca, Karthik Moraes MD  12/08/23 0724

## 2023-12-13 ENCOUNTER — OFFICE VISIT (OUTPATIENT)
Dept: FAMILY MEDICINE | Facility: CLINIC | Age: 48
End: 2023-12-13
Payer: COMMERCIAL

## 2023-12-13 DIAGNOSIS — M54.6 CHRONIC BILATERAL THORACIC BACK PAIN: ICD-10-CM

## 2023-12-13 DIAGNOSIS — G89.29 CHRONIC BILATERAL THORACIC BACK PAIN: ICD-10-CM

## 2023-12-13 DIAGNOSIS — G43.829 MENSTRUAL MIGRAINE WITHOUT STATUS MIGRAINOSUS, NOT INTRACTABLE: ICD-10-CM

## 2023-12-13 DIAGNOSIS — R10.30 LOWER ABDOMINAL PAIN: ICD-10-CM

## 2023-12-13 DIAGNOSIS — D73.4 CYST OF SPLEEN: ICD-10-CM

## 2023-12-13 DIAGNOSIS — Z87.442 HISTORY OF NEPHROLITHIASIS: ICD-10-CM

## 2023-12-13 DIAGNOSIS — Z87.440 PERSONAL HISTORY OF URINARY TRACT INFECTION: Primary | ICD-10-CM

## 2023-12-13 DIAGNOSIS — R92.8 ABNORMALITY OF RIGHT BREAST ON SCREENING MAMMOGRAM: ICD-10-CM

## 2023-12-13 LAB
ALBUMIN UR-MCNC: NEGATIVE MG/DL
APPEARANCE UR: CLEAR
BACTERIA #/AREA URNS HPF: ABNORMAL /HPF
BILIRUB UR QL STRIP: NEGATIVE
CLUE CELLS: ABNORMAL
COLOR UR AUTO: YELLOW
GLUCOSE UR STRIP-MCNC: NEGATIVE MG/DL
HGB UR QL STRIP: ABNORMAL
KETONES UR STRIP-MCNC: NEGATIVE MG/DL
LEUKOCYTE ESTERASE UR QL STRIP: NEGATIVE
NITRATE UR QL: NEGATIVE
PH UR STRIP: 6 [PH] (ref 5–7)
RBC #/AREA URNS AUTO: ABNORMAL /HPF
SP GR UR STRIP: 1.01 (ref 1–1.03)
SQUAMOUS #/AREA URNS AUTO: ABNORMAL /LPF
TRICHOMONAS, WET PREP: ABNORMAL
UROBILINOGEN UR STRIP-ACNC: 0.2 E.U./DL
WBC #/AREA URNS AUTO: ABNORMAL /HPF
WBC'S/HIGH POWER FIELD, WET PREP: ABNORMAL
YEAST, WET PREP: ABNORMAL

## 2023-12-13 PROCEDURE — 99213 OFFICE O/P EST LOW 20 MIN: CPT | Performed by: FAMILY MEDICINE

## 2023-12-13 PROCEDURE — 87210 SMEAR WET MOUNT SALINE/INK: CPT | Performed by: FAMILY MEDICINE

## 2023-12-13 PROCEDURE — 81001 URINALYSIS AUTO W/SCOPE: CPT | Performed by: FAMILY MEDICINE

## 2023-12-13 RX ORDER — TIZANIDINE 2 MG/1
2 TABLET ORAL 3 TIMES DAILY PRN
Qty: 90 TABLET | Refills: 3 | Status: SHIPPED | OUTPATIENT
Start: 2023-12-13 | End: 2024-04-11

## 2023-12-13 RX ORDER — RIZATRIPTAN BENZOATE 5 MG/1
TABLET, ORALLY DISINTEGRATING ORAL
Qty: 18 TABLET | Refills: 0 | Status: SHIPPED | OUTPATIENT
Start: 2023-12-13 | End: 2024-07-16

## 2023-12-13 NOTE — PROGRESS NOTES
Assessment & Plan     Personal history of urinary tract infection  Cleared   Results for orders placed or performed in visit on 12/13/23   UA Macroscopic with reflex to Microscopic and Culture - Lab Collect     Status: Abnormal    Specimen: Urine, Clean Catch   Result Value Ref Range    Color Urine Yellow Colorless, Straw, Light Yellow, Yellow    Appearance Urine Clear Clear    Glucose Urine Negative Negative mg/dL    Bilirubin Urine Negative Negative    Ketones Urine Negative Negative mg/dL    Specific Gravity Urine 1.015 1.003 - 1.035    Blood Urine Trace (A) Negative    pH Urine 6.0 5.0 - 7.0    Protein Albumin Urine Negative Negative mg/dL    Urobilinogen Urine 0.2 0.2, 1.0 E.U./dL    Nitrite Urine Negative Negative    Leukocyte Esterase Urine Negative Negative   UA Microscopic with Reflex to Culture     Status: Abnormal   Result Value Ref Range    Bacteria Urine Few (A) None Seen /HPF    RBC Urine 0-2 0-2 /HPF /HPF    WBC Urine 0-5 0-5 /HPF /HPF    Squamous Epithelials Urine Moderate (A) None Seen /LPF    Narrative    Urine Culture not indicated   Wet prep - lab collect     Status: Abnormal    Specimen: Vagina; Swab   Result Value Ref Range    Trichomonas Absent Absent    Yeast Absent Absent    Clue Cells Absent Absent    WBCs/high power field 2+ (A) None         History of nephrolithiasis  Stable     Menstrual migraine without status migrainosus, not intractable  Helping   - rizatriptan (MAXALT-MLT) 5 MG ODT; DISSOLVE 1-2 TABS IN MOUTH AT ONSET OF HEADACHE/ MIGRAINE-MAY REPEAT IN 2 HRS-MAX 30 MG IN 24 HRS    Chronic bilateral thoracic back pain  Uses prn   - tiZANidine (ZANAFLEX) 2 MG tablet; Take 1 tablet (2 mg) by mouth 3 times daily as needed for muscle spasms    Lower abdominal pain    - Wet prep - lab collect; Future  - UA Macroscopic with reflex to Microscopic and Culture - Lab Collect; Future  - Wet prep - lab collect  - UA Macroscopic with reflex to Microscopic and Culture - Lab Collect  - UA  "Microscopic with Reflex to Culture    Cyst of spleen  Recheck 3 months   - US Abdomen Limited; Future    Abnormality of right breast on screening mammogram 6 mo follow up meeded     - MA Diagnostic Digital Right; Future       MED REC REQUIRED  Post Medication Reconciliation Status: discharge medications reconciled, continue medications without change  BMI:   Estimated body mass index is 28.25 kg/m  as calculated from the following:    Height as of 12/6/23: 1.676 m (5' 6\").    Weight as of 12/6/23: 79.4 kg (175 lb).           Sujatha Maher MD  [facility] DENA Romero is a 48 year old, presenting for the following health issues:  ER F/U  Follow up on emergency room visit     Probably passed a stone had uti and covid   History of Present Illness       Reason for visit:  Follow up from er visit    She eats 2-3 servings of fruits and vegetables daily.She consumes 0 sweetened beverage(s) daily.She exercises with enough effort to increase her heart rate 20 to 29 minutes per day.  She exercises with enough effort to increase her heart rate 5 days per week.   She is taking medications regularly.       Discussed splenic cyst she had + culture proteous mirabilis         Review of Systems         Objective    There were no vitals taken for this visit.  There is no height or weight on file to calculate BMI.  Physical Exam   GENERAL: healthy, alert and no distress        Sujatha Maher M.D.                "

## 2024-01-29 ENCOUNTER — TRANSFERRED RECORDS (OUTPATIENT)
Dept: HEALTH INFORMATION MANAGEMENT | Facility: CLINIC | Age: 49
End: 2024-01-29
Payer: COMMERCIAL

## 2024-04-03 ENCOUNTER — VIRTUAL VISIT (OUTPATIENT)
Dept: FAMILY MEDICINE | Facility: CLINIC | Age: 49
End: 2024-04-03
Payer: COMMERCIAL

## 2024-04-03 DIAGNOSIS — Z71.89 ENCOUNTER FOR SUPPORT TO CAREGIVER: Primary | ICD-10-CM

## 2024-04-03 PROCEDURE — 99441 PR PHYSICIAN TELEPHONE EVALUATION 5-10 MIN: CPT | Mod: 93 | Performed by: FAMILY MEDICINE

## 2024-04-03 NOTE — PROGRESS NOTES
"Heather is a 49 year old who is being evaluated via a billable video visit.    How would you like to obtain your AVS? MyChart  If the video visit is dropped, the invitation should be resent by: Send to e-mail at: [email or web address]  Will anyone else be joining your video visit? No      Assessment & Plan           1. Encounter for support to caregiver  Filled out paperwork for her to be able to have FMLA to care for her mother.  This also will apply to her mother-in-law Hoa.  If this needs to be updated due to a change in the circumstances we can do that.  This should be faxed to the [facility] department.  I did tell Heather that she forgot to sign it  10 minutes spent on the day of service with chart review, telephone call, counseling, and charting. Sujatha Maher M.D.  10      BMI  Estimated body mass index is 28.25 kg/m  as calculated from the following:    Height as of 12/6/23: 1.676 m (5' 6\").    Weight as of 12/6/23: 79.4 kg (175 lb).             Subjective   Heather is a 49 year old, presenting for the following health issues:  Forms      HPI     My Chart Note:  4/2/24  1:10 PM  I have a virtual appointment tomorrow to talk about the FMLA paperwork.  If we don't need an appointment let me know.  I just need it filled out to help take mom to all of her appointments and they seem to be adding up.  She was seen by her cardiovascular Dr yesterday and they are requiring iron infusions 3X a week next week and she has  two other appointments as well.  I just need this paperwork so that work doesn't hold the time off against me.  I need it dated from the day of her surgery which was 2/21/24 and can go for a year at a time my HR said.  It can be faxed to [phone].  If we still need the appointment then I will talk to you tomorrow. Thanks, Heather                Objective           Vitals:  No vitals were obtained today due to virtual visit.    Physical Exam             Video-Visit Details    Type of service:  " Video Visit   Originating Location (pt. Location):     Distant Location (provider location):    Platform used for Video Visit: Unable to complete video visit  Signed Electronically by: Sujatha Maher MD

## 2024-04-10 DIAGNOSIS — M54.6 CHRONIC BILATERAL THORACIC BACK PAIN: ICD-10-CM

## 2024-04-10 DIAGNOSIS — G89.29 CHRONIC BILATERAL THORACIC BACK PAIN: ICD-10-CM

## 2024-04-11 DIAGNOSIS — R69 DIAGNOSIS UNKNOWN: Primary | ICD-10-CM

## 2024-04-11 RX ORDER — TIZANIDINE 2 MG/1
TABLET ORAL
Qty: 270 TABLET | Refills: 1 | Status: SHIPPED | OUTPATIENT
Start: 2024-04-11 | End: 2024-07-16

## 2024-04-12 ENCOUNTER — TELEPHONE (OUTPATIENT)
Dept: FAMILY MEDICINE | Facility: CLINIC | Age: 49
End: 2024-04-12
Payer: COMMERCIAL

## 2024-04-12 NOTE — TELEPHONE ENCOUNTER
No she does not. We can wait another 2-3 months to recheck and make sure that it is not changing . She can schedule for late June then. Sujatha Maher M.D.

## 2024-04-12 NOTE — TELEPHONE ENCOUNTER
"See [employer] message from 4/11/24.    Patient returned call, states she has a follow up ultrasound appointment scheduled for 4/15/24 which she is going to reschedule as she just had a CT scan done again at the ED yesterday and \"was no change\".  Patient reports she has a large kidney stone and is meeting with urology to schedule surgery.    Message routed to Dr. Sujatha Maher/donn. Patient is wondering the if she really needs to repeat the US and how soon you would recommend having it done.    Julie Behrendt RN        "

## 2024-04-15 NOTE — TELEPHONE ENCOUNTER
Call placed to Patient.  Relayed Dr Maher's message from below.  Verbalized understanding.  Kevin Wong RN

## 2024-04-17 ENCOUNTER — VIRTUAL VISIT (OUTPATIENT)
Dept: UROLOGY | Facility: CLINIC | Age: 49
End: 2024-04-17
Payer: COMMERCIAL

## 2024-04-17 DIAGNOSIS — N20.0 NEPHROLITHIASIS: Primary | ICD-10-CM

## 2024-04-17 DIAGNOSIS — R69 DIAGNOSIS UNKNOWN: ICD-10-CM

## 2024-04-17 PROCEDURE — 99204 OFFICE O/P NEW MOD 45 MIN: CPT | Mod: 95 | Performed by: STUDENT IN AN ORGANIZED HEALTH CARE EDUCATION/TRAINING PROGRAM

## 2024-04-17 RX ORDER — TAMSULOSIN HYDROCHLORIDE 0.4 MG/1
0.4 CAPSULE ORAL DAILY
Qty: 30 CAPSULE | Refills: 0 | Status: ON HOLD | OUTPATIENT
Start: 2024-04-17 | End: 2024-05-01

## 2024-04-17 NOTE — PROGRESS NOTES
Heather is a 49 year old who is being evaluated via a billable video visit.    How would you like to obtain your AVS? MyChart  If the video visit is dropped, the invitation should be resent by: Text to cell phone: [phone]  Will anyone else be joining your video visit? No            UROLOGY OUTPATIENT VISIT      Chief Complaint:   Right flank pain      Synopsis   Caitlin Oh is a very pleasant AGE: 49 year old year old person    She is a history of kidney stone disease  She had an episode where she underwent shockwave lithotripsy did not pass the stone underwent stent placement and then eventual ureteroscopy.    Stone History  -Number of previous stone episodes: 1  -Laterality: experienced stones only on the right side  -Prior surgery: right ureteral stent placement, right ureteroscopy, and right SWL    Risk Factors  -History of GI/Bariatric surgery: no history of GI/bariatric surgery  -Urinary reconstruction: no previous history of urinary reconstruction  -Anatomic anomalies: no urinary tract anomalies    In December 2023 she was having some right flank pain  In April 10, 2024 she came to the urgent care and was diagnosed with a 4 x 12 x 9 mm stone in the right renal pelvis with some wall thickening.  She was given antibiotics for possible urinary tract infection as the white blood cells in the urine show 11-25.      Medical Comorbidities      Past Medical History:   Diagnosis Date    Bilateral bunions     Cervical high risk HPV (human papillomavirus) test positive 11/04/2015    Female infertility 07/10/2008    Undergoing ivf 2008.     Kidney stones 11/30/2010    [facility] 11/24/10     Personal history of gestational diabetes 07/11/2012    diet controlled    Plantar fasciitis     s/p surgery               Medications     Current Outpatient Medications   Medication Sig Dispense Refill    rizatriptan (MAXALT-MLT) 5 MG ODT DISSOLVE 1-2 TABS IN MOUTH AT ONSET OF HEADACHE/ MIGRAINE-MAY REPEAT IN 2 HRS-MAX 30 MG  IN 24 HRS 18 tablet 0    tamsulosin (FLOMAX) 0.4 MG capsule Take 1 capsule (0.4 mg) by mouth daily 30 capsule 0    tiZANidine (ZANAFLEX) 2 MG tablet TAKE 1 TABLET BY MOUTH THREE TIMES A DAY AS NEEDED FOR MUSCLE SPASM 270 tablet 1    topiramate (TOPAMAX) 25 MG tablet 2 tabs 2 times per day 360 tablet 1     No current facility-administered medications for this visit.            Assessment/Plan   49 year old year old person with right renal stone  '    Right nephrolithiasis  She has a history of nephrolithiasis this is an exacerbation of a chronic problem given the size it is unlikely that she is can be able to assess on the stone so we plan to proceed with surgical intervention..Will proceed with ureterscopic stone clearance  in around 2 weeks at [place] . Risks and benefits were detailed of ureteroscopic stone clearance including potential issues of urinary or systemic infection, ureteral injury, inaccessible stone, incomplete stone clearance, multiple surgeries, and stent related symptoms of urgency, frequency and hematuria Patient verbalized understanding. Patient agrees with plan as discussed. Preoperative evaluation with primary care has been requested.    CC:  Sujatha Maher    Additional Coding Information:    Problems:  4 -- one or more chronic illnesses with exacerbation or side effects    Data Reviewed  Tests ordered: UA/UC  CT report reviewed  UA report reviewed    Level of risk:  3 -- minor surgery without risks            Video-Visit Details    Type of service:  Video Visit   Joined the call at 4/17/2024, 10:51:15 am.  Left the call at 4/17/2024, 11:12:56 am.  You were on the call for 21 minutes 40 seconds .  Originating Location (pt. Location): Home    Distant Location (provider location):  On-site  Platform used for Video Visit: Misha  Signed Electronically by: Rocio Brantley MD

## 2024-04-18 ENCOUNTER — OFFICE VISIT (OUTPATIENT)
Dept: FAMILY MEDICINE | Facility: CLINIC | Age: 49
End: 2024-04-18
Payer: COMMERCIAL

## 2024-04-18 VITALS
HEIGHT: 66 IN | OXYGEN SATURATION: 100 % | TEMPERATURE: 97.3 F | WEIGHT: 187 LBS | BODY MASS INDEX: 30.05 KG/M2 | HEART RATE: 60 BPM | DIASTOLIC BLOOD PRESSURE: 76 MMHG | SYSTOLIC BLOOD PRESSURE: 118 MMHG

## 2024-04-18 DIAGNOSIS — N20.0 KIDNEY STONE: ICD-10-CM

## 2024-04-18 DIAGNOSIS — Z01.818 PREOP GENERAL PHYSICAL EXAM: Primary | ICD-10-CM

## 2024-04-18 LAB
ANION GAP SERPL CALCULATED.3IONS-SCNC: 11 MMOL/L (ref 7–15)
BUN SERPL-MCNC: 12.5 MG/DL (ref 6–20)
CALCIUM SERPL-MCNC: 8.9 MG/DL (ref 8.6–10)
CHLORIDE SERPL-SCNC: 106 MMOL/L (ref 98–107)
CREAT SERPL-MCNC: 0.76 MG/DL (ref 0.51–0.95)
DEPRECATED HCO3 PLAS-SCNC: 24 MMOL/L (ref 22–29)
EGFRCR SERPLBLD CKD-EPI 2021: >90 ML/MIN/1.73M2
GLUCOSE SERPL-MCNC: 95 MG/DL (ref 70–99)
POTASSIUM SERPL-SCNC: 4.3 MMOL/L (ref 3.4–5.3)
SODIUM SERPL-SCNC: 141 MMOL/L (ref 135–145)

## 2024-04-18 PROCEDURE — 99213 OFFICE O/P EST LOW 20 MIN: CPT | Performed by: FAMILY MEDICINE

## 2024-04-18 PROCEDURE — 36415 COLL VENOUS BLD VENIPUNCTURE: CPT | Performed by: FAMILY MEDICINE

## 2024-04-18 PROCEDURE — 80048 BASIC METABOLIC PNL TOTAL CA: CPT | Performed by: FAMILY MEDICINE

## 2024-04-18 RX ORDER — OXYCODONE AND ACETAMINOPHEN 5; 325 MG/1; MG/1
1 TABLET ORAL EVERY 6 HOURS PRN
Qty: 20 TABLET | Refills: 0 | Status: SHIPPED | OUTPATIENT
Start: 2024-04-18 | End: 2024-04-24

## 2024-04-18 NOTE — PROGRESS NOTES
Preoperative Evaluation  [facility]  [address] [place] [address]  Phone: [phone]  Primary Provider: Sujatha Bernal  Pre-op Performing Provider: SUJATHA BERNAL  Apr 18, 2024       Heather is a 49 year old, presenting for the following:  Pre-Op Exam      Surgical Information  Surgery/Procedure: Cystoscopy, Right Ureteroscopy, Right Holmium Laser Lithotripsy, Right Retrograde Pyelogram, Possible Right Ureteral Stent Placement   Surgery Location: [facility]  Surgeon: Rocio Brantley MD   Surgery Date: 5/1/24  Time of Surgery: 4pm  Where patient plans to recover: At home with family  Fax number for surgical facility: Note does not need to be faxed, will be available electronically in Epic.    Assessment & Plan     The proposed surgical procedure is considered LOW risk.    Preop general physical exam      Kidney stone    - Basic metabolic panel  (Ca, Cl, CO2, Creat, Gluc, K, Na, BUN); Future  - oxyCODONE-acetaminophen (PERCOCET) 5-325 MG tablet; Take 1 tablet by mouth every 6 hours as needed for pain            - No identified additional risk factors other than previously addressed    Antiplatelet or Anticoagulation Medication Instructions   - Patient is on no antiplatelet or anticoagulation medications.    Additional Medication Instructions  Patient is to take all scheduled medications on the day of surgery    Recommendation  APPROVAL GIVEN to proceed with proposed procedure, without further diagnostic evaluation.          Subjective       HPI related to upcoming procedure: recurrent kidney stone         4/18/2024     8:29 AM   Preop Questions   1. Have you ever had a heart attack or stroke? No   2. Have you ever had surgery on your heart or blood vessels, such as a stent placement, a coronary artery bypass, or surgery on an artery in your head, neck, heart, or legs? No   3. Do you have chest pain with activity? No   4. Do you have a history of   heart failure? No   5. Do you currently have a cold, bronchitis or symptoms of other infection? No   6. Do you have a cough, shortness of breath, or wheezing? No   7. Do you or anyone in your family have previous history of blood clots? No   8. Do you or does anyone in your family have a serious bleeding problem such as prolonged bleeding following surgeries or cuts? No   9. Have you ever had problems with anemia or been told to take iron pills? No   10. Have you had any abnormal blood loss such as black, tarry or bloody stools, or abnormal vaginal bleeding? No   11. Have you ever had a blood transfusion? No   12. Are you willing to have a blood transfusion if it is medically needed before, during, or after your surgery? Yes   13. Have you or any of your relatives ever had problems with anesthesia? No   14. Do you have sleep apnea, excessive snoring or daytime drowsiness? No   15. Do you have any artifical heart valves or other implanted medical devices like a pacemaker, defibrillator, or continuous glucose monitor? No   16. Do you have artificial joints? No   17. Are you allergic to latex? No   18. Is there any chance that you may be pregnant? No       Health Care Directive  Patient does not have a Health Care Directive or Living Will: Discussed advance care planning with patient; information given to patient to review.    Preoperative Review of    reviewed - controlled substances reflected in medication list.      Status of Chronic Conditions:  See problem list for active medical problems.  Problems all longstanding and stable, except as noted/documented.  See ROS for pertinent symptoms related to these conditions.    Patient Active Problem List    Diagnosis Date Noted    Cervical high risk HPV (human papillomavirus) test positive 11/04/2015     Priority: Medium     10/28/2015:Pap--NIL, +HR HPV (NOT type 16 or 18). Plan to repeat Pap + HPV cotesting in 1 year. Reminder placed in TRACKING  9/27/16:Pap--NIL,  neg HPV. Plan to repeat Pap + HPV cotesting in 3 years (2019).  8/31/20 NIL pap, Neg HPV. Plan cotest in 3 years.         Menstrual migraine 12/02/2014     Priority: Medium    Tension headache, chronic 03/22/2013     Priority: Medium    CARDIOVASCULAR SCREENING; LDL GOAL LESS THAN 160 10/31/2010     Priority: Medium      Past Medical History:   Diagnosis Date    Bilateral bunions     Cervical high risk HPV (human papillomavirus) test positive 11/04/2015    Female infertility 07/10/2008    Undergoing ivf 2008.     Kidney stones 11/30/2010    [facility] er 11/24/10     Personal history of gestational diabetes 07/11/2012    diet controlled    Plantar fasciitis     s/p surgery     Past Surgical History:   Procedure Laterality Date    ABDOMEN SURGERY      BUNIONECTOMY Left 12/13/2018    Procedure: Left 5th metatarsal corrective osteotomy;  Surgeon: Nick Fuller DPM;  Location: [facility] OR    BUNIONECTOMY Left 05/26/2020    Procedure: BONE SMOOTHING 5TH DIGIT AND SOFT TISSUE RELEASE 5TH MPJ;  Surgeon: Alessandro Beltran DPM;  Location: [facility];  Service: Podiatry    BUNIONECTOMY RT/LT Bilateral 11/2009    COLONOSCOPY N/A 11/19/2021    Procedure: COLONOSCOPY;  Surgeon: Anderson Park DO;  Location: [facility] GI    CYSTOSCOPY, RETROGRADES, INSERT STENT URETER(S), COMBINED  09/19/2013    Procedure: COMBINED CYSTOSCOPY, RETROGRADES, INSERT STENT URETER(S);  Right Ureteral Stent Placement;  Surgeon: JUN Villar MD;  Location: [facility] OR    DILATION AND CURETTAGE, HYSTEROSCOPY, ABLATE ENDOMETRIUM THERMACHOICE, COMBINED N/A 04/15/2015    Procedure: COMBINED DILATION AND CURETTAGE, HYSTEROSCOPY, ABLATE ENDOMETRIUM THERMACHOICE;  Surgeon: Munira Goddard MD;  Location: [facility] OR    DILATION AND CURETTAGE, OPERATIVE HYSTEROSCOPY WITH MORCELLATOR, COMBINED N/A 11/07/2018    Procedure: COMBINED DILATION AND CURETTAGE, OPERATIVE HYSTEROSCOPY WITH MORCELLATOR;  Surgeon: Munira Goddard MD;  Location: [facility] OR    ESOPHAGOSCOPY,  GASTROSCOPY, DUODENOSCOPY (EGD), COMBINED N/A 10/15/2020    Procedure: ESOPHAGOGASTRODUODENOSCOPY (EGD);  Surgeon: Brandon Gardner MD;  Location: [facility]    EXTRACORPOREAL SHOCK WAVE LITHOTRIPSY (ESWL)  09/18/2013    Procedure: EXTRACORPOREAL SHOCK WAVE LITHOTRIPSY (ESWL);  Right Extracorporeal Shock Wave Lithotripsy;  Surgeon: JUN Villar MD;  Location: [facility] OR    FOOT SURGERY      for plantar fasciitis    FOOT SURGERY      HC TOOTH EXTRACTION W/FORCEP      wisdom teeth    LAPAROSCOPY DIAGNOSTIC (GYN)  2007    infertility    LASER HOLMIUM LITHOTRIPSY URETER(S), INSERT STENT, COMBINED  09/27/2013    Procedure: COMBINED CYSTOSCOPY, URETEROSCOPY, LASER HOLMIUM LITHOTRIPSY URETER(S), INSERT STENT;  Right Ureteroscopic Stone Extraction and Possible Stent Placement;  Surgeon: JUN Villar MD;  Location: [facility] OR    MAMMOPLASTY REDUCTION BILATERAL Bilateral 01/26/2022    Procedure: BILATERAL REDUCTION MAMMAPLASTY;  Surgeon: Shamir Marin MD;  Location:  OR    OTHER SURGICAL HISTORY      uterine ablation    PELVIC LAPAROSCOPY      TONSILLECTOMY  1983    TONSILLECTOMY       Current Outpatient Medications   Medication Sig Dispense Refill    rizatriptan (MAXALT-MLT) 5 MG ODT DISSOLVE 1-2 TABS IN MOUTH AT ONSET OF HEADACHE/ MIGRAINE-MAY REPEAT IN 2 HRS-MAX 30 MG IN 24 HRS 18 tablet 0    tamsulosin (FLOMAX) 0.4 MG capsule Take 1 capsule (0.4 mg) by mouth daily 30 capsule 0    tiZANidine (ZANAFLEX) 2 MG tablet TAKE 1 TABLET BY MOUTH THREE TIMES A DAY AS NEEDED FOR MUSCLE SPASM 270 tablet 1    topiramate (TOPAMAX) 25 MG tablet 2 tabs 2 times per day 360 tablet 1       Allergies   Allergen Reactions    Penicillins Rash        Social History     Tobacco Use    Smoking status: Never    Smokeless tobacco: Never   Substance Use Topics    Alcohol use: No       History   Drug Use No         Review of Systems    Review of Systems  Constitutional, HEENT, cardiovascular, pulmonary, gi and gu systems are negative, except as  "otherwise noted.    Objective    /76 (BP Location: Right arm, Patient Position: Chair, Cuff Size: Adult Regular)   Pulse 60   Temp 97.3  F (36.3  C) (Tympanic)   Ht 1.676 m (5' 6\")   Wt 84.8 kg (187 lb)   SpO2 100%   BMI 30.18 kg/m     Estimated body mass index is 30.18 kg/m  as calculated from the following:    Height as of this encounter: 1.676 m (5' 6\").    Weight as of this encounter: 84.8 kg (187 lb).  Physical Exam  GENERAL: alert and no distress  RESP: lungs clear to auscultation - no rales, rhonchi or wheezes  CV: regular rate and rhythm, normal S1 S2, no S3 or S4, no murmur, click or rub, no peripheral edema     Recent Labs   Lab Test 12/06/23  1326 08/07/23  0937   HGB 12.5 13.5    255    141   POTASSIUM 3.7 4.6   CR 0.71 0.80        Diagnostics  Labs pending at this time.  Results will be reviewed when available.   No EKG required for low risk surgery (cataract, skin procedure, breast biopsy, etc).    Revised Cardiac Risk Index (RCRI)  The patient has the following serious cardiovascular risks for perioperative complications:   - No serious cardiac risks = 0 points     RCRI Interpretation: 0 points: Class I (very low risk - 0.4% complication rate)           Signed Electronically by: Sujatha Maher MD  Copy of this evaluation report is provided to requesting physician.         "

## 2024-04-18 NOTE — PATIENT INSTRUCTIONS
Preparing for Your Surgery  Getting started  A nurse will call you to review your health history and instructions. They will give you an arrival time based on your scheduled surgery time. Please be ready to share:  Your doctor's clinic name and phone number  Your medical, surgical, and anesthesia history  A list of allergies and sensitivities  A list of medicines, including herbal treatments and over-the-counter drugs  Whether the patient has a legal guardian (ask how to send us the papers in advance)  Please tell us if you're pregnant--or if there's any chance you might be pregnant. Some surgeries may injure a fetus (unborn baby), so they require a pregnancy test. Surgeries that are safe for a fetus don't always need a test, and you can choose whether to have one.   If you have a child who's having surgery, please ask for a copy of Preparing for Your Child's Surgery.    Preparing for surgery  Within 10 to 30 days of surgery: Have a pre-op exam (sometimes called an H&P, or History and Physical). This can be done at a clinic or pre-operative center.  If you're having a , you may not need this exam. Talk to your care team.  At your pre-op exam, talk to your care team about all medicines you take. If you need to stop any medicines before surgery, ask when to start taking them again.  We do this for your safety. Many medicines can make you bleed too much during surgery. Some change how well surgery (anesthesia) drugs work.  Call your insurance company to let them know you're having surgery. (If you don't have insurance, call [phone].)  Call your clinic if there's any change in your health. This includes signs of a cold or flu (sore throat, runny nose, cough, rash, fever). It also includes a scrape or scratch near the surgery site.  If you have questions on the day of surgery, call your hospital or surgery center.  Eating and drinking guidelines  For your safety: Unless your surgeon tells you otherwise,  follow the guidelines below.  Eat and drink as usual until 8 hours before you arrive for surgery. After that, no food or milk.  Drink clear liquids until 2 hours before you arrive. These are liquids you can see through, like water, Gatorade, and Propel Water. They also include plain black coffee and tea (no cream or milk), candy, and breath mints. You can spit out gum when you arrive.  If you drink alcohol: Stop drinking it the night before surgery.  If your care team tells you to take medicine on the morning of surgery, it's okay to take it with a sip of water.  Preventing infection  Shower or bathe the night before and morning of your surgery. Follow the instructions your clinic gave you. (If no instructions, use regular soap.)  Don't shave or clip hair near your surgery site. We'll remove the hair if needed.  Don't smoke or vape the morning of surgery. You may chew nicotine gum up to 2 hours before surgery. A nicotine patch is okay.  Note: Some surgeries require you to completely quit smoking and nicotine. Check with your surgeon.  Your care team will make every effort to keep you safe from infection. We will:  Clean our hands often with soap and water (or an alcohol-based hand rub).  Clean the skin at your surgery site with a special soap that kills germs.  Give you a special gown to keep you warm. (Cold raises the risk of infection.)  Wear special hair covers, masks, gowns and gloves during surgery.  Give antibiotic medicine, if prescribed. Not all surgeries need antibiotics.  What to bring on the day of surgery  Photo ID and insurance card  Copy of your health care directive, if you have one  Glasses and hearing aids (bring cases)  You can't wear contacts during surgery  Inhaler and eye drops, if you use them (tell us about these when you arrive)  CPAP machine or breathing device, if you use them  A few personal items, if spending the night  If you have . . .  A pacemaker, ICD (cardiac defibrillator) or other  implant: Bring the ID card.  An implanted stimulator: Bring the remote control.  A legal guardian: Bring a copy of the certified (court-stamped) guardianship papers.  Please remove any jewelry, including body piercings. Leave jewelry and other valuables at home.  If you're going home the day of surgery  You must have a responsible adult drive you home. They should stay with you overnight as well.  If you don't have someone to stay with you, and you aren't safe to go home alone, we may keep you overnight. Insurance often won't pay for this.  After surgery  If it's hard to control your pain or you need more pain medicine, please call your surgeon's office.  Questions?   If you have any questions for your care team, list them here: _________________________________________________________________________________________________________________________________________________________________________ ____________________________________ ____________________________________ ____________________________________  For informational purposes only. Not to replace the advice of your health care provider. Copyright   2003, 2019 [employer] [facility]. All rights reserved. Clinically reviewed by April Hunt MD. SMARTworks 392740 - REV 12/22.    How to Take Your Medication Before Surgery  - Take all of your medications before surgery as usual  - STOP taking all vitamins and herbal supplements 14 days before surgery.

## 2024-04-22 ENCOUNTER — LAB (OUTPATIENT)
Dept: LAB | Facility: CLINIC | Age: 49
End: 2024-04-22
Payer: COMMERCIAL

## 2024-04-22 DIAGNOSIS — N20.0 NEPHROLITHIASIS: ICD-10-CM

## 2024-04-22 PROCEDURE — 87186 SC STD MICRODIL/AGAR DIL: CPT

## 2024-04-22 PROCEDURE — 87086 URINE CULTURE/COLONY COUNT: CPT

## 2024-04-23 ENCOUNTER — TELEPHONE (OUTPATIENT)
Dept: SURGERY | Facility: CLINIC | Age: 49
End: 2024-04-23
Payer: COMMERCIAL

## 2024-04-23 DIAGNOSIS — N20.0 NEPHROLITHIASIS: Primary | ICD-10-CM

## 2024-04-23 LAB — BACTERIA UR CULT: ABNORMAL

## 2024-04-23 RX ORDER — SULFAMETHOXAZOLE/TRIMETHOPRIM 800-160 MG
1 TABLET ORAL 2 TIMES DAILY
Qty: 14 TABLET | Refills: 0 | Status: ON HOLD | OUTPATIENT
Start: 2024-04-23 | End: 2024-05-01

## 2024-04-30 ENCOUNTER — ANESTHESIA EVENT (OUTPATIENT)
Dept: SURGERY | Facility: CLINIC | Age: 49
End: 2024-04-30
Payer: COMMERCIAL

## 2024-04-30 NOTE — ANESTHESIA PREPROCEDURE EVALUATION
Anesthesia Pre-Procedure Evaluation    Patient: Caitlin Oh   MRN: [id number] : 1975        Procedure : Procedure(s):  Cystoscopy, Right Ureteroscopy, Right Holmium Laser Lithotripsy, Right Retrograde Pyelogram, Possible Right Ureteral Stent Placement          Past Medical History:   Diagnosis Date    Bilateral bunions     Cervical high risk HPV (human papillomavirus) test positive 2015    Female infertility 07/10/2008    Undergoing ivf .     Kidney stones 2010    [facility] er 11/24/10     Personal history of gestational diabetes 2012    diet controlled    Plantar fasciitis     s/p surgery      Past Surgical History:   Procedure Laterality Date    ABDOMEN SURGERY      BUNIONECTOMY Left 2018    Procedure: Left 5th metatarsal corrective osteotomy;  Surgeon: Nick Fuller DPM;  Location: [facility]    BUNIONECTOMY Left 2020    Procedure: BONE SMOOTHING 5TH DIGIT AND SOFT TISSUE RELEASE 5TH MPJ;  Surgeon: Alessandro Beltran DPM;  Location: formerly [facility];  Service: Podiatry    BUNIONECTOMY RT/LT Bilateral 2009    COLONOSCOPY N/A 2021    Procedure: COLONOSCOPY;  Surgeon: Anderson Park DO;  Location: [facility] GI    CYSTOSCOPY, RETROGRADES, INSERT STENT URETER(S), COMBINED  2013    Procedure: COMBINED CYSTOSCOPY, RETROGRADES, INSERT STENT URETER(S);  Right Ureteral Stent Placement;  Surgeon: JUN Villar MD;  Location: [facility] OR    DILATION AND CURETTAGE, HYSTEROSCOPY, ABLATE ENDOMETRIUM THERMACHOICE, COMBINED N/A 04/15/2015    Procedure: COMBINED DILATION AND CURETTAGE, HYSTEROSCOPY, ABLATE ENDOMETRIUM THERMACHOICE;  Surgeon: Munira Goddard MD;  Location: [facility]    DILATION AND CURETTAGE, OPERATIVE HYSTEROSCOPY WITH MORCELLATOR, COMBINED N/A 2018    Procedure: COMBINED DILATION AND CURETTAGE, OPERATIVE HYSTEROSCOPY WITH MORCELLATOR;  Surgeon: Munira Goddard MD;  Location: [facility]    ESOPHAGOSCOPY, GASTROSCOPY, DUODENOSCOPY (EGD), COMBINED N/A  10/15/2020    Procedure: ESOPHAGOGASTRODUODENOSCOPY (EGD);  Surgeon: Brandon Gardner MD;  Location: [facility]    EXTRACORPOREAL SHOCK WAVE LITHOTRIPSY (ESWL)  09/18/2013    Procedure: EXTRACORPOREAL SHOCK WAVE LITHOTRIPSY (ESWL);  Right Extracorporeal Shock Wave Lithotripsy;  Surgeon: JUN Villar MD;  Location: [facility] OR    FOOT SURGERY      for plantar fasciitis    FOOT SURGERY      HC TOOTH EXTRACTION W/FORCEP      wisdom teeth    LAPAROSCOPY DIAGNOSTIC (GYN)  2007    infertility    LASER HOLMIUM LITHOTRIPSY URETER(S), INSERT STENT, COMBINED  09/27/2013    Procedure: COMBINED CYSTOSCOPY, URETEROSCOPY, LASER HOLMIUM LITHOTRIPSY URETER(S), INSERT STENT;  Right Ureteroscopic Stone Extraction and Possible Stent Placement;  Surgeon: JUN Villar MD;  Location: [facility]    MAMMOPLASTY REDUCTION BILATERAL Bilateral 01/26/2022    Procedure: BILATERAL REDUCTION MAMMAPLASTY;  Surgeon: Shamir Marin MD;  Location:  OR    OTHER SURGICAL HISTORY      uterine ablation    PELVIC LAPAROSCOPY      TONSILLECTOMY  1983    TONSILLECTOMY        Allergies   Allergen Reactions    Penicillins Rash      Social History     Tobacco Use    Smoking status: Never    Smokeless tobacco: Never   Substance Use Topics    Alcohol use: No      Wt Readings from Last 1 Encounters:   04/18/24 84.8 kg (187 lb)        Anesthesia Evaluation   Pt has had prior anesthetic. Type: General, MAC and Regional.        ROS/MED HX  ENT/Pulmonary:  - neg pulmonary ROS     Neurologic:     (+)      migraines,                          Cardiovascular:  - neg cardiovascular ROS     METS/Exercise Tolerance:     Hematologic:  - neg hematologic  ROS     Musculoskeletal:  - neg musculoskeletal ROS     GI/Hepatic:  - neg GI/hepatic ROS     Renal/Genitourinary:     (+) renal disease,      Nephrolithiasis ,       Endo:     (+)               Obesity,       Psychiatric/Substance Use:  - neg psychiatric ROS     Infectious Disease:  - neg infectious disease ROS     Malignancy:   "- neg malignancy ROS     Other:  - neg other ROS          Physical Exam    Airway        Mallampati: II   TM distance: > 3 FB   Neck ROM: full   Mouth opening: > 3 cm    Respiratory Devices and Support         Dental           Cardiovascular   cardiovascular exam normal          Pulmonary   pulmonary exam normal            OUTSIDE LABS:  CBC:   Lab Results   Component Value Date    WBC 6.9 12/06/2023    WBC 6.1 08/07/2023    HGB 12.5 12/06/2023    HGB 13.5 08/07/2023    HCT 37.3 12/06/2023    HCT 40.5 08/07/2023     12/06/2023     08/07/2023     BMP:   Lab Results   Component Value Date     04/18/2024     12/06/2023    POTASSIUM 4.3 04/18/2024    POTASSIUM 3.7 12/06/2023    CHLORIDE 106 04/18/2024    CHLORIDE 109 (H) 12/06/2023    CO2 24 04/18/2024    CO2 21 (L) 12/06/2023    BUN 12.5 04/18/2024    BUN 15.5 12/06/2023    CR 0.76 04/18/2024    CR 0.71 12/06/2023    GLC 95 04/18/2024     (H) 12/06/2023     COAGS: No results found for: \"PTT\", \"INR\", \"FIBR\"  POC:   Lab Results   Component Value Date    HCG Negative 10/15/2020    HCGS Negative 12/06/2023     HEPATIC:   Lab Results   Component Value Date    ALBUMIN 4.6 08/07/2023    PROTTOTAL 7.2 08/07/2023    ALT 36 08/07/2023    AST 38 08/07/2023    ALKPHOS 80 08/07/2023    BILITOTAL 0.4 08/07/2023     OTHER:   Lab Results   Component Value Date    A1C 5.5 07/11/2012    FEDE 8.9 04/18/2024    PHOS 3.2 12/27/2013    MAG 2.2 12/27/2013    TSH 2.13 04/24/2017    CRP 5.7 10/25/2017    SED 10 02/22/2023       Anesthesia Plan    ASA Status:  2       Anesthesia Type: General.     - Airway: LMA   Induction: Propofol.   Maintenance: TIVA.        Consents    Anesthesia Plan(s) and associated risks, benefits, and realistic alternatives discussed. Questions answered and patient/representative(s) expressed understanding.     - Discussed: Risks, Benefits and Alternatives for BOTH SEDATION and the PROCEDURE were discussed     - Discussed with:  Patient  " "          Postoperative Care    Pain management: IV analgesics, Oral pain medications, Multi-modal analgesia.   PONV prophylaxis: Ondansetron (or other 5HT-3), Dexamethasone or Solumedrol     Comments:               Jaydon Richards CRNA, APRN CRNA    I have reviewed the pertinent notes and labs in the chart from the past 30 days and (re)examined the patient.  Any updates or changes from those notes are reflected in this note.              # Obesity: Estimated body mass index is 30.18 kg/m  as calculated from the following:    Height as of 4/18/24: 1.676 m (5' 6\").    Weight as of 4/18/24: 84.8 kg (187 lb).      "

## 2024-05-01 ENCOUNTER — TELEPHONE (OUTPATIENT)
Dept: SURGERY | Facility: CLINIC | Age: 49
End: 2024-05-01

## 2024-05-01 ENCOUNTER — ANESTHESIA (OUTPATIENT)
Dept: SURGERY | Facility: CLINIC | Age: 49
End: 2024-05-01
Payer: COMMERCIAL

## 2024-05-01 ENCOUNTER — APPOINTMENT (OUTPATIENT)
Dept: GENERAL RADIOLOGY | Facility: CLINIC | Age: 49
End: 2024-05-01
Attending: STUDENT IN AN ORGANIZED HEALTH CARE EDUCATION/TRAINING PROGRAM
Payer: COMMERCIAL

## 2024-05-01 ENCOUNTER — HOSPITAL ENCOUNTER (OUTPATIENT)
Facility: CLINIC | Age: 49
Discharge: HOME | End: 2024-05-01
Attending: STUDENT IN AN ORGANIZED HEALTH CARE EDUCATION/TRAINING PROGRAM | Admitting: STUDENT IN AN ORGANIZED HEALTH CARE EDUCATION/TRAINING PROGRAM
Payer: COMMERCIAL

## 2024-05-01 VITALS
SYSTOLIC BLOOD PRESSURE: 137 MMHG | DIASTOLIC BLOOD PRESSURE: 79 MMHG | HEIGHT: 66 IN | OXYGEN SATURATION: 100 % | TEMPERATURE: 97.5 F | RESPIRATION RATE: 14 BRPM | WEIGHT: 181 LBS | HEART RATE: 57 BPM | BODY MASS INDEX: 29.09 KG/M2

## 2024-05-01 DIAGNOSIS — N20.0 NEPHROLITHIASIS: Primary | ICD-10-CM

## 2024-05-01 DIAGNOSIS — N20.0 NEPHROLITHIASIS: ICD-10-CM

## 2024-05-01 PROCEDURE — 271N000001 HC OR GENERAL SUPPLY NON-STERILE: Performed by: STUDENT IN AN ORGANIZED HEALTH CARE EDUCATION/TRAINING PROGRAM

## 2024-05-01 PROCEDURE — 250N000013 HC RX MED GY IP 250 OP 250 PS 637: Performed by: NURSE ANESTHETIST, CERTIFIED REGISTERED

## 2024-05-01 PROCEDURE — C1747 HC OR ENDOSCOPE, SGL USE,URINARY TRACT, IMAGING/ILLUMINATION DEVICE: HCPCS | Performed by: STUDENT IN AN ORGANIZED HEALTH CARE EDUCATION/TRAINING PROGRAM

## 2024-05-01 PROCEDURE — 52356 CYSTO/URETERO W/LITHOTRIPSY: CPT | Mod: RT | Performed by: STUDENT IN AN ORGANIZED HEALTH CARE EDUCATION/TRAINING PROGRAM

## 2024-05-01 PROCEDURE — C2617 STENT, NON-COR, TEM W/O DEL: HCPCS | Performed by: STUDENT IN AN ORGANIZED HEALTH CARE EDUCATION/TRAINING PROGRAM

## 2024-05-01 PROCEDURE — 74420 UROGRAPHY RTRGR +-KUB: CPT | Mod: 26 | Performed by: STUDENT IN AN ORGANIZED HEALTH CARE EDUCATION/TRAINING PROGRAM

## 2024-05-01 PROCEDURE — 272N000001 HC OR GENERAL SUPPLY STERILE: Performed by: STUDENT IN AN ORGANIZED HEALTH CARE EDUCATION/TRAINING PROGRAM

## 2024-05-01 PROCEDURE — 999N000179 XR SURGERY CARM FLUORO LESS THAN 5 MIN W STILLS

## 2024-05-01 PROCEDURE — 710N000009 HC RECOVERY PHASE 1, LEVEL 1, PER MIN: Performed by: STUDENT IN AN ORGANIZED HEALTH CARE EDUCATION/TRAINING PROGRAM

## 2024-05-01 PROCEDURE — 360N000084 HC SURGERY LEVEL 4 W/ FLUORO, PER MIN: Performed by: STUDENT IN AN ORGANIZED HEALTH CARE EDUCATION/TRAINING PROGRAM

## 2024-05-01 PROCEDURE — 250N000011 HC RX IP 250 OP 636: Performed by: NURSE ANESTHETIST, CERTIFIED REGISTERED

## 2024-05-01 PROCEDURE — 999N000141 HC STATISTIC PRE-PROCEDURE NURSING ASSESSMENT: Performed by: STUDENT IN AN ORGANIZED HEALTH CARE EDUCATION/TRAINING PROGRAM

## 2024-05-01 PROCEDURE — 255N000002 HC RX 255 OP 636: Performed by: STUDENT IN AN ORGANIZED HEALTH CARE EDUCATION/TRAINING PROGRAM

## 2024-05-01 PROCEDURE — 250N000009 HC RX 250: Performed by: NURSE ANESTHETIST, CERTIFIED REGISTERED

## 2024-05-01 PROCEDURE — 258N000003 HC RX IP 258 OP 636: Performed by: NURSE ANESTHETIST, CERTIFIED REGISTERED

## 2024-05-01 PROCEDURE — 250N000011 HC RX IP 250 OP 636: Performed by: STUDENT IN AN ORGANIZED HEALTH CARE EDUCATION/TRAINING PROGRAM

## 2024-05-01 PROCEDURE — C1894 INTRO/SHEATH, NON-LASER: HCPCS | Performed by: STUDENT IN AN ORGANIZED HEALTH CARE EDUCATION/TRAINING PROGRAM

## 2024-05-01 PROCEDURE — C1758 CATHETER, URETERAL: HCPCS | Performed by: STUDENT IN AN ORGANIZED HEALTH CARE EDUCATION/TRAINING PROGRAM

## 2024-05-01 PROCEDURE — 370N000017 HC ANESTHESIA TECHNICAL FEE, PER MIN: Performed by: STUDENT IN AN ORGANIZED HEALTH CARE EDUCATION/TRAINING PROGRAM

## 2024-05-01 PROCEDURE — 710N000012 HC RECOVERY PHASE 2, PER MINUTE: Performed by: STUDENT IN AN ORGANIZED HEALTH CARE EDUCATION/TRAINING PROGRAM

## 2024-05-01 PROCEDURE — C1769 GUIDE WIRE: HCPCS | Performed by: STUDENT IN AN ORGANIZED HEALTH CARE EDUCATION/TRAINING PROGRAM

## 2024-05-01 DEVICE — URETERAL STENT
Type: IMPLANTABLE DEVICE | Site: URETHRA | Status: NON-FUNCTIONAL
Brand: PERCUFLEX™ PLUS
Removed: 2024-05-15

## 2024-05-01 RX ORDER — NALOXONE HYDROCHLORIDE 0.4 MG/ML
0.1 INJECTION, SOLUTION INTRAMUSCULAR; INTRAVENOUS; SUBCUTANEOUS
Status: DISCONTINUED | OUTPATIENT
Start: 2024-05-01 | End: 2024-05-01 | Stop reason: HOSPADM

## 2024-05-01 RX ORDER — DEXAMETHASONE SODIUM PHOSPHATE 4 MG/ML
INJECTION, SOLUTION INTRA-ARTICULAR; INTRALESIONAL; INTRAMUSCULAR; INTRAVENOUS; SOFT TISSUE PRN
Status: DISCONTINUED | OUTPATIENT
Start: 2024-05-01 | End: 2024-05-01

## 2024-05-01 RX ORDER — ONDANSETRON 2 MG/ML
4 INJECTION INTRAMUSCULAR; INTRAVENOUS EVERY 30 MIN PRN
Status: DISCONTINUED | OUTPATIENT
Start: 2024-05-01 | End: 2024-05-01 | Stop reason: HOSPADM

## 2024-05-01 RX ORDER — CEPHALEXIN 500 MG/1
500 CAPSULE ORAL DAILY
Qty: 14 CAPSULE | Refills: 0 | Status: ON HOLD | OUTPATIENT
Start: 2024-05-01 | End: 2024-05-15

## 2024-05-01 RX ORDER — ACETAMINOPHEN 325 MG/1
975 TABLET ORAL ONCE
Status: COMPLETED | OUTPATIENT
Start: 2024-05-01 | End: 2024-05-01

## 2024-05-01 RX ORDER — ACETAMINOPHEN 500 MG
500 TABLET ORAL EVERY 6 HOURS PRN
COMMUNITY
Start: 2024-05-01

## 2024-05-01 RX ORDER — FENTANYL CITRATE 50 UG/ML
25 INJECTION, SOLUTION INTRAMUSCULAR; INTRAVENOUS EVERY 5 MIN PRN
Status: DISCONTINUED | OUTPATIENT
Start: 2024-05-01 | End: 2024-05-01 | Stop reason: HOSPADM

## 2024-05-01 RX ORDER — ONDANSETRON 4 MG/1
4 TABLET, ORALLY DISINTEGRATING ORAL EVERY 30 MIN PRN
Status: DISCONTINUED | OUTPATIENT
Start: 2024-05-01 | End: 2024-05-01 | Stop reason: HOSPADM

## 2024-05-01 RX ORDER — PHENAZOPYRIDINE HYDROCHLORIDE 95 MG/1
190 TABLET ORAL 3 TIMES DAILY PRN
Qty: 30 TABLET | Refills: 0 | Status: SHIPPED | OUTPATIENT
Start: 2024-05-01 | End: 2024-07-16

## 2024-05-01 RX ORDER — HYDROMORPHONE HCL IN WATER/PF 6 MG/30 ML
0.2 PATIENT CONTROLLED ANALGESIA SYRINGE INTRAVENOUS EVERY 5 MIN PRN
Status: DISCONTINUED | OUTPATIENT
Start: 2024-05-01 | End: 2024-05-01 | Stop reason: HOSPADM

## 2024-05-01 RX ORDER — LIDOCAINE 40 MG/G
CREAM TOPICAL
Status: DISCONTINUED | OUTPATIENT
Start: 2024-05-01 | End: 2024-05-01 | Stop reason: HOSPADM

## 2024-05-01 RX ORDER — GABAPENTIN 300 MG/1
300 CAPSULE ORAL
Status: COMPLETED | OUTPATIENT
Start: 2024-05-01 | End: 2024-05-01

## 2024-05-01 RX ORDER — IBUPROFEN 400 MG/1
400 TABLET, FILM COATED ORAL EVERY 6 HOURS PRN
COMMUNITY
Start: 2024-05-01

## 2024-05-01 RX ORDER — KETOROLAC TROMETHAMINE 30 MG/ML
INJECTION, SOLUTION INTRAMUSCULAR; INTRAVENOUS PRN
Status: DISCONTINUED | OUTPATIENT
Start: 2024-05-01 | End: 2024-05-01

## 2024-05-01 RX ORDER — SODIUM CHLORIDE, SODIUM LACTATE, POTASSIUM CHLORIDE, CALCIUM CHLORIDE 600; 310; 30; 20 MG/100ML; MG/100ML; MG/100ML; MG/100ML
INJECTION, SOLUTION INTRAVENOUS CONTINUOUS
Status: DISCONTINUED | OUTPATIENT
Start: 2024-05-01 | End: 2024-05-01 | Stop reason: HOSPADM

## 2024-05-01 RX ORDER — ONDANSETRON 2 MG/ML
INJECTION INTRAMUSCULAR; INTRAVENOUS PRN
Status: DISCONTINUED | OUTPATIENT
Start: 2024-05-01 | End: 2024-05-01

## 2024-05-01 RX ORDER — OXYCODONE HYDROCHLORIDE 5 MG/1
5 TABLET ORAL EVERY 6 HOURS PRN
Qty: 10 TABLET | Refills: 0 | Status: SHIPPED | OUTPATIENT
Start: 2024-05-01 | End: 2024-05-04

## 2024-05-01 RX ORDER — IOPAMIDOL 612 MG/ML
INJECTION, SOLUTION INTRAVASCULAR PRN
Status: DISCONTINUED | OUTPATIENT
Start: 2024-05-01 | End: 2024-05-01 | Stop reason: HOSPADM

## 2024-05-01 RX ORDER — HYDROXYZINE HYDROCHLORIDE 25 MG/1
25 TABLET, FILM COATED ORAL EVERY 6 HOURS PRN
Status: DISCONTINUED | OUTPATIENT
Start: 2024-05-01 | End: 2024-05-01 | Stop reason: HOSPADM

## 2024-05-01 RX ORDER — LIDOCAINE HYDROCHLORIDE 20 MG/ML
INJECTION, SOLUTION INFILTRATION; PERINEURAL PRN
Status: DISCONTINUED | OUTPATIENT
Start: 2024-05-01 | End: 2024-05-01

## 2024-05-01 RX ORDER — DEXAMETHASONE SODIUM PHOSPHATE 4 MG/ML
4 INJECTION, SOLUTION INTRA-ARTICULAR; INTRALESIONAL; INTRAMUSCULAR; INTRAVENOUS; SOFT TISSUE
Status: DISCONTINUED | OUTPATIENT
Start: 2024-05-01 | End: 2024-05-01 | Stop reason: HOSPADM

## 2024-05-01 RX ORDER — HYDROMORPHONE HCL IN WATER/PF 6 MG/30 ML
0.4 PATIENT CONTROLLED ANALGESIA SYRINGE INTRAVENOUS EVERY 5 MIN PRN
Status: DISCONTINUED | OUTPATIENT
Start: 2024-05-01 | End: 2024-05-01 | Stop reason: HOSPADM

## 2024-05-01 RX ORDER — TAMSULOSIN HYDROCHLORIDE 0.4 MG/1
0.4 CAPSULE ORAL DAILY
Qty: 30 CAPSULE | Refills: 0 | Status: SHIPPED | OUTPATIENT
Start: 2024-05-01 | End: 2024-07-16

## 2024-05-01 RX ORDER — SENNA AND DOCUSATE SODIUM 50; 8.6 MG/1; MG/1
1 TABLET, FILM COATED ORAL AT BEDTIME
Qty: 30 TABLET | Refills: 0 | Status: SHIPPED | OUTPATIENT
Start: 2024-05-01 | End: 2024-07-16

## 2024-05-01 RX ORDER — PROPOFOL 10 MG/ML
INJECTION, EMULSION INTRAVENOUS CONTINUOUS PRN
Status: DISCONTINUED | OUTPATIENT
Start: 2024-05-01 | End: 2024-05-01

## 2024-05-01 RX ORDER — FENTANYL CITRATE 50 UG/ML
INJECTION, SOLUTION INTRAMUSCULAR; INTRAVENOUS PRN
Status: DISCONTINUED | OUTPATIENT
Start: 2024-05-01 | End: 2024-05-01

## 2024-05-01 RX ORDER — OXYBUTYNIN CHLORIDE 5 MG/1
5 TABLET ORAL 3 TIMES DAILY PRN
Qty: 30 TABLET | Refills: 0 | Status: SHIPPED | OUTPATIENT
Start: 2024-05-01 | End: 2024-07-16

## 2024-05-01 RX ORDER — MEPERIDINE HYDROCHLORIDE 25 MG/ML
12.5 INJECTION INTRAMUSCULAR; INTRAVENOUS; SUBCUTANEOUS EVERY 5 MIN PRN
Status: DISCONTINUED | OUTPATIENT
Start: 2024-05-01 | End: 2024-05-01 | Stop reason: HOSPADM

## 2024-05-01 RX ORDER — PROPOFOL 10 MG/ML
INJECTION, EMULSION INTRAVENOUS PRN
Status: DISCONTINUED | OUTPATIENT
Start: 2024-05-01 | End: 2024-05-01

## 2024-05-01 RX ORDER — HYDROXYZINE HYDROCHLORIDE 50 MG/1
50 TABLET, FILM COATED ORAL EVERY 6 HOURS PRN
Status: DISCONTINUED | OUTPATIENT
Start: 2024-05-01 | End: 2024-05-01 | Stop reason: HOSPADM

## 2024-05-01 RX ORDER — CEFAZOLIN SODIUM/WATER 2 G/20 ML
2 SYRINGE (ML) INTRAVENOUS SEE ADMIN INSTRUCTIONS
Status: DISCONTINUED | OUTPATIENT
Start: 2024-05-01 | End: 2024-05-01 | Stop reason: HOSPADM

## 2024-05-01 RX ORDER — ACETAMINOPHEN 325 MG/1
975 TABLET ORAL ONCE
Status: DISCONTINUED | OUTPATIENT
Start: 2024-05-01 | End: 2024-05-01 | Stop reason: HOSPADM

## 2024-05-01 RX ORDER — CEFAZOLIN SODIUM/WATER 2 G/20 ML
2 SYRINGE (ML) INTRAVENOUS
Status: COMPLETED | OUTPATIENT
Start: 2024-05-01 | End: 2024-05-01

## 2024-05-01 RX ORDER — FENTANYL CITRATE 50 UG/ML
50 INJECTION, SOLUTION INTRAMUSCULAR; INTRAVENOUS EVERY 5 MIN PRN
Status: DISCONTINUED | OUTPATIENT
Start: 2024-05-01 | End: 2024-05-01 | Stop reason: HOSPADM

## 2024-05-01 RX ADMIN — Medication 2 G: at 15:09

## 2024-05-01 RX ADMIN — GABAPENTIN 300 MG: 300 CAPSULE ORAL at 14:42

## 2024-05-01 RX ADMIN — FENTANYL CITRATE 100 MCG: 50 INJECTION INTRAMUSCULAR; INTRAVENOUS at 15:19

## 2024-05-01 RX ADMIN — ONDANSETRON 4 MG: 2 INJECTION INTRAMUSCULAR; INTRAVENOUS at 15:19

## 2024-05-01 RX ADMIN — FENTANYL CITRATE 100 MCG: 50 INJECTION INTRAMUSCULAR; INTRAVENOUS at 15:56

## 2024-05-01 RX ADMIN — HYDROMORPHONE HYDROCHLORIDE 0.2 MG: 0.2 INJECTION, SOLUTION INTRAMUSCULAR; INTRAVENOUS; SUBCUTANEOUS at 16:19

## 2024-05-01 RX ADMIN — LIDOCAINE HYDROCHLORIDE 0.2 ML: 10 INJECTION, SOLUTION EPIDURAL; INFILTRATION; INTRACAUDAL; PERINEURAL at 14:54

## 2024-05-01 RX ADMIN — HYDROXYZINE HYDROCHLORIDE 50 MG: 50 TABLET, FILM COATED ORAL at 17:06

## 2024-05-01 RX ADMIN — SODIUM CHLORIDE, POTASSIUM CHLORIDE, SODIUM LACTATE AND CALCIUM CHLORIDE: 600; 310; 30; 20 INJECTION, SOLUTION INTRAVENOUS at 14:53

## 2024-05-01 RX ADMIN — MIDAZOLAM 2 MG: 1 INJECTION INTRAMUSCULAR; INTRAVENOUS at 15:14

## 2024-05-01 RX ADMIN — ACETAMINOPHEN 975 MG: 325 TABLET, FILM COATED ORAL at 14:42

## 2024-05-01 RX ADMIN — PROPOFOL 200 MCG/KG/MIN: 10 INJECTION, EMULSION INTRAVENOUS at 15:19

## 2024-05-01 RX ADMIN — KETOROLAC TROMETHAMINE 15 MG: 30 INJECTION, SOLUTION INTRAMUSCULAR at 15:19

## 2024-05-01 RX ADMIN — DEXAMETHASONE SODIUM PHOSPHATE 4 MG: 4 INJECTION, SOLUTION INTRA-ARTICULAR; INTRALESIONAL; INTRAMUSCULAR; INTRAVENOUS; SOFT TISSUE at 15:19

## 2024-05-01 RX ADMIN — HYDROMORPHONE HYDROCHLORIDE 0.2 MG: 0.2 INJECTION, SOLUTION INTRAMUSCULAR; INTRAVENOUS; SUBCUTANEOUS at 16:29

## 2024-05-01 RX ADMIN — LIDOCAINE HYDROCHLORIDE 100 MG: 20 INJECTION, SOLUTION INFILTRATION; PERINEURAL at 15:19

## 2024-05-01 RX ADMIN — PROPOFOL 200 MG: 10 INJECTION, EMULSION INTRAVENOUS at 15:19

## 2024-05-01 ASSESSMENT — ACTIVITIES OF DAILY LIVING (ADL)
ADLS_ACUITY_SCORE: 20

## 2024-05-01 NOTE — BRIEF OP NOTE
[facility]    Brief Operative Note    Pre-operative diagnosis: Nephrolithiasis [N20.0]  Post-operative diagnosis Same as pre-operative diagnosis    Procedure: Cystoscopy, Right Ureteroscopy, Right Holmium Laser Lithotripsy, Right Retrograde Pyelogram,  Right Ureteral Stent Placement, Right - Urethra    Surgeon: Surgeons and Role:     * Rocio Brantley MD - Primary  Anesthesia: General   Estimated Blood Loss: Minimal    Drains: Right 6x24 JJ stent no string  Specimens: * No specimens in log *  Findings:   Right renal stone, soft struvite likely. The ureter wasn't able to accommodate 11/13 sheath, only scope without safety wire. Dusted/fragmented the stones and opted to do second look to clear out pieces   .  Complications: None.  Implants:   Implant Name Type Inv. Item Serial No.  Lot No. LRB No. Used Action   STENT URETERAL PERCUFLEX PLUS [id number] - [id number] Stent STENT URETERAL PERCUFLEX PLUS [id number]  [employer] [place] [id number] Right 1 Implanted

## 2024-05-01 NOTE — DISCHARGE INSTRUCTIONS
Same Day Surgery Discharge Instructions  Special Precautions After Surgery - Adult    It is not unusual to feel lightheaded or faint, up to 24 hours after surgery or while taking pain medication.  If you have these symptoms; sit for a few minutes before standing and have someone assist you when getting up.  You should rest and relax for the next 24 hours and must have someone stay with you for at least 24 hours after your discharge.  DO NOT DRIVE any vehicle or operate mechanical equipment for 24 hours following the end of your surgery.  DO NOT DRIVE while taking narcotic pain medications that have been prescribed by your physician.  If you had a limb operated on, you must be able to use it fully to drive.  DO NOT drink alcoholic beverages for 24 hours following surgery or while taking prescription pain medication.  Drink clear liquids (apple juice, ginger ale, broth, 7-Up, etc.).  Progress to your regular diet as you feel able.  Any questions call your physician and do not make important decisions for 24 hours.    Nausea and Vomiting: Nausea and vomiting can occur any time after receiving anesthesia. If you experience nausea and vomiting we encourage you to move to a clear liquid diet and advance your diet as tolerated. If nausea and vomiting do not improve within 12 hours please call the surgeon or present to the Emergency department.     Break-through Bleeding: If your experience bleeding from your surgical site apply pressure and additional dressing per nurse instruction. For simple problems such as a saturated dressing, you may need to reinforce the dressing with more gauze and tape and put slight pressure on the site. If bleeding does not subside contact the surgeon or present to the Emergency Department.    Post-op Infection: If you develop a fever of 100.4 or greater, have pus like drainage, redness, swelling or severe pain at the surgical site not alleviated with pain medications; please  contact the surgeon or present to the Emergency Department.     Medications:  Acetaminophen (Tylenol):  Next dose: 8PM.  Ibuprofen (Motrin, Advil):  Next dose: 9PM.  Follow the instructions on the bottle.  __________________________________________________________________________________________________________________________________  IMPORTANT NUMBERS:    [facility] Main Number:  [phone], [phone]  Pharmacy:  [phone]  Same Day Surgery:  [phone], for general post-op questions call Monday - Thursday until 8:30 p.m., Fridays until 6:00 p.m.  Nurse Advice Line:  [phone]                                                                          Surgery Specialty Clinic:  [phone]           When is a stent needed?  A stent is placed if your urologist thinks the urine might not drain well after kidney stone surgery.  Stents are often placed to stop stone fragments or blood from blocking urine leaving the kidney and to  prevent spasms in the ureter. Stents can be left with or without a string.    What can I expect with a stent?  It is very common for stents to cause symptoms following surgery. You may experience some of the  following:   Urinary frequency and urgency   Burning or pain in your lower back during urination   Blood in the urine   Sensation of incomplete emptying of the bladder   Discomfort or pain in the bladder, lower abdomen and/or lower back    How to manage stent symptoms?  Drink plenty of fluids  Medications like Tamsulosin (e.g., Flomax) have been shown to reduce pain  Pain medication can be helpful in reducing discomfort or pain  Use a heating pad or take a warm bath for relief of pain    Will this affect daily activities?  Physical Activity: You may restart your normal physical routine. If you see increased blood in your urine when you become more active, get off your feet, rest, and drink plenty of fluids.  Work Activities, Social Life & Travel: Having a stent should not affect  work activities, social life, or travel. If you experience urinary symptoms, you may need to use the bathroom more often.  Sex: Having a stent should not affect your sex life. However, if you have a stent with a string coming outside the body through the urethra, sexual activities may be difficult. Most patients have some of the symptoms, but they usually go away once the stent is removed.    How is the stent removed?   Your stent is often removed within the first two weeks in the doctor s office.   If the stent was left with a string, you can remove it at home or have your  doctor s office remove it.   Before the stent is removed, drink plenty of water and take pain medication.    What can I expect after the stent is removed?  While most patients do not experience any symptoms after the stent is removed, some patients experience cramping due to bladder or ureteral spasms which may lead to feelings of nausea or urinary urgency. This is not unusual and will pass with time.  Continue to drink a lot of liquids and keep taking your pain medication as directed. Some doctors may prescribe medications to help alleviate these symptom    When to call your doctor?  Nausea, vomiting and unable to drink or keep down liquids  Chills, fever higher than 101  The stent falls out  Difficulty or inability to urinate  Constantly leaking urine  Severe pain that is not relieved by pain medication    Remember  These symptoms are common, and do not require medical help. They will pass with time: If you are still concerned, please contact your doctor s office.  Blood in urine  Pain or discomfort  Urinary frequency or urgency  Burning or pain during urination  Sensation of incomplete emptying of the bladder          Follow-Up:  - Follow up with Dr. Brantley in 2 weeks to clear out the rest of stone  - Call your primary care provider to touch base regarding your recent procedure.  - Call or return sooner than your regularly scheduled visit if  "you develop any of the following: fever (greater than 101.5), uncontrolled pain, uncontrolled nausea or vomiting, as well as increased redness, swelling, or drainage from your wound.     Phone numbers:   - Monday through Friday 8am to 4:30pm: Call [phone] with questions or to schedule or confirm appointment.    - Nights or weekends: call the after hours emergency pager - [phone] and tell the  \"I would like to page the Urology Resident on call.\"  - For emergencies, call [phone]      "

## 2024-05-01 NOTE — OP NOTE
OPERATIVE REPORT    PREOPERATIVE DIAGNOSIS:  Nephrolithiasis [N20.0]     POSTOPERATIVE DIAGNOSIS:  Same    PROCEDURES PERFORMED:   1. Cystoscopy  2. Right Ureteroscopy   3. Right  Laser Lithotripsy  4. Right  Retrograde Pyelogram with interpretation of imaging  5. Right  Ureteral Stent placement     STAFF SURGEON: Rocio Brantley MD was present and participatory for the entire case.   RESIDENT(S): None  FELLOW: None     ANESTHESIA: General    ESTIMATED BLOOD LOSS: <5 ml    DRAINS/TUBES:   Right  6 Kyrgyz x 24cm double-J ureteral stent without String     IV FLUIDS: Please see dictated anesthesia record  COMPLICATIONS: None.   SPECIMEN:   None    SIGNIFICANT FINDINGS:    Right renal stone, soft struvite likely. The ureter wasn't able to accommodate 11/13 sheath, only scope without safety wire. Dusted/fragmented the stones and opted to do second look to clear out pieces   .      BRIEF OPERATIVE INDICATIONS:  Caitlin Oh is a(n) 49 year old female with a history of kidney stone disease.  She presented with a right renal stone about 4 x 12 x 9 mm.  She had right flank pain.  She had a urine culture that grew Proteus and she is been on antibiotics.  We went over treatment options and plan to proceed with ureteroscopy with laser lithotripsy to treat the stone.  I did discuss that with Proteus stones stone clearance is important and that may have to do a second look to clear out fragments if not able to basket them out. After a discussion of all risks, benefits, and alternatives, the patient elected to proceed with definitive stone management with a ureteroscopic approach.    After induction of general anesthesia the patient was repositioned in dorsal lithotomy and prepped and draped in the standard sterile fashion.  A time out was performed confirming the appropriate patient identity and planned procedure.  The patient received culture directed antibiotics and had bilateral sequential compression devices for  DVT propylaxis.    A 22-Jordanian rigid cystoscope was inserted into a well-lubricated urethra. The urethra was unremarkable without stricture.     The ureteral orifice was identified and cannulated with a sensor wire with the aid of a dual lumen catheter. The wire passed without resistance into the upper pole    Retrograde Pyelogram  imaging fails to demonstrate radio-opaque renal stone consistent with pre-operative imaging. There is minimal hydronephrosis.      Dual lumen catheter is inserted with minimal resistance    I attempted to go alongside the wire with the flexible ureteroscope but it would not go beyond the distal ureter.  I then placed a second wire and attempted to go over the second wire with the flexible ureteroscope but it was still not go beyond the distal ureter.  I then removed the safety wire and I was then finally able to go up over the wire to the kidney with the flexible ureteroscope and I identified the stone in the renal pelvis.  I moved the stone to the upper calyx and I used a 200  m holmium laser fiber at settings of 0.2 J and 40 Hz to carefully dust the stone.  Once it was smaller the stone was soft and it fragmented into a few smaller pieces.  It was clear that the stone seem to be struvite and I wanted to basket extract these pieces to leave her stone free.  I then put a wire through the scope and pulled the scope back.  I then attempted to place an access sheath to help me basket extract pieces.  I put the 11 portion of 11/13 sheath but it met resistance in the mid ureter and it itself was tight and I did not think the 13 Jordanian portion would go up.    Given the struvite stone and the necessity to get her stone free I opted to place a stent at this point to facilitate passive dilation of the ureter so that I can place an access sheath next time to remove these fragments.     Stent insertion  Right 6F 24 cm stent is inserted with good coil in kidney and bladder under fluoroscopic  guidance.      The bladder was drained    The patient tolerated the procedure well and there were no apparent complications. The patient  was transported to the postanesthesia care unit in stable condition.     POSTOP PLAN:  return to OR in 2 weeks for definitive stone clearance

## 2024-05-01 NOTE — PROGRESS NOTES
I met with Heather    No interval changes in health    She has a right renal stone     1.  Inferior right renal pelvis 4 x 12 x 9 mm stone without obstruction at this time. However, there is mild right renal pelvis wall thickening and dilatation for which intermittent obstruction or superimposed inflammation cannot be excluded.     She has proteus in urine culture and that is being treated     We discussed she likely has struvite stone  Discussed importance of clearance    We identified and discussed risks of ureteroscopic stone clearance including but not limited to ureteral injury, infection,incomplete stone clearance, and possible necessity of unanticipated additional procedures. We also discussed that if ureter is narrow will need a stent first and secondary ureteroscopy. We discussed that the duration of the stent will be determined at end of case and while typically I usually leave in for 1 week if there is concern of injury or impacted stones or abrasions in ureter will need to leave it in longer. We discussed the side effects of stent which include urgency, frequency, hematuria, flank pain, dysuria.     OR for right ureteroscopy    Rocio Brantley MD

## 2024-05-01 NOTE — ANESTHESIA PROCEDURE NOTES
Airway       Patient location during procedure: OR  Staff -        CRNA: Cecille Soares APRN CRNA       Performed By: CRNA  Consent for Airway        Urgency: elective  Indications and Patient Condition       Indications for airway management: kian-procedural and airway protection       Induction type:intravenous       Mask difficulty assessment: 1 - vent by mask    Final Airway Details       Final airway type: supraglottic airway    Supraglottic Airway Details        Type: LMA       LMA size: 4    Post intubation assessment        Placement verified by: capnometry, equal breath sounds and chest rise        Number of attempts at approach: 1       Number of other approaches attempted: 0       Secured with: tape       Ease of procedure: easy       Dentition: Intact and Unchanged

## 2024-05-01 NOTE — ANESTHESIA CARE TRANSFER NOTE
Patient: Caitlin Oh    Procedure: Procedure(s):  Cystoscopy, Right Ureteroscopy, Right Holmium Laser Lithotripsy, Right Retrograde Pyelogram,  Right Ureteral Stent Placement       Diagnosis: Nephrolithiasis [N20.0]  Diagnosis Additional Information: No value filed.    Anesthesia Type:   General     Note:    Oropharynx: oropharynx clear of all foreign objects  Level of Consciousness: drowsy  Oxygen Supplementation: face mask    Independent Airway: airway patency satisfactory and stable  Dentition: dentition unchanged  Vital Signs Stable: post-procedure vital signs reviewed and stable  Report to RN Given: handoff report given  Patient transferred to: PACU    Handoff Report: Identifed the Patient, Identified the Reponsible Provider, Reviewed the pertinent medical history, Discussed the surgical course, Reviewed Intra-OP anesthesia mangement and issues during anesthesia, Set expectations for post-procedure period and Allowed opportunity for questions and acknowledgement of understanding      Vitals:  Vitals Value Taken Time   BP     Temp     Pulse     Resp     SpO2         Electronically Signed By: BLADIMIR Jacobo CRNA  May 1, 2024  4:12 PM

## 2024-05-01 NOTE — ANESTHESIA POSTPROCEDURE EVALUATION
Patient: Caitlin Oh    Procedure: Procedure(s):  Cystoscopy, Right Ureteroscopy, Right Holmium Laser Lithotripsy, Right Retrograde Pyelogram,  Right Ureteral Stent Placement       Anesthesia Type:  General    Note:  Disposition: Outpatient   Postop Pain Control: Uneventful            Sign Out: Well controlled pain   PONV: No   Neuro/Psych: Uneventful            Sign Out: Acceptable/Baseline neuro status   Airway/Respiratory: Uneventful            Sign Out: Acceptable/Baseline resp. status   CV/Hemodynamics: Uneventful            Sign Out: Acceptable CV status; No obvious hypovolemia; No obvious fluid overload   Other NRE: NONE   DID A NON-ROUTINE EVENT OCCUR? No           Last vitals:  Vitals Value Taken Time   /81 05/01/24 1630   Temp 36.2  C (97.2  F) 05/01/24 1612   Pulse 56 05/01/24 1641   Resp 12 05/01/24 1641   SpO2 100 % 05/01/24 1641   Vitals shown include unfiled device data.    Electronically Signed By: BLADIMIR Jacobo CRNA  May 1, 2024  4:42 PM

## 2024-05-02 ENCOUNTER — TELEPHONE (OUTPATIENT)
Dept: UROLOGY | Facility: CLINIC | Age: 49
End: 2024-05-02
Payer: COMMERCIAL

## 2024-05-02 NOTE — TELEPHONE ENCOUNTER
[facility] Call Center    Phone Message    May a detailed message be left on voicemail: yes     Reason for Call: Patient is requesting a call back from her care team regarding an upcoming surgery. Please call patient back.    Action Taken: Other: [place] - Urology    Travel Screening: Not Applicable

## 2024-05-02 NOTE — TELEPHONE ENCOUNTER
Pt is requesting to speak with Radha regarding the date surgery is scheduled for in a couple of weeks.  No surgery dates currently scheduled.    Genoveva Ovideo  [facility] RN

## 2024-05-03 ENCOUNTER — MYC MEDICAL ADVICE (OUTPATIENT)
Dept: UROLOGY | Facility: CLINIC | Age: 49
End: 2024-05-03
Payer: COMMERCIAL

## 2024-05-03 NOTE — TELEPHONE ENCOUNTER
Called pt to triage and review STENT pain home treatment measures and common symptoms.  Pt confirms she took stool softeners and manage a BM this am.  Will continue bowel measures to ensure no further constipation.  Sujata MATHIAS   [facility] RN

## 2024-05-14 ENCOUNTER — ANESTHESIA EVENT (OUTPATIENT)
Dept: SURGERY | Facility: CLINIC | Age: 49
End: 2024-05-14
Payer: COMMERCIAL

## 2024-05-14 NOTE — ANESTHESIA PREPROCEDURE EVALUATION
Anesthesia Pre-Procedure Evaluation    Patient: Caitlin Oh   MRN: [id number] : 1975        Procedure : Procedure(s):  Cystoscopy, Right Ureteroscopy, Right Retrograde Pyelogram, Right Laser Lithotripsy, Possible Right Ureteral Stent Exchange, Possible Right Ureteral Stent Removal          Past Medical History:   Diagnosis Date    Bilateral bunions     Cervical high risk HPV (human papillomavirus) test positive 2015    Female infertility 07/10/2008    Undergoing ivf .     Kidney stones 2010    [facility] 11/24/10     Personal history of gestational diabetes 2012    diet controlled    Plantar fasciitis     s/p surgery      Past Surgical History:   Procedure Laterality Date    ABDOMEN SURGERY      BUNIONECTOMY Left 2018    Procedure: Left 5th metatarsal corrective osteotomy;  Surgeon: Nick Fuller DPM;  Location: [facility] OR    BUNIONECTOMY Left 2020    Procedure: BONE SMOOTHING 5TH DIGIT AND SOFT TISSUE RELEASE 5TH MPJ;  Surgeon: Alessandro Beltran DPM;  Location: [facility];  Service: Podiatry    BUNIONECTOMY RT/LT Bilateral 2009    COLONOSCOPY N/A 2021    Procedure: COLONOSCOPY;  Surgeon: Anderson Park DO;  Location: [facility] GI    COMBINED CYSTOSCOPY, RETROGRADES, URETEROSCOPY, LASER HOLMIUM LITHOTRIPSY URETER(S), INSERT STENT Right 2024    Procedure: Cystoscopy, Right Ureteroscopy, Right Holmium Laser Lithotripsy, Right Retrograde Pyelogram,  Right Ureteral Stent Placement;  Surgeon: Rocio Brantley MD;  Location: [facility] OR    CYSTOSCOPY, RETROGRADES, INSERT STENT URETER(S), COMBINED  2013    Procedure: COMBINED CYSTOSCOPY, RETROGRADES, INSERT STENT URETER(S);  Right Ureteral Stent Placement;  Surgeon: JUN Villar MD;  Location: [facility] OR    DILATION AND CURETTAGE, HYSTEROSCOPY, ABLATE ENDOMETRIUM THERMACHOICE, COMBINED N/A 04/15/2015    Procedure: COMBINED DILATION AND CURETTAGE, HYSTEROSCOPY, ABLATE ENDOMETRIUM THERMACHOICE;   Surgeon: Munira Goddard MD;  Location: [facility] OR    DILATION AND CURETTAGE, OPERATIVE HYSTEROSCOPY WITH MORCELLATOR, COMBINED N/A 11/07/2018    Procedure: COMBINED DILATION AND CURETTAGE, OPERATIVE HYSTEROSCOPY WITH MORCELLATOR;  Surgeon: Munira Goddard MD;  Location: [facility] OR    ESOPHAGOSCOPY, GASTROSCOPY, DUODENOSCOPY (EGD), COMBINED N/A 10/15/2020    Procedure: ESOPHAGOGASTRODUODENOSCOPY (EGD);  Surgeon: Brandon Gardner MD;  Location: [facility]    EXTRACORPOREAL SHOCK WAVE LITHOTRIPSY (ESWL)  09/18/2013    Procedure: EXTRACORPOREAL SHOCK WAVE LITHOTRIPSY (ESWL);  Right Extracorporeal Shock Wave Lithotripsy;  Surgeon: JUN Villar MD;  Location: [facility] OR    FOOT SURGERY      for plantar fasciitis    FOOT SURGERY      HC TOOTH EXTRACTION W/FORCEP      wisdom teeth    LAPAROSCOPY DIAGNOSTIC (GYN)  2007    infertility    LASER HOLMIUM LITHOTRIPSY URETER(S), INSERT STENT, COMBINED  09/27/2013    Procedure: COMBINED CYSTOSCOPY, URETEROSCOPY, LASER HOLMIUM LITHOTRIPSY URETER(S), INSERT STENT;  Right Ureteroscopic Stone Extraction and Possible Stent Placement;  Surgeon: JUN Villar MD;  Location: [facility] OR    MAMMOPLASTY REDUCTION BILATERAL Bilateral 01/26/2022    Procedure: BILATERAL REDUCTION MAMMAPLASTY;  Surgeon: Shamir Marin MD;  Location:  OR    OTHER SURGICAL HISTORY      uterine ablation    PELVIC LAPAROSCOPY      TONSILLECTOMY  1983    TONSILLECTOMY        Allergies   Allergen Reactions    Penicillins Rash      Social History     Tobacco Use    Smoking status: Never    Smokeless tobacco: Never   Substance Use Topics    Alcohol use: No      Wt Readings from Last 1 Encounters:   05/01/24 82.1 kg (181 lb)        Anesthesia Evaluation   Pt has had prior anesthetic. Type: General, MAC and Regional.    No history of anesthetic complications       ROS/MED HX  ENT/Pulmonary:  - neg pulmonary ROS     Neurologic:     (+)      migraines,                          Cardiovascular:  - neg cardiovascular ROS    "  METS/Exercise Tolerance: >4 METS    Hematologic:  - neg hematologic  ROS     Musculoskeletal:  - neg musculoskeletal ROS     GI/Hepatic:  - neg GI/hepatic ROS     Renal/Genitourinary:     (+) renal disease,      Nephrolithiasis ,       Endo:  - neg endo ROS     Psychiatric/Substance Use:  - neg psychiatric ROS     Infectious Disease:  - neg infectious disease ROS     Malignancy:  - neg malignancy ROS     Other:  - neg other ROS          Physical Exam    Airway  airway exam normal      Mallampati: I   TM distance: > 3 FB   Neck ROM: full   Mouth opening: > 3 cm    Respiratory Devices and Support         Dental       (+) Minor Abnormalities - some fillings, tiny chips      Cardiovascular   cardiovascular exam normal       Rhythm and rate: regular and normal     Pulmonary   pulmonary exam normal        breath sounds clear to auscultation         OUTSIDE LABS:  CBC:   Lab Results   Component Value Date    WBC 6.9 12/06/2023    WBC 6.1 08/07/2023    HGB 12.5 12/06/2023    HGB 13.5 08/07/2023    HCT 37.3 12/06/2023    HCT 40.5 08/07/2023     12/06/2023     08/07/2023     BMP:   Lab Results   Component Value Date     04/18/2024     12/06/2023    POTASSIUM 4.3 04/18/2024    POTASSIUM 3.7 12/06/2023    CHLORIDE 106 04/18/2024    CHLORIDE 109 (H) 12/06/2023    CO2 24 04/18/2024    CO2 21 (L) 12/06/2023    BUN 12.5 04/18/2024    BUN 15.5 12/06/2023    CR 0.76 04/18/2024    CR 0.71 12/06/2023    GLC 95 04/18/2024     (H) 12/06/2023     COAGS: No results found for: \"PTT\", \"INR\", \"FIBR\"  POC:   Lab Results   Component Value Date    HCG Negative 10/15/2020    HCGS Negative 12/06/2023     HEPATIC:   Lab Results   Component Value Date    ALBUMIN 4.6 08/07/2023    PROTTOTAL 7.2 08/07/2023    ALT 36 08/07/2023    AST 38 08/07/2023    ALKPHOS 80 08/07/2023    BILITOTAL 0.4 08/07/2023     OTHER:   Lab Results   Component Value Date    A1C 5.5 07/11/2012    FEDE 8.9 04/18/2024    PHOS 3.2 12/27/2013    " "MAG 2.2 12/27/2013    TSH 2.13 04/24/2017    CRP 5.7 10/25/2017    SED 10 02/22/2023       Anesthesia Plan    ASA Status:  2       Anesthesia Type: General.     - Airway: LMA   Induction: Propofol, Intravenous.   Maintenance: TIVA.        Consents    Anesthesia Plan(s) and associated risks, benefits, and realistic alternatives discussed. Questions answered and patient/representative(s) expressed understanding.     - Discussed: Risks, Benefits and Alternatives for BOTH SEDATION and the PROCEDURE were discussed     - Discussed with:  Patient      - Extended Intubation/Ventilatory Support Discussed: No.      - Patient is DNR/DNI Status: No     Use of blood products discussed: No .     Postoperative Care    Pain management: IV analgesics, Oral pain medications, Multi-modal analgesia.   PONV prophylaxis: Ondansetron (or other 5HT-3), Dexamethasone or Solumedrol, Background Propofol Infusion     Comments:               Jaydon Richards CRNA, APRN CRNA    I have reviewed the pertinent notes and labs in the chart from the past 30 days and (re)examined the patient.  Any updates or changes from those notes are reflected in this note.              # Overweight: Estimated body mass index is 29.21 kg/m  as calculated from the following:    Height as of 5/1/24: 1.676 m (5' 6\").    Weight as of 5/1/24: 82.1 kg (181 lb).      "

## 2024-05-15 ENCOUNTER — HOSPITAL ENCOUNTER (OUTPATIENT)
Facility: CLINIC | Age: 49
Discharge: HOME | End: 2024-05-15
Attending: STUDENT IN AN ORGANIZED HEALTH CARE EDUCATION/TRAINING PROGRAM | Admitting: STUDENT IN AN ORGANIZED HEALTH CARE EDUCATION/TRAINING PROGRAM
Payer: COMMERCIAL

## 2024-05-15 ENCOUNTER — ANESTHESIA (OUTPATIENT)
Dept: SURGERY | Facility: CLINIC | Age: 49
End: 2024-05-15
Payer: COMMERCIAL

## 2024-05-15 ENCOUNTER — APPOINTMENT (OUTPATIENT)
Dept: GENERAL RADIOLOGY | Facility: CLINIC | Age: 49
End: 2024-05-15
Attending: STUDENT IN AN ORGANIZED HEALTH CARE EDUCATION/TRAINING PROGRAM
Payer: COMMERCIAL

## 2024-05-15 VITALS
BODY MASS INDEX: 29.09 KG/M2 | HEART RATE: 61 BPM | WEIGHT: 181 LBS | OXYGEN SATURATION: 99 % | TEMPERATURE: 97.6 F | HEIGHT: 66 IN | DIASTOLIC BLOOD PRESSURE: 74 MMHG | SYSTOLIC BLOOD PRESSURE: 148 MMHG | RESPIRATION RATE: 16 BRPM

## 2024-05-15 DIAGNOSIS — N20.0 NEPHROLITHIASIS: ICD-10-CM

## 2024-05-15 PROCEDURE — 250N000011 HC RX IP 250 OP 636: Performed by: NURSE ANESTHETIST, CERTIFIED REGISTERED

## 2024-05-15 PROCEDURE — 250N000013 HC RX MED GY IP 250 OP 250 PS 637: Performed by: NURSE ANESTHETIST, CERTIFIED REGISTERED

## 2024-05-15 PROCEDURE — 52356 CYSTO/URETERO W/LITHOTRIPSY: CPT | Mod: RT | Performed by: STUDENT IN AN ORGANIZED HEALTH CARE EDUCATION/TRAINING PROGRAM

## 2024-05-15 PROCEDURE — 710N000009 HC RECOVERY PHASE 1, LEVEL 1, PER MIN: Performed by: STUDENT IN AN ORGANIZED HEALTH CARE EDUCATION/TRAINING PROGRAM

## 2024-05-15 PROCEDURE — C2617 STENT, NON-COR, TEM W/O DEL: HCPCS | Performed by: STUDENT IN AN ORGANIZED HEALTH CARE EDUCATION/TRAINING PROGRAM

## 2024-05-15 PROCEDURE — C1894 INTRO/SHEATH, NON-LASER: HCPCS | Performed by: STUDENT IN AN ORGANIZED HEALTH CARE EDUCATION/TRAINING PROGRAM

## 2024-05-15 PROCEDURE — 271N000001 HC OR GENERAL SUPPLY NON-STERILE: Performed by: STUDENT IN AN ORGANIZED HEALTH CARE EDUCATION/TRAINING PROGRAM

## 2024-05-15 PROCEDURE — 258N000003 HC RX IP 258 OP 636: Performed by: NURSE ANESTHETIST, CERTIFIED REGISTERED

## 2024-05-15 PROCEDURE — 272N000001 HC OR GENERAL SUPPLY STERILE: Performed by: STUDENT IN AN ORGANIZED HEALTH CARE EDUCATION/TRAINING PROGRAM

## 2024-05-15 PROCEDURE — 250N000011 HC RX IP 250 OP 636: Performed by: STUDENT IN AN ORGANIZED HEALTH CARE EDUCATION/TRAINING PROGRAM

## 2024-05-15 PROCEDURE — 255N000002 HC RX 255 OP 636: Performed by: STUDENT IN AN ORGANIZED HEALTH CARE EDUCATION/TRAINING PROGRAM

## 2024-05-15 PROCEDURE — 87186 SC STD MICRODIL/AGAR DIL: CPT | Performed by: STUDENT IN AN ORGANIZED HEALTH CARE EDUCATION/TRAINING PROGRAM

## 2024-05-15 PROCEDURE — 258N000001 HC RX 258: Performed by: STUDENT IN AN ORGANIZED HEALTH CARE EDUCATION/TRAINING PROGRAM

## 2024-05-15 PROCEDURE — 999N000179 XR SURGERY CARM FLUORO LESS THAN 5 MIN W STILLS: Mod: TC

## 2024-05-15 PROCEDURE — 82365 CALCULUS SPECTROSCOPY: CPT | Performed by: STUDENT IN AN ORGANIZED HEALTH CARE EDUCATION/TRAINING PROGRAM

## 2024-05-15 PROCEDURE — 360N000084 HC SURGERY LEVEL 4 W/ FLUORO, PER MIN: Performed by: STUDENT IN AN ORGANIZED HEALTH CARE EDUCATION/TRAINING PROGRAM

## 2024-05-15 PROCEDURE — 250N000009 HC RX 250: Performed by: NURSE ANESTHETIST, CERTIFIED REGISTERED

## 2024-05-15 PROCEDURE — 370N000017 HC ANESTHESIA TECHNICAL FEE, PER MIN: Performed by: STUDENT IN AN ORGANIZED HEALTH CARE EDUCATION/TRAINING PROGRAM

## 2024-05-15 PROCEDURE — C1747 HC OR ENDOSCOPE, SGL USE,URINARY TRACT, IMAGING/ILLUMINATION DEVICE: HCPCS | Performed by: STUDENT IN AN ORGANIZED HEALTH CARE EDUCATION/TRAINING PROGRAM

## 2024-05-15 PROCEDURE — 74420 UROGRAPHY RTRGR +-KUB: CPT | Mod: 26 | Performed by: STUDENT IN AN ORGANIZED HEALTH CARE EDUCATION/TRAINING PROGRAM

## 2024-05-15 PROCEDURE — 710N000012 HC RECOVERY PHASE 2, PER MINUTE: Performed by: STUDENT IN AN ORGANIZED HEALTH CARE EDUCATION/TRAINING PROGRAM

## 2024-05-15 PROCEDURE — 999N000141 HC STATISTIC PRE-PROCEDURE NURSING ASSESSMENT: Performed by: STUDENT IN AN ORGANIZED HEALTH CARE EDUCATION/TRAINING PROGRAM

## 2024-05-15 PROCEDURE — C1769 GUIDE WIRE: HCPCS | Performed by: STUDENT IN AN ORGANIZED HEALTH CARE EDUCATION/TRAINING PROGRAM

## 2024-05-15 DEVICE — URETERAL STENT
Type: IMPLANTABLE DEVICE | Site: URETHRA | Status: FUNCTIONAL
Brand: PERCUFLEX™ PLUS

## 2024-05-15 RX ORDER — ONDANSETRON 2 MG/ML
4 INJECTION INTRAMUSCULAR; INTRAVENOUS EVERY 30 MIN PRN
Status: DISCONTINUED | OUTPATIENT
Start: 2024-05-15 | End: 2024-05-15 | Stop reason: HOSPADM

## 2024-05-15 RX ORDER — IOPAMIDOL 612 MG/ML
INJECTION, SOLUTION INTRAVASCULAR PRN
Status: DISCONTINUED | OUTPATIENT
Start: 2024-05-15 | End: 2024-05-15 | Stop reason: HOSPADM

## 2024-05-15 RX ORDER — HYDROMORPHONE HCL IN WATER/PF 6 MG/30 ML
0.4 PATIENT CONTROLLED ANALGESIA SYRINGE INTRAVENOUS EVERY 5 MIN PRN
Status: DISCONTINUED | OUTPATIENT
Start: 2024-05-15 | End: 2024-05-15 | Stop reason: HOSPADM

## 2024-05-15 RX ORDER — DEXAMETHASONE SODIUM PHOSPHATE 4 MG/ML
INJECTION, SOLUTION INTRA-ARTICULAR; INTRALESIONAL; INTRAMUSCULAR; INTRAVENOUS; SOFT TISSUE PRN
Status: DISCONTINUED | OUTPATIENT
Start: 2024-05-15 | End: 2024-05-15

## 2024-05-15 RX ORDER — FENTANYL CITRATE 50 UG/ML
50 INJECTION, SOLUTION INTRAMUSCULAR; INTRAVENOUS EVERY 5 MIN PRN
Status: DISCONTINUED | OUTPATIENT
Start: 2024-05-15 | End: 2024-05-15 | Stop reason: HOSPADM

## 2024-05-15 RX ORDER — FENTANYL CITRATE 50 UG/ML
25 INJECTION, SOLUTION INTRAMUSCULAR; INTRAVENOUS EVERY 5 MIN PRN
Status: DISCONTINUED | OUTPATIENT
Start: 2024-05-15 | End: 2024-05-15 | Stop reason: HOSPADM

## 2024-05-15 RX ORDER — FENTANYL CITRATE 50 UG/ML
INJECTION, SOLUTION INTRAMUSCULAR; INTRAVENOUS PRN
Status: DISCONTINUED | OUTPATIENT
Start: 2024-05-15 | End: 2024-05-15

## 2024-05-15 RX ORDER — SODIUM CHLORIDE, SODIUM LACTATE, POTASSIUM CHLORIDE, CALCIUM CHLORIDE 600; 310; 30; 20 MG/100ML; MG/100ML; MG/100ML; MG/100ML
INJECTION, SOLUTION INTRAVENOUS CONTINUOUS
Status: DISCONTINUED | OUTPATIENT
Start: 2024-05-15 | End: 2024-05-15 | Stop reason: HOSPADM

## 2024-05-15 RX ORDER — DEXAMETHASONE SODIUM PHOSPHATE 4 MG/ML
4 INJECTION, SOLUTION INTRA-ARTICULAR; INTRALESIONAL; INTRAMUSCULAR; INTRAVENOUS; SOFT TISSUE
Status: DISCONTINUED | OUTPATIENT
Start: 2024-05-15 | End: 2024-05-15 | Stop reason: HOSPADM

## 2024-05-15 RX ORDER — OXYCODONE HYDROCHLORIDE 5 MG/1
10 TABLET ORAL
Status: DISCONTINUED | OUTPATIENT
Start: 2024-05-15 | End: 2024-05-15 | Stop reason: HOSPADM

## 2024-05-15 RX ORDER — PROPOFOL 10 MG/ML
INJECTION, EMULSION INTRAVENOUS PRN
Status: DISCONTINUED | OUTPATIENT
Start: 2024-05-15 | End: 2024-05-15

## 2024-05-15 RX ORDER — KETAMINE HYDROCHLORIDE 10 MG/ML
INJECTION INTRAMUSCULAR; INTRAVENOUS PRN
Status: DISCONTINUED | OUTPATIENT
Start: 2024-05-15 | End: 2024-05-15

## 2024-05-15 RX ORDER — NALOXONE HYDROCHLORIDE 0.4 MG/ML
0.1 INJECTION, SOLUTION INTRAMUSCULAR; INTRAVENOUS; SUBCUTANEOUS
Status: DISCONTINUED | OUTPATIENT
Start: 2024-05-15 | End: 2024-05-15 | Stop reason: HOSPADM

## 2024-05-15 RX ORDER — LIDOCAINE HYDROCHLORIDE 20 MG/ML
INJECTION, SOLUTION INFILTRATION; PERINEURAL PRN
Status: DISCONTINUED | OUTPATIENT
Start: 2024-05-15 | End: 2024-05-15

## 2024-05-15 RX ORDER — ACETAMINOPHEN 325 MG/1
975 TABLET ORAL ONCE
Status: COMPLETED | OUTPATIENT
Start: 2024-05-15 | End: 2024-05-15

## 2024-05-15 RX ORDER — ONDANSETRON 4 MG/1
4 TABLET, ORALLY DISINTEGRATING ORAL EVERY 30 MIN PRN
Status: DISCONTINUED | OUTPATIENT
Start: 2024-05-15 | End: 2024-05-15 | Stop reason: HOSPADM

## 2024-05-15 RX ORDER — LIDOCAINE 40 MG/G
CREAM TOPICAL
Status: DISCONTINUED | OUTPATIENT
Start: 2024-05-15 | End: 2024-05-15 | Stop reason: HOSPADM

## 2024-05-15 RX ORDER — GLYCOPYRROLATE 0.2 MG/ML
INJECTION, SOLUTION INTRAMUSCULAR; INTRAVENOUS PRN
Status: DISCONTINUED | OUTPATIENT
Start: 2024-05-15 | End: 2024-05-15

## 2024-05-15 RX ORDER — OXYCODONE HYDROCHLORIDE 5 MG/1
5 TABLET ORAL
Status: COMPLETED | OUTPATIENT
Start: 2024-05-15 | End: 2024-05-15

## 2024-05-15 RX ORDER — PROPOFOL 10 MG/ML
INJECTION, EMULSION INTRAVENOUS CONTINUOUS PRN
Status: DISCONTINUED | OUTPATIENT
Start: 2024-05-15 | End: 2024-05-15

## 2024-05-15 RX ORDER — ONDANSETRON 2 MG/ML
INJECTION INTRAMUSCULAR; INTRAVENOUS PRN
Status: DISCONTINUED | OUTPATIENT
Start: 2024-05-15 | End: 2024-05-15

## 2024-05-15 RX ORDER — FUROSEMIDE 10 MG/ML
10 INJECTION INTRAMUSCULAR; INTRAVENOUS ONCE
Status: DISCONTINUED | OUTPATIENT
Start: 2024-05-15 | End: 2024-05-15 | Stop reason: HOSPADM

## 2024-05-15 RX ORDER — HYDROMORPHONE HCL IN WATER/PF 6 MG/30 ML
0.2 PATIENT CONTROLLED ANALGESIA SYRINGE INTRAVENOUS EVERY 5 MIN PRN
Status: DISCONTINUED | OUTPATIENT
Start: 2024-05-15 | End: 2024-05-15 | Stop reason: HOSPADM

## 2024-05-15 RX ORDER — GABAPENTIN 300 MG/1
300 CAPSULE ORAL
Status: COMPLETED | OUTPATIENT
Start: 2024-05-15 | End: 2024-05-15

## 2024-05-15 RX ORDER — CEFTRIAXONE 2 G/1
2 INJECTION, POWDER, FOR SOLUTION INTRAMUSCULAR; INTRAVENOUS
Status: COMPLETED | OUTPATIENT
Start: 2024-05-15 | End: 2024-05-15

## 2024-05-15 RX ORDER — FENTANYL CITRATE 50 UG/ML
25 INJECTION, SOLUTION INTRAMUSCULAR; INTRAVENOUS
Status: DISCONTINUED | OUTPATIENT
Start: 2024-05-15 | End: 2024-05-15 | Stop reason: HOSPADM

## 2024-05-15 RX ADMIN — FENTANYL CITRATE 100 MCG: 50 INJECTION INTRAMUSCULAR; INTRAVENOUS at 09:30

## 2024-05-15 RX ADMIN — CEFTRIAXONE SODIUM 2 G: 2 INJECTION, POWDER, FOR SOLUTION INTRAMUSCULAR; INTRAVENOUS at 08:20

## 2024-05-15 RX ADMIN — PROPOFOL 150 MG: 10 INJECTION, EMULSION INTRAVENOUS at 09:05

## 2024-05-15 RX ADMIN — GLYCOPYRROLATE 0.2 MG: 0.2 INJECTION, SOLUTION INTRAMUSCULAR; INTRAVENOUS at 09:02

## 2024-05-15 RX ADMIN — SODIUM CHLORIDE, POTASSIUM CHLORIDE, SODIUM LACTATE AND CALCIUM CHLORIDE: 600; 310; 30; 20 INJECTION, SOLUTION INTRAVENOUS at 08:19

## 2024-05-15 RX ADMIN — LIDOCAINE HYDROCHLORIDE 100 MG: 20 INJECTION, SOLUTION INFILTRATION; PERINEURAL at 09:05

## 2024-05-15 RX ADMIN — MIDAZOLAM 2 MG: 1 INJECTION INTRAMUSCULAR; INTRAVENOUS at 08:59

## 2024-05-15 RX ADMIN — FENTANYL CITRATE 100 MCG: 50 INJECTION INTRAMUSCULAR; INTRAVENOUS at 09:05

## 2024-05-15 RX ADMIN — PROPOFOL 200 MCG/KG/MIN: 10 INJECTION, EMULSION INTRAVENOUS at 09:05

## 2024-05-15 RX ADMIN — ONDANSETRON 4 MG: 2 INJECTION INTRAMUSCULAR; INTRAVENOUS at 09:10

## 2024-05-15 RX ADMIN — KETAMINE HYDROCHLORIDE 50 MG: 10 INJECTION INTRAMUSCULAR; INTRAVENOUS at 09:05

## 2024-05-15 RX ADMIN — OXYCODONE HYDROCHLORIDE 5 MG: 5 TABLET ORAL at 10:39

## 2024-05-15 RX ADMIN — GABAPENTIN 300 MG: 300 CAPSULE ORAL at 08:11

## 2024-05-15 RX ADMIN — DEXAMETHASONE SODIUM PHOSPHATE 8 MG: 4 INJECTION, SOLUTION INTRA-ARTICULAR; INTRALESIONAL; INTRAMUSCULAR; INTRAVENOUS; SOFT TISSUE at 09:10

## 2024-05-15 RX ADMIN — ACETAMINOPHEN 975 MG: 325 TABLET, FILM COATED ORAL at 08:11

## 2024-05-15 ASSESSMENT — ACTIVITIES OF DAILY LIVING (ADL)
ADLS_ACUITY_SCORE: 20

## 2024-05-15 NOTE — PROGRESS NOTES
I met with Heather today. 2 weeks ago I did a ureteroscopy to treat a renal stone. Her urine culture was proteus and the stone appeared to be struvite. However the ureter was narrow and I was unable to basket extract pieces. Given the struvite nature wanted to clear all the fragments so we are doing a second look today after stent placement after passive dilation of ureter    We identified and discussed risks of ureteroscopic stone clearance including but not limited to ureteral injury, infection,incomplete stone clearance, and possible necessity of unanticipated additional procedures. WWe discussed that the duration of the stent will be determined at end of case and while typically I usually leave in for 1 week if there is concern of injury or impacted stones or abrasions in ureter will need to leave it in longer. We discussed the side effects of stent which include urgency, frequency, hematuria, flank pain, dysuria. I discussed that may leave the stent out if ureter looks well.    Rocio Brantley MD

## 2024-05-15 NOTE — DISCHARGE INSTRUCTIONS
You have a stent, this is exiting the urethra  with a string and will be what you use to extract the stent    The string is taped to the inner thigh.      You may remove the stent on 5/20/2024 by pulling on the blackstring.  The stent is blue and has a curl on the top and bottom side.      We typically recommend you do this in the bathtub or shower as it may make you have the urge to urinate.    If the stent accidentally pulls out, you have our permission to remove it.  We highly recommend however that you leave the stent in place as best you can for the entire recommended time from surgery.    In the event that youlose the ability to see the string DO NOT PANIC!  The string will occasionally retract into the bladder.  If this happens try to void and see if the string comes out afterwards.  If it still does not come out, calmly callour office, we will have to bring you in for an appointment to remove the stent in the other manner.            When is a stent needed?  A stent is placed if your urologist thinks the urine might not drain well after kidney stone surgery.  Stents are often placed to stop stone fragments or blood from blocking urine leaving the kidney and to  prevent spasms in the ureter. Stents can be left with or without a string.    What can I expect with a stent?  It is very common for stents to cause symptoms following surgery. You may experience some of the  following:   Urinary frequency and urgency   Burning or pain in your lower back during urination   Blood in the urine   Sensation of incomplete emptying of the bladder   Discomfort or pain in the bladder, lower abdomen and/or lower back    How to manage stent symptoms?  Drink plenty of fluids  Medications like Tamsulosin (e.g., Flomax) have been shown to reduce pain  Pain medication can be helpful in reducing discomfort or pain  Use a heating pad or take a warm bath for relief of pain    Will this affect daily activities?  Physical Activity: You  may restart your normal physical routine. If you see increased blood in your urine when you become more active, get off your feet, rest, and drink plenty of fluids.  Work Activities, Social Life & Travel: Having a stent should not affect work activities, social life, or travel. If you experience urinary symptoms, you may need to use the bathroom more often.  Sex: Having a stent should not affect your sex life. However, if you have a stent with a string coming outside the body through the urethra, sexual activities may be difficult. Most patients have some of the symptoms, but they usually go away once the stent is removed.    How is the stent removed?   Your stent is often removed within the first two weeks in the doctor s office.   If the stent was left with a string, you can remove it at home or have your  doctor s office remove it.   Before the stent is removed, drink plenty of water and take pain medication.    What can I expect after the stent is removed?  While most patients do not experience any symptoms after the stent is removed, some patients experience cramping due to bladder or ureteral spasms which may lead to feelings of nausea or urinary urgency. This is not unusual and will pass with time.  Continue to drink a lot of liquids and keep taking your pain medication as directed. Some doctors may prescribe medications to help alleviate these symptom    When to call your doctor?  Nausea, vomiting and unable to drink or keep down liquids  Chills, fever higher than 101  The stent falls out  Difficulty or inability to urinate  Constantly leaking urine  Severe pain that is not relieved by pain medication    Remember  These symptoms are common, and do not require medical help. They will pass with time: If you are still concerned, please contact your doctor s office.  Blood in urine  Pain or discomfort  Urinary frequency or urgency  Burning or pain during urination  Sensation of incomplete emptying of the  bladder          Follow-Up:  - Follow up with Dr. Brantley in 2 months with ultrasound of kidney  - Call your primary care provider to touch base regarding your recent procedure.  - Call or return sooner than your regularly scheduled visit if you develop any of the following: fever (greater than 101.5), uncontrolled pain, uncontrolled nausea or vomiting, as well as increased redness, swelling, or drainage from your wound.     Phone numbers:   - Monday through Friday 8am to 4:30pm: Call [phone] with questions or to schedule or confirm appointment.                          Same Day Surgery Discharge Instructions  Special Precautions After Surgery - Adult    It is not unusual to feel lightheaded or faint, up to 24 hours after surgery or while taking pain medication.  If you have these symptoms; sit for a few minutes before standing and have someone assist you when getting up.  You should rest and relax for the next 24 hours and must have someone stay with you for at least 24 hours after your discharge.  DO NOT DRIVE any vehicle or operate mechanical equipment for 24 hours following the end of your surgery.  DO NOT DRIVE while taking narcotic pain medications that have been prescribed by your physician.  If you had a limb operated on, you must be able to use it fully to drive.  DO NOT drink alcoholic beverages for 24 hours following surgery or while taking prescription pain medication.  Drink clear liquids (apple juice, ginger ale, broth, 7-Up, etc.).  Progress to your regular diet as you feel able.  Any questions call your physician and do not make important decisions for 24 hours.    ACTIVITY      __________________________________________________________________________________________________________________________________  IMPORTANT NUMBERS:    [facility] Main Number:  [phone], [phone]  Pharmacy:  [phone]  Same Day Surgery:  [phone], Monday - Friday until 8:30 p.m.  Urgent Care:   [phone]  Emergency Room:  [phone]      [facility]:  [phone]                                                                             [facility]:  [phone] option 1  [facility]:  [phone]     [facility]:  [phone]   [facility]:  [phone]   Home Medical Equipment: [phone]  [facility]:  [phone]

## 2024-05-15 NOTE — ANESTHESIA POSTPROCEDURE EVALUATION
Patient: Caitlin Oh    Procedure: Procedure(s):  Cystoscopy, Right Ureteroscopy, Right Retrograde Pyelogram, Right Laser Lithotripsy, Right Ureteral Stent Exchange, Right Ureteral Stent Removal       Anesthesia Type:  General    Note:  Disposition: Outpatient   Postop Pain Control: Uneventful            Sign Out: Well controlled pain   PONV: No   Neuro/Psych: Uneventful            Sign Out: Acceptable/Baseline neuro status   Airway/Respiratory: Uneventful            Sign Out: Acceptable/Baseline resp. status   CV/Hemodynamics: Uneventful            Sign Out: Acceptable CV status; No obvious hypovolemia; No obvious fluid overload   Other NRE: NONE   DID A NON-ROUTINE EVENT OCCUR? No           Last vitals:  Vitals Value Taken Time   /91 05/15/24 1030   Temp 36.4  C (97.6  F) 05/15/24 1045   Pulse 68 05/15/24 1045   Resp 14 05/15/24 1045   SpO2 100 % 05/15/24 1045       Electronically Signed By: Jaydon Richards CRNA, APRN CRNA  May 15, 2024  11:07 AM

## 2024-05-15 NOTE — BRIEF OP NOTE
[facility]    Brief Operative Note    Pre-operative diagnosis: Nephrolithiasis [N20.0]  Post-operative diagnosis Same as pre-operative diagnosis    Procedure: Cystoscopy, Right Ureteroscopy, Right Retrograde Pyelogram, Right Laser Lithotripsy, Right Ureteral Stent Exchange, Right Ureteral Stent Removal, Right - Urethra    Surgeon: Surgeons and Role:     * Rocio Brantley MD - Primary  Anesthesia: General   Estimated Blood Loss: 2 mL from 5/15/2024  9:00 AM to 5/15/2024 10:11 AM      Drains: Right 4.8 x 24 JJ stent with string  Specimens:   ID Type Source Tests Collected by Time Destination   A : Right Kidney Stone Calculus/Stone Kidney, Right STONE ANALYSIS Rocio Brantley MD 5/15/2024  9:46 AM    B : Right Kidney Stone Calculus/Stone Kidney, Right AEROBIC BACTERIAL CULTURE ROUTINE Rocio Brantley MD 5/15/2024  9:47 AM      Findings:   Right renal stones fragmented and basket extracted,  .  Complications: None.  Implants:   Implant Name Type Inv. Item Serial No.  Lot No. LRB No. Used Action   STENT URETERAL PERCUFLEX PLUS [id number] - [id number] Stent STENT URETERAL PERCUFLEX PLUS [id number]  [employer] [id number] Right 1 Implanted

## 2024-05-15 NOTE — ANESTHESIA CARE TRANSFER NOTE
Patient: Caitlin Oh    Procedure: Procedure(s):  Cystoscopy, Right Ureteroscopy, Right Retrograde Pyelogram, Right Laser Lithotripsy, Possible Right Ureteral Stent Exchange, Possible Right Ureteral Stent Removal       Diagnosis: Nephrolithiasis [N20.0]  Diagnosis Additional Information: No value filed.    Anesthesia Type:   General     Note:    Oropharynx: oropharynx clear of all foreign objects and spontaneously breathing  Level of Consciousness: drowsy  Oxygen Supplementation: face mask and nasal cannula  Level of Supplemental Oxygen (L/min / FiO2): 2  Independent Airway: airway patency satisfactory and stable  Dentition: dentition unchanged  Vital Signs Stable: post-procedure vital signs reviewed and stable  Report to RN Given: handoff report given  Patient transferred to: PACU    Handoff Report: Identifed the Patient, Identified the Reponsible Provider, Reviewed the pertinent medical history, Discussed the surgical course, Reviewed Intra-OP anesthesia mangement and issues during anesthesia, Set expectations for post-procedure period and Allowed opportunity for questions and acknowledgement of understanding    Vitals:  Vitals Value Taken Time   /89 05/15/24 1012   Temp     Pulse 80 05/15/24 1014   Resp 13 05/15/24 1014   SpO2 99 % 05/15/24 1014   Vitals shown include unfiled device data.    Electronically Signed By: Jaydon Richards CRNA, APRN CRNA  May 15, 2024  10:15 AM

## 2024-05-15 NOTE — OP NOTE
OPERATIVE REPORT    PREOPERATIVE DIAGNOSIS:  Nephrolithiasis [N20.0]     POSTOPERATIVE DIAGNOSIS:  Same    PROCEDURES PERFORMED:   1. Cystoscopy  2. Right Ureteroscopy   3. Right  Laser Lithotripsy  4. Right Stone Basket Extraction  5. Right  Retrograde Pyelogram with interpretation of imaging  6. Right  Ureteral Stent exchange     STAFF SURGEON: Rocio Brantley MD was present and participatory for the entire case.   RESIDENT(S): None  FELLOW: None     ANESTHESIA: General    ESTIMATED BLOOD LOSS: <5 ml    DRAINS/TUBES:   Right  4.8 Faroese x 24cm double-J ureteral stent with String     IV FLUIDS: Please see dictated anesthesia record  COMPLICATIONS: None.   SPECIMEN:   Right renal stone for analysis and culture    SIGNIFICANT FINDINGS:   One larger stone fragment lasered and basket extracted, numerous smaller fragment basket extracted, got good clearance, only < 1 mm small fragments remaining that are few and that I couldn't basket    BRIEF OPERATIVE INDICATIONS:  Caitlin Oh is a(n) 49 year old female who presented with 12 mm right renal stone and I did a right ureteroscopy about 2 weeks ago. The stone likely struvite but ureter was narrow and I wasn't able to basket out pieces and given the infectious nature of stone I wanted to clear residual fragments so she is here for second look ureteroscopy. After a discussion of all risks, benefits, and alternatives, the patient elected to proceed with definitive stone management with a ureteroscopic approach.    After induction of general anesthesia the patient was repositioned in dorsal lithotomy and prepped and draped in the standard sterile fashion.  A time out was performed confirming the appropriate patient identity and planned procedure.  The patient received culture directed antibiotics and had bilateral sequential compression devices for DVT propylaxis.    A 22-Faroese rigid cystoscope was inserted into a well-lubricated urethra. The urethra was  unremarkable without stricture.      The previous indwelling ureteral stent was identified and removed with the flexible stent grasper to the level of the urethral meatus. A sensor wire was advanced up to the renal pelvis under fluoroscopic guidance and previous stent discarded.    Flexible ureteroscopy  I first went alongside wire and no ureteral stones noted. I went to kidney where there was one larger stone in renal pelvis and several smaller pieces in the lower calyx. I then advanced 11/13 x 36 cm sheath over the wire to proximal ureter.  The flexible ureteroscope was used to identify the larger stone and it was moved to upper calyx with halo basket. A 200 micron laser fiber was used at a setting of 0.8 J and 8 Hz and lithotripsy was performed to fragment the stone. I then used halo basket to remove all sizeable pieces. I did a retrograde pyelogram and mapped out kidney and examined each calyx and no other residual stones noted, there were few < 1 mm fragments that were too small to fit in basket. Pullback ureteroscopy was performed and showed no retained stone fragments or significant ureteral injury     Stent insertion  Right 4.8F 24 cm stent is inserted with good coil in kidney and bladder under fluoroscopic guidance. String secured to inner thigh       The bladder was drained    The patient tolerated the procedure well and there were no apparent complications. The patient  was transported to the postanesthesia care unit in stable condition.     POSTOP PLAN:  remove own stent in 5 days with extraction string and return to clinic in 2 months with Renal US  Keep on daily keflex for 30 days for supression given proteus and struvite stone

## 2024-05-15 NOTE — ANESTHESIA PROCEDURE NOTES
Airway    Staff -        CRNA: Jaydon Richards APRN CRNA       Performed By: CRNA  Consent for Airway        Urgency: elective  Indications and Patient Condition       Indications for airway management: kian-procedural       Induction type:intravenous       Mask difficulty assessment: 1 - vent by mask    Final Airway Details       Final airway type: supraglottic airway    Supraglottic Airway Details        Type: LMA       Brand: Air-Q       LMA size: 4    Post intubation assessment        Placement verified by: capnometry, equal breath sounds and chest rise        Number of attempts at approach: 1       Secured with: tape       Ease of procedure: easy       Dentition: Intact

## 2024-05-18 LAB
APPEARANCE STONE: NORMAL
COMPN STONE: NORMAL
SPECIMEN WT: 31 MG

## 2024-05-19 ENCOUNTER — HOSPITAL ENCOUNTER (EMERGENCY)
Facility: CLINIC | Age: 49
Discharge: HOME | End: 2024-05-19
Attending: FAMILY MEDICINE | Admitting: FAMILY MEDICINE
Payer: COMMERCIAL

## 2024-05-19 VITALS
SYSTOLIC BLOOD PRESSURE: 124 MMHG | HEART RATE: 60 BPM | WEIGHT: 181 LBS | HEIGHT: 66 IN | OXYGEN SATURATION: 96 % | BODY MASS INDEX: 29.09 KG/M2 | DIASTOLIC BLOOD PRESSURE: 73 MMHG | RESPIRATION RATE: 18 BRPM | TEMPERATURE: 99.3 F

## 2024-05-19 DIAGNOSIS — R10.9 RIGHT FLANK PAIN: ICD-10-CM

## 2024-05-19 DIAGNOSIS — R31.29 OTHER MICROSCOPIC HEMATURIA: ICD-10-CM

## 2024-05-19 LAB
ALBUMIN UR-MCNC: NEGATIVE MG/DL
APPEARANCE UR: CLEAR
BACTERIA #/AREA URNS HPF: ABNORMAL /HPF
BILIRUB UR QL STRIP: NEGATIVE
COLOR UR AUTO: COLORLESS
GLUCOSE UR STRIP-MCNC: NEGATIVE MG/DL
HGB UR QL STRIP: ABNORMAL
KETONES UR STRIP-MCNC: NEGATIVE MG/DL
LEUKOCYTE ESTERASE UR QL STRIP: NEGATIVE
MUCOUS THREADS #/AREA URNS LPF: PRESENT /LPF
NITRATE UR QL: NEGATIVE
PH UR STRIP: 6 [PH] (ref 5–7)
RBC URINE: 3 /HPF
SP GR UR STRIP: 1 (ref 1–1.03)
SQUAMOUS EPITHELIAL: 1 /HPF
UROBILINOGEN UR STRIP-MCNC: NORMAL MG/DL
WBC URINE: 1 /HPF

## 2024-05-19 PROCEDURE — 81001 URINALYSIS AUTO W/SCOPE: CPT | Performed by: FAMILY MEDICINE

## 2024-05-19 PROCEDURE — 99284 EMERGENCY DEPT VISIT MOD MDM: CPT | Performed by: FAMILY MEDICINE

## 2024-05-19 PROCEDURE — 99283 EMERGENCY DEPT VISIT LOW MDM: CPT | Performed by: FAMILY MEDICINE

## 2024-05-19 ASSESSMENT — COLUMBIA-SUICIDE SEVERITY RATING SCALE - C-SSRS
1. IN THE PAST MONTH, HAVE YOU WISHED YOU WERE DEAD OR WISHED YOU COULD GO TO SLEEP AND NOT WAKE UP?: NO
6. HAVE YOU EVER DONE ANYTHING, STARTED TO DO ANYTHING, OR PREPARED TO DO ANYTHING TO END YOUR LIFE?: NO
2. HAVE YOU ACTUALLY HAD ANY THOUGHTS OF KILLING YOURSELF IN THE PAST MONTH?: NO

## 2024-05-19 ASSESSMENT — ACTIVITIES OF DAILY LIVING (ADL)
ADLS_ACUITY_SCORE: 33
ADLS_ACUITY_SCORE: 35

## 2024-05-19 NOTE — ED TRIAGE NOTES
Patient had right ureteral stent removed today ~1000. Felt fine today after removal but then started to have right flank aching, then pain progressed. She reports it was technically supposed to come out tomorrow but it was falling out on its own. Ibuprofen last 1700. Denies blood in urine after stent removal.      Triage Assessment (Adult)       Row Name 05/19/24 9577          Triage Assessment    Airway WDL WDL        Respiratory WDL    Respiratory WDL WDL        Skin Circulation/Temperature WDL    Skin Circulation/Temperature WDL WDL        Cardiac WDL    Cardiac WDL WDL        Peripheral/Neurovascular WDL    Peripheral Neurovascular WDL WDL        Cognitive/Neuro/Behavioral WDL    Cognitive/Neuro/Behavioral WDL WDL

## 2024-05-19 NOTE — ED PROVIDER NOTES
HPI   Patient is a 49-year-old female presenting with new right low back and flank pain.  She has a known history of ureteral stones.  She has a recent history of renal stone requiring 2 different procedures.  Her first procedure occurred on 5/1 and involved a laser lithotripsy and stent.  The second procedure occurred on 5/15 and involved a laser lithotripsy, stone basket retrieval, and stent exchange.  Struvite stones identified.  The urology procedural note was read in its entirety.    The patient was having incontinence and burning with urination 2 days ago.  She felt the stent had moved and she was concerned that the symptoms were related to stent coming out early.  As result, she pulled the stent out one day before it was supposed to be officially removed.  Then, today she was in the car on the way home from a basketball tournament when she experienced severe right low back and flank pain.  No abdominal or pelvic pain.  No new dysuria, urgency, frequency of urination.  No hematuria.  She denies recent bleeding even after the stent was removed.  No fever.  No trauma or injury.  She denies constipation, multiple bowel movements today after taking stool softener yesterday.  No vaginal discharge or irritation of the ordinary.  No vaginal bleeding.  No fever.      Allergies:  Allergies   Allergen Reactions    Penicillins Rash     Problem List:    Patient Active Problem List    Diagnosis Date Noted    Cervical high risk HPV (human papillomavirus) test positive 11/04/2015     Priority: Medium     10/28/2015:Pap--NIL, +HR HPV (NOT type 16 or 18). Plan to repeat Pap + HPV cotesting in 1 year. Reminder placed in TRACKING  9/27/16:Pap--NIL, neg HPV. Plan to repeat Pap + HPV cotesting in 3 years (2019).  8/31/20 NIL pap, Neg HPV. Plan cotest in 3 years.         Menstrual migraine 12/02/2014     Priority: Medium    Tension headache, chronic 03/22/2013     Priority: Medium    CARDIOVASCULAR SCREENING; LDL GOAL LESS THAN  160 10/31/2010     Priority: Medium      Past Medical History:    Past Medical History:   Diagnosis Date    Bilateral bunions     Cervical high risk HPV (human papillomavirus) test positive 11/04/2015    Female infertility 07/10/2008    Kidney stones 11/30/2010    Personal history of gestational diabetes 07/11/2012    Plantar fasciitis      Past Surgical History:    Past Surgical History:   Procedure Laterality Date    ABDOMEN SURGERY      BUNIONECTOMY Left 12/13/2018    Procedure: Left 5th metatarsal corrective osteotomy;  Surgeon: Nick Fuller DPM;  Location: [facility]    BUNIONECTOMY Left 05/26/2020    Procedure: BONE SMOOTHING 5TH DIGIT AND SOFT TISSUE RELEASE 5TH MPJ;  Surgeon: Alessandro Beltran DPM;  Location: [facility];  Service: Podiatry    BUNIONECTOMY RT/LT Bilateral 11/2009    COLONOSCOPY N/A 11/19/2021    Procedure: COLONOSCOPY;  Surgeon: Anderson Park DO;  Location: [facility]    COMBINED CYSTOSCOPY, RETROGRADES, URETEROSCOPY, LASER HOLMIUM LITHOTRIPSY URETER(S), INSERT STENT Right 5/1/2024    Procedure: Cystoscopy, Right Ureteroscopy, Right Holmium Laser Lithotripsy, Right Retrograde Pyelogram,  Right Ureteral Stent Placement;  Surgeon: Rocio Brantley MD;  Location: [facility]    COMBINED CYSTOSCOPY, RETROGRADES, URETEROSCOPY, LASER HOLMIUM LITHOTRIPSY URETER(S), INSERT STENT Right 5/15/2024    Procedure: Cystoscopy, Right Ureteroscopy, Right Retrograde Pyelogram, Right Laser Lithotripsy, Right Ureteral Stent Exchange,;  Surgeon: Rocio Brantley MD;  Location: [facility]    CYSTOSCOPY, REMOVE STENT(S), COMBINED Right 5/15/2024    Procedure: Right Ureteral Stent Removal;  Surgeon: Rocio Brantley MD;  Location: [facility]    CYSTOSCOPY, RETROGRADES, INSERT STENT URETER(S), COMBINED  09/19/2013    Procedure: COMBINED CYSTOSCOPY, RETROGRADES, INSERT STENT URETER(S);  Right Ureteral Stent Placement;  Surgeon: JUN Villar MD;  Location: [facility]    DILATION AND CURETTAGE,  HYSTEROSCOPY, ABLATE ENDOMETRIUM THERMACHOICE, COMBINED N/A 04/15/2015    Procedure: COMBINED DILATION AND CURETTAGE, HYSTEROSCOPY, ABLATE ENDOMETRIUM THERMACHOICE;  Surgeon: Munira Goddard MD;  Location: [facility]    DILATION AND CURETTAGE, OPERATIVE HYSTEROSCOPY WITH MORCELLATOR, COMBINED N/A 11/07/2018    Procedure: COMBINED DILATION AND CURETTAGE, OPERATIVE HYSTEROSCOPY WITH MORCELLATOR;  Surgeon: Munira Goddard MD;  Location: [facility]    ESOPHAGOSCOPY, GASTROSCOPY, DUODENOSCOPY (EGD), COMBINED N/A 10/15/2020    Procedure: ESOPHAGOGASTRODUODENOSCOPY (EGD);  Surgeon: Brandon Gardner MD;  Location: [facility]    EXTRACORPOREAL SHOCK WAVE LITHOTRIPSY (ESWL)  09/18/2013    Procedure: EXTRACORPOREAL SHOCK WAVE LITHOTRIPSY (ESWL);  Right Extracorporeal Shock Wave Lithotripsy;  Surgeon: JUN Villar MD;  Location: [facility]    FOOT SURGERY      for plantar fasciitis    FOOT SURGERY      HC TOOTH EXTRACTION W/FORCEP      wisdom teeth    LAPAROSCOPY DIAGNOSTIC (GYN)  2007    infertility    LASER HOLMIUM LITHOTRIPSY URETER(S), INSERT STENT, COMBINED  09/27/2013    Procedure: COMBINED CYSTOSCOPY, URETEROSCOPY, LASER HOLMIUM LITHOTRIPSY URETER(S), INSERT STENT;  Right Ureteroscopic Stone Extraction and Possible Stent Placement;  Surgeon: JUN Villar MD;  Location: [facility]    MAMMOPLASTY REDUCTION BILATERAL Bilateral 01/26/2022    Procedure: BILATERAL REDUCTION MAMMAPLASTY;  Surgeon: Shamir Marin MD;  Location:  OR    OTHER SURGICAL HISTORY      uterine ablation    PELVIC LAPAROSCOPY      TONSILLECTOMY  1983    TONSILLECTOMY       Family History:    Family History   Problem Relation Age of Onset    Heart Disease Maternal Grandmother     Breast Cancer Maternal Grandmother         dx'd age 70    Arthritis Mother     Hypertension Mother     Cancer Mother         skin cancer    Other Cancer Mother         Lymphoma    Prostate Cancer Father      Social History:  Marital Status:   [2]  Social History     Tobacco Use  "   Smoking status: Never    Smokeless tobacco: Never   Vaping Use    Vaping status: Never Used   Substance Use Topics    Alcohol use: No    Drug use: No      Medications:    acetaminophen (TYLENOL) 500 MG tablet  cephALEXin (KEFLEX) 250 MG capsule  ibuprofen (ADVIL/MOTRIN) 400 MG tablet  oxyBUTYnin (DITROPAN) 5 MG tablet  phenazopyridine (AZO URINARY PAIN RELIEF) 95 MG tablet  rizatriptan (MAXALT-MLT) 5 MG ODT  SENNA-docusate sodium (SENNA S) 8.6-50 MG tablet  tamsulosin (FLOMAX) 0.4 MG capsule  tiZANidine (ZANAFLEX) 2 MG tablet  topiramate (TOPAMAX) 25 MG tablet      Review of Systems   All other systems reviewed and are negative.      PE   BP: (!) 168/91  Pulse: 61  Temp: 99.3  F (37.4  C)  Resp: 18  Height: 167.6 cm (5' 6\")  Weight: 82.1 kg (181 lb)  SpO2: 100 %  Physical Exam  Vitals reviewed.   Constitutional:       General: She is not in acute distress.     Appearance: She is well-developed.   HENT:      Head: Normocephalic and atraumatic.      Right Ear: External ear normal.      Left Ear: External ear normal.      Nose: Nose normal.      Mouth/Throat:      Mouth: Mucous membranes are moist.      Pharynx: Oropharynx is clear.   Eyes:      Extraocular Movements: Extraocular movements intact.      Conjunctiva/sclera: Conjunctivae normal.      Pupils: Pupils are equal, round, and reactive to light.   Cardiovascular:      Rate and Rhythm: Normal rate and regular rhythm.   Pulmonary:      Effort: Pulmonary effort is normal.   Abdominal:      Palpations: Abdomen is soft.      Tenderness: There is no abdominal tenderness.   Musculoskeletal:         General: Normal range of motion.      Cervical back: Normal range of motion.   Skin:     General: Skin is warm and dry.   Neurological:      Mental Status: She is alert and oriented to person, place, and time.   Psychiatric:         Behavior: Behavior normal.         ED COURSE and MDM   1858.  Patient with right low back and flank pain, known recent history of lithotripsy " and stone basket retrieval.  Known recent history of stent removal.  Urine analysis pending.  Pain is rated 2/10 though severe on the way here.    2012.  I spoke with Dr. Aguilera about the case.  He was able to look at the patient's chart.  The patient has improved pain, rating it 1-2/10.  I was mistaken about the timing of stent removal.  She told me the stent came out about 8 hours ago.  Ureteral spasm and/or ureteral edema can occur which may cause pain.  Usually this would occur hours after the removal of stent.  And then passing of a fragment also could cause flank pain.  Low concern for infection, urine analysis reviewed with the patient and with urology.  No additional orders recommended.  Follow-up as scheduled.  Return for worsening as discussed.    Electronic medical chart reviewed, including medical problems, medications, medical allergies, social history.  Recent hospitalizations and surgical procedures reviewed.  Recent clinic visits and consultations reviewed.  Recent labs and test results reviewed.  Nursing notes reviewed.    The patient, their parent if applicable, and/or their medical decision maker(s) and I have reviewed all of the available historical information, applicable PMH, physical exam findings, and objective diagnostic data gathered during this ED visit.  We then discussed all work-up options and then together agreed upon the course taken during this visit.  The ultimate disposition and plan was a cooperative decision made between myself and the patient, their parent if applicable, and/or their legal decision maker(s).  The risks and benefits of all decisions made during this visit were discussed to the best of my abilities given the circumstances, and all parties are understanding of the pertinent ramifications of these decisions.      LABS  Labs Ordered and Resulted from Time of ED Arrival to Time of ED Departure   ROUTINE UA WITH MICROSCOPIC REFLEX TO CULTURE - Abnormal       Result  Value    Color Urine Colorless      Appearance Urine Clear      Glucose Urine Negative      Bilirubin Urine Negative      Ketones Urine Negative      Specific Gravity Urine 1.004      Blood Urine Large (*)     pH Urine 6.0      Protein Albumin Urine Negative      Urobilinogen Urine Normal      Nitrite Urine Negative      Leukocyte Esterase Urine Negative      Bacteria Urine Few (*)     Mucus Urine Present (*)     RBC Urine 3 (*)     WBC Urine 1      Squamous Epithelials Urine 1         IMAGING  Images reviewed by me.  Radiology report also reviewed.  No orders to display       Procedures    Medications - No data to display      IMPRESSION       ICD-10-CM    1. Right flank pain  R10.9       2. Other microscopic hematuria  R31.29                Medication List      There are no discharge medications for this visit.                             Prabhakar Valdez MD  05/19/24 2014

## 2024-05-20 ENCOUNTER — MYC MEDICAL ADVICE (OUTPATIENT)
Dept: UROLOGY | Facility: CLINIC | Age: 49
End: 2024-05-20
Payer: COMMERCIAL

## 2024-05-20 LAB — BACTERIA SPEC CULT: ABNORMAL

## 2024-05-20 NOTE — TELEPHONE ENCOUNTER
Cystoscopy, Right Ureteroscopy, Right Retrograde Pyelogram, Right Laser Lithotripsy, Right Ureteral Stent Exchange: 5/15/2024    Stent removed 5/19. In ED 5/19 for pain.   Urine negative.     Message sent to patient to watch for fever. Tylenol and ibuprofen and fluids.     Please advise if any other recs.     Thank you,    Kadeem BROOKS RN  [facility]

## 2024-05-20 NOTE — DISCHARGE INSTRUCTIONS
RETURN TO THE EMERGENCY ROOM FOR THE FOLLOWING:    Severely worsened pain, vomiting and dehydration, fever greater than 101, or at anytime for any concern.    FOLLOW UP:    With your urologist as scheduled.    TREATMENT RECOMMENDATIONS:    There have been no new medications provided and there are no prescription medication changes recommended.     NURSE ADVICE LINE:  [phone] or [phone]

## 2024-05-21 ENCOUNTER — TELEPHONE (OUTPATIENT)
Dept: SURGERY | Facility: CLINIC | Age: 49
End: 2024-05-21
Payer: COMMERCIAL

## 2024-05-21 NOTE — TELEPHONE ENCOUNTER
Spoke to patient on phone doing better today.   3/10 pain yesterday and it resolved on its own  Doing well this morning    Rocio Brantley MD

## 2024-05-22 ENCOUNTER — ANCILLARY PROCEDURE (OUTPATIENT)
Dept: MAMMOGRAPHY | Facility: CLINIC | Age: 49
End: 2024-05-22
Attending: FAMILY MEDICINE
Payer: COMMERCIAL

## 2024-05-22 DIAGNOSIS — R92.8 ABNORMALITY OF RIGHT BREAST ON SCREENING MAMMOGRAM: ICD-10-CM

## 2024-05-22 PROCEDURE — 77065 DX MAMMO INCL CAD UNI: CPT | Mod: RT

## 2024-07-08 ENCOUNTER — PATIENT OUTREACH (OUTPATIENT)
Dept: CARE COORDINATION | Facility: CLINIC | Age: 49
End: 2024-07-08
Payer: COMMERCIAL

## 2024-07-16 ENCOUNTER — OFFICE VISIT (OUTPATIENT)
Dept: UROLOGY | Facility: CLINIC | Age: 49
End: 2024-07-16
Payer: COMMERCIAL

## 2024-07-16 ENCOUNTER — HOSPITAL ENCOUNTER (OUTPATIENT)
Dept: ULTRASOUND IMAGING | Facility: CLINIC | Age: 49
Discharge: HOME | End: 2024-07-16
Attending: STUDENT IN AN ORGANIZED HEALTH CARE EDUCATION/TRAINING PROGRAM | Admitting: STUDENT IN AN ORGANIZED HEALTH CARE EDUCATION/TRAINING PROGRAM
Payer: COMMERCIAL

## 2024-07-16 VITALS — RESPIRATION RATE: 16 BRPM | DIASTOLIC BLOOD PRESSURE: 87 MMHG | SYSTOLIC BLOOD PRESSURE: 130 MMHG | HEART RATE: 61 BPM

## 2024-07-16 DIAGNOSIS — N20.0 NEPHROLITHIASIS: Primary | ICD-10-CM

## 2024-07-16 DIAGNOSIS — N20.0 NEPHROLITHIASIS: ICD-10-CM

## 2024-07-16 LAB
ALBUMIN UR-MCNC: NEGATIVE MG/DL
APPEARANCE UR: CLEAR
BILIRUB UR QL STRIP: NEGATIVE
COLOR UR AUTO: YELLOW
GLUCOSE UR STRIP-MCNC: NEGATIVE MG/DL
HGB UR QL STRIP: NEGATIVE
KETONES UR STRIP-MCNC: NEGATIVE MG/DL
LEUKOCYTE ESTERASE UR QL STRIP: NEGATIVE
NITRATE UR QL: NEGATIVE
PH UR STRIP: 6 [PH] (ref 5–7)
RBC #/AREA URNS AUTO: NORMAL /HPF
SP GR UR STRIP: <=1.005 (ref 1–1.03)
UROBILINOGEN UR STRIP-ACNC: 0.2 E.U./DL
WBC #/AREA URNS AUTO: NORMAL /HPF

## 2024-07-16 PROCEDURE — 76770 US EXAM ABDO BACK WALL COMP: CPT

## 2024-07-16 PROCEDURE — 81001 URINALYSIS AUTO W/SCOPE: CPT | Performed by: STUDENT IN AN ORGANIZED HEALTH CARE EDUCATION/TRAINING PROGRAM

## 2024-07-16 PROCEDURE — 99213 OFFICE O/P EST LOW 20 MIN: CPT | Performed by: STUDENT IN AN ORGANIZED HEALTH CARE EDUCATION/TRAINING PROGRAM

## 2024-07-16 NOTE — NURSING NOTE
"Initial /87 (BP Location: Left arm, Patient Position: Chair, Cuff Size: Adult Regular)   Pulse 61   Resp 16  Estimated body mass index is 29.21 kg/m  as calculated from the following:    Height as of 5/19/24: 1.676 m (5' 6\").    Weight as of 5/19/24: 82.1 kg (181 lb). .  Patient is here for a recheck of kidney stones.  caryn perez LPN    "

## 2024-07-16 NOTE — PROGRESS NOTES
UROLOGY OUTPATIENT VISIT      Chief Complaint:   Post-op      Synopsis   Caitlin Oh is a very pleasant AGE: 49 year old year old person    She has a history of kidney stone disease and had an episode of shockwave lithotripsy and eventual ureteroscopy all on the right side.    In April 20, 2024 she was found to have a 12 mm right renal pelvis stone    On May 1, 2024 I did a right ureteroscopy but I was unable to remove the pieces due to narrow ureter.  I did a second look ureteroscopy on May 15, 2024 and got good clearance    The stone culture had Proteus and the stone analysis had struvite as well as calcium phosphate    She is doing well, maybe some irritation.   Lot better  No flank pain    Imaging   I personally reviewed the CT scan and by my interpretation it demonstrates mild right renal pelvis dilation without dilation of calyces. I compared this to my retrograde pyelogram and prior CT and she's always had somewhat dilated renal pelvis.     The following distinct labs were reviewed   Most Recent 3 CBC's:  Recent Labs   Lab Test 12/06/23  1326 08/07/23  0937 02/22/23  1540   WBC 6.9 6.1 9.8   HGB 12.5 13.5 12.4   MCV 93 91 94    255 274     Most Recent 3 BMP's:  Recent Labs   Lab Test 04/18/24  1139 12/06/23  1326 08/07/23  0937    140 141   POTASSIUM 4.3 3.7 4.6   CHLORIDE 106 109* 104   CO2 24 21* 27   BUN 12.5 15.5 11.9   CR 0.76 0.71 0.80   ANIONGAP 11 10 10   FEDE 8.9 8.7 9.3   GLC 95 104* 100*     Most Recent Urinalysis:  Recent Labs   Lab Test 07/16/24  1045   COLOR Yellow   APPEARANCE Clear   URINEGLC Negative   URINEBILI Negative   URINEKETONE Negative   SG <=1.005   UBLD Negative   URINEPH 6.0   PROTEIN Negative   UROBILINOGEN 0.2   NITRITE Negative   LEUKEST Negative   RBCU None Seen   WBCU None Seen       Medical Comorbidities      Past Medical History:   Diagnosis Date    Bilateral bunions     Cervical high risk HPV (human papillomavirus) test positive 11/04/2015    Female  infertility 07/10/2008    Undergoing ivf 2008.     Kidney stones 11/30/2010    [facility] 11/24/10     Personal history of gestational diabetes 07/11/2012    diet controlled    Plantar fasciitis     s/p surgery               Medications     Current Outpatient Medications   Medication Sig Dispense Refill    acetaminophen (TYLENOL) 500 MG tablet Take 1 tablet (500 mg) by mouth every 6 hours as needed for mild pain      ibuprofen (ADVIL/MOTRIN) 400 MG tablet Take 1 tablet (400 mg) by mouth every 6 hours as needed for moderate pain       No current facility-administered medications for this visit.            Assessment/Plan   49 year old year old person with with a history of recurrent kidney stone disease    #1 recurrent kidney stone disease  I recently did a right ureteroscopy staged to clear out sizable renal pelvis stone.  She had Proteus from the stone culture and it was partially struvite and calcium phosphate.  She was on Topamax but she has since stopped.  I think this is more infection related stone.  We will check a urine today to make sure she does not have any infection.  She has some pelviectasis in the right kidney which I reviewed prior imaging and she has had that even last year.  I think this is a chronic mild dilation of the kidney without any dilation of the calyces and does not require any intervention.  I will plan to see her back in 1 year and we will do a CT to monitor for recurrent stones.    If the urinalysis is concerning for infection we will treat this and see her sooner to assess for residual stone      CC:  Sujatha Maher    Additional Coding Information:    Problems:  3 -- one stable chronic illness    Data Reviewed  Independent interpretation of a test performed by another physician/other qualified health care professional (not separately reported) - Renal US    Level of risk:  2 -- Minimal risk of morbidity

## 2024-09-24 ENCOUNTER — OFFICE VISIT (OUTPATIENT)
Dept: FAMILY MEDICINE | Facility: CLINIC | Age: 49
End: 2024-09-24
Payer: COMMERCIAL

## 2024-09-24 VITALS
HEART RATE: 53 BPM | TEMPERATURE: 97.5 F | HEIGHT: 66 IN | SYSTOLIC BLOOD PRESSURE: 108 MMHG | WEIGHT: 192 LBS | DIASTOLIC BLOOD PRESSURE: 70 MMHG | BODY MASS INDEX: 30.86 KG/M2 | OXYGEN SATURATION: 98 %

## 2024-09-24 DIAGNOSIS — K21.00 GASTROESOPHAGEAL REFLUX DISEASE WITH ESOPHAGITIS WITHOUT HEMORRHAGE: ICD-10-CM

## 2024-09-24 DIAGNOSIS — Z12.31 ENCOUNTER FOR SCREENING MAMMOGRAM FOR BREAST CANCER: ICD-10-CM

## 2024-09-24 DIAGNOSIS — Z00.00 ROUTINE GENERAL MEDICAL EXAMINATION AT A HEALTH CARE FACILITY: Primary | ICD-10-CM

## 2024-09-24 DIAGNOSIS — N95.1 PERIMENOPAUSAL: ICD-10-CM

## 2024-09-24 DIAGNOSIS — G89.29 CHRONIC BILATERAL THORACIC BACK PAIN: ICD-10-CM

## 2024-09-24 DIAGNOSIS — M54.6 CHRONIC BILATERAL THORACIC BACK PAIN: ICD-10-CM

## 2024-09-24 DIAGNOSIS — G43.829 MENSTRUAL MIGRAINE WITHOUT STATUS MIGRAINOSUS, NOT INTRACTABLE: ICD-10-CM

## 2024-09-24 LAB
ALBUMIN SERPL BCG-MCNC: 4.4 G/DL (ref 3.5–5.2)
ALP SERPL-CCNC: 96 U/L (ref 40–150)
ALT SERPL W P-5'-P-CCNC: 20 U/L (ref 0–50)
ANION GAP SERPL CALCULATED.3IONS-SCNC: 9 MMOL/L (ref 7–15)
AST SERPL W P-5'-P-CCNC: 22 U/L (ref 0–45)
BILIRUB SERPL-MCNC: 0.2 MG/DL
BUN SERPL-MCNC: 12.5 MG/DL (ref 6–20)
CALCIUM SERPL-MCNC: 9.3 MG/DL (ref 8.8–10.4)
CHLORIDE SERPL-SCNC: 106 MMOL/L (ref 98–107)
CREAT SERPL-MCNC: 0.81 MG/DL (ref 0.51–0.95)
EGFRCR SERPLBLD CKD-EPI 2021: 89 ML/MIN/1.73M2
ERYTHROCYTE [DISTWIDTH] IN BLOOD BY AUTOMATED COUNT: 12.7 % (ref 10–15)
FSH SERPL IRP2-ACNC: 94.5 MIU/ML
GLUCOSE SERPL-MCNC: 97 MG/DL (ref 70–99)
HCO3 SERPL-SCNC: 26 MMOL/L (ref 22–29)
HCT VFR BLD AUTO: 40.6 % (ref 35–47)
HGB BLD-MCNC: 13.2 G/DL (ref 11.7–15.7)
LH SERPL-ACNC: 50.7 MIU/ML
MCH RBC QN AUTO: 29.7 PG (ref 26.5–33)
MCHC RBC AUTO-ENTMCNC: 32.5 G/DL (ref 31.5–36.5)
MCV RBC AUTO: 91 FL (ref 78–100)
PLATELET # BLD AUTO: 274 10E3/UL (ref 150–450)
POTASSIUM SERPL-SCNC: 4.6 MMOL/L (ref 3.4–5.3)
PROT SERPL-MCNC: 7.4 G/DL (ref 6.4–8.3)
RBC # BLD AUTO: 4.45 10E6/UL (ref 3.8–5.2)
SODIUM SERPL-SCNC: 141 MMOL/L (ref 135–145)
TSH SERPL DL<=0.005 MIU/L-ACNC: 2.22 UIU/ML (ref 0.3–4.2)
WBC # BLD AUTO: 7.2 10E3/UL (ref 4–11)

## 2024-09-24 PROCEDURE — 36415 COLL VENOUS BLD VENIPUNCTURE: CPT | Performed by: FAMILY MEDICINE

## 2024-09-24 PROCEDURE — 84443 ASSAY THYROID STIM HORMONE: CPT | Performed by: FAMILY MEDICINE

## 2024-09-24 PROCEDURE — 83001 ASSAY OF GONADOTROPIN (FSH): CPT | Performed by: FAMILY MEDICINE

## 2024-09-24 PROCEDURE — 90656 IIV3 VACC NO PRSV 0.5 ML IM: CPT | Performed by: FAMILY MEDICINE

## 2024-09-24 PROCEDURE — 85027 COMPLETE CBC AUTOMATED: CPT | Performed by: FAMILY MEDICINE

## 2024-09-24 PROCEDURE — 80053 COMPREHEN METABOLIC PANEL: CPT | Performed by: FAMILY MEDICINE

## 2024-09-24 PROCEDURE — 99396 PREV VISIT EST AGE 40-64: CPT | Mod: 25 | Performed by: FAMILY MEDICINE

## 2024-09-24 PROCEDURE — 90471 IMMUNIZATION ADMIN: CPT | Performed by: FAMILY MEDICINE

## 2024-09-24 PROCEDURE — 99214 OFFICE O/P EST MOD 30 MIN: CPT | Mod: 25 | Performed by: FAMILY MEDICINE

## 2024-09-24 PROCEDURE — 83002 ASSAY OF GONADOTROPIN (LH): CPT | Performed by: FAMILY MEDICINE

## 2024-09-24 RX ORDER — TIZANIDINE HYDROCHLORIDE 4 MG/1
4 CAPSULE, GELATIN COATED ORAL 3 TIMES DAILY PRN
Qty: 90 CAPSULE | Refills: 3 | Status: SHIPPED | OUTPATIENT
Start: 2024-09-24

## 2024-09-24 RX ORDER — SUMATRIPTAN 5 MG/1
1 SPRAY NASAL PRN
Qty: 3 EACH | Refills: 2 | Status: SHIPPED | OUTPATIENT
Start: 2024-09-24

## 2024-09-24 RX ORDER — TIZANIDINE 2 MG/1
2 TABLET ORAL PRN
Qty: 90 TABLET | Refills: 1 | Status: CANCELLED | OUTPATIENT
Start: 2024-09-24

## 2024-09-24 RX ORDER — FAMOTIDINE 40 MG/1
40 TABLET, FILM COATED ORAL DAILY
Qty: 90 TABLET | Refills: 3 | Status: SHIPPED | OUTPATIENT
Start: 2024-09-24

## 2024-09-24 RX ORDER — CLONIDINE HYDROCHLORIDE 0.1 MG/1
.1-.2 TABLET ORAL AT BEDTIME
Qty: 60 TABLET | Refills: 5 | Status: SHIPPED | OUTPATIENT
Start: 2024-09-24

## 2024-09-24 RX ORDER — RIZATRIPTAN BENZOATE 5 MG/1
TABLET, ORALLY DISINTEGRATING ORAL
Qty: 18 TABLET | Refills: 0 | Status: CANCELLED | OUTPATIENT
Start: 2024-09-24

## 2024-09-24 RX ORDER — RIZATRIPTAN BENZOATE 10 MG/1
10 TABLET, ORALLY DISINTEGRATING ORAL
Qty: 18 TABLET | Refills: 2 | Status: SHIPPED | OUTPATIENT
Start: 2024-09-24

## 2024-09-24 NOTE — PROGRESS NOTES
"Preventive Care Visit  [facility] DENA Maher MD, Family Medicine  Sep 24, 2024      Assessment & Plan     Routine general medical examination at a health care facility      Menstrual migraine without status migrainosus, not intractable  Will try the imitrex nasal for the worsening headache   - tiZANidine (ZANAFLEX) 4 MG capsule; Take 1 capsule (4 mg) by mouth 3 times daily as needed for muscle spasms.  - rizatriptan (MAXALT-MLT) 10 MG ODT; Take 1 tablet (10 mg) by mouth at onset of headache for migraine. May repeat in 2 hours. Max 3 tablets/24 hours.  - SUMAtriptan (IMITREX) 5 MG/ACT nasal spray; Spray 1 spray in nostril as needed for migraine. May repeat in 2 hours. Max 8 sprays/24 hours. To be used if maxalt has not aborted headache  - Comprehensive metabolic panel (BMP + Alb, Alk Phos, ALT, AST, Total. Bili, TP); Future  - CBC with platelets; Future  - TSH with free T4 reflex; Future    Chronic bilateral thoracic back pain  Use the tizanidine   - Comprehensive metabolic panel (BMP + Alb, Alk Phos, ALT, AST, Total. Bili, TP); Future    Perimenopausal  Will check   - cloNIDine (CATAPRES) 0.1 MG tablet; Take 1-2 tablets (0.1-0.2 mg) by mouth at bedtime.  - Follicle stimulating hormone; Future  - Luteinizing Hormone; Future  - Comprehensive metabolic panel (BMP + Alb, Alk Phos, ALT, AST, Total. Bili, TP); Future    Encounter for screening mammogram for breast cancer    - MA Screen Bilateral w/Shaheed; Future    Gastroesophageal reflux disease with esophagitis without hemorrhage  Try taking every night   - famotidine (PEPCID) 40 MG tablet; Take 1 tablet (40 mg) by mouth daily.    Patient has been advised of split billing requirements and indicates understanding: Yes        BMI  Estimated body mass index is 30.99 kg/m  as calculated from the following:    Height as of this encounter: 1.676 m (5' 6\").    Weight as of this encounter: 87.1 kg (192 lb).   Weight management plan: Discussed healthy " diet and exercise guidelines    Counseling  Appropriate preventive services were addressed with this patient via screening, questionnaire, or discussion as appropriate for fall prevention, nutrition, physical activity, Tobacco-use cessation, social engagement, weight loss and cognition.  Checklist reviewing preventive services available has been given to the patient.  Reviewed patient's diet, addressing concerns and/or questions.       Follow up mammo 3 months     Subjective   Heather is a 49 year old, presenting for the following:  Physical        9/24/2024     8:30 AM   Additional Questions   Roomed by Ria AMADOR CMA        Health Care Directive  Patient does not have a Health Care Directive or Living Will: Discussed advance care planning with patient; information given to patient to review.    HPI            9/24/2024   General Health   How would you rate your overall physical health? Good   Feel stress (tense, anxious, or unable to sleep) To some extent      (!) STRESS CONCERN      9/24/2024   Nutrition   Three or more servings of calcium each day? Yes   Diet: Regular (no restrictions)   How many servings of fruit and vegetables per day? (!) 2-3   How many sweetened beverages each day? 0-1            9/24/2024   Exercise   Days per week of moderate/strenous exercise 4 days            9/24/2024   Social Factors   Frequency of gathering with friends or relatives Three times a week   Worry food won't last until get money to buy more No   Food not last or not have enough money for food? No   Do you have housing? (Housing is defined as stable permanent housing and does not include staying ouside in a car, in a tent, in an abandoned building, in an overnight shelter, or couch-surfing.) Yes   Are you worried about losing your housing? No   Lack of transportation? No   Unable to get utilities (heat,electricity)? No            9/24/2024   Dental   Dentist two times every year? Yes                     9/24/2024   Substance  Use   Alcohol more than 3/day or more than 7/wk Not Applicable   Do you use any other substances recreationally? No        Social History     Tobacco Use    Smoking status: Never    Smokeless tobacco: Never   Vaping Use    Vaping status: Never Used   Substance Use Topics    Alcohol use: No    Drug use: No           11/7/2023   LAST FHS-7 RESULTS   1st degree relative breast or ovarian cancer No   Any relative bilateral breast cancer No   Any male have breast cancer No   Any ONE woman have BOTH breast AND ovarian cancer No   Any woman with breast cancer before 50yrs No   2 or more relatives with breast AND/OR ovarian cancer No   2 or more relatives with breast AND/OR bowel cancer No           Mammogram Screening - Mammogram every 1-2 years updated in Health Maintenance based on mutual decision making        9/24/2024   STI Screening   New sexual partner(s) since last STI/HIV test? No        History of abnormal Pap smear: No - age 30- 64 PAP with HPV every 5 years recommended        Latest Ref Rng & Units 8/31/2020     9:28 AM 8/31/2020     9:00 AM 9/27/2016    10:04 AM   PAP / HPV   PAP (Historical)  NIL      HPV 16 DNA NEG^Negative  Negative  Negative    HPV 18 DNA NEG^Negative  Negative  Negative    Other HR HPV NEG^Negative  Negative  Negative      ASCVD Risk   The 10-year ASCVD risk score (Talia PIMENTEL, et al., 2019) is: 0.5%    Values used to calculate the score:      Age: 49 years      Sex: Female      Is Non- : No      Diabetic: No      Tobacco smoker: No      Systolic Blood Pressure: 108 mmHg      Is BP treated: No      HDL Cholesterol: 67 mg/dL      Total Cholesterol: 177 mg/dL        9/24/2024   Contraception/Family Planning   Questions about contraception or family planning No           Reviewed and updated as needed this visit by Provider                  She just got back from [place]. She has had more migraines but she is likely perimenopausal   Past Medical History:  "  Diagnosis Date    Bilateral bunions     Cervical high risk HPV (human papillomavirus) test positive 11/04/2015    Female infertility 07/10/2008    Undergoing ivf 2008.     Kidney stones 11/30/2010    [facility] er 11/24/10     Personal history of gestational diabetes 07/11/2012    diet controlled    Plantar fasciitis     s/p surgery         Review of Systems  Constitutional, HEENT, cardiovascular, pulmonary, gi and gu systems are negative, except as otherwise noted.     Objective    Exam  /70 (BP Location: Right arm, Patient Position: Chair, Cuff Size: Adult Regular)   Pulse 53   Temp 97.5  F (36.4  C) (Tympanic)   Ht 1.676 m (5' 6\")   Wt 87.1 kg (192 lb)   SpO2 98%   BMI 30.99 kg/m     Estimated body mass index is 30.99 kg/m  as calculated from the following:    Height as of this encounter: 1.676 m (5' 6\").    Weight as of this encounter: 87.1 kg (192 lb).    Physical Exam  GENERAL: alert and no distress  RESP: lungs clear to auscultation - no rales, rhonchi or wheezes  CV: regular rate and rhythm, normal S1 S2, no S3 or S4, no murmur, click or rub, no peripheral edema   NEURO: Normal strength and tone, mentation intact and speech normal  PSYCH: mentation appears normal, affect normal/bright        Signed Electronically by: Sujatha Maher MD    "

## 2024-09-24 NOTE — PATIENT INSTRUCTIONS
Patient Education   Preventive Care Advice   This is general advice given by our system to help you stay healthy. However, your care team may have specific advice just for you. Please talk to your care team about your preventive care needs.  Nutrition  Eat 5 or more servings of fruits and vegetables each day.  Try wheat bread, brown rice and whole grain pasta (instead of white bread, rice, and pasta).  Get enough calcium and vitamin D. Check the label on foods and aim for 100% of the RDA (recommended daily allowance).  Lifestyle  Exercise at least 150 minutes each week  (30 minutes a day, 5 days a week).  Do muscle strengthening activities 2 days a week. These help control your weight and prevent disease.  No smoking.  Wear sunscreen to prevent skin cancer.  Have a dental exam and cleaning every 6 months.  Yearly exams  See your health care team every year to talk about:  Any changes in your health.  Any medicines your care team has prescribed.  Preventive care, family planning, and ways to prevent chronic diseases.  Shots (vaccines)   HPV shots (up to age 26), if you've never had them before.  Hepatitis B shots (up to age 59), if you've never had them before.  COVID-19 shot: Get this shot when it's due.  Flu shot: Get a flu shot every year.  Tetanus shot: Get a tetanus shot every 10 years.  Pneumococcal, hepatitis A, and RSV shots: Ask your care team if you need these based on your risk.  Shingles shot (for age 50 and up)  General health tests  Diabetes screening:  Starting at age 35, Get screened for diabetes at least every 3 years.  If you are younger than age 35, ask your care team if you should be screened for diabetes.  Cholesterol test: At age 39, start having a cholesterol test every 5 years, or more often if advised.  Bone density scan (DEXA): At age 50, ask your care team if you should have this scan for osteoporosis (brittle bones).  Hepatitis C: Get tested at least once in your life.  STIs (sexually  transmitted infections)  Before age 24: Ask your care team if you should be screened for STIs.  After age 24: Get screened for STIs if you're at risk. You are at risk for STIs (including HIV) if:  You are sexually active with more than one person.  You don't use condoms every time.  You or a partner was diagnosed with a sexually transmitted infection.  If you are at risk for HIV, ask about PrEP medicine to prevent HIV.  Get tested for HIV at least once in your life, whether you are at risk for HIV or not.  Cancer screening tests  Cervical cancer screening: If you have a cervix, begin getting regular cervical cancer screening tests starting at age 21.  Breast cancer scan (mammogram): If you've ever had breasts, begin having regular mammograms starting at age 40. This is a scan to check for breast cancer.  Colon cancer screening: It is important to start screening for colon cancer at age 45.  Have a colonoscopy test every 10 years (or more often if you're at risk) Or, ask your provider about stool tests like a FIT test every year or Cologuard test every 3 years.  To learn more about your testing options, visit:   .  For help making a decision, visit:   https://bit.ly/mj32092.  Prostate cancer screening test: If you have a prostate, ask your care team if a prostate cancer screening test (PSA) at age 55 is right for you.  Lung cancer screening: If you are a current or former smoker ages 50 to 80, ask your care team if ongoing lung cancer screenings are right for you.  For informational purposes only. Not to replace the advice of your health care provider. Copyright   2023 [employer]. All rights reserved. Clinically reviewed by the [facility]. [employer] [id number] - REV 01/24.

## 2024-10-21 ENCOUNTER — TRANSFERRED RECORDS (OUTPATIENT)
Dept: HEALTH INFORMATION MANAGEMENT | Facility: CLINIC | Age: 49
End: 2024-10-21
Payer: COMMERCIAL

## 2024-11-18 ENCOUNTER — E-VISIT (OUTPATIENT)
Dept: FAMILY MEDICINE | Facility: CLINIC | Age: 49
End: 2024-11-18
Payer: COMMERCIAL

## 2024-11-18 DIAGNOSIS — Z87.81 HISTORY OF TOE FRACTURE: ICD-10-CM

## 2024-11-18 DIAGNOSIS — G89.29 CHRONIC BILATERAL LOW BACK PAIN WITHOUT SCIATICA: Primary | ICD-10-CM

## 2024-11-18 DIAGNOSIS — M54.50 CHRONIC BILATERAL LOW BACK PAIN WITHOUT SCIATICA: Primary | ICD-10-CM

## 2024-11-18 PROCEDURE — 99421 OL DIG E/M SVC 5-10 MIN: CPT | Performed by: FAMILY MEDICINE

## 2024-11-21 ENCOUNTER — ANCILLARY PROCEDURE (OUTPATIENT)
Dept: MAMMOGRAPHY | Facility: CLINIC | Age: 49
End: 2024-11-21
Attending: FAMILY MEDICINE
Payer: COMMERCIAL

## 2024-11-21 DIAGNOSIS — R92.8 CATEGORY 3 MAMMOGRAPHY RESULT WITH SHORT FOLLOW-UP INTERVAL SUGGESTED FOR PROBABLY BENIGN FINDING: ICD-10-CM

## 2024-11-21 PROCEDURE — 77062 BREAST TOMOSYNTHESIS BI: CPT

## 2024-11-21 PROCEDURE — 77066 DX MAMMO INCL CAD BI: CPT

## 2024-12-03 ENCOUNTER — OFFICE VISIT (OUTPATIENT)
Dept: FAMILY MEDICINE | Facility: CLINIC | Age: 49
End: 2024-12-03
Payer: COMMERCIAL

## 2024-12-03 VITALS
BODY MASS INDEX: 31.82 KG/M2 | HEIGHT: 66 IN | OXYGEN SATURATION: 99 % | TEMPERATURE: 98.1 F | WEIGHT: 198 LBS | DIASTOLIC BLOOD PRESSURE: 79 MMHG | SYSTOLIC BLOOD PRESSURE: 124 MMHG | HEART RATE: 64 BPM | RESPIRATION RATE: 14 BRPM

## 2024-12-03 DIAGNOSIS — B07.0 PLANTAR WARTS: Primary | ICD-10-CM

## 2024-12-03 DIAGNOSIS — T16.2XXA FOREIGN BODY OF LEFT EAR, INITIAL ENCOUNTER: ICD-10-CM

## 2024-12-03 PROCEDURE — 17110 DESTRUCTION B9 LES UP TO 14: CPT | Performed by: FAMILY MEDICINE

## 2024-12-03 PROCEDURE — 99212 OFFICE O/P EST SF 10 MIN: CPT | Mod: 25 | Performed by: FAMILY MEDICINE

## 2024-12-03 RX ORDER — ESTRADIOL 0.03 MG/D
PATCH, EXTENDED RELEASE TRANSDERMAL
COMMUNITY
Start: 2024-10-15

## 2024-12-03 RX ORDER — PROGESTERONE 100 MG/1
100 CAPSULE ORAL DAILY
COMMUNITY
Start: 2024-10-15

## 2024-12-03 NOTE — PROGRESS NOTES
Assessment & Plan  Plantar warts  OPTIONS OF OTC TREATMENT VS IN OFFICE PARING AND LIQUID NITROGEN TREATMENT DISCUSSED.   She opts for paring down and liquid nitrogen treatment. Consent verbally obtained prior to initiating treatment.  Pros and cons discussed. Pain and potential blood blister discussed.   WART DESCRIPTION AND TREATMENT #1:   Dome shaped grey/brown hyperkeratotic lesion(s) with verrucous appearance, black dots on surface.   the calloused area measures 3 mm in diameter. Located on the plantar surface of the left foot noted at the distal aspect of the first metatarsal bone. The callous was pared down with 11 blade scalpel. The lesion was frozen/ thawed x 3     Follow up if residual wart remains in 7-10 days.     Orders:    DESTRUCT BENIGN LESION, UP TO 14    Foreign body of left ear, initial encounter  Patient complains of hearing a crackly sound in her left ear.  On exam there is a black hair like structure noted in the left ear canal.  Due to time constraints and focusing on her plantar wart today this was not removed but she will make a follow-up appointment to have it removed if it continues to bother her.              Julia Romero is a 49 year old, presenting for the following health issues:  wart removal (On foot/)      12/3/2024    12:34 PM   Additional Questions   Roomed by as   Accompanied by self         12/3/2024    12:34 PM   Patient Reported Additional Medications   Patient reports taking the following new medications no     History of Present Illness       Reason for visit:  Get wart frozen  Symptom onset:  1-2 weeks ago  Symptoms include:  Wart on bottom of left foot  Symptom intensity:  Moderate  Symptom progression:  Staying the same  Had these symptoms before:  No  What makes it worse:  Standing  What makes it better:  Being off of it   She is taking medications regularly.           Objective    /79 (BP Location: Left arm, Patient Position: Left side, Cuff Size: Adult  "Large)   Pulse 64   Temp 98.1  F (36.7  C) (Oral)   Resp 14   Ht 1.676 m (5' 6\")   Wt 89.8 kg (198 lb)   SpO2 99%   BMI 31.96 kg/m    Body mass index is 31.96 kg/m .  Physical Exam  Constitutional:       Appearance: Normal appearance.   HENT:      Head: Normocephalic and atraumatic.      Right Ear: Tympanic membrane, ear canal and external ear normal.      Left Ear: Tympanic membrane, ear canal and external ear normal.      Ears:      Comments: On exam there is a black hair like structure noted in the left ear canal.   Cardiovascular:      Rate and Rhythm: Normal rate and regular rhythm.   Pulmonary:      Effort: Pulmonary effort is normal.   Musculoskeletal:         General: Normal range of motion.      Cervical back: Normal range of motion and neck supple.   Skin:     Comments:  Dome shaped grey/brown hyperkeratotic lesion(s) with verrucous appearance, black dots on surface.   the calloused area measures 3 mm in diameter. Located on the plantar surface of the left foot noted at the distal aspect of the first metatarsal bone   Neurological:      General: No focal deficit present.      Mental Status: She is alert and oriented to person, place, and time.                Signed Electronically by: Halie Youssef MD    "

## 2024-12-03 NOTE — ASSESSMENT & PLAN NOTE
Patient complains of hearing a crackly sound in her left ear.  On exam there is a black hair like structure noted in the left ear canal.  Due to time constraints and focusing on her plantar wart today this was not removed but she will make a follow-up appointment to have it removed if it continues to bother her.

## 2024-12-03 NOTE — ASSESSMENT & PLAN NOTE
OPTIONS OF OTC TREATMENT VS IN OFFICE PARING AND LIQUID NITROGEN TREATMENT DISCUSSED.   She opts for paring down and liquid nitrogen treatment. Consent verbally obtained prior to initiating treatment.  Pros and cons discussed. Pain and potential blood blister discussed.   WART DESCRIPTION AND TREATMENT #1:   Dome shaped grey/brown hyperkeratotic lesion(s) with verrucous appearance, black dots on surface.   the calloused area measures 3 mm in diameter. Located on the plantar surface of the left foot noted at the distal aspect of the first metatarsal bone. The callous was pared down with 11 blade scalpel. The lesion was frozen/ thawed x 3     Follow up if residual wart remains in 7-10 days.     Orders:    DESTRUCT BENIGN LESION, UP TO 14

## 2024-12-11 ENCOUNTER — OFFICE VISIT (OUTPATIENT)
Dept: FAMILY MEDICINE | Facility: CLINIC | Age: 49
End: 2024-12-11
Payer: COMMERCIAL

## 2024-12-11 VITALS
BODY MASS INDEX: 31.82 KG/M2 | DIASTOLIC BLOOD PRESSURE: 81 MMHG | OXYGEN SATURATION: 97 % | WEIGHT: 198 LBS | SYSTOLIC BLOOD PRESSURE: 125 MMHG | HEIGHT: 66 IN | HEART RATE: 75 BPM | RESPIRATION RATE: 16 BRPM | TEMPERATURE: 98 F

## 2024-12-11 DIAGNOSIS — T16.2XXA FOREIGN BODY OF LEFT EAR, INITIAL ENCOUNTER: ICD-10-CM

## 2024-12-11 DIAGNOSIS — L98.9 LESION OF SKIN OF FOOT: Primary | ICD-10-CM

## 2024-12-11 PROCEDURE — 99213 OFFICE O/P EST LOW 20 MIN: CPT | Mod: 25 | Performed by: FAMILY MEDICINE

## 2024-12-11 PROCEDURE — 69209 REMOVE IMPACTED EAR WAX UNI: CPT | Mod: RT | Performed by: FAMILY MEDICINE

## 2024-12-11 NOTE — PROGRESS NOTES
Assessment & Plan  Lesion of skin of foot  Lesion of the skin of the left foot over the distal first metatarsal bone plantar surface.  The lesion is approximately 3 mm in diameter and has verrucous qualities.  Skin does appear to be sloughing off consistent with the wart treatments over-the-counter the patient has been doing although she has not done any wart treatments for the last 1 week or so so it is a bit unusual that this would not have healed by now.    However on 12/3/2024 we did pare down the lesion and freeze it with liquid nitrogen and unfortunately there was no improvement in the lesion at all.  At this time the patient opts to decline further liquid nitrogen treatments and would like to see a podiatrist.    Orders:    Orthopedic  Referral; Future    Foreign body of left ear, initial encounter  Foreign body left ear-it appears to be a black short hair from her dog.  She says her dog has thick eyelash like hairs that are short.    The hair appears to be stuck to wax which is on the anterior surface of the ear canal.  I attempted to remove by grabbing with alligator forcep but was unsuccessful.  At this time we will attempt ear lavage to see if washing out wax out will help remove this hair as it is bothersome to her and causes a crackly sound when it apparently moves.    ear lavage was successful at removing hair.     Orders:    SD REMOVAL IMPACTED CERUMEN IRRIGATION/LVG UNILAT          Subjective   Heather is a 49 year old, presenting for the following health issues:  Wart (Bottom of LT foot.) and Ear Problem (LT. Recheck.)        12/11/2024    12:54 PM   Additional Questions   Roomed by sac   Accompanied by self         12/11/2024    12:54 PM   Patient Reported Additional Medications   Patient reports taking the following new medications no     History of Present Illness       Reason for visit:  Get wart frozen  Symptom onset:  1-2 weeks ago  Symptoms include:  Wart on bottom of left  "foot  Symptom intensity:  Moderate  Symptom progression:  Staying the same  Had these symptoms before:  No  What makes it worse:  Standing  What makes it better:  Being off of it   She is taking medications regularly.           Objective    /81 (BP Location: Left arm, Patient Position: Sitting, Cuff Size: Adult Large)   Pulse 75   Temp 98  F (36.7  C) (Oral)   Resp 16   Ht 1.676 m (5' 6\")   Wt 89.8 kg (198 lb)   SpO2 97%   BMI 31.96 kg/m    Body mass index is 31.96 kg/m .  Physical Exam  Constitutional:       Appearance: Normal appearance.   HENT:      Head: Normocephalic and atraumatic.      Right Ear: Hearing, tympanic membrane, ear canal and external ear normal.      Left Ear: Hearing, tympanic membrane and external ear normal. A foreign body (it appears her dogs hair is embedded in a small amount of wax on the anterior ear canal. an alligaor forcep was used and tolerated well but i was unable to grasp and remove the hair. earlavage will be undertaken to try and remove fb) is present.   Cardiovascular:      Rate and Rhythm: Normal rate and regular rhythm.   Pulmonary:      Effort: Pulmonary effort is normal.   Musculoskeletal:         General: Normal range of motion.      Cervical back: Normal range of motion and neck supple.        Feet:    Neurological:      General: No focal deficit present.      Mental Status: She is alert and oriented to person, place, and time.                Signed Electronically by: Halie Youssef MD    "

## 2024-12-11 NOTE — ASSESSMENT & PLAN NOTE
Foreign body left ear-it appears to be a black short hair from her dog.  She says her dog has thick eyelash like hairs that are short.    The hair appears to be stuck to wax which is on the anterior surface of the ear canal.  I attempted to remove by grabbing with alligator forcep but was unsuccessful.  At this time we will attempt ear lavage to see if washing out wax out will help remove this hair as it is bothersome to her and causes a crackly sound when it apparently moves.    ear lavage was successful at removing hair.     Orders:    WI REMOVAL IMPACTED CERUMEN IRRIGATION/LVG UNILAT

## 2024-12-11 NOTE — ASSESSMENT & PLAN NOTE
Lesion of the skin of the left foot over the distal first metatarsal bone plantar surface.  The lesion is approximately 3 mm in diameter and has verrucous qualities.  Skin does appear to be sloughing off consistent with the wart treatments over-the-counter the patient has been doing although she has not done any wart treatments for the last 1 week or so so it is a bit unusual that this would not have healed by now.    However on 12/3/2024 we did pare down the lesion and freeze it with liquid nitrogen and unfortunately there was no improvement in the lesion at all.  At this time the patient opts to decline further liquid nitrogen treatments and would like to see a podiatrist.    Orders:    Orthopedic  Referral; Future

## 2024-12-12 ENCOUNTER — PATIENT OUTREACH (OUTPATIENT)
Dept: CARE COORDINATION | Facility: CLINIC | Age: 49
End: 2024-12-12
Payer: COMMERCIAL

## 2024-12-16 ENCOUNTER — PATIENT OUTREACH (OUTPATIENT)
Dept: CARE COORDINATION | Facility: CLINIC | Age: 49
End: 2024-12-16
Payer: COMMERCIAL

## 2025-02-25 DIAGNOSIS — G43.829 MENSTRUAL MIGRAINE WITHOUT STATUS MIGRAINOSUS, NOT INTRACTABLE: ICD-10-CM

## 2025-02-25 RX ORDER — TIZANIDINE HYDROCHLORIDE 4 MG/1
4 CAPSULE, GELATIN COATED ORAL 3 TIMES DAILY PRN
Qty: 90 CAPSULE | Refills: 0 | Status: SHIPPED | OUTPATIENT
Start: 2025-02-25

## 2025-03-17 ENCOUNTER — TRANSFERRED RECORDS (OUTPATIENT)
Dept: HEALTH INFORMATION MANAGEMENT | Facility: CLINIC | Age: 50
End: 2025-03-17
Payer: COMMERCIAL

## 2025-04-05 DIAGNOSIS — G43.829 MENSTRUAL MIGRAINE WITHOUT STATUS MIGRAINOSUS, NOT INTRACTABLE: ICD-10-CM

## 2025-04-07 RX ORDER — TIZANIDINE HYDROCHLORIDE 4 MG/1
4 CAPSULE, GELATIN COATED ORAL 3 TIMES DAILY PRN
Qty: 90 CAPSULE | Refills: 0 | Status: SHIPPED | OUTPATIENT
Start: 2025-04-07

## 2025-04-16 ENCOUNTER — MYC MEDICAL ADVICE (OUTPATIENT)
Dept: FAMILY MEDICINE | Facility: CLINIC | Age: 50
End: 2025-04-16
Payer: COMMERCIAL

## 2025-04-16 ENCOUNTER — TELEPHONE (OUTPATIENT)
Dept: FAMILY MEDICINE | Facility: CLINIC | Age: 50
End: 2025-04-16
Payer: COMMERCIAL

## 2025-04-16 NOTE — TELEPHONE ENCOUNTER
Patient calling to request her FMLA paperwork be mailed to her. She was out of town so could not pick it up last week. Confirmed address in chart correct. No need to call her unless care team has further questions.   Karime MCLEAN RN

## 2025-04-21 ENCOUNTER — TRANSFERRED RECORDS (OUTPATIENT)
Dept: HEALTH INFORMATION MANAGEMENT | Facility: CLINIC | Age: 50
End: 2025-04-21
Payer: COMMERCIAL

## 2025-04-30 ENCOUNTER — TRANSCRIBE ORDERS (OUTPATIENT)
Dept: FAMILY MEDICINE | Facility: CLINIC | Age: 50
End: 2025-04-30
Payer: COMMERCIAL

## 2025-04-30 DIAGNOSIS — K21.00 GASTROESOPHAGEAL REFLUX DISEASE WITH ESOPHAGITIS WITHOUT HEMORRHAGE: Primary | ICD-10-CM

## 2025-05-10 DIAGNOSIS — G43.829 MENSTRUAL MIGRAINE WITHOUT STATUS MIGRAINOSUS, NOT INTRACTABLE: ICD-10-CM

## 2025-05-11 RX ORDER — TIZANIDINE HYDROCHLORIDE 4 MG/1
4 CAPSULE, GELATIN COATED ORAL 3 TIMES DAILY PRN
Qty: 90 CAPSULE | Refills: 0 | Status: SHIPPED | OUTPATIENT
Start: 2025-05-11

## 2025-05-14 ENCOUNTER — HOSPITAL ENCOUNTER (OUTPATIENT)
Dept: CT IMAGING | Facility: HOSPITAL | Age: 50
Discharge: HOME | End: 2025-05-14
Attending: STUDENT IN AN ORGANIZED HEALTH CARE EDUCATION/TRAINING PROGRAM
Payer: COMMERCIAL

## 2025-05-14 DIAGNOSIS — N20.0 NEPHROLITHIASIS: ICD-10-CM

## 2025-05-14 PROCEDURE — 74176 CT ABD & PELVIS W/O CONTRAST: CPT

## 2025-05-15 ENCOUNTER — HOSPITAL ENCOUNTER (OUTPATIENT)
Dept: RADIOLOGY | Facility: HOSPITAL | Age: 50
Discharge: HOME | End: 2025-05-15
Attending: FAMILY MEDICINE
Payer: COMMERCIAL

## 2025-05-15 DIAGNOSIS — K21.00 GASTROESOPHAGEAL REFLUX DISEASE WITH ESOPHAGITIS WITHOUT HEMORRHAGE: ICD-10-CM

## 2025-05-15 PROCEDURE — 74220 X-RAY XM ESOPHAGUS 1CNTRST: CPT

## 2025-05-19 ENCOUNTER — OFFICE VISIT (OUTPATIENT)
Dept: SURGERY | Facility: CLINIC | Age: 50
End: 2025-05-19
Attending: FAMILY MEDICINE
Payer: COMMERCIAL

## 2025-05-19 DIAGNOSIS — K21.00 GASTROESOPHAGEAL REFLUX DISEASE WITH ESOPHAGITIS WITHOUT HEMORRHAGE: ICD-10-CM

## 2025-05-19 PROCEDURE — 99204 OFFICE O/P NEW MOD 45 MIN: CPT | Performed by: SURGERY

## 2025-05-19 NOTE — LETTER
5/19/2025      Caitlin Oh  [address] [place] [address]      Dear Colleague,    Thank you for referring your patient, Caitlin Oh, to the [facility] SURGERY CLINIC AND BARIATRICS CARE [place]. Please see a copy of my visit note below.    HPI: Caitlin Oh is a 50 year old female referred to see me by Sujatha Maher for evaluation of gastroesophageal reflux disease.  She notes  a several year history of GERD since the early 2000's described as esophageal burning, worse when no taking PPIs, intermittent regurgitation and dysphagia.  She had an endoscopy in 2020 which demonstrated a Schatzki's ring.  She is currently on Pepcid which has helped with her esophageal burning.        Allergies:Penicillins    Past Medical History:   Diagnosis Date     Bilateral bunions      Cervical high risk HPV (human papillomavirus) test positive 11/04/2015     Female infertility 07/10/2008    Undergoing ivf 2008.      Kidney stones 11/30/2010    [facility] 11/24/10      Personal history of gestational diabetes 07/11/2012    diet controlled     Plantar fasciitis     s/p surgery       Past Surgical History:   Procedure Laterality Date     ABDOMEN SURGERY       BUNIONECTOMY Left 12/13/2018    Procedure: Left 5th metatarsal corrective osteotomy;  Surgeon: Nick uFller DPM;  Location: [facility]     BUNIONECTOMY Left 05/26/2020    Procedure: BONE SMOOTHING 5TH DIGIT AND SOFT TISSUE RELEASE 5TH MPJ;  Surgeon: Alessandro Beltran DPM;  Location: [facility];  Service: Podiatry     BUNIONECTOMY RT/LT Bilateral 11/2009     COLONOSCOPY N/A 11/19/2021    Procedure: COLONOSCOPY;  Surgeon: Anderson Park, ;  Location: [facility]     COMBINED CYSTOSCOPY, RETROGRADES, URETEROSCOPY, LASER HOLMIUM LITHOTRIPSY URETER(S), INSERT STENT Right 5/1/2024    Procedure: Cystoscopy, Right Ureteroscopy, Right Holmium Laser Lithotripsy, Right Retrograde Pyelogram,  Right Ureteral Stent Placement;  Surgeon: Karon  MD Rocio;  Location: [facility]     COMBINED CYSTOSCOPY, RETROGRADES, URETEROSCOPY, LASER HOLMIUM LITHOTRIPSY URETER(S), INSERT STENT Right 5/15/2024    Procedure: Cystoscopy, Right Ureteroscopy, Right Retrograde Pyelogram, Right Laser Lithotripsy, Right Ureteral Stent Exchange,;  Surgeon: Rocio Brantley MD;  Location: [facility]     CYSTOSCOPY, REMOVE STENT(S), COMBINED Right 5/15/2024    Procedure: Right Ureteral Stent Removal;  Surgeon: Rocio Brantley MD;  [facility]     CYSTOSCOPY, RETROGRADES, INSERT STENT URETER(S), COMBINED  09/19/2013    Procedure: COMBINED CYSTOSCOPY, RETROGRADES, INSERT STENT URETER(S);  Right Ureteral Stent Placement;  Surgeon: JUN Villar MD;  Location: [facility]     DILATION AND CURETTAGE, HYSTEROSCOPY, ABLATE ENDOMETRIUM THERMACHOICE, COMBINED N/A 04/15/2015    Procedure: COMBINED DILATION AND CURETTAGE, HYSTEROSCOPY, ABLATE ENDOMETRIUM THERMACHOICE;  Surgeon: Munira Goddard MD;  [facility]: [facility]     DILATION AND CURETTAGE, OPERATIVE HYSTEROSCOPY WITH MORCELLATOR, COMBINED N/A 11/07/2018    Procedure: COMBINED DILATION AND CURETTAGE, OPERATIVE HYSTEROSCOPY WITH MORCELLATOR;  Surgeon: Munira Goddard MD;  [facility]     ESOPHAGOSCOPY, GASTROSCOPY, DUODENOSCOPY (EGD), COMBINED N/A 10/15/2020    Procedure: ESOPHAGOGASTRODUODENOSCOPY (EGD);  Surgeon: Brandon Gardner MD;  [facility]: [facility]     EXTRACORPOREAL SHOCK WAVE LITHOTRIPSY (ESWL)  09/18/2013    Procedure: EXTRACORPOREAL SHOCK WAVE LITHOTRIPSY (ESWL);  Right Extracorporeal Shock Wave Lithotripsy;  Surgeon: JUN Villar MD;  Location: [facility]     FOOT SURGERY      for plantar fasciitis     FOOT SURGERY       HC TOOTH EXTRACTION W/FORCEP      wisdom teeth     LAPAROSCOPY DIAGNOSTIC (GYN)  2007    infertility     LASER HOLMIUM LITHOTRIPSY URETER(S), INSERT STENT, COMBINED  09/27/2013    Procedure: COMBINED CYSTOSCOPY, URETEROSCOPY, LASER HOLMIUM LITHOTRIPSY URETER(S), INSERT STENT;  Right Ureteroscopic  Stone Extraction and Possible Stent Placement;  Surgeon: JUN Villar MD;  Location: [facility]     MAMMOPLASTY REDUCTION BILATERAL Bilateral 01/26/2022    Procedure: BILATERAL REDUCTION MAMMAPLASTY;  Surgeon: Shamir Marin MD;  Location: [facility]     OTHER SURGICAL HISTORY      uterine ablation     PELVIC LAPAROSCOPY       TONSILLECTOMY  1983     TONSILLECTOMY         CURRENT MEDS:    Current Outpatient Medications:      ANJALI 0.025 MG/24HR bi-weekly patch, APPLY 1 PATCH TRANSDERMALLY TWICE A WEEK, Disp: , Rfl:      famotidine (PEPCID) 40 MG tablet, Take 1 tablet (40 mg) by mouth daily., Disp: 90 tablet, Rfl: 3     ibuprofen (ADVIL/MOTRIN) 400 MG tablet, Take 1 tablet (400 mg) by mouth every 6 hours as needed for moderate pain, Disp: , Rfl:      progesterone (PROMETRIUM) 100 MG capsule, Take 100 mg by mouth daily., Disp: , Rfl:      rizatriptan (MAXALT-MLT) 10 MG ODT, Take 1 tablet (10 mg) by mouth at onset of headache for migraine. May repeat in 2 hours. Max 3 tablets/24 hours., Disp: 18 tablet, Rfl: 2     SUMAtriptan (IMITREX) 5 MG/ACT nasal spray, Spray 1 spray in nostril as needed for migraine. May repeat in 2 hours. Max 8 sprays/24 hours. To be used if maxalt has not aborted headache, Disp: 3 each, Rfl: 2     tiZANidine (ZANAFLEX) 4 MG capsule, TAKE 1 CAPSULE (4 MG) BY MOUTH 3 TIMES DAILY AS NEEDED FOR MUSCLE SPASMS., Disp: 90 capsule, Rfl: 0    Family History   Problem Relation Age of Onset     Heart Disease Maternal Grandmother      Breast Cancer Maternal Grandmother         dx'd age 70     Arthritis Mother      Hypertension Mother      Cancer Mother         skin cancer     Other Cancer Mother         Lymphoma     Prostate Cancer Father         reports that she has never smoked. She has never used smokeless tobacco. She reports that she does not drink alcohol and does not use drugs.    Review of Systems:  The 12 system review is within normal limits except for as mentioned above.  General ROS: No  complaints or constitutional symptoms  Ophthalmic ROS: No complaints of visual changes  Skin: No complaints or symptoms   Endocrine: No complaints or symptoms  Hematologic/Lymphatic: No symptoms or complaints  Psychiatric: No symptoms or complaints  Respiratory ROS: no cough, shortness of breath, or wheezing  Cardiovascular ROS: no chest pain or dyspnea on exertion  Gastrointestinal ROS: As per HPI  Genito-Urinary ROS: no dysuria, trouble voiding, or hematuria  Musculoskeletal ROS: no joint or muscle pain  Neurological ROS: no TIA or stroke symptoms      EXAM:  There were no vitals taken for this visit.  GENERAL: Well developed female, No acute distress, pleasant and conversant   EYES: Pupils equal, round and reactive, no scleral icterus  ABDOMEN: Soft, non-tender, no masses, non-distended  SKIN: Pink, warm and dry, no obvious rashes or lesions   NEURO:No focal deficits, ambulatory  MUSCULOSKELETAL:No obvious deformities, no swelling, normal appearing      LABS:  Lab Results   Component Value Date    WBC 7.2 09/24/2024    WBC 9.1 10/30/2018    HGB 13.2 09/24/2024    HGB 13.8 10/30/2018    HCT 40.6 09/24/2024    HCT 41.5 10/30/2018    MCV 91 09/24/2024    MCV 94 10/30/2018     09/24/2024     10/30/2018     INR/Prothrombin Time  @LABRCNTIP(NA,K,CL,co2,bun,creatinine,labglom,glucose,calcium)@  Lab Results   Component Value Date    ALT 20 09/24/2024    AST 22 09/24/2024    ALKPHOS 96 09/24/2024       IMAGES:   Relevant images were reviewed and discussed with the patient.  Notable findings were: esophagram relatively unremarkable    Assessment/Plan:   Caitlin Oh is a 50 year old female with signs and symptoms consistent with GERD.  I have explained the pathophysiology of GERD in detail as well as the surgical versus non-operative management strategies.      At this point we will proceed with continued initial work-up.  This will include a repeat endoscopy to assess the hiatus and distal esophagus.  Her  esophagram was read as normal but there may be evidence of mild narrowing in the upper region.     Willi Yepez DO [facility]  Foregut and Acute Care Surgery  [phone]  [facility] Department of Surgery    Again, thank you for allowing me to participate in the care of your patient.        Sincerely,        Willi Yepez, DO    Electronically signed

## 2025-05-19 NOTE — PROGRESS NOTES
HPI: Caitlin Oh is a 50 year old female referred to see me by Sujatha Maher for evaluation of gastroesophageal reflux disease.  She notes  a several year history of GERD since the early 2000's described as esophageal burning, worse when no taking PPIs, intermittent regurgitation and dysphagia.  She had an endoscopy in 2020 which demonstrated a Schatzki's ring.  She is currently on Pepcid which has helped with her esophageal burning.        Allergies:Penicillins    Past Medical History:   Diagnosis Date    Bilateral bunions     Cervical high risk HPV (human papillomavirus) test positive 11/04/2015    Female infertility 07/10/2008    Undergoing ivf 2008.     Kidney stones 11/30/2010    [facility] er 11/24/10     Personal history of gestational diabetes 07/11/2012    diet controlled    Plantar fasciitis     s/p surgery       Past Surgical History:   Procedure Laterality Date    ABDOMEN SURGERY      BUNIONECTOMY Left 12/13/2018    Procedure: Left 5th metatarsal corrective osteotomy;  Surgeon: Nick Fuller DPM;  Location: [facility]    BUNIONECTOMY Left 05/26/2020    Procedure: BONE SMOOTHING 5TH DIGIT AND SOFT TISSUE RELEASE 5TH MPJ;  Surgeon: Alessandro Beltran DPM;  Location: [facility];  Service: Podiatry    BUNIONECTOMY RT/LT Bilateral 11/2009    COLONOSCOPY N/A 11/19/2021    Procedure: COLONOSCOPY;  Surgeon: Anderson Park DO;  Location: [facility]    COMBINED CYSTOSCOPY, RETROGRADES, URETEROSCOPY, LASER HOLMIUM LITHOTRIPSY URETER(S), INSERT STENT Right 5/1/2024    Procedure: Cystoscopy, Right Ureteroscopy, Right Holmium Laser Lithotripsy, Right Retrograde Pyelogram,  Right Ureteral Stent Placement;  Surgeon: Rocio Brantley MD;  Location: [facility]    COMBINED CYSTOSCOPY, RETROGRADES, URETEROSCOPY, LASER HOLMIUM LITHOTRIPSY URETER(S), INSERT STENT Right 5/15/2024    Procedure: Cystoscopy, Right Ureteroscopy, Right Retrograde Pyelogram, Right Laser Lithotripsy, Right Ureteral Stent Exchange,;   Surgeon: Rocio Brantley MD;  [facility]: [facility]    CYSTOSCOPY, REMOVE STENT(S), COMBINED Right 5/15/2024    Procedure: Right Ureteral Stent Removal;  Surgeon: Rocio Brantley MD;  [facility]    CYSTOSCOPY, RETROGRADES, INSERT STENT URETER(S), COMBINED  09/19/2013    Procedure: COMBINED CYSTOSCOPY, RETROGRADES, INSERT STENT URETER(S);  Right Ureteral Stent Placement;  Surgeon: JUN Villar MD;  [facility]: [facility]    DILATION AND CURETTAGE, HYSTEROSCOPY, ABLATE ENDOMETRIUM THERMACHOICE, COMBINED N/A 04/15/2015    Procedure: COMBINED DILATION AND CURETTAGE, HYSTEROSCOPY, ABLATE ENDOMETRIUM THERMACHOICE;  Surgeon: Munira Goddard MD;  [facility]: [facility]    DILATION AND CURETTAGE, OPERATIVE HYSTEROSCOPY WITH MORCELLATOR, COMBINED N/A 11/07/2018    Procedure: COMBINED DILATION AND CURETTAGE, OPERATIVE HYSTEROSCOPY WITH MORCELLATOR;  Surgeon: Munira Goddard MD;  [facility]: [facility]    ESOPHAGOSCOPY, GASTROSCOPY, DUODENOSCOPY (EGD), COMBINED N/A 10/15/2020    Procedure: ESOPHAGOGASTRODUODENOSCOPY (EGD);  Surgeon: Brandon Gardner MD;  [facility]: [facility]    EXTRACORPOREAL SHOCK WAVE LITHOTRIPSY (ESWL)  09/18/2013    Procedure: EXTRACORPOREAL SHOCK WAVE LITHOTRIPSY (ESWL);  Right Extracorporeal Shock Wave Lithotripsy;  Surgeon: JUN Villar MD;  Location: [facility]    FOOT SURGERY      for plantar fasciitis    FOOT SURGERY      HC TOOTH EXTRACTION W/FORCEP      wisdom teeth    LAPAROSCOPY DIAGNOSTIC (GYN)  2007    infertility    LASER HOLMIUM LITHOTRIPSY URETER(S), INSERT STENT, COMBINED  09/27/2013    Procedure: COMBINED CYSTOSCOPY, URETEROSCOPY, LASER HOLMIUM LITHOTRIPSY URETER(S), INSERT STENT;  Right Ureteroscopic Stone Extraction and Possible Stent Placement;  Surgeon: JUN Villar MD;  Location: [facility]    MAMMOPLASTY REDUCTION BILATERAL Bilateral 01/26/2022    Procedure: BILATERAL REDUCTION MAMMAPLASTY;  Surgeon: Shamir Marin MD;  Location:  OR    OTHER SURGICAL HISTORY      uterine ablation     PELVIC LAPAROSCOPY      TONSILLECTOMY  1983    TONSILLECTOMY         CURRENT MEDS:    Current Outpatient Medications:     ANJALI 0.025 MG/24HR bi-weekly patch, APPLY 1 PATCH TRANSDERMALLY TWICE A WEEK, Disp: , Rfl:     famotidine (PEPCID) 40 MG tablet, Take 1 tablet (40 mg) by mouth daily., Disp: 90 tablet, Rfl: 3    ibuprofen (ADVIL/MOTRIN) 400 MG tablet, Take 1 tablet (400 mg) by mouth every 6 hours as needed for moderate pain, Disp: , Rfl:     progesterone (PROMETRIUM) 100 MG capsule, Take 100 mg by mouth daily., Disp: , Rfl:     rizatriptan (MAXALT-MLT) 10 MG ODT, Take 1 tablet (10 mg) by mouth at onset of headache for migraine. May repeat in 2 hours. Max 3 tablets/24 hours., Disp: 18 tablet, Rfl: 2    SUMAtriptan (IMITREX) 5 MG/ACT nasal spray, Spray 1 spray in nostril as needed for migraine. May repeat in 2 hours. Max 8 sprays/24 hours. To be used if maxalt has not aborted headache, Disp: 3 each, Rfl: 2    tiZANidine (ZANAFLEX) 4 MG capsule, TAKE 1 CAPSULE (4 MG) BY MOUTH 3 TIMES DAILY AS NEEDED FOR MUSCLE SPASMS., Disp: 90 capsule, Rfl: 0    Family History   Problem Relation Age of Onset    Heart Disease Maternal Grandmother     Breast Cancer Maternal Grandmother         dx'd age 70    Arthritis Mother     Hypertension Mother     Cancer Mother         skin cancer    Other Cancer Mother         Lymphoma    Prostate Cancer Father         reports that she has never smoked. She has never used smokeless tobacco. She reports that she does not drink alcohol and does not use drugs.    Review of Systems:  The 12 system review is within normal limits except for as mentioned above.  General ROS: No complaints or constitutional symptoms  Ophthalmic ROS: No complaints of visual changes  Skin: No complaints or symptoms   Endocrine: No complaints or symptoms  Hematologic/Lymphatic: No symptoms or complaints  Psychiatric: No symptoms or complaints  Respiratory ROS: no cough, shortness of breath, or wheezing  Cardiovascular  ROS: no chest pain or dyspnea on exertion  Gastrointestinal ROS: As per HPI  Genito-Urinary ROS: no dysuria, trouble voiding, or hematuria  Musculoskeletal ROS: no joint or muscle pain  Neurological ROS: no TIA or stroke symptoms      EXAM:  There were no vitals taken for this visit.  GENERAL: Well developed female, No acute distress, pleasant and conversant   EYES: Pupils equal, round and reactive, no scleral icterus  ABDOMEN: Soft, non-tender, no masses, non-distended  SKIN: Pink, warm and dry, no obvious rashes or lesions   NEURO:No focal deficits, ambulatory  MUSCULOSKELETAL:No obvious deformities, no swelling, normal appearing      LABS:  Lab Results   Component Value Date    WBC 7.2 09/24/2024    WBC 9.1 10/30/2018    HGB 13.2 09/24/2024    HGB 13.8 10/30/2018    HCT 40.6 09/24/2024    HCT 41.5 10/30/2018    MCV 91 09/24/2024    MCV 94 10/30/2018     09/24/2024     10/30/2018     INR/Prothrombin Time  @LABRCNTIP(NA,K,CL,co2,bun,creatinine,labglom,glucose,calcium)@  Lab Results   Component Value Date    ALT 20 09/24/2024    AST 22 09/24/2024    ALKPHOS 96 09/24/2024       IMAGES:   Relevant images were reviewed and discussed with the patient.  Notable findings were: esophagram relatively unremarkable    Assessment/Plan:   Caitlin Oh is a 50 year old female with signs and symptoms consistent with GERD.  I have explained the pathophysiology of GERD in detail as well as the surgical versus non-operative management strategies.      At this point we will proceed with continued initial work-up.  This will include a repeat endoscopy to assess the hiatus and distal esophagus.  Her esophagram was read as normal but there may be evidence of mild narrowing in the upper region.     Willi Yepez DO FACS  Foregut and Acute Care Surgery  [phone]  [facility] Department of Surgery

## 2025-05-23 ENCOUNTER — RESULTS FOLLOW-UP (OUTPATIENT)
Dept: FAMILY MEDICINE | Facility: CLINIC | Age: 50
End: 2025-05-23

## 2025-06-03 ENCOUNTER — ANCILLARY PROCEDURE (OUTPATIENT)
Dept: MAMMOGRAPHY | Facility: CLINIC | Age: 50
End: 2025-06-03
Attending: FAMILY MEDICINE
Payer: COMMERCIAL

## 2025-06-03 DIAGNOSIS — R92.8 BI-RADS CATEGORY 3 MAMMOGRAM RESULT: ICD-10-CM

## 2025-06-03 PROCEDURE — 77065 DX MAMMO INCL CAD UNI: CPT | Mod: RT

## 2025-06-15 DIAGNOSIS — G43.829 MENSTRUAL MIGRAINE WITHOUT STATUS MIGRAINOSUS, NOT INTRACTABLE: ICD-10-CM

## 2025-06-16 RX ORDER — TIZANIDINE HYDROCHLORIDE 4 MG/1
4 CAPSULE, GELATIN COATED ORAL 3 TIMES DAILY PRN
Qty: 90 CAPSULE | Refills: 0 | Status: SHIPPED | OUTPATIENT
Start: 2025-06-16

## 2025-06-26 ENCOUNTER — TRANSFERRED RECORDS (OUTPATIENT)
Dept: HEALTH INFORMATION MANAGEMENT | Facility: CLINIC | Age: 50
End: 2025-06-26
Payer: COMMERCIAL

## 2025-06-27 ENCOUNTER — TRANSFERRED RECORDS (OUTPATIENT)
Dept: HEALTH INFORMATION MANAGEMENT | Facility: CLINIC | Age: 50
End: 2025-06-27
Payer: COMMERCIAL

## 2025-07-01 ENCOUNTER — TRANSFERRED RECORDS (OUTPATIENT)
Dept: HEALTH INFORMATION MANAGEMENT | Facility: CLINIC | Age: 50
End: 2025-07-01
Payer: COMMERCIAL

## 2025-07-03 ENCOUNTER — OFFICE VISIT (OUTPATIENT)
Dept: SURGERY | Facility: CLINIC | Age: 50
End: 2025-07-03
Payer: COMMERCIAL

## 2025-07-03 DIAGNOSIS — K21.00 GASTROESOPHAGEAL REFLUX DISEASE WITH ESOPHAGITIS WITHOUT HEMORRHAGE: Primary | ICD-10-CM

## 2025-07-03 PROCEDURE — 99212 OFFICE O/P EST SF 10 MIN: CPT | Performed by: SURGERY

## 2025-07-03 NOTE — PROGRESS NOTES
HPI: Caitlin Oh is here for follow up after her endoscopy.    Allergies, Medications, Social History, Past Medical History and Past Surgical History were reviewed and are noted in the chart.    There were no vitals taken for this visit.  There is no height or weight on file to calculate BMI.      EXAM:   GENERAL: Appears well      Incision/Surgical Site 12/13/18 Left Foot (Active)       Incision/Surgical Site 01/26/22 Right Breast (Active)       Incision/Surgical Site 01/26/22 Left Breast (Active)       Assessment/Plan: Caitlin Oh continues to do well. Her symptoms are well-managed with H2 blockers.  We discussed the findings of the mild small Schatzki's ring in the setting of a sliding hiatal hernia.  Because she is doing well with medical and dietary management strategies we will continue with a nonoperative surveillance program.  She is happy with the plan will follow-up accordingly if she ever has any worsening symptoms or medication escalation requirements.        Alvarado Yepez DO [facility] Surgery  [phone]

## 2025-07-03 NOTE — LETTER
7/3/2025      Caitlin [place]  [address] [address] [facility] [address]      Dear Colleague,    Thank you for referring your patient, Caitlin Oh, to the [facility] SURGERY CLINIC AND BARIATRICS CARE [place]. Please see a copy of my visit note below.         HPI: Caitlin Oh is here for follow up after her endoscopy.    Allergies, Medications, Social History, Past Medical History and Past Surgical History were reviewed and are noted in the chart.    There were no vitals taken for this visit.  There is no height or weight on file to calculate BMI.      EXAM:   GENERAL: Appears well      Incision/Surgical Site 12/13/18 Left Foot (Active)       Incision/Surgical Site 01/26/22 Right Breast (Active)       Incision/Surgical Site 01/26/22 Left Breast (Active)       Assessment/Plan: Caitlin Oh continues to do well. Her symptoms are well-managed with H2 blockers.  We discussed the findings of the mild small Schatzki's ring in the setting of a sliding hiatal hernia.  Because she is doing well with medical and dietary management strategies we will continue with a nonoperative surveillance program.  She is happy with the plan will follow-up accordingly if she ever has any worsening symptoms or medication escalation requirements.        Alvarado Yepez DO FACS  [facility] Surgery  [phone]    Again, thank you for allowing me to participate in the care of your patient.        Sincerely,        Willi Yepez DO    Electronically signed

## 2025-07-08 DIAGNOSIS — K21.00 GASTROESOPHAGEAL REFLUX DISEASE WITH ESOPHAGITIS WITHOUT HEMORRHAGE: ICD-10-CM

## 2025-07-08 RX ORDER — FAMOTIDINE 40 MG/1
40 TABLET, FILM COATED ORAL DAILY
Qty: 90 TABLET | Refills: 0 | Status: SHIPPED | OUTPATIENT
Start: 2025-07-08

## 2025-07-16 DIAGNOSIS — G43.829 MENSTRUAL MIGRAINE WITHOUT STATUS MIGRAINOSUS, NOT INTRACTABLE: ICD-10-CM

## 2025-07-20 RX ORDER — TIZANIDINE HYDROCHLORIDE 4 MG/1
4 CAPSULE, GELATIN COATED ORAL 3 TIMES DAILY PRN
Qty: 270 CAPSULE | Refills: 1 | Status: SHIPPED | OUTPATIENT
Start: 2025-07-20

## (undated) DEVICE — DRAPE C-ARM MINI 5423

## (undated) DEVICE — DRAPE SHEET HALF 40X60" 9358

## (undated) DEVICE — PREP CHLORAPREP 26ML TINTED ORANGE  260815

## (undated) DEVICE — GLOVE PROTEXIS MICRO 7.0  2D73PM70

## (undated) DEVICE — PREP POVIDONE-IODINE 7.5% SCRUB 4OZ BOTTLE MDS093945

## (undated) DEVICE — SU ETHILON 3-0 FS-1 18" 669H

## (undated) DEVICE — DRSG KERLIX FLUFFS X5

## (undated) DEVICE — SOL WATER IRRIG 1000ML BOTTLE 2F7114

## (undated) DEVICE — GLOVE PROTEXIS BLUE W/NEU-THERA 6.0  2D73EB60

## (undated) DEVICE — SOL NACL 0.9% IRRIG 1000ML BOTTLE 07138-09

## (undated) DEVICE — SEAL SET MYOSURE ROD LENS SCOPE SINGLE USE 40-902

## (undated) DEVICE — ADH SKIN CLOSURE PREMIERPRO EXOFIN 1.0ML 3470

## (undated) DEVICE — PAD PERI INDIV WRAP 11" 2022

## (undated) DEVICE — CATH INTERMITTENT CLEAN-CATH FEMALE 14FR 6" VINYL LF 420614

## (undated) DEVICE — SOL NACL 0.9% IRRIG 3000ML BAG 07972-08

## (undated) DEVICE — SOL WATER IRRIG 1000ML BOTTLE 07139-09

## (undated) DEVICE — DRAPE SPLIT EENT 76X124" 3X28" 9447

## (undated) DEVICE — Device

## (undated) DEVICE — BNDG ELASTIC 3"X5YDS UNSTERILE 6611-30

## (undated) DEVICE — DRAPE IOBAN INCISE 23X17" 6650EZ

## (undated) DEVICE — SU VICRYL 2-0 CT-1 27" UND J259H

## (undated) DEVICE — NDL SPINAL 22GA 3.5" QUINCKE 405181

## (undated) DEVICE — LUBRICATING JELLY 4.25OZ

## (undated) DEVICE — ADH LIQUID MASTISOL TOPICAL VIAL 2-3ML 0523-48

## (undated) DEVICE — ESU GROUND PAD ADULT W/CORD E7507

## (undated) DEVICE — STPL SKIN 35W 054887

## (undated) DEVICE — SUCTION CURETTE 3MM ENDOMETRIAL MX140

## (undated) DEVICE — PACK EXTREMITY LATEX FREE SOP32HFFCS

## (undated) DEVICE — MARKER SKIN DOUBLE TIP W/FLEXI-RULER W/LABELS

## (undated) DEVICE — PREP POVIDONE-IODINE 10% SOLUTION 4OZ BOTTLE MDS093944

## (undated) DEVICE — GUIDEWIRE SENSOR DUAL FLEX STR 0.035"X150CM M0066703080

## (undated) DEVICE — PAD FLOOR SURGISAFE

## (undated) DEVICE — DRSG TEGADERM 2 3/8X2 3/4" 1624W

## (undated) DEVICE — GLOVE PROTEXIS MICRO 5.5  2D73PM55

## (undated) DEVICE — BASKET NITINOL TIPLESS HALO  1.5FRX120CM 554120

## (undated) DEVICE — SU MONOCRYL 5-0 P-3 18" UND Y493G

## (undated) DEVICE — DRSG TELFA 3X8" 1238

## (undated) DEVICE — BONE WAX 2.5GM W31G

## (undated) DEVICE — GLOVE PROTEXIS POWDER FREE 8.0 ORTHOPEDIC 2D73ET80

## (undated) DEVICE — STRAP KNEE/BODY 31143004

## (undated) DEVICE — SU MONOCRYL 4-0 PS-2 18" UND Y496G

## (undated) DEVICE — CATH URETERAL DUAL LUMEN 10FRX54CM M0064051000

## (undated) DEVICE — AGILITI HOLMIUM HIGH POWER LASER

## (undated) DEVICE — DECANTER VIAL 2006S

## (undated) DEVICE — GLOVE PROTEXIS W/NEU-THERA 7.5  2D73TE75

## (undated) DEVICE — GOWN LG DISP 9515

## (undated) DEVICE — STOCKING SLEEVE COMPRESSION CALF MED

## (undated) DEVICE — DRSG KERLIX 4 1/2"X4YDS ROLL 6715

## (undated) DEVICE — DRSG JUMPSTART ANTIMICROBIAL 4X4" ABS-4004

## (undated) DEVICE — GLOVE BIOGEL PI MICRO INDICATOR UNDERGLOVE SZ 6.5 48965

## (undated) DEVICE — TUBING SUCTION 12"X1/4" N612

## (undated) DEVICE — DRSG ABDOMINAL 07 1/2X8" 7197D

## (undated) DEVICE — SYR 20ML LL W/O NDL

## (undated) DEVICE — SOL NACL 0.9% IRRIG 1000ML BOTTLE 2F7124

## (undated) DEVICE — TAPE MICROFOAM 3" 1528-3

## (undated) DEVICE — SPONGE LAP 18X18" X8435

## (undated) DEVICE — URETEROSCOPE FLEX SLG USE STD 7.7FR LITHOVUE M0067913500

## (undated) DEVICE — SHEATH URETERAL ACCESS NAVIGATOR HD 11/13FRX36CM M0062502220

## (undated) DEVICE — SU MONOCRYL 3-0 PS-2 27" Y427H

## (undated) DEVICE — DRAPE EXTREMITY W/ARMBOARD 29405

## (undated) DEVICE — MANIFOLD NEPTUNE 4 PORT 700-20

## (undated) DEVICE — DRAPE STERI TOWEL SM 1000

## (undated) DEVICE — DRSG ADAPTIC 3X3" 6112

## (undated) DEVICE — ADAPTER CATH CHECK-FLO 9FR FLL 050885 G15476

## (undated) DEVICE — SU VICRYL 2-0 SH 27" UND J417H

## (undated) DEVICE — SU PDS II 2-0 SH 27" Z317H

## (undated) DEVICE — DRAPE C-ARM 60X42" 1013

## (undated) DEVICE — TUBING SYS AQUILEX BLUE INFLOW AQL-110 YLW OUTFLOW AQL-111

## (undated) DEVICE — DRSG GAUZE 4X4" TRAY

## (undated) DEVICE — PACK LAPAROSCOPY/PELVISCOPY STD

## (undated) DEVICE — ESU ELEC BLADE 2.75" COATED/INSULATED E1455

## (undated) DEVICE — ESU PENCIL SMOKE EVAC W/ROCKER SWITCH 0703-047-000

## (undated) DEVICE — DILATOR CERVICAL OS FINDER TAPER 2-4MMX21.5CM 1176

## (undated) DEVICE — NDL 22GA 1.5"

## (undated) DEVICE — BLADE KNIFE SURG 10 371110

## (undated) DEVICE — SUCTION TIP YANKAUER W/O VENT K86

## (undated) DEVICE — AGILITI FIBER SU 272UM

## (undated) DEVICE — GLOVE BIOGEL PI MICRO SZ 6.5 48565

## (undated) DEVICE — GOWN XLG DISP 9545

## (undated) DEVICE — PACK MINOR SBA15MIFSE

## (undated) DEVICE — DRSG STERI STRIP 1/2X4" R1547

## (undated) DEVICE — GLOVE PROTEXIS POWDER FREE 7.5 ORTHOPEDIC 2D73ET75

## (undated) DEVICE — DRAPE SHEET REV FOLD 3/4 9349

## (undated) RX ORDER — PROPOFOL 10 MG/ML
INJECTION, EMULSION INTRAVENOUS
Status: DISPENSED
Start: 2022-01-26

## (undated) RX ORDER — PROPOFOL 10 MG/ML
INJECTION, EMULSION INTRAVENOUS
Status: DISPENSED
Start: 2024-05-01

## (undated) RX ORDER — ACETAMINOPHEN 325 MG/1
TABLET ORAL
Status: DISPENSED
Start: 2024-05-15

## (undated) RX ORDER — ACETAMINOPHEN 325 MG/1
TABLET ORAL
Status: DISPENSED
Start: 2024-05-01

## (undated) RX ORDER — DEXMEDETOMIDINE HYDROCHLORIDE 100 UG/ML
INJECTION, SOLUTION INTRAVENOUS
Status: DISPENSED
Start: 2022-01-26

## (undated) RX ORDER — FENTANYL CITRATE 50 UG/ML
INJECTION, SOLUTION INTRAMUSCULAR; INTRAVENOUS
Status: DISPENSED
Start: 2024-05-01

## (undated) RX ORDER — GABAPENTIN 300 MG/1
CAPSULE ORAL
Status: DISPENSED
Start: 2024-05-01

## (undated) RX ORDER — DEXAMETHASONE SODIUM PHOSPHATE 4 MG/ML
INJECTION, SOLUTION INTRA-ARTICULAR; INTRALESIONAL; INTRAMUSCULAR; INTRAVENOUS; SOFT TISSUE
Status: DISPENSED
Start: 2018-12-13

## (undated) RX ORDER — ONDANSETRON 2 MG/ML
INJECTION INTRAMUSCULAR; INTRAVENOUS
Status: DISPENSED
Start: 2018-11-07

## (undated) RX ORDER — ACETAMINOPHEN 325 MG/1
TABLET ORAL
Status: DISPENSED
Start: 2022-01-26

## (undated) RX ORDER — HYDROXYZINE HYDROCHLORIDE 50 MG/1
TABLET, FILM COATED ORAL
Status: DISPENSED
Start: 2024-05-01

## (undated) RX ORDER — FENTANYL CITRATE-0.9 % NACL/PF 10 MCG/ML
PLASTIC BAG, INJECTION (ML) INTRAVENOUS
Status: DISPENSED
Start: 2022-01-26

## (undated) RX ORDER — ONDANSETRON 2 MG/ML
INJECTION INTRAMUSCULAR; INTRAVENOUS
Status: DISPENSED
Start: 2024-05-01

## (undated) RX ORDER — FENTANYL CITRATE 50 UG/ML
INJECTION, SOLUTION INTRAMUSCULAR; INTRAVENOUS
Status: DISPENSED
Start: 2022-01-26

## (undated) RX ORDER — PROPOFOL 10 MG/ML
INJECTION, EMULSION INTRAVENOUS
Status: DISPENSED
Start: 2020-10-15

## (undated) RX ORDER — KETOROLAC TROMETHAMINE 30 MG/ML
INJECTION, SOLUTION INTRAMUSCULAR; INTRAVENOUS
Status: DISPENSED
Start: 2018-11-07

## (undated) RX ORDER — DEXAMETHASONE SODIUM PHOSPHATE 4 MG/ML
INJECTION, SOLUTION INTRA-ARTICULAR; INTRALESIONAL; INTRAMUSCULAR; INTRAVENOUS; SOFT TISSUE
Status: DISPENSED
Start: 2024-05-01

## (undated) RX ORDER — ONDANSETRON 2 MG/ML
INJECTION INTRAMUSCULAR; INTRAVENOUS
Status: DISPENSED
Start: 2018-12-13

## (undated) RX ORDER — CLINDAMYCIN PHOSPHATE 900 MG/50ML
INJECTION, SOLUTION INTRAVENOUS
Status: DISPENSED
Start: 2018-12-13

## (undated) RX ORDER — LIDOCAINE HYDROCHLORIDE 10 MG/ML
INJECTION, SOLUTION EPIDURAL; INFILTRATION; INTRACAUDAL; PERINEURAL
Status: DISPENSED
Start: 2024-05-01

## (undated) RX ORDER — GLYCOPYRROLATE 0.2 MG/ML
INJECTION, SOLUTION INTRAMUSCULAR; INTRAVENOUS
Status: DISPENSED
Start: 2024-05-01

## (undated) RX ORDER — FENTANYL CITRATE 50 UG/ML
INJECTION, SOLUTION INTRAMUSCULAR; INTRAVENOUS
Status: DISPENSED
Start: 2018-11-07

## (undated) RX ORDER — PROPOFOL 10 MG/ML
INJECTION, EMULSION INTRAVENOUS
Status: DISPENSED
Start: 2024-05-15

## (undated) RX ORDER — LIDOCAINE HYDROCHLORIDE 10 MG/ML
INJECTION, SOLUTION EPIDURAL; INFILTRATION; INTRACAUDAL; PERINEURAL
Status: DISPENSED
Start: 2018-11-07

## (undated) RX ORDER — BUPIVACAINE HYDROCHLORIDE 5 MG/ML
INJECTION, SOLUTION PERINEURAL
Status: DISPENSED
Start: 2018-12-13

## (undated) RX ORDER — ONDANSETRON 2 MG/ML
INJECTION INTRAMUSCULAR; INTRAVENOUS
Status: DISPENSED
Start: 2022-01-26

## (undated) RX ORDER — PROPOFOL 10 MG/ML
INJECTION, EMULSION INTRAVENOUS
Status: DISPENSED
Start: 2018-11-07

## (undated) RX ORDER — GABAPENTIN 300 MG/1
CAPSULE ORAL
Status: DISPENSED
Start: 2024-05-15

## (undated) RX ORDER — OXYCODONE HYDROCHLORIDE 5 MG/1
TABLET ORAL
Status: DISPENSED
Start: 2022-01-26

## (undated) RX ORDER — LIDOCAINE HYDROCHLORIDE 10 MG/ML
INJECTION, SOLUTION INFILTRATION; PERINEURAL
Status: DISPENSED
Start: 2018-12-13

## (undated) RX ORDER — LIDOCAINE HYDROCHLORIDE 10 MG/ML
INJECTION, SOLUTION EPIDURAL; INFILTRATION; INTRACAUDAL; PERINEURAL
Status: DISPENSED
Start: 2018-08-17

## (undated) RX ORDER — PROPOFOL 10 MG/ML
INJECTION, EMULSION INTRAVENOUS
Status: DISPENSED
Start: 2018-12-13

## (undated) RX ORDER — LIDOCAINE HYDROCHLORIDE 10 MG/ML
INJECTION, SOLUTION INFILTRATION; PERINEURAL
Status: DISPENSED
Start: 2018-11-07

## (undated) RX ORDER — CLINDAMYCIN PHOSPHATE 150 MG/ML
INJECTION, SOLUTION INTRAVENOUS
Status: DISPENSED
Start: 2022-01-26

## (undated) RX ORDER — FENTANYL CITRATE 50 UG/ML
INJECTION, SOLUTION INTRAMUSCULAR; INTRAVENOUS
Status: DISPENSED
Start: 2018-12-13

## (undated) RX ORDER — DEXAMETHASONE SODIUM PHOSPHATE 4 MG/ML
INJECTION, SOLUTION INTRA-ARTICULAR; INTRALESIONAL; INTRAMUSCULAR; INTRAVENOUS; SOFT TISSUE
Status: DISPENSED
Start: 2018-11-07

## (undated) RX ORDER — KETOROLAC TROMETHAMINE 30 MG/ML
INJECTION, SOLUTION INTRAMUSCULAR; INTRAVENOUS
Status: DISPENSED
Start: 2024-05-01

## (undated) RX ORDER — OXYCODONE HYDROCHLORIDE 5 MG/1
TABLET ORAL
Status: DISPENSED
Start: 2024-05-15

## (undated) RX ORDER — DEXAMETHASONE SODIUM PHOSPHATE 4 MG/ML
INJECTION, SOLUTION INTRA-ARTICULAR; INTRALESIONAL; INTRAMUSCULAR; INTRAVENOUS; SOFT TISSUE
Status: DISPENSED
Start: 2022-01-26

## (undated) RX ORDER — LIDOCAINE HYDROCHLORIDE 10 MG/ML
INJECTION, SOLUTION EPIDURAL; INFILTRATION; INTRACAUDAL; PERINEURAL
Status: DISPENSED
Start: 2018-12-13

## (undated) RX ORDER — FENTANYL CITRATE 50 UG/ML
INJECTION, SOLUTION INTRAMUSCULAR; INTRAVENOUS
Status: DISPENSED
Start: 2024-05-15

## (undated) RX ORDER — FUROSEMIDE 10 MG/ML
INJECTION INTRAMUSCULAR; INTRAVENOUS
Status: DISPENSED
Start: 2024-05-15

## (undated) RX ORDER — HYDROMORPHONE HCL IN WATER/PF 6 MG/30 ML
PATIENT CONTROLLED ANALGESIA SYRINGE INTRAVENOUS
Status: DISPENSED
Start: 2024-05-01

## (undated) RX ORDER — BUPIVACAINE HYDROCHLORIDE 2.5 MG/ML
INJECTION, SOLUTION INFILTRATION; PERINEURAL
Status: DISPENSED
Start: 2022-01-26

## (undated) RX ORDER — LIDOCAINE HYDROCHLORIDE 10 MG/ML
INJECTION, SOLUTION EPIDURAL; INFILTRATION; INTRACAUDAL; PERINEURAL
Status: DISPENSED
Start: 2024-05-15

## (undated) RX ORDER — ACETAMINOPHEN 325 MG/1
TABLET ORAL
Status: DISPENSED
Start: 2018-11-07

## (undated) RX ORDER — EPINEPHRINE 1 MG/ML
INJECTION, SOLUTION, CONCENTRATE INTRAVENOUS
Status: DISPENSED
Start: 2018-08-17